# Patient Record
Sex: MALE | Race: BLACK OR AFRICAN AMERICAN | NOT HISPANIC OR LATINO | Employment: FULL TIME | URBAN - METROPOLITAN AREA
[De-identification: names, ages, dates, MRNs, and addresses within clinical notes are randomized per-mention and may not be internally consistent; named-entity substitution may affect disease eponyms.]

---

## 2023-03-02 PROBLEM — S30.1XXA ABDOMINAL WALL HEMATOMA: Status: ACTIVE | Noted: 2023-03-02

## 2023-03-02 PROBLEM — I73.9 PAD (PERIPHERAL ARTERY DISEASE) (CMS-HCC): Status: ACTIVE | Noted: 2023-03-02

## 2023-03-02 PROBLEM — E55.9 VITAMIN D DEFICIENCY: Status: ACTIVE | Noted: 2023-03-02

## 2023-03-02 PROBLEM — N52.9 ERECTILE DYSFUNCTION: Status: ACTIVE | Noted: 2023-03-02

## 2023-03-02 PROBLEM — T80.211S: Status: ACTIVE | Noted: 2023-03-02

## 2023-03-02 PROBLEM — N28.9 KIDNEY LESION, NATIVE, LEFT: Status: ACTIVE | Noted: 2023-03-02

## 2023-03-02 PROBLEM — D64.9 ANEMIA: Status: ACTIVE | Noted: 2023-03-02

## 2023-03-02 PROBLEM — E11.9 DIABETES MELLITUS (MULTI): Status: ACTIVE | Noted: 2023-03-02

## 2023-03-02 PROBLEM — R63.0 POOR APPETITE: Status: ACTIVE | Noted: 2023-03-02

## 2023-03-02 PROBLEM — E11.649 DIABETIC HYPOGLYCEMIA (MULTI): Status: ACTIVE | Noted: 2023-03-02

## 2023-03-02 PROBLEM — M79.605 PAIN OF LEFT LOWER EXTREMITY: Status: ACTIVE | Noted: 2023-03-02

## 2023-03-02 PROBLEM — R78.81 COAG NEGATIVE STAPHYLOCOCCUS BACTEREMIA: Status: ACTIVE | Noted: 2023-03-02

## 2023-03-02 PROBLEM — K63.8219 SMALL INTESTINAL BACTERIAL OVERGROWTH: Status: ACTIVE | Noted: 2023-03-02

## 2023-03-02 PROBLEM — J30.2 SEASONAL ALLERGIES: Status: ACTIVE | Noted: 2023-03-02

## 2023-03-02 PROBLEM — L02.419 ABSCESS, AXILLA: Status: ACTIVE | Noted: 2023-03-02

## 2023-03-02 PROBLEM — B19.20 HEPATITIS C VIRUS: Status: ACTIVE | Noted: 2023-03-02

## 2023-03-02 PROBLEM — I50.32 CHRONIC DIASTOLIC HEART FAILURE (MULTI): Status: ACTIVE | Noted: 2023-03-02

## 2023-03-02 PROBLEM — H61.20 CERUMEN IMPACTION: Status: ACTIVE | Noted: 2023-03-02

## 2023-03-02 PROBLEM — R50.9 FEVER WITH CHILLS: Status: ACTIVE | Noted: 2023-03-02

## 2023-03-02 PROBLEM — A04.72 CLOSTRIDIUM DIFFICILE DIARRHEA: Status: ACTIVE | Noted: 2023-03-02

## 2023-03-02 PROBLEM — K85.10 GALLSTONE PANCREATITIS (HHS-HCC): Status: ACTIVE | Noted: 2023-03-02

## 2023-03-02 PROBLEM — S62.609A FINGER FRACTURE: Status: ACTIVE | Noted: 2023-03-02

## 2023-03-02 PROBLEM — I49.1 SUPRAVENTRICULAR PREMATURE BEATS: Status: ACTIVE | Noted: 2023-03-02

## 2023-03-02 PROBLEM — H10.13 ALLERGIC CONJUNCTIVITIS OF BOTH EYES: Status: ACTIVE | Noted: 2023-03-02

## 2023-03-02 PROBLEM — I10 HYPERTENSION: Status: ACTIVE | Noted: 2023-03-02

## 2023-03-02 PROBLEM — H02.834 DERMATOCHALASIS OF LEFT UPPER EYELID: Status: ACTIVE | Noted: 2023-03-02

## 2023-03-02 PROBLEM — N18.9 CHRONIC RENAL INSUFFICIENCY: Status: ACTIVE | Noted: 2023-03-02

## 2023-03-02 PROBLEM — T85.698A MECHANICAL COMPLICATION DUE TO TISSUE GRAFT: Status: ACTIVE | Noted: 2023-03-02

## 2023-03-02 PROBLEM — I82.409 DEEP VEIN THROMBOSIS (DVT) OF LOWER EXTREMITY (MULTI): Status: ACTIVE | Noted: 2023-03-02

## 2023-03-02 PROBLEM — M17.12 ARTHRITIS OF KNEE, LEFT: Status: ACTIVE | Noted: 2023-03-02

## 2023-03-02 PROBLEM — N17.9 AKI (ACUTE KIDNEY INJURY) (CMS-HCC): Status: ACTIVE | Noted: 2023-03-02

## 2023-03-02 PROBLEM — R93.89 ABNORMAL CT OF THE CHEST: Status: ACTIVE | Noted: 2023-03-02

## 2023-03-02 PROBLEM — E61.1 LOW IRON: Status: ACTIVE | Noted: 2023-03-02

## 2023-03-02 PROBLEM — F41.8 DEPRESSION WITH ANXIETY: Status: ACTIVE | Noted: 2023-03-02

## 2023-03-02 PROBLEM — K85.90 PANCREATITIS (HHS-HCC): Status: ACTIVE | Noted: 2023-03-02

## 2023-03-02 PROBLEM — I50.32: Status: ACTIVE | Noted: 2023-03-02

## 2023-03-02 PROBLEM — T82.9XXA COMPLICATION OF VASCULAR ACCESS FOR DIALYSIS: Status: ACTIVE | Noted: 2023-03-02

## 2023-03-02 PROBLEM — B95.7 COAG NEGATIVE STAPHYLOCOCCUS BACTEREMIA: Status: ACTIVE | Noted: 2023-03-02

## 2023-03-02 PROBLEM — D84.9 IMMUNOSUPPRESSION (MULTI): Status: ACTIVE | Noted: 2023-03-02

## 2023-03-02 PROBLEM — R06.09 DYSPNEA ON EXERTION: Status: ACTIVE | Noted: 2023-03-02

## 2023-03-02 PROBLEM — Z94.0 KIDNEY REPLACED BY TRANSPLANT (HHS-HCC): Status: ACTIVE | Noted: 2023-03-02

## 2023-03-02 PROBLEM — E53.8 VITAMIN B12 DEFICIENCY: Status: ACTIVE | Noted: 2023-03-02

## 2023-03-02 PROBLEM — R10.32 LEFT LOWER QUADRANT PAIN: Status: ACTIVE | Noted: 2023-03-02

## 2023-03-02 PROBLEM — R19.7 DIARRHEA: Status: ACTIVE | Noted: 2023-03-02

## 2023-03-02 PROBLEM — R10.9 CHRONIC ABDOMINAL PAIN: Status: ACTIVE | Noted: 2023-03-02

## 2023-03-02 PROBLEM — H04.123 DRY EYES, BILATERAL: Status: ACTIVE | Noted: 2023-03-02

## 2023-03-02 PROBLEM — N20.0 RIGHT KIDNEY STONE: Status: ACTIVE | Noted: 2023-03-02

## 2023-03-02 PROBLEM — K21.9 GERD (GASTROESOPHAGEAL REFLUX DISEASE): Status: ACTIVE | Noted: 2023-03-02

## 2023-03-02 PROBLEM — R04.0 EPISTAXIS: Status: ACTIVE | Noted: 2023-03-02

## 2023-03-02 PROBLEM — M51.36 LUMBAR DEGENERATIVE DISC DISEASE: Status: ACTIVE | Noted: 2023-03-02

## 2023-03-02 PROBLEM — L02.01 FACIAL ABSCESS: Status: ACTIVE | Noted: 2023-03-02

## 2023-03-02 PROBLEM — M16.12 ARTHRITIS OF LEFT HIP: Status: ACTIVE | Noted: 2023-03-02

## 2023-03-02 PROBLEM — I50.9 CHF (CONGESTIVE HEART FAILURE) (MULTI): Status: ACTIVE | Noted: 2023-03-02

## 2023-03-02 PROBLEM — H52.10 MYOPIA: Status: ACTIVE | Noted: 2023-03-02

## 2023-03-02 PROBLEM — Z94.9 TRANSPLANT: Status: ACTIVE | Noted: 2023-03-02

## 2023-03-02 PROBLEM — E78.5 DYSLIPIDEMIA: Status: ACTIVE | Noted: 2023-03-02

## 2023-03-02 PROBLEM — L30.9 DERMATITIS: Status: ACTIVE | Noted: 2023-03-02

## 2023-03-02 PROBLEM — R09.02 HYPOXIA: Status: ACTIVE | Noted: 2023-03-02

## 2023-03-02 PROBLEM — R53.83 FATIGUE: Status: ACTIVE | Noted: 2023-03-02

## 2023-03-02 PROBLEM — G47.33 OBSTRUCTIVE SLEEP APNEA: Status: ACTIVE | Noted: 2023-03-02

## 2023-03-02 PROBLEM — E78.5 HYPERLIPIDEMIA: Status: ACTIVE | Noted: 2023-03-02

## 2023-03-02 PROBLEM — I48.0 PAROXYSMAL ATRIAL FIBRILLATION (MULTI): Status: ACTIVE | Noted: 2023-03-02

## 2023-03-02 PROBLEM — H25.813 COMBINED FORM OF AGE-RELATED CATARACT, BOTH EYES: Status: ACTIVE | Noted: 2023-03-02

## 2023-03-02 PROBLEM — H02.403 ACQUIRED INVOLUTIONAL PTOSIS OF BOTH EYELIDS: Status: ACTIVE | Noted: 2023-03-02

## 2023-03-02 PROBLEM — N18.6 ESRD (END STAGE RENAL DISEASE) (MULTI): Status: ACTIVE | Noted: 2023-03-02

## 2023-03-02 PROBLEM — R31.9 HEMATURIA: Status: ACTIVE | Noted: 2023-03-02

## 2023-03-02 PROBLEM — R39.9 SYMPTOMS INVOLVING URINARY SYSTEM: Status: ACTIVE | Noted: 2023-03-02

## 2023-03-02 PROBLEM — E66.01 MORBID OBESITY (MULTI): Status: ACTIVE | Noted: 2023-03-02

## 2023-03-02 PROBLEM — R30.0 DYSURIA: Status: ACTIVE | Noted: 2023-03-02

## 2023-03-02 PROBLEM — H52.209 ASTIGMATISM: Status: ACTIVE | Noted: 2023-03-02

## 2023-03-02 PROBLEM — M10.9 GOUT: Status: ACTIVE | Noted: 2023-03-02

## 2023-03-02 PROBLEM — H02.831 DERMATOCHALASIS OF RIGHT UPPER EYELID: Status: ACTIVE | Noted: 2023-03-02

## 2023-03-02 PROBLEM — R05.9 COUGH: Status: ACTIVE | Noted: 2023-03-02

## 2023-03-02 PROBLEM — R60.9 EDEMA: Status: ACTIVE | Noted: 2023-03-02

## 2023-03-02 PROBLEM — R52 PAIN: Status: ACTIVE | Noted: 2023-03-02

## 2023-03-02 PROBLEM — R07.9 CHEST PAIN: Status: ACTIVE | Noted: 2023-03-02

## 2023-03-02 PROBLEM — K80.20 CHOLELITHIASIS: Status: ACTIVE | Noted: 2023-03-02

## 2023-03-02 PROBLEM — H57.813 BROW PTOSIS, BILATERAL: Status: ACTIVE | Noted: 2023-03-02

## 2023-03-02 PROBLEM — I48.91 A-FIB (MULTI): Status: ACTIVE | Noted: 2023-03-02

## 2023-03-02 PROBLEM — G89.29 CHRONIC ABDOMINAL PAIN: Status: ACTIVE | Noted: 2023-03-02

## 2023-03-02 PROBLEM — K57.32 DIVERTICULITIS OF COLON: Status: ACTIVE | Noted: 2023-03-02

## 2023-03-02 PROBLEM — E86.0 DEHYDRATION: Status: ACTIVE | Noted: 2023-03-02

## 2023-03-02 PROBLEM — J18.9 PNEUMONIA: Status: ACTIVE | Noted: 2023-03-02

## 2023-03-02 PROBLEM — R06.02 SHORTNESS OF BREATH: Status: ACTIVE | Noted: 2023-03-02

## 2023-03-02 PROBLEM — J45.909 REACTIVE AIRWAY DISEASE (HHS-HCC): Status: ACTIVE | Noted: 2023-03-02

## 2023-03-02 PROBLEM — G44.209 HEADACHE, TENSION-TYPE: Status: ACTIVE | Noted: 2023-03-02

## 2023-03-02 PROBLEM — M54.16 LEFT LUMBAR RADICULOPATHY: Status: ACTIVE | Noted: 2023-03-02

## 2023-03-02 PROBLEM — R60.0 LEG EDEMA, LEFT: Status: ACTIVE | Noted: 2023-03-02

## 2023-03-02 LAB
ABO GROUP (TYPE) IN BLOOD: NORMAL
ALANINE AMINOTRANSFERASE (SGPT) (U/L) IN SER/PLAS: 10 U/L (ref 10–52)
ALBUMIN (G/DL) IN SER/PLAS: 4.2 G/DL (ref 3.4–5)
ALKALINE PHOSPHATASE (U/L) IN SER/PLAS: 295 U/L (ref 33–120)
APPEARANCE, URINE: NORMAL
ASCORBIC ACID: NORMAL MG/DL
ASPARTATE AMINOTRANSFERASE (SGOT) (U/L) IN SER/PLAS: 21 U/L (ref 9–39)
BILIRUBIN DIRECT (MG/DL) IN SER/PLAS: 0.6 MG/DL (ref 0–0.3)
BILIRUBIN TOTAL (MG/DL) IN SER/PLAS: 1.5 MG/DL (ref 0–1.2)
BILIRUBIN, URINE: NORMAL
BLOOD, URINE: NORMAL
C PEPTIDE (NG/ML) IN SER/PLAS: 2.2 NG/ML (ref 0.7–3.9)
COLOR, URINE: NORMAL
CREATININE (MG/DL) IN SER/PLAS: 5.92 MG/DL (ref 0.5–1.3)
CYTOMEGALOVIRUS IGG ANTIBODY: REACTIVE
ESTIMATED AVERAGE GLUCOSE FOR HBA1C: 108 MG/DL
FLOW AUTOCROSSMATCH: NORMAL
GFR MALE: 10 ML/MIN/1.73M2
GLUCOSE, URINE: NORMAL
HEMOGLOBIN A1C/HEMOGLOBIN TOTAL IN BLOOD: 5.4 %
HEPATITIS B VIRUS CORE AB (PRESENCE) IN SER/PLAS BY IMM: NONREACTIVE
HEPATITIS B VIRUS SURFACE AB (MIU/ML) IN SERUM: 33.1 MIU/ML
HEPATITIS B VIRUS SURFACE AG PRESENCE IN SERUM: NONREACTIVE
HEPATITIS C VIRUS AB PRESENCE IN SERUM: REACTIVE
HIV 1/ 2 AG/AB SCREEN: NONREACTIVE
HLA CLASS I ANTIBODY SCREEN, FLOW CYTOMETRY: NORMAL
HLA CLASS II ANTIBODY SCREEN, FLOW CYTOMETRY: NORMAL
KETONES, URINE: NORMAL
LEUKOCYTE ESTERASE, URINE: NORMAL
NITRITE, URINE: NORMAL
PH, URINE: NORMAL
PHOSPHATE (MG/DL) IN SER/PLAS: 3.8 MG/DL (ref 2.5–4.9)
PROSTATE SPECIFIC AG (NG/ML) IN SER/PLAS: 0.24 NG/ML (ref 0–4)
PROTEIN TOTAL: 7.1 G/DL (ref 6.4–8.2)
PROTEIN, URINE: NORMAL
RH FACTOR: NORMAL
SPECIFIC GRAVITY, URINE: NORMAL
SYPHILIS TOTAL AB: NONREACTIVE
T-SPOT. TB INTERPRETATION: NORMAL
UREA NITROGEN (MG/DL) IN SER/PLAS: 19 MG/DL (ref 6–23)
UROBILINOGEN, URINE: NORMAL
VARICELLA ZOSTER IGG: POSITIVE

## 2023-03-02 RX ORDER — EPINASTINE HYDROCHLORIDE 0.5 MG/ML
SOLUTION/ DROPS OPHTHALMIC
COMMUNITY
Start: 2021-06-09

## 2023-03-02 RX ORDER — ERGOCALCIFEROL 1.25 MG/1
CAPSULE ORAL
COMMUNITY
Start: 2020-09-03

## 2023-03-02 RX ORDER — CHOLESTYRAMINE 4 G/9G
POWDER, FOR SUSPENSION ORAL
Status: ON HOLD | COMMUNITY
Start: 2022-06-21 | End: 2023-10-10 | Stop reason: WASHOUT

## 2023-03-02 RX ORDER — ALLOPURINOL 300 MG/1
TABLET ORAL
COMMUNITY
End: 2023-06-28 | Stop reason: SINTOL

## 2023-03-02 RX ORDER — BLOOD-GLUCOSE METER
KIT MISCELLANEOUS
COMMUNITY
Start: 2018-11-27 | End: 2023-07-20 | Stop reason: ALTCHOICE

## 2023-03-02 RX ORDER — SEVELAMER CARBONATE 800 MG/1
TABLET, FILM COATED ORAL
COMMUNITY
Start: 2021-07-23 | End: 2023-10-06 | Stop reason: ALTCHOICE

## 2023-03-02 RX ORDER — GABAPENTIN 300 MG/1
300 CAPSULE ORAL NIGHTLY
COMMUNITY
Start: 2021-07-06 | End: 2023-06-27 | Stop reason: SDUPTHER

## 2023-03-02 RX ORDER — VIT B COMP NO.3/FOLIC/C/BIOTIN 1 MG-60 MG
1 TABLET ORAL DAILY
COMMUNITY
Start: 2021-08-09 | End: 2023-10-06 | Stop reason: SDUPTHER

## 2023-03-02 RX ORDER — OMEPRAZOLE 40 MG/1
1 CAPSULE, DELAYED RELEASE ORAL 2 TIMES DAILY
COMMUNITY
Start: 2012-12-30 | End: 2023-10-06 | Stop reason: ALTCHOICE

## 2023-03-02 RX ORDER — DULOXETIN HYDROCHLORIDE 30 MG/1
30 CAPSULE, DELAYED RELEASE ORAL DAILY
COMMUNITY
End: 2023-06-27 | Stop reason: SDUPTHER

## 2023-03-02 RX ORDER — INSULIN ASPART 100 [IU]/ML
INJECTION, SOLUTION INTRAVENOUS; SUBCUTANEOUS
COMMUNITY
Start: 2019-02-27 | End: 2023-07-20 | Stop reason: ALTCHOICE

## 2023-03-02 RX ORDER — SILDENAFIL 100 MG/1
TABLET, FILM COATED ORAL
Status: ON HOLD | COMMUNITY
Start: 2018-05-11 | End: 2023-10-10 | Stop reason: WASHOUT

## 2023-03-02 RX ORDER — TORSEMIDE 20 MG/1
TABLET ORAL
COMMUNITY
Start: 2017-10-03 | End: 2023-05-10 | Stop reason: ALTCHOICE

## 2023-03-02 RX ORDER — METOPROLOL TARTRATE 50 MG/1
1 TABLET ORAL EVERY 12 HOURS
COMMUNITY
Start: 2013-01-14 | End: 2023-10-06 | Stop reason: SDUPTHER

## 2023-03-02 RX ORDER — MULTIVITAMIN
1 TABLET ORAL DAILY
COMMUNITY

## 2023-03-02 RX ORDER — AMLODIPINE BESYLATE 10 MG/1
1 TABLET ORAL DAILY
COMMUNITY
Start: 2018-01-29 | End: 2023-05-10 | Stop reason: SDUPTHER

## 2023-03-02 RX ORDER — ASPIRIN 81 MG/1
1 TABLET ORAL DAILY
COMMUNITY
Start: 2022-01-06 | End: 2023-10-06 | Stop reason: SDUPTHER

## 2023-03-02 RX ORDER — BLOOD SUGAR DIAGNOSTIC
STRIP MISCELLANEOUS
COMMUNITY
Start: 2018-02-26 | End: 2023-07-20 | Stop reason: ALTCHOICE

## 2023-03-02 RX ORDER — PREDNISONE 5 MG/1
5 TABLET ORAL DAILY
COMMUNITY
Start: 2013-07-18 | End: 2023-07-20 | Stop reason: ALTCHOICE

## 2023-03-03 LAB
EBV INTERPRETATION: ABNORMAL
EPSTEIN-BARR VCA IGG: POSITIVE
EPSTEIN-BARR VCA IGM: NEGATIVE
EPSTEIN-BARR VIRUS EARLY ANTIGEN ANTIBODY, IGG: POSITIVE
EPSTIEN-BARR NUCLEAR ANTIGEN AB: POSITIVE
HCV PCR QUANT: NOT DETECTED IU/ML
HCV RNA, PCR LOG: NORMAL LOG10 IU/ML

## 2023-03-04 LAB
NIL(NEG) CONTROL SPOT COUNT: NORMAL
PANEL A SPOT COUNT: 0
PANEL B SPOT COUNT: 0
POS CONTROL SPOT COUNT: NORMAL
T-SPOT. TB INTERPRETATION: NEGATIVE

## 2023-03-06 LAB
AMPHETAMINE SCREEN BLOOD: NEGATIVE NG/ML
BARBITURATE SCREEN BLOOD: NEGATIVE NG/ML
BENZODIAZEPINES SCREEN BLOOD: NEGATIVE NG/ML
BUPRENORPHINE SCREEN BLOOD: NEGATIVE NG/ML
CANNABINOIDS SCREEN BLOOD: NEGATIVE NG/ML
COCAINE SCREEN BLOOD: NEGATIVE NG/ML
DRUG SCREEN COMMENT BLOOD: NORMAL
METHADONE SCREEN BLOOD: NEGATIVE NG/ML
METHAMPHETAMINE, BLOOD, SCREEN: NEGATIVE NG/ML
OPIATE SCREEN BLOOD: NEGATIVE NG/ML
OXYCODONE SCREEN BLOOD: NEGATIVE NG/ML
PHENCYCLIDINE SCREEN BLOOD: NEGATIVE NG/ML

## 2023-03-07 LAB
COTININE BLOOD QUANTITATIVE: <5 NG/ML
NICOTINE BLOOD QUANTITATIVE: <5 NG/ML

## 2023-03-15 ENCOUNTER — OFFICE VISIT (OUTPATIENT)
Dept: PRIMARY CARE | Facility: CLINIC | Age: 58
End: 2023-03-15
Payer: MEDICARE

## 2023-03-15 VITALS
HEART RATE: 87 BPM | SYSTOLIC BLOOD PRESSURE: 144 MMHG | BODY MASS INDEX: 33.19 KG/M2 | DIASTOLIC BLOOD PRESSURE: 78 MMHG | RESPIRATION RATE: 20 BRPM | WEIGHT: 238 LBS

## 2023-03-15 DIAGNOSIS — N18.6 ESRD (END STAGE RENAL DISEASE) (MULTI): ICD-10-CM

## 2023-03-15 DIAGNOSIS — E66.09 CLASS 1 OBESITY DUE TO EXCESS CALORIES WITH SERIOUS COMORBIDITY AND BODY MASS INDEX (BMI) OF 33.0 TO 33.9 IN ADULT: ICD-10-CM

## 2023-03-15 DIAGNOSIS — F33.41 RECURRENT MAJOR DEPRESSIVE DISORDER, IN PARTIAL REMISSION (CMS-HCC): ICD-10-CM

## 2023-03-15 DIAGNOSIS — I73.9 PAD (PERIPHERAL ARTERY DISEASE) (CMS-HCC): ICD-10-CM

## 2023-03-15 DIAGNOSIS — R60.1 ANASARCA: ICD-10-CM

## 2023-03-15 DIAGNOSIS — I50.9 CHRONIC CONGESTIVE HEART FAILURE, UNSPECIFIED HEART FAILURE TYPE (MULTI): Primary | ICD-10-CM

## 2023-03-15 DIAGNOSIS — I48.0 PAROXYSMAL ATRIAL FIBRILLATION (MULTI): ICD-10-CM

## 2023-03-15 DIAGNOSIS — Z94.0 KIDNEY REPLACED BY TRANSPLANT (HHS-HCC): ICD-10-CM

## 2023-03-15 PROCEDURE — 3008F BODY MASS INDEX DOCD: CPT | Performed by: INTERNAL MEDICINE

## 2023-03-15 PROCEDURE — 99214 OFFICE O/P EST MOD 30 MIN: CPT | Performed by: INTERNAL MEDICINE

## 2023-03-15 PROCEDURE — 3078F DIAST BP <80 MM HG: CPT | Performed by: INTERNAL MEDICINE

## 2023-03-15 PROCEDURE — 3066F NEPHROPATHY DOC TX: CPT | Performed by: INTERNAL MEDICINE

## 2023-03-15 PROCEDURE — 3077F SYST BP >= 140 MM HG: CPT | Performed by: INTERNAL MEDICINE

## 2023-03-15 PROCEDURE — 3044F HG A1C LEVEL LT 7.0%: CPT | Performed by: INTERNAL MEDICINE

## 2023-03-15 ASSESSMENT — ENCOUNTER SYMPTOMS
RESPIRATORY NEGATIVE: 1
FATIGUE: 1
NEUROLOGICAL NEGATIVE: 1
ARTHRALGIAS: 1
CARDIOVASCULAR NEGATIVE: 1
BACK PAIN: 1
ABDOMINAL PAIN: 1
DYSPHORIC MOOD: 1
EYES NEGATIVE: 1

## 2023-03-15 NOTE — PROGRESS NOTES
Subjective   Patient ID: Nghia Hall is a 57 y.o. male who presents for Discuss Disability .    HPI     Patient with a ESRD on hemodialysis CHF diabetes A-fib PAD depression obesity status post renal transplant presents for evaluation for disability.  Patient goes to hemodialysis 3 days a week misses quite a bit of time from work due to dialysis plus recovered from dialysis including fatigue and just general malaise along with recovery time.  Patient has been under his dry weight 110 kg last hemodialysis down to 106 kg.  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  Does have some passive fluid of the right breast tissue with some discomfort in that area comes and goes in terms of soreness.  Symptoms conditions moderate in severity stable controlled past 3 months.    Review of Systems   Constitutional:  Positive for fatigue.   HENT: Negative.     Eyes: Negative.    Respiratory: Negative.     Cardiovascular: Negative.    Gastrointestinal:  Positive for abdominal pain.   Genitourinary: Negative.         Stable esrd   Musculoskeletal:  Positive for arthralgias and back pain.   Skin: Negative.    Neurological: Negative.    Psychiatric/Behavioral:  Positive for dysphoric mood.        Objective   /78 (Patient Position: Sitting)   Pulse 87   Resp 20   Wt 108 kg (238 lb)   BMI 33.19 kg/m²     Physical Exam  Constitutional:       Appearance: Normal appearance. He is obese.   HENT:      Head: Normocephalic.      Nose: Nose normal.   Pulmonary:      Effort: Pulmonary effort is normal.   Abdominal:      Palpations: Abdomen is soft.   Musculoskeletal:         General: Normal range of motion.      Cervical back: Normal range of motion.      Right lower leg: No edema.      Left lower  leg: No edema.   Skin:     General: Skin is warm and dry.      Comments: Anasarca with fluid passive congestion right breast which is soft and NT   Neurological:      General: No focal deficit present.      Mental Status: He is alert and oriented to person, place, and time. Mental status is at baseline.   Psychiatric:         Behavior: Behavior normal.         Thought Content: Thought content normal.         Judgment: Judgment normal.      Comments: Flat affect         Assessment/Plan   Diagnoses and all orders for this visit:  Chronic congestive heart failure, unspecified heart failure type (CMS/[facility])  ESRD (end stage renal disease) (CMS/[facility])  Paroxysmal atrial fibrillation (CMS/[facility])  PAD (peripheral artery disease) (CMS/[facility])  Kidney replaced by transplant  Anasarca  Recurrent major depressive disorder, in partial remission (CMS/[facility])  Class 1 obesity due to excess calories with serious comorbidity and body mass index (BMI) of 33.0 to 33.9 in adult       ESRD on hemodialysis CHF diabetes A-fib obesity PAD depression status post renal transplant patient continues to decline with multiple comorbidities malaise and fatigue body pains from anasarca as fluid on board and his hemodialysis continue to eat away at him unfortunately.  I would certainly support permanent disability which we discussed.  I would look into Social Security for permanent disability along with his long-term care policy through his job.  I will forward records but would fully support permanent disability given how sick notices been particular with all the hospitalizations for all of his medical problems as we well know.  Continue hemodialysis care per nephrology.  Volume status stable.  We will help him in any way we can.    This note dictated with Dragon software, not proofread for errors or punctuation.

## 2023-03-16 ENCOUNTER — TELEPHONE (OUTPATIENT)
Dept: PRIMARY CARE | Facility: CLINIC | Age: 58
End: 2023-03-16
Payer: COMMERCIAL

## 2023-03-16 NOTE — TELEPHONE ENCOUNTER
Patient just saw you yesterday. He went to dialysis today and feels like his legs  (where the grafs are) give out each time he gets up from the table. States it's the second time it has happened.     He wants to know who should he see or what to do. [phone]    Please send to Ritika so she can call him on Friday-Thank you!

## 2023-03-17 DIAGNOSIS — M16.10 ARTHRITIS, HIP: ICD-10-CM

## 2023-03-17 DIAGNOSIS — M17.10 ARTHRITIS OF KNEE: Primary | ICD-10-CM

## 2023-03-27 NOTE — TELEPHONE ENCOUNTER
Pt called in and stated that he has been having stomach issues off and on for past couple months. Pt states he has tightness and feels dehydrated cant stop using bathroom. Pt needs advice he also states that he had this same problem in the past and he is on dialysis so he cannot drink a lot of water.      # [phone]

## 2023-04-05 DIAGNOSIS — R19.8 ABDOMINAL TIGHTNESS: Primary | ICD-10-CM

## 2023-04-06 ENCOUNTER — DOCUMENTATION (OUTPATIENT)
Dept: PRIMARY CARE | Facility: CLINIC | Age: 58
End: 2023-04-06

## 2023-04-06 ENCOUNTER — OFFICE VISIT (OUTPATIENT)
Dept: PRIMARY CARE | Facility: CLINIC | Age: 58
End: 2023-04-06
Payer: MEDICARE

## 2023-04-06 ENCOUNTER — LAB (OUTPATIENT)
Dept: LAB | Facility: LAB | Age: 58
End: 2023-04-06
Payer: MEDICARE

## 2023-04-06 VITALS
BODY MASS INDEX: 32.48 KG/M2 | HEART RATE: 72 BPM | RESPIRATION RATE: 20 BRPM | HEIGHT: 71 IN | SYSTOLIC BLOOD PRESSURE: 117 MMHG | WEIGHT: 232 LBS | DIASTOLIC BLOOD PRESSURE: 64 MMHG

## 2023-04-06 DIAGNOSIS — R94.6 ABNORMAL RESULTS OF THYROID FUNCTION STUDIES: ICD-10-CM

## 2023-04-06 DIAGNOSIS — E11.22 TYPE 2 DIABETES MELLITUS WITH CHRONIC KIDNEY DISEASE ON CHRONIC DIALYSIS, WITH LONG-TERM CURRENT USE OF INSULIN (MULTI): ICD-10-CM

## 2023-04-06 DIAGNOSIS — Z99.2 DEPENDENCE ON RENAL DIALYSIS (CMS-HCC): ICD-10-CM

## 2023-04-06 DIAGNOSIS — D47.Z1 POST-TRANSPLANT LYMPHOPROLIFERATIVE DISORDER (PTLD) (CODE): ICD-10-CM

## 2023-04-06 DIAGNOSIS — E66.01 MORBID (SEVERE) OBESITY DUE TO EXCESS CALORIES (MULTI): ICD-10-CM

## 2023-04-06 DIAGNOSIS — G89.29 CHRONIC ABDOMINAL PAIN: Primary | ICD-10-CM

## 2023-04-06 DIAGNOSIS — R19.7 DIARRHEA, UNSPECIFIED TYPE: ICD-10-CM

## 2023-04-06 DIAGNOSIS — N18.6 TYPE 2 DIABETES MELLITUS WITH CHRONIC KIDNEY DISEASE ON CHRONIC DIALYSIS, WITH LONG-TERM CURRENT USE OF INSULIN (MULTI): ICD-10-CM

## 2023-04-06 DIAGNOSIS — K63.8219 SMALL INTESTINAL BACTERIAL OVERGROWTH: ICD-10-CM

## 2023-04-06 DIAGNOSIS — R10.9 CHRONIC ABDOMINAL PAIN: Primary | ICD-10-CM

## 2023-04-06 DIAGNOSIS — D84.9 IMMUNODEFICIENCY, UNSPECIFIED (MULTI): ICD-10-CM

## 2023-04-06 DIAGNOSIS — R10.9 CHRONIC ABDOMINAL PAIN: ICD-10-CM

## 2023-04-06 DIAGNOSIS — I48.0 PAROXYSMAL ATRIAL FIBRILLATION (MULTI): ICD-10-CM

## 2023-04-06 DIAGNOSIS — N18.6 ESRD (END STAGE RENAL DISEASE) (MULTI): ICD-10-CM

## 2023-04-06 DIAGNOSIS — Z79.4 TYPE 2 DIABETES MELLITUS WITH CHRONIC KIDNEY DISEASE ON CHRONIC DIALYSIS, WITH LONG-TERM CURRENT USE OF INSULIN (MULTI): ICD-10-CM

## 2023-04-06 DIAGNOSIS — G89.29 CHRONIC ABDOMINAL PAIN: ICD-10-CM

## 2023-04-06 DIAGNOSIS — Z99.2 TYPE 2 DIABETES MELLITUS WITH CHRONIC KIDNEY DISEASE ON CHRONIC DIALYSIS, WITH LONG-TERM CURRENT USE OF INSULIN (MULTI): ICD-10-CM

## 2023-04-06 DIAGNOSIS — N25.81 SECONDARY HYPERPARATHYROIDISM OF RENAL ORIGIN (MULTI): ICD-10-CM

## 2023-04-06 LAB
ALANINE AMINOTRANSFERASE (SGPT) (U/L) IN SER/PLAS: 6 U/L (ref 10–52)
ALBUMIN (G/DL) IN SER/PLAS: 3.7 G/DL (ref 3.4–5)
ALKALINE PHOSPHATASE (U/L) IN SER/PLAS: 236 U/L (ref 33–120)
ANION GAP IN SER/PLAS: 17 MMOL/L (ref 10–20)
ASPARTATE AMINOTRANSFERASE (SGOT) (U/L) IN SER/PLAS: 20 U/L (ref 9–39)
BASOPHILS (10*3/UL) IN BLOOD BY AUTOMATED COUNT: 0.08 X10E9/L (ref 0–0.1)
BASOPHILS/100 LEUKOCYTES IN BLOOD BY AUTOMATED COUNT: 2.8 % (ref 0–2)
BILIRUBIN TOTAL (MG/DL) IN SER/PLAS: 2 MG/DL (ref 0–1.2)
CALCIUM (MG/DL) IN SER/PLAS: 8.7 MG/DL (ref 8.6–10.3)
CARBON DIOXIDE, TOTAL (MMOL/L) IN SER/PLAS: 27 MMOL/L (ref 21–32)
CHLORIDE (MMOL/L) IN SER/PLAS: 95 MMOL/L (ref 98–107)
CREATININE (MG/DL) IN SER/PLAS: 5.6 MG/DL (ref 0.5–1.3)
EOSINOPHILS (10*3/UL) IN BLOOD BY AUTOMATED COUNT: 0.45 X10E9/L (ref 0–0.7)
EOSINOPHILS/100 LEUKOCYTES IN BLOOD BY AUTOMATED COUNT: 15.7 % (ref 0–6)
ERYTHROCYTE DISTRIBUTION WIDTH (RATIO) BY AUTOMATED COUNT: 17.9 % (ref 11.5–14.5)
ERYTHROCYTE MEAN CORPUSCULAR HEMOGLOBIN CONCENTRATION (G/DL) BY AUTOMATED: 30.6 G/DL (ref 32–36)
ERYTHROCYTE MEAN CORPUSCULAR VOLUME (FL) BY AUTOMATED COUNT: 95 FL (ref 80–100)
ERYTHROCYTES (10*6/UL) IN BLOOD BY AUTOMATED COUNT: 3.27 X10E12/L (ref 4.5–5.9)
GFR MALE: 11 ML/MIN/1.73M2
GLUCOSE (MG/DL) IN SER/PLAS: 67 MG/DL (ref 74–99)
HEMATOCRIT (%) IN BLOOD BY AUTOMATED COUNT: 31 % (ref 41–52)
HEMOGLOBIN (G/DL) IN BLOOD: 9.5 G/DL (ref 13.5–17.5)
IMMATURE GRANULOCYTES/100 LEUKOCYTES IN BLOOD BY AUTOMATED COUNT: 0 % (ref 0–0.9)
LEUKOCYTES (10*3/UL) IN BLOOD BY AUTOMATED COUNT: 2.9 X10E9/L (ref 4.4–11.3)
LYMPHOCYTES (10*3/UL) IN BLOOD BY AUTOMATED COUNT: 0.98 X10E9/L (ref 1.2–4.8)
LYMPHOCYTES/100 LEUKOCYTES IN BLOOD BY AUTOMATED COUNT: 34.1 % (ref 13–44)
MONOCYTES (10*3/UL) IN BLOOD BY AUTOMATED COUNT: 0.43 X10E9/L (ref 0.1–1)
MONOCYTES/100 LEUKOCYTES IN BLOOD BY AUTOMATED COUNT: 15 % (ref 2–10)
NEUTROPHILS (10*3/UL) IN BLOOD BY AUTOMATED COUNT: 0.93 X10E9/L (ref 1.2–7.7)
NEUTROPHILS/100 LEUKOCYTES IN BLOOD BY AUTOMATED COUNT: 32.4 % (ref 40–80)
PLATELETS (10*3/UL) IN BLOOD AUTOMATED COUNT: 138 X10E9/L (ref 150–450)
POTASSIUM (MMOL/L) IN SER/PLAS: 4.7 MMOL/L (ref 3.5–5.3)
PROTEIN TOTAL: 6.6 G/DL (ref 6.4–8.2)
SODIUM (MMOL/L) IN SER/PLAS: 134 MMOL/L (ref 136–145)
THYROTROPIN (MIU/L) IN SER/PLAS BY DETECTION LIMIT <= 0.05 MIU/L: 3.68 MIU/L (ref 0.44–3.98)
UREA NITROGEN (MG/DL) IN SER/PLAS: 11 MG/DL (ref 6–23)

## 2023-04-06 PROCEDURE — 36415 COLL VENOUS BLD VENIPUNCTURE: CPT

## 2023-04-06 PROCEDURE — 84443 ASSAY THYROID STIM HORMONE: CPT

## 2023-04-06 PROCEDURE — 3008F BODY MASS INDEX DOCD: CPT | Performed by: INTERNAL MEDICINE

## 2023-04-06 PROCEDURE — 3044F HG A1C LEVEL LT 7.0%: CPT | Performed by: INTERNAL MEDICINE

## 2023-04-06 PROCEDURE — 3078F DIAST BP <80 MM HG: CPT | Performed by: INTERNAL MEDICINE

## 2023-04-06 PROCEDURE — 3074F SYST BP LT 130 MM HG: CPT | Performed by: INTERNAL MEDICINE

## 2023-04-06 PROCEDURE — 99215 OFFICE O/P EST HI 40 MIN: CPT | Performed by: INTERNAL MEDICINE

## 2023-04-06 PROCEDURE — 3066F NEPHROPATHY DOC TX: CPT | Performed by: INTERNAL MEDICINE

## 2023-04-06 PROCEDURE — 85025 COMPLETE CBC W/AUTO DIFF WBC: CPT

## 2023-04-06 PROCEDURE — 80053 COMPREHEN METABOLIC PANEL: CPT

## 2023-04-06 RX ORDER — DIPHENOXYLATE HYDROCHLORIDE AND ATROPINE SULFATE 2.5; .025 MG/1; MG/1
1 TABLET ORAL 4 TIMES DAILY PRN
Qty: 40 TABLET | Refills: 1 | Status: SHIPPED | OUTPATIENT
Start: 2023-04-06 | End: 2023-04-26

## 2023-04-06 ASSESSMENT — ENCOUNTER SYMPTOMS
RESPIRATORY NEGATIVE: 1
NEUROLOGICAL NEGATIVE: 1
ARTHRALGIAS: 1
CARDIOVASCULAR NEGATIVE: 1
BACK PAIN: 1
FATIGUE: 1
EYES NEGATIVE: 1
DYSPHORIC MOOD: 1
ABDOMINAL PAIN: 1

## 2023-04-06 NOTE — PROGRESS NOTES
Subjective   Patient ID: Nghia Hall is a 58 y.o. male who presents for Letter for School/Work.    HPI     Review of Systems    Objective   There were no vitals taken for this visit.    Physical Exam    Assessment/Plan

## 2023-04-06 NOTE — PROGRESS NOTES
"Subjective   Patient ID: Nghia Hall is a 58 y.o. male who presents for Follow-up.    HPI     Patient with a ESRD on hemodialysis CHF diabetes A-fib PAD depression obesity status post renal transplant presents for evaluation for disability.  Patient goes to hemodialysis 3 days a week.  Patient's been having ongoing diarrhea with abdominal cramps for the better part of a month whenever he eats he has a watery bowel movement afterwards occasional formed slight nausea but no emesis tired during the day hard time sleeping at night.  Feels cold left side of his abdomen is sore from anasarca particularly lies on the left side not much of an appetite.  Reviewed his extensive work-up with CT and pelvis and PET scanning plus lymph node biopsy that fortunately did not disclose malignancy.  Abdominal lymph node presence may be consistent with low-grade lymphoproliferative disorder so he is followed with hematology at this point in time.  He had colonoscopy done that was negative last year.  Has tried Imodium which has not helped.  Generally just does not feel well.  Symptoms conditions moderate in severity stable controlled past 1 month.    Review of Systems   Constitutional:  Positive for fatigue.   HENT: Negative.     Eyes: Negative.    Respiratory: Negative.     Cardiovascular: Negative.    Gastrointestinal:  Positive for abdominal pain.   Genitourinary: Negative.         Stable esrd   Musculoskeletal:  Positive for arthralgias and back pain.   Skin: Negative.    Neurological: Negative.    Psychiatric/Behavioral:  Positive for dysphoric mood.        Objective   /64 (Patient Position: Sitting)   Pulse 72   Resp 20   Ht 1.803 m (5' 11\")   Wt 105 kg (232 lb)   BMI 32.36 kg/m²     Physical Exam  Constitutional:       Appearance: Normal appearance. He is obese.   HENT:      Head: Normocephalic.      Nose: Nose normal.   Pulmonary:      Effort: Pulmonary effort is normal.   Abdominal:      Palpations: Abdomen is " soft.   Musculoskeletal:         General: Normal range of motion.      Cervical back: Normal range of motion.      Right lower leg: No edema.      Left lower leg: No edema.   Skin:     General: Skin is warm and dry.      Comments: Anasarca with fluid passive congestion right breast which is soft and NT   Neurological:      General: No focal deficit present.      Mental Status: He is alert and oriented to person, place, and time. Mental status is at baseline.   Psychiatric:         Behavior: Behavior normal.         Thought Content: Thought content normal.         Judgment: Judgment normal.      Comments: Flat affect         Assessment/Plan   Diagnoses and all orders for this visit:  Chronic abdominal pain  -     CBC and Auto Differential; Future  -     Comprehensive Metabolic Panel; Future  -     TSH with reflex to Free T4 if abnormal; Future  -     CT abdomen pelvis wo IV contrast; Future  Diarrhea, unspecified type  -     Pancreatic Elastase, Fecal; Future  -     Stool Pathogen Panel, PCR; Future  -     C. difficile, PCR; Future  -     Referral to Gastroenterology; Future  -     diphenoxylate-atropine (Lomotil) 2.5-0.025 mg tablet; Take 1 tablet by mouth 4 times a day as needed for diarrhea for up to 20 days.  Small intestinal bacterial overgrowth  -     Breath Hydrogen Test-Dextrose (Urea); Future  Abnormal results of thyroid function studies  -     TSH with reflex to Free T4 if abnormal; Future  ESRD (end stage renal disease) (CMS/[facility])  Dependence on renal dialysis (CMS/[facility])  Secondary hyperparathyroidism of renal origin (CMS/[facility])  Post-transplant lymphoproliferative disorder (PTLD) (CODE) (CMS/[facility])  Immunodeficiency, unspecified (CMS/[facility])  Type 2 diabetes mellitus with chronic kidney disease on chronic dialysis, with long-term current use of insulin (CMS/[facility])  Morbid (severe) obesity due to excess calories (CMS/[facility])  Paroxysmal atrial fibrillation (CMS/[facility])       ESRD on hemodialysis CHF diabetes A-fib obesity  PAD depression status post renal transplant presents with ongoing diarrhea and some abdominal cramping.  He has lymphadenopathy in the abdomen with negative biopsy and negative PET scanning potential low-grade lymphoproliferative disorder but no evidence of malignancy and he will continue follow-up care with hematology.  Colonoscopy from last year was negative.  We will run his laboratories and will order C. difficile plus stool pathogen PCR.  This will be to rule out infectious process.  We should rule out pancreatic insufficiency with pancreatic elastase.  We will rule out SIBO with hydrogen breath testing.  We will try him on Lomotil for the diarrhea.  And will also refer him to GI for further investigation.  We will see what the work-up shows.  He will continue dialysis care as per nephrology for his ESRD.    This note dictated with Dragon software, not proofread for errors or punctuation.

## 2023-04-08 LAB
CAMPYLOBACTER GP: NOT DETECTED
NOROVIRUS GI/GII: NOT DETECTED
ROTAVIRUS A: NOT DETECTED
SALMONELLA SP.: NOT DETECTED
SHIGA TOXIN 1: NOT DETECTED
SHIGA TOXIN 2: NOT DETECTED
SHIGELLA SP.: NOT DETECTED
VIBRIO GRP.: NOT DETECTED
YERSINIA ENTEROCOLITICA: NOT DETECTED

## 2023-04-09 LAB — C. DIFFICILE TOXIN, PCR: NOT DETECTED

## 2023-04-14 LAB — PANCREATIC ELASTASE, FECAL: 229 UG/G

## 2023-04-24 ENCOUNTER — PATIENT OUTREACH (OUTPATIENT)
Dept: PRIMARY CARE | Facility: CLINIC | Age: 58
End: 2023-04-24
Payer: COMMERCIAL

## 2023-04-24 NOTE — PROGRESS NOTES
[place]  [facility] [place] Diagnosis:  Acute pancreatitis, covid symptoms   Admission Date:   4/12/23   Discharge Date:   4/22/23     PCP Appointment Date: task to office due to MD emergency   Specialist Appointment Date:     Hospital Encounter and Summary: Linked   See discharge assessment below for further details   No new scripts given at discharge    Engagement  Call Start Time: 1200 (4/24/2023 12:13 PM)    Medications  Medications reviewed with patient/caregiver?: No (no new meds given at discharge) (4/24/2023 12:13 PM)  Is the patient having any side effects they believe may be caused by any medication additions or changes?: No (4/24/2023 12:13 PM)  Does the patient have all medications ordered at discharge?: Not applicable (4/24/2023 12:13 PM)  Care Management Interventions: No intervention needed (4/24/2023 12:13 PM)  Prescription Comments: all meds remained the same (4/24/2023 12:13 PM)  Is the patient taking all medications as directed (includes completed medication regime)?: Yes (4/24/2023 12:13 PM)  Medication Comments: all meds remained the same (4/24/2023 12:13 PM)    Appointments  Does the patient have a primary care provider?: Yes (4/24/2023 12:13 PM)  Care Management Interventions: Educated patient on importance of making appointment (4/24/2023 12:13 PM)  Has the patient kept scheduled appointments due by today?: No (4/24/2023 12:13 PM)  What is preventing the patient from keeping their appointments?: Doesn't understand importance (4/24/2023 12:13 PM)  Care Management Interventions: Educated on importance of keeping appointment (4/24/2023 12:13 PM)    Self Management  What is the home health agency?: pt denies neeed (4/24/2023 12:13 PM)  Has home health visited the patient within 72 hours of discharge?: Not applicable (4/24/2023 12:13 PM)    Patient Teaching  Does the patient have access to their discharge instructions?: Yes (4/24/2023 12:13 PM)  Care Management Interventions: Reviewed  instructions with patient (4/24/2023 12:13 PM)  What is the patient's perception of their health status since discharge?: Improving (4/24/2023 12:13 PM)  Is the patient/caregiver able to teach back the hierarchy of who to call/visit for symptoms/problems? PCP, Specialist, Home Health nurse, Urgent Care, ED, 911: Yes (4/24/2023 12:13 PM)    Wrap Up  Wrap Up Additional Comments: pt reports doing well at home. denies need for hhc. has transporation to HD. pt called office for pcp appt but was told md out for emergency today so this  cm will send task to have offcie schedule follow up. (4/24/2023 12:13 PM)  Call End Time: 1216 (4/24/2023 12:13 PM)

## 2023-05-03 ENCOUNTER — APPOINTMENT (OUTPATIENT)
Dept: PRIMARY CARE | Facility: CLINIC | Age: 58
End: 2023-05-03
Payer: COMMERCIAL

## 2023-05-08 ENCOUNTER — PATIENT OUTREACH (OUTPATIENT)
Dept: PRIMARY CARE | Facility: CLINIC | Age: 58
End: 2023-05-08
Payer: COMMERCIAL

## 2023-05-08 RX ORDER — SENNOSIDES 8.6 MG/1
TABLET ORAL
COMMUNITY
Start: 2023-05-07 | End: 2023-05-11

## 2023-05-08 RX ORDER — DOCUSATE SODIUM 100 MG/1
CAPSULE, LIQUID FILLED ORAL 2 TIMES DAILY
COMMUNITY
Start: 2023-05-07 | End: 2023-05-11

## 2023-05-08 RX ORDER — METHOCARBAMOL 500 MG/1
TABLET, FILM COATED ORAL 4 TIMES DAILY
COMMUNITY
Start: 2023-05-07 | End: 2023-06-27 | Stop reason: ALTCHOICE

## 2023-05-08 RX ORDER — TRAMADOL HYDROCHLORIDE 50 MG/1
TABLET ORAL EVERY 6 HOURS
COMMUNITY
Start: 2023-05-07 | End: 2023-05-11

## 2023-05-08 RX ORDER — LIDOCAINE 560 MG/1
PATCH PERCUTANEOUS; TOPICAL; TRANSDERMAL EVERY 24 HOURS
COMMUNITY
Start: 2023-05-07 | End: 2023-05-09

## 2023-05-08 NOTE — PROGRESS NOTES
Dis[place]  [facility] [place]  Discharge Diagnosis:   FALL   Admission Date:  5/2/23   Discharge Date:   5/7/23     PCP Appointment Date:  5/10/23   Specialist Appointment Date:   Hospital Encounter and Summary: Linked   SCRIPTS GIVEN AT discharge  COLACE, LIDOCAINE, METHOCARBAMOL, SENNA, TRAMADOL  2 ATTEMPTS TO REACH PT AND ASSESS NEEDS

## 2023-05-09 ENCOUNTER — PATIENT OUTREACH (OUTPATIENT)
Dept: PRIMARY CARE | Facility: CLINIC | Age: 58
End: 2023-05-09
Payer: COMMERCIAL

## 2023-05-10 ENCOUNTER — PATIENT OUTREACH (OUTPATIENT)
Dept: PRIMARY CARE | Facility: CLINIC | Age: 58
End: 2023-05-10

## 2023-05-10 ENCOUNTER — OFFICE VISIT (OUTPATIENT)
Dept: PRIMARY CARE | Facility: CLINIC | Age: 58
End: 2023-05-10
Payer: MEDICARE

## 2023-05-10 VITALS
OXYGEN SATURATION: 98 % | HEIGHT: 71 IN | DIASTOLIC BLOOD PRESSURE: 70 MMHG | WEIGHT: 220 LBS | HEART RATE: 58 BPM | SYSTOLIC BLOOD PRESSURE: 110 MMHG | RESPIRATION RATE: 17 BRPM | BODY MASS INDEX: 30.8 KG/M2

## 2023-05-10 DIAGNOSIS — N18.6 ESRD (END STAGE RENAL DISEASE) (MULTI): ICD-10-CM

## 2023-05-10 DIAGNOSIS — E66.01 MORBID OBESITY (MULTI): ICD-10-CM

## 2023-05-10 DIAGNOSIS — I50.32 CHRONIC DIASTOLIC HEART FAILURE (MULTI): ICD-10-CM

## 2023-05-10 DIAGNOSIS — R29.6 RECURRENT FALLS: Primary | ICD-10-CM

## 2023-05-10 DIAGNOSIS — I10 PRIMARY HYPERTENSION: ICD-10-CM

## 2023-05-10 DIAGNOSIS — I48.0 PAROXYSMAL ATRIAL FIBRILLATION (MULTI): ICD-10-CM

## 2023-05-10 DIAGNOSIS — D84.9 IMMUNOSUPPRESSION (MULTI): ICD-10-CM

## 2023-05-10 DIAGNOSIS — I82.409 DEEP VEIN THROMBOSIS (DVT) OF LOWER EXTREMITY, UNSPECIFIED CHRONICITY, UNSPECIFIED LATERALITY, UNSPECIFIED VEIN (MULTI): ICD-10-CM

## 2023-05-10 PROCEDURE — 3044F HG A1C LEVEL LT 7.0%: CPT | Performed by: STUDENT IN AN ORGANIZED HEALTH CARE EDUCATION/TRAINING PROGRAM

## 2023-05-10 PROCEDURE — 1036F TOBACCO NON-USER: CPT | Performed by: STUDENT IN AN ORGANIZED HEALTH CARE EDUCATION/TRAINING PROGRAM

## 2023-05-10 PROCEDURE — 3078F DIAST BP <80 MM HG: CPT | Performed by: STUDENT IN AN ORGANIZED HEALTH CARE EDUCATION/TRAINING PROGRAM

## 2023-05-10 PROCEDURE — 99205 OFFICE O/P NEW HI 60 MIN: CPT | Performed by: STUDENT IN AN ORGANIZED HEALTH CARE EDUCATION/TRAINING PROGRAM

## 2023-05-10 PROCEDURE — 3008F BODY MASS INDEX DOCD: CPT | Performed by: STUDENT IN AN ORGANIZED HEALTH CARE EDUCATION/TRAINING PROGRAM

## 2023-05-10 PROCEDURE — 3074F SYST BP LT 130 MM HG: CPT | Performed by: STUDENT IN AN ORGANIZED HEALTH CARE EDUCATION/TRAINING PROGRAM

## 2023-05-10 PROCEDURE — 3066F NEPHROPATHY DOC TX: CPT | Performed by: STUDENT IN AN ORGANIZED HEALTH CARE EDUCATION/TRAINING PROGRAM

## 2023-05-10 RX ORDER — AMLODIPINE BESYLATE 10 MG/1
10 TABLET ORAL DAILY
Qty: 90 TABLET | Refills: 1 | Status: SHIPPED | OUTPATIENT
Start: 2023-05-10 | End: 2023-05-25 | Stop reason: SDUPTHER

## 2023-05-10 NOTE — PROGRESS NOTES
Nghia Hall is a 58 y.o. male [place] [facility] [place]/10[place]23 for routine care, as well as for management of the following chronic medical conditions: ESRD s/p DDKT (2006, subsequent graft failure 07/2021, on HD MWF at [facility] through LT groin AV graft on [address]; on Prednisone), pAfib (on Apixaban), HFpEF, HCV (s/p treatment), gout, lymphadenopathy (following with oncology, inconclusive biopsy in 01/2023). Patient presents today for hospital follow up after back-to-back hospitalization from 5/2-5/7 and 5/7-5/9 after all out of bed and fall down stairs, respectively. Patient found to have L rib fxs, R pleural effusion, hyponatremia, anemia, MATTHIAS on CKD, abdominopelvic ascites, and mesenteric edema on imaging/labs. He is also transferring care from prior primary care provider, Dr. Hansen, whom he has not seen since December 2008.     ACUTE CONCERNS:   #Fall, Trauma  - Pain control for rib fractures with tramadol, tylenol, robaxin, and lidocaine patch  - Aggressive pulmonary hygiene and IS for pleural effusion  - Staple removal   [  ] engaged Cpari Gutierrez, care navigator, for support services  [  ] Medical Leave Paperwork completed   [  ] Trauma clinic follow up as needed     CHRONIC MEDICAL CONDITIONS:   #ESRD s/p DDKT (2006, subsequent graft failure 07/2021, on HD MWF at [facility] through LT groin AV graft on [address]; on Prednisone), pAfib (on Apixaban)   - follows with Dr. Mccormick   - anuric at this time   - HD MWF as above   - hypoNa at time of admission, improved, defer management to nephrology   - Amlodipine, B-Blocker for BP control  - Prednisone as lone immunosuppressive agent at this time     #HFpEF, Stage C, Associated cpcPH and RV Dysfunction  #Atrial Fibrillation   #LAZARO  #HTN  - follows with cardiology, Dr. Delcid  - Recent RHC with pHTN (mPAP 37) and CVP 18 (CO/CI 8.1/3.6, 7/3.1)  - Echo with evidence of RV dysfunction  - B-blocker, Amlodipine as above   [  ] plan  was for repeat echo in 6 weeks to assess his PAP non-invasively and his RV size/function; has not been done  [  ] has upcoming visit pending   [  ] Amlodipine refill today through [facility] for delivery     #HCV (s/p treatment)  #Gout  #DVT  - prior, on DOAC as above   [  ] refill today     #Lymphadenopathy (following with oncology, inconclusive biopsy in 01/2023): concern for reactive nature vs. PTLD (post-transplant lymphoproliferative disorder)   - Recent concern for lymphoproliferative disorder with FNA completed on 1/11  - Flow Cytometry resulted with no clonal B cell or abnormal T cell population  - Left Iliac lymph node pathology showed reactive hyperplasia and polyclonal plasmacytosis; no evidence of a lymphoproliferative disorder  - following with Dr. 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    Renal transplant rejection    Kidney transplant status 12/08/2022    Kidney replaced by transplant    Personal history of immunosuppression therapy 07/04/2021    H/O immunosuppressive therapy    Personal history of other diseases of the nervous system and sense organs     History of cataract    Personal history of other diseases of urinary system 11/02/2021    Personal history of renal failure    Personal history of other endocrine, nutritional and metabolic disease 07/22/2013    History of hyperlipidemia    Type 2 diabetes mellitus without complications (CMS/[facility]) 12/08/2022    Diabetes mellitus    Urinary tract infection, site not specified 05/02/2018    Acute UTI     Subspecialty Medical Care: Nephrology (Transplant), Oncology, Cardiology (HF), Vascular Surgery      Past Surgical History:   Past Surgical History:   Procedure Laterality Date    CT AORTA AND BILATERAL ILIOFEMORAL RUNOFF ANGIOGRAM W AND/OR WO IV CONTRAST  11/7/2021    CT AORTA AND BILATERAL ILIOFEMORAL RUNOFF ANGIOGRAM W AND/OR WO IV CONTRAST 11/7/2021 [facility] CLINICAL LEGACY    CT AORTA AND BILATERAL ILIOFEMORAL RUNOFF ANGIOGRAM W AND/OR WO IV CONTRAST  7/6/2022    CT AORTA AND BILATERAL ILIOFEMORAL RUNOFF ANGIOGRAM W AND/OR WO IV CONTRAST 7/6/2022 [facility] EMERGENCY LEGACY    CT AORTA AND BILATERAL ILIOFEMORAL RUNOFF ANGIOGRAM W AND/OR WO IV CONTRAST  8/17/2022    CT AORTA AND BILATERAL ILIOFEMORAL RUNOFF ANGIOGRAM W AND/OR WO IV CONTRAST 8/17/2022 [facility] EMERGENCY LEGACY    CT AORTA AND BILATERAL ILIOFEMORAL RUNOFF ANGIOGRAM W AND/OR WO IV CONTRAST  9/30/2022    CT AORTA AND BILATERAL ILIOFEMORAL RUNOFF ANGIOGRAM W AND/OR WO IV CONTRAST 9/30/2022 [facility] ANCILLARY LEGACY    CT AORTA AND BILATERAL ILIOFEMORAL RUNOFF ANGIOGRAM W AND/OR WO IV CONTRAST  12/29/2022    CT AORTA AND BILATERAL ILIOFEMORAL RUNOFF ANGIOGRAM W AND/OR WO IV CONTRAST 12/29/2022 DOCTOR OFFICE LEGACY    IR VENOGRAM DIALYSIS  8/30/2022    IR VENOGRAM DIALYSIS 8/30/2022 [facility] CLINICAL LEGACY    MANDIBLE SURGERY  08/25/2015    Jaw Surgery    MR HEAD ANGIO WO IV CONTRAST  1/30/2014    MR HEAD ANGIO WO IV CONTRAST 1/30/2014 [facility] ANCILLARY LEGACY    MR NECK ANGIO WO IV CONTRAST  1/30/2014    MR NECK ANGIO WO IV CONTRAST 1/30/2014 [facility]CILL[facility]GAC[facility]    OTHER SURGICAL HISTORY  11/03/2021    Arteriovenous fistula creation procedure    OTHER SURGICAL HISTORY  11/02/2021    Renal Transplant    OTHER SURGICAL HISTORY  11/03/2021    Venous Thrombectomy    OTHER SURGICAL HISTORY  06/19/2014    Hand Dislocation Interphalangeal, Open Treatment    US GUIDED BIOPSY LYMPH NODE SUPERFICIAL  1/11/2023    US GUIDED BIOPSY LYMPH NODE SUPERFICIAL 1/11/2023 DOCTOR OFFICE LEGACY     Medications:  Current Outpatient Medications:     allopurinol (Zyloprim) 300 mg tablet, Take 1  tablet twice weekly, Disp: , Rfl:     amLODIPine (Norvasc) 10 mg tablet, Take 1 tablet (10 mg) by mouth once daily., Disp: 90 tablet, Rfl: 1    apixaban (Eliquis) 2.5 mg tablet, Take 1 tablet (2.5 mg) by mouth 2 times a day., Disp: 180 tablet, Rfl: 1    aspirin 81 mg EC tablet, Take 1 tablet (81 mg) by mouth once daily., Disp: , Rfl:     B complex-vitamin C-folic acid (Siri-Salo Rx) 1- mg-mg-mcg tablet, Take 1 tablet by mouth once daily., Disp: , Rfl:     blood sugar diagnostic (Accu-Chek Patricia Plus test strp) strip, TEST 4 TIMES DAILY., Disp: , Rfl:     cholestyramine (Questran) 4 gram packet, MIX THE CONTENTS OF 1 POWDER PACKET WITH 2-6 OZ OF NONCARBONATED BEVERAGE AND SWALLOW ONCE DAILY., Disp: , Rfl:     docusate sodium (Colace) 100 mg capsule, Take by mouth twice a day., Disp: , Rfl:     DULoxetine (Cymbalta) 30 mg DR capsule, Take 1 capsule (30 mg) by mouth once daily. Do not crush or chew., Disp: , Rfl:     epinastine (Elestat) 0.05 % ophthalmic solution, 1 drop in both eyes 2 times a day as needed for itching/allergies, Disp: , Rfl:     ergocalciferol (Vitamin D-2) 1.25 MG (83190 UT) capsule, Take 1 capsule every month, Disp: , Rfl:     FreeStyle Lite Strips strip, TEST TWICE DAILY., Disp: , Rfl:     gabapentin (Neurontin) 300 mg capsule, Take 1 capsule (300 mg) by mouth once daily at bedtime., Disp: , Rfl:     insulin aspart (NovoLOG) 100 unit/mL (3 mL) pen, use sliding scale tid ac as directed - inject 1u fbs 151-200, 2u fbs 201-250, 3u fbs 251-300, 5u fbs 301-350, 7u fbs >350, Disp: , Rfl:     methocarbamol (Robaxin) 500 mg tablet, Take by mouth 4 times a day., Disp: , Rfl:     metoprolol tartrate (Lopressor) 50 mg tablet, Take 1 tablet (50 mg) by mouth in the morning and 1 tablet (50 mg) in the evening., Disp: , Rfl:     multivitamin tablet, Take 1 tablet by mouth once daily., Disp: , Rfl:     omeprazole (PriLOSEC) 40 mg DR capsule, Take 1 capsule (40 mg) by mouth in the morning and 1 capsule (40  "mg) before bedtime., Disp: , Rfl:     predniSONE (Deltasone) 5 mg tablet, Take 2 tablets (10 mg) by mouth once daily., Disp: , Rfl:     sennosides (Senokot) 8.6 mg tablet, Take by mouth., Disp: , Rfl:     sevelamer carbonate (Renvela) 800 mg tablet, TAKE 2 TABLET After meals, Disp: , Rfl:     sildenafil (Viagra) 100 mg tablet, TAKE 1 TABLET DAILY 1 HOUR BEFORE NEEDED, Disp: , Rfl:     traMADol (Ultram) 50 mg tablet, Take by mouth every 6 hours., Disp: , Rfl:   Pharmacy: [facility] ([place])    Allergies:   Allergies   Allergen Reactions    Ibuprofen Anaphylaxis    Oxycodone Itching    Vancomycin Itching, Unknown and Rash     face red and hot     Immunizations: discuss at CPE   Family History:   Family History   Problem Relation Name Age of Onset    Kidney failure Other Sibling      Social History:   Home/Living Situation/Falls/Safety Assessment: lives alone, high falls risk  Education/Employment/Work/Vocational: works for [employer] in [employer]  Activities:   Drug Use:   Diet: ESRD on HD  Depression/Anxiety:   Sexuality/Contraception/Menstrual History:   Sleep:     Patient Information:  Health Insurance:   Transportation:   Healthcare POA/Guardian:   Contact Information:   Other:     Visit Vitals  /70 (BP Location: Left arm, Patient Position: Sitting)   Pulse 58   Resp 17   Ht 1.803 m (5' 11\")   Wt 99.8 kg (220 lb)   SpO2 98%   BMI 30.68 kg/m²   Smoking Status Former   BSA 2.24 m²      PHYSICAL EXAM:   General: obese AA male, NAD   HEENT: staples on occiput of head x4, successful removal with no complication, no other lesions noted, large neck circumference   CV: RRR  PULM: distant breath sounds, non-labored respirations at rest, PO2 98% on RA; pain with deep inspiration given recent rib fractures    ABD: soft, obese, NT, ND  : no suprapubic tenderness; L groin AVG  SKIN: no rashes noted   NEURO: A&Ox4, symmetric facies, difficulty with ambulation   PSYCH: pleasant mood, appropriate affect     Assessment/Plan "    Nghia Hall is a 58 y.o. male seen in Clinic at [facility] [place] by Dr. Luis Petersen on 05/10/23 for routine care, as well as for management of the following chronic medical conditions: ESRD s/p DDKT (2006, subsequent graft failure 07/2021, on HD MWF at [facility] through LT groin AV graft on [address]; on Prednisone), pAfib (on Apixaban), HFpEF, HCV (s/p treatment), gout, lymphadenopathy (following with oncology, inconclusive biopsy in 01/2023). Patient presents today for hospital follow up after back-to-back hospitalization from 5/2-5/7 and 5/7-5/9 after all out of bed and fall down stairs, respectively. Patient found to have L rib fxs, R pleural effusion, hyponatremia, anemia, MATTHIAS on CKD, abdominopelvic ascites, and mesenteric edema on imaging/labs. He is also transferring care from prior primary care provider, Dr. Hansen, whom he has not seen since December 2008.     ACUTE CONCERNS:   #Fall, Trauma  - Pain control for rib fractures with tramadol, tylenol, robaxin, and lidocaine patch  - Aggressive pulmonary hygiene and IS for pleural effusion  - Staple removal   [  ] engaged Capri Gutierrez, care navigator, for support services  [  ] Medical Leave Paperwork completed   [  ] Trauma clinic follow up as needed     CHRONIC MEDICAL CONDITIONS:   #ESRD s/p DDKT (2006, subsequent graft failure 07/2021, on HD MWF at [facility] through LT groin AV graft on [address]; on Prednisone), pAfib (on Apixaban)   - follows with Dr. Mccormick   - anuric at this time   - HD MWF as above   - hypoNa at time of admission, improved, defer management to nephrology   - Amlodipine, B-Blocker for BP control  - Prednisone as lone immunosuppressive agent at this time     #HFpEF, Stage C, Associated cpcPH and RV Dysfunction  #Atrial Fibrillation   #LAZARO  #HTN  - follows with cardiology, Dr. eDlcid  - Recent RHC with pHTN (mPAP 37) and CVP 18 (CO/CI 8.1/3.6, 7/3.1)  - Echo with evidence of RV dysfunction  - B-blocker, Amlodipine as above   [  ]  plan was for repeat echo in 6 weeks to assess his PAP non-invasively and his RV size/function; has not been done  [  ] has upcoming visit pending   [  ] Amlodipine refill today through [facility] for delivery     #HCV (s/p treatment)  #Gout  #DVT  - prior, on DOAC as above   [  ] refill today     #Lymphadenopathy (following with oncology, inconclusive biopsy in 01/2023): concern for reactive nature vs. PTLD (post-transplant lymphoproliferative disorder)   - Recent concern for lymphoproliferative disorder with FNA completed on 1/11  - Flow Cytometry resulted with no clonal B cell or abnormal T cell population  - Left Iliac lymph node pathology showed reactive hyperplasia and polyclonal plasmacytosis; no evidence of a lymphoproliferative disorder  - following with Dr. Castellon, missed most recent visit due to hospitalization  - EBV PCR negative  - Tacro was changed to low dose prednisone only since 11/2022   - plan to conservatively follow LN's for now per last note     #Health Maintenance    Cancer Screening  - Colorectal Cancer Screening: discuss at CPE   - Lung Cancer Screening:   - Prostate Cancer Screening: discuss at CPE    Laboratory Screening  - Lipid Screen: discuss at CPE   - A1C, glucose screen: on HD  - STI, HIV, Hep B screen: prior negative testing   - Hep C screen: s/p treatment     Imaging Screening  - AAA screening:    Immunizations: discuss at CPE   - Influenza: annually   - COVID: vaccinated  - Tdap: unknown   - Prevnar, Pneumovax: discuss at CPE   - Shingrix: discuss at CPE     Other Screening  - Health Literacy Assessment: poor  - Depression screen:   - Home safety/partner violence screen: lives alone, high falls risk   - Hearing/Vision screens:   - Alcohol/tobacco/drug use screen:   - Healthcare POA/Advanced Directives:     Referrals: Care Navigator, Staple Removal, follow up with nephrology, cardiology, and oncology (work to coordinate), connect with  [facility] (through [facility])  Return to  clinic in 2 weeks for close follow-up, sooner if acute issues arise.     Patient Discussion:    Please call back the office with any questions at [phone]. In the case of an emergency, please call 911 or go to the nearest Emergency Department.      Luis Petersen MD  Internal Medicine-Pediatrics  [facility] [place], [place] [address] [place] [address], [address]  P: [phone], F: [phone]

## 2023-05-11 ENCOUNTER — TELEPHONE (OUTPATIENT)
Dept: PRIMARY CARE | Facility: CLINIC | Age: 58
End: 2023-05-11
Payer: COMMERCIAL

## 2023-05-24 ENCOUNTER — APPOINTMENT (OUTPATIENT)
Dept: PRIMARY CARE | Facility: CLINIC | Age: 58
End: 2023-05-24
Payer: MEDICARE

## 2023-05-25 ENCOUNTER — DOCUMENTATION (OUTPATIENT)
Dept: CARE COORDINATION | Facility: CLINIC | Age: 58
End: 2023-05-25

## 2023-05-25 ENCOUNTER — OFFICE VISIT (OUTPATIENT)
Dept: PRIMARY CARE | Facility: CLINIC | Age: 58
End: 2023-05-25
Payer: MEDICARE

## 2023-05-25 VITALS
OXYGEN SATURATION: 96 % | RESPIRATION RATE: 18 BRPM | BODY MASS INDEX: 30.8 KG/M2 | HEART RATE: 75 BPM | SYSTOLIC BLOOD PRESSURE: 142 MMHG | HEIGHT: 71 IN | WEIGHT: 220 LBS | DIASTOLIC BLOOD PRESSURE: 97 MMHG

## 2023-05-25 DIAGNOSIS — I50.32 CHRONIC DIASTOLIC HEART FAILURE (MULTI): ICD-10-CM

## 2023-05-25 DIAGNOSIS — I10 PRIMARY HYPERTENSION: ICD-10-CM

## 2023-05-25 DIAGNOSIS — N18.6 ESRD (END STAGE RENAL DISEASE) (MULTI): ICD-10-CM

## 2023-05-25 DIAGNOSIS — Z59.41 FOOD INSECURITY: ICD-10-CM

## 2023-05-25 DIAGNOSIS — S22.49XS CLOSED FRACTURE OF MULTIPLE RIBS, UNSPECIFIED LATERALITY, SEQUELA: Primary | ICD-10-CM

## 2023-05-25 PROCEDURE — 3008F BODY MASS INDEX DOCD: CPT | Performed by: STUDENT IN AN ORGANIZED HEALTH CARE EDUCATION/TRAINING PROGRAM

## 2023-05-25 PROCEDURE — 3080F DIAST BP >= 90 MM HG: CPT | Performed by: STUDENT IN AN ORGANIZED HEALTH CARE EDUCATION/TRAINING PROGRAM

## 2023-05-25 PROCEDURE — 99215 OFFICE O/P EST HI 40 MIN: CPT | Performed by: STUDENT IN AN ORGANIZED HEALTH CARE EDUCATION/TRAINING PROGRAM

## 2023-05-25 PROCEDURE — 1036F TOBACCO NON-USER: CPT | Performed by: STUDENT IN AN ORGANIZED HEALTH CARE EDUCATION/TRAINING PROGRAM

## 2023-05-25 PROCEDURE — 3044F HG A1C LEVEL LT 7.0%: CPT | Performed by: STUDENT IN AN ORGANIZED HEALTH CARE EDUCATION/TRAINING PROGRAM

## 2023-05-25 PROCEDURE — 3066F NEPHROPATHY DOC TX: CPT | Performed by: STUDENT IN AN ORGANIZED HEALTH CARE EDUCATION/TRAINING PROGRAM

## 2023-05-25 PROCEDURE — 3077F SYST BP >= 140 MM HG: CPT | Performed by: STUDENT IN AN ORGANIZED HEALTH CARE EDUCATION/TRAINING PROGRAM

## 2023-05-25 RX ORDER — AMLODIPINE BESYLATE 10 MG/1
10 TABLET ORAL DAILY
Qty: 90 TABLET | Refills: 1 | Status: SHIPPED | OUTPATIENT
Start: 2023-05-25 | End: 2023-10-03 | Stop reason: ALTCHOICE

## 2023-05-25 RX ORDER — AMLODIPINE BESYLATE 10 MG/1
10 TABLET ORAL DAILY
Qty: 90 TABLET | Refills: 1 | Status: SHIPPED | OUTPATIENT
Start: 2023-05-25 | End: 2023-05-25 | Stop reason: SDUPTHER

## 2023-05-25 SDOH — ECONOMIC STABILITY - FOOD INSECURITY: FOOD INSECURITY: Z59.41

## 2023-05-25 NOTE — PROGRESS NOTES
Nghia Hall is a 58 y.o. male [place] [facility] [place] Luis Petersen on 05/25/23 for routine care, as well as for management of the following chronic medical conditions: ESRD s/p DDKT (2006, subsequent graft failure 07/2021, on HD MWF at [facility] through LT groin AV graft on [address]; on Prednisone), pAfib (on Apixaban), HFpEF, HCV (s/p treatment), gout, lymphadenopathy (following with oncology, inconclusive biopsy in 01/2023). Patient presents today for follow up visit. He was last seen approximately 2 weeks ago for hospital follow up after fall with subsequent rib fractures. He continues to have rib pain today. Was scheduled for follow up in trauma clinic but appointment was cancelled. Missed HD yesterday. Unable to get BP medication refilled due to pharmacy not accepting his insurance. He notes today that he has less than $2 available to him from a financial standpoint between now and mid-June, the time of his next paycheck. He has followed up with cardiology as well since last visit, heart failure team, and noted to have persistence of his pulm HTN and biventricular dysfunction, making him a poor transplant candidate. He continues to have innumerous medical and social complications today. Actively work to engage social work, coordinate mail delivery of medications, food for life referral, etc during visit with plans for close follow up. Repeat x-ray given pleural effusion previously seen and to assess rib fractures.     #Fall, Trauma  - Pain control for rib fractures with tramadol, tylenol, robaxin, and lidocaine patch  - Aggressive pulmonary hygiene and IS for pleural effusion  - Trauma clinic follow up appointment cancelled  [x] engaged Capri Gutierrez, care navigator, for support services  [x] Medical Leave Paperwork completed--patient now back to work  [  ] social work referral     #ESRD s/p DDKT (2006, subsequent graft failure 07/2021, on HD MWF at [facility] through LT groin AV graft on [address]  [address]; on Prednisone), pAfib (on Apixaban)   - follows with Dr. Mccormick   - anuric at this time   - HD MWF as above   - hypoNa at time of admission, improved, defer management to nephrology   - Amlodipine, B-Blocker for BP control  - Prednisone as lone immunosuppressive agent at this time   [X] appointment with transplant nephrology coordinated (scheduled for July)  - DID miss dialysis yesterday   [  ] refill Amlodipine--unable to be filled by [facility] due to insurance issues based on phone call with pharmacist today during visit, sent to [facility] for delivery after discussion with social work team     #HFpEF, Stage C, Associated cpcPH and RV Dysfunction  #Atrial Fibrillation   #LAZARO  #HTN  - follows with cardiology, Dr. Delcid  - Recent RHC with pHTN (mPAP 37) and CVP 18 (CO/CI 8.1/3.6, 7/3.1)  - Echo with evidence of RV dysfunction  - B-blocker, Amlodipine as above   - visit with Farhana earlier this month   - plan was for repeat echo in 6 weeks to assess his PAP non-invasively and his RV size/function: completed 5/11/23, LVEF 45-50%, abnormal LV diastolic filling, no LVH, moderately enlarged RV, low normal RV systolic function, LA moderately dilated, RA moderately dilated, moderate TR, moderately elevated PAP, global hypokinesis of LV   - per May 2023 note:  \"At the current time he would be high risk from a cardiac standpoint given his biventricular dysfunction and persistent pHTN. I discussed that we can reassess in 1 year with a repeat echo to see if chronic volume unloading has an impact on his PA pressures.\"    #HCV (s/p treatment)  #Gout  #DVT  - prior, on DOAC as above     #Lymphadenopathy (following with oncology, inconclusive biopsy in 01/2023): concern for reactive nature vs. PTLD (post-transplant lymphoproliferative disorder)   - Recent concern for lymphoproliferative disorder with FNA completed on 1/11  - Flow Cytometry resulted with no clonal B cell or abnormal T cell " population  - Left Iliac lymph node pathology showed reactive hyperplasia and polyclonal plasmacytosis; no evidence of a lymphoproliferative disorder  - following with Dr. 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    Renal transplant rejection    Kidney transplant status 12/08/2022    Kidney replaced by transplant    Personal history of immunosuppression therapy 07/04/2021    H/O immunosuppressive therapy    Personal history of other diseases of the nervous system and sense organs     History of cataract    Personal history of other diseases of urinary system 11/02/2021    Personal history of renal failure    Personal history of other endocrine, nutritional and metabolic disease 07/22/2013    History of hyperlipidemia    Type 2 diabetes mellitus without complications (CMS/[facility]) 12/08/2022    Diabetes mellitus    Urinary tract infection, site not specified 05/02/2018    Acute UTI     Subspecialty Medical Care: Nephrology (Transplant), Oncology, Cardiology (HF), Vascular Surgery     Past Surgical History:   Past Surgical History:   Procedure Laterality Date    CT AORTA AND BILATERAL ILIOFEMORAL RUNOFF ANGIOGRAM W AND/OR WO IV CONTRAST  11/7/2021    CT AORTA AND BILATERAL ILIOFEMORAL RUNOFF ANGIOGRAM W AND/OR WO IV CONTRAST 11/7/2021 [facility] CLINICAL LEGACY    CT AORTA AND BILATERAL ILIOFEMORAL RUNOFF ANGIOGRAM W AND/OR WO IV CONTRAST  7/6/2022    CT AORTA AND BILATERAL ILIOFEMORAL RUNOFF ANGIOGRAM W AND/OR WO IV CONTRAST 7/6/2022 [facility] EMERGENCY LEGACY    CT AORTA AND BILATERAL ILIOFEMORAL RUNOFF ANGIOGRAM W AND/OR WO IV CONTRAST  8/17/2022    CT AORTA AND BILATERAL  ILIOFEMORAL RUNOFF ANGIOGRAM W AND/OR WO IV CONTRAST 8/17/2022 [facility] EMERGENCY LEGACY    CT AORTA AND BILATERAL ILIOFEMORAL RUNOFF ANGIOGRAM W AND/OR WO IV CONTRAST  9/30/2022    CT AORTA AND BILATERAL ILIOFEMORAL RUNOFF ANGIOGRAM W AND/OR WO IV CONTRAST 9/30/2022 [facility] ANCILLARY LEGACY    CT AORTA AND BILATERAL ILIOFEMORAL RUNOFF ANGIOGRAM W AND/OR WO IV CONTRAST  12/29/2022    CT AORTA AND BILATERAL ILIOFEMORAL RUNOFF ANGIOGRAM W AND/OR WO IV CONTRAST 12/29/2022 DOCTOR OFFICE LEGACY    IR VENOGRAM DIALYSIS  8/30/2022    IR VENOGRAM DIALYSIS 8/30/2022 [facility] CLINICAL LEGACY    MANDIBLE SURGERY  08/25/2015    Jaw Surgery    MR HEAD ANGIO WO IV CONTRAST  1/30/2014    MR HEAD ANGIO WO IV CONTRAST 1/30/2014 [facility] ANCILLARY LEGACY    MR NECK ANGIO WO IV CONTRAST  1/30/2014    MR NECK ANGIO WO IV CONTRAST 1/30/2014 [facility] ANCILLARY LEGACY    OTHER SURGICAL HISTORY  11/03/2021    Arteriovenous fistula creation procedure    OTHER SURGICAL HISTORY  11/02/2021    Renal Transplant    OTHER SURGICAL HISTORY  11/03/2021    Venous Thrombectomy    OTHER SURGICAL HISTORY  06/19/2014    Hand Dislocation Interphalangeal, Open Treatment    US GUIDED BIOPSY LYMPH NODE SUPERFICIAL  1/11/2023    US GUIDED BIOPSY LYMPH NODE SUPERFICIAL 1/11/2023 DOCTOR OFFICE LEGACY     Medications:  Current Outpatient Medications:     allopurinol (Zyloprim) 300 mg tablet, Take 1 tablet twice weekly, Disp: , Rfl:     amLODIPine (Norvasc) 10 mg tablet, Take 1 tablet (10 mg) by mouth once daily., Disp: 90 tablet, Rfl: 1    apixaban (Eliquis) 2.5 mg tablet, Take 1 tablet (2.5 mg) by mouth 2 times a day., Disp: 180 tablet, Rfl: 1    aspirin 81 mg EC tablet, Take 1 tablet (81 mg) by mouth once daily., Disp: , Rfl:     B complex-vitamin C-folic acid (Siri-Salo Rx) 1- mg-mg-mcg tablet, Take 1 tablet by mouth once daily., Disp: , Rfl:     blood sugar diagnostic (Accu-Chek Patricia Plus test strp) strip, TEST 4 TIMES DAILY., Disp: , Rfl:     cholestyramine (Questran) 4  gram packet, MIX THE CONTENTS OF 1 POWDER PACKET WITH 2-6 OZ OF NONCARBONATED BEVERAGE AND SWALLOW ONCE DAILY., Disp: , Rfl:     DULoxetine (Cymbalta) 30 mg DR capsule, Take 1 capsule (30 mg) by mouth once daily. Do not crush or chew., Disp: , Rfl:     epinastine (Elestat) 0.05 % ophthalmic solution, 1 drop in both eyes 2 times a day as needed for itching/allergies, Disp: , Rfl:     ergocalciferol (Vitamin D-2) 1.25 MG (63830 UT) capsule, Take 1 capsule every month, Disp: , Rfl:     FreeStyle Lite Strips strip, TEST TWICE DAILY., Disp: , Rfl:     gabapentin (Neurontin) 300 mg capsule, Take 1 capsule (300 mg) by mouth once daily at bedtime., Disp: , Rfl:     insulin aspart (NovoLOG) 100 unit/mL (3 mL) pen, use sliding scale tid ac as directed - inject 1u fbs 151-200, 2u fbs 201-250, 3u fbs 251-300, 5u fbs 301-350, 7u fbs >350, Disp: , Rfl:     methocarbamol (Robaxin) 500 mg tablet, Take by mouth 4 times a day., Disp: , Rfl:     metoprolol tartrate (Lopressor) 50 mg tablet, Take 1 tablet (50 mg) by mouth in the morning and 1 tablet (50 mg) in the evening., Disp: , Rfl:     multivitamin tablet, Take 1 tablet by mouth once daily., Disp: , Rfl:     omeprazole (PriLOSEC) 40 mg DR capsule, Take 1 capsule (40 mg) by mouth in the morning and 1 capsule (40 mg) before bedtime., Disp: , Rfl:     predniSONE (Deltasone) 5 mg tablet, Take 2 tablets (10 mg) by mouth once daily., Disp: , Rfl:     sevelamer carbonate (Renvela) 800 mg tablet, TAKE 2 TABLET After meals, Disp: , Rfl:     sildenafil (Viagra) 100 mg tablet, TAKE 1 TABLET DAILY 1 HOUR BEFORE NEEDED, Disp: , Rfl:   Pharmacy: [facility] ([place])    Allergies:   Allergies   Allergen Reactions    Ibuprofen Anaphylaxis    Oxycodone Itching    Vancomycin Itching, Unknown and Rash     face red and hot     Immunizations: discuss at CPE   Family History:   Family History   Problem Relation Name Age of Onset    Kidney failure Other Sibling      Social History:   Home/Living  "Situation/Falls/Safety Assessment: lives alone, high falls risk  Education/Employment/Work/Vocational: works for [employer] in [employer]  Activities:   Drug Use:   Diet: ESRD on HD, +food insecurity   Depression/Anxiety: very frustrated over health and barriers to his healthcare   Sexuality/Contraception/Menstrual History:   Sleep:     Patient Information:  Health Insurance: has insurance   Transportation: public transportation   Healthcare POA/Guardian:   Contact Information:     Visit Vitals  BP (!) 142/97 (BP Location: Left arm, Patient Position: Sitting)   Pulse 75   Resp 18   Ht 1.803 m (5' 11\")   Wt 99.8 kg (220 lb)   SpO2 96%   BMI 30.68 kg/m²   Smoking Status Former   BSA 2.24 m²      PHYSICAL EXAM:   General: obese AA male, NAD   HEENT: staples on occiput of head x4, successful removal with no complication, no other lesions noted, large neck circumference   CV: RRR  PULM: distant breath sounds, non-labored respirations at rest, PO2 96% on RA; pain with deep inspiration given recent rib fractures    ABD: soft, obese, NT, ND  : no suprapubic tenderness; L groin AVG  SKIN: no rashes noted   NEURO: A&Ox4, symmetric facies, difficulty with ambulation   PSYCH: pleasant mood, appropriate affect     Assessment/Plan    Nghia Hall is a 58 y.o. male seen in Clinic at [facility] by Dr. Luis Petersen on 05/25/23 for routine care, as well as for management of the following chronic medical conditions: ESRD s/p DDKT (2006, subsequent graft failure 07/2021, on HD MWF at [facility] through LT groin AV graft on [address]; on Prednisone), pAfib (on Apixaban), HFpEF, HCV (s/p treatment), gout, lymphadenopathy (following with oncology, inconclusive biopsy in 01/2023). Patient presents today for follow up visit. He was last seen approximately 2 weeks ago for hospital follow up after fall with subsequent rib fractures. He continues to have rib pain today. Was scheduled for follow up in trauma clinic but appointment was cancelled. " Missed HD yesterday. Unable to get BP medication refilled due to pharmacy not accepting his insurance. He notes today that he has less than $2 available to him from a financial standpoint between now and mid-June, the time of his next paycheck. He has followed up with cardiology as well since last visit, heart failure team, and noted to have persistence of his pulm HTN and biventricular dysfunction, making him a poor transplant candidate. He continues to have innumerous medical and social complications today. Actively work to engage social work, coordinate mail delivery of medications, food for life referral, etc during visit with plans for close follow up. Repeat x-ray given pleural effusion previously seen and to assess rib fractures.     #Fall, Trauma  - Pain control for rib fractures with tramadol, tylenol, robaxin, and lidocaine patch  - Aggressive pulmonary hygiene and IS for pleural effusion  - Trauma clinic follow up appointment cancelled  [x] engaged Capri Gutierrez, care navigator, for support services  [x] Medical Leave Paperwork completed--patient now back to work  [  ] social work referral     #ESRD s/p DDKT (2006, subsequent graft failure 07/2021, on HD MWF at [facility] through LT groin AV graft on [address]; on Prednisone), pAfib (on Apixaban)   - follows with Dr. Mccormick   - anuric at this time   - HD MWF as above   - hypoNa at time of admission, improved, defer management to nephrology   - Amlodipine, B-Blocker for BP control  - Prednisone as lone immunosuppressive agent at this time   [X] appointment with transplant nephrology coordinated (scheduled for July)  - DID miss dialysis yesterday   [  ] refill Amlodipine--unable to be filled by [facility] due to insurance issues based on phone call with pharmacist today during visit, sent to [facility] for delivery after discussion with social work team     #HFpEF, Stage C, Associated cpcPH and RV Dysfunction  #Atrial Fibrillation  "  #LAZARO  #HTN  - follows with cardiology, Dr. Delcid  - Recent RHC with pHTN (mPAP 37) and CVP 18 (CO/CI 8.1/3.6, 7/3.1)  - Echo with evidence of RV dysfunction  - B-blocker, Amlodipine as above   - visit with Farhana earlier this month   - plan was for repeat echo in 6 weeks to assess his PAP non-invasively and his RV size/function: completed 5/11/23, LVEF 45-50%, abnormal LV diastolic filling, no LVH, moderately enlarged RV, low normal RV systolic function, LA moderately dilated, RA moderately dilated, moderate TR, moderately elevated PAP, global hypokinesis of LV   - per May 2023 note:  \"At the current time he would be high risk from a cardiac standpoint given his biventricular dysfunction and persistent pHTN. I discussed that we can reassess in 1 year with a repeat echo to see if chronic volume unloading has an impact on his PA pressures.\"    #HCV (s/p treatment)  #Gout  #DVT  - prior, on DOAC as above     #Lymphadenopathy (following with oncology, inconclusive biopsy in 01/2023): concern for reactive nature vs. PTLD (post-transplant lymphoproliferative disorder)   - Recent concern for lymphoproliferative disorder with FNA completed on 1/11  - Flow Cytometry resulted with no clonal B cell or abnormal T cell population  - Left Iliac lymph node pathology showed reactive hyperplasia and polyclonal plasmacytosis; no evidence of a lymphoproliferative disorder  - following with Dr. Castellon, missed most recent visit due to hospitalization  - EBV PCR negative  - Tacro was changed to low dose prednisone only since 11/2022   - plan to conservatively follow LN's for now per last note     #Health Maintenance    Cancer Screening  - Colorectal Cancer Screening: discuss at CPE   - Lung Cancer Screening:   - Prostate Cancer Screening: discuss at CPE    Laboratory Screening  - Lipid Screen: discuss at CPE   - A1C, glucose screen: on HD  - STI, HIV, Hep B screen: prior negative testing   - Hep C screen: s/p treatment "     Imaging Screening  - AAA screening:    Immunizations: discuss at CPE   - Influenza: annually   - COVID: vaccinated  - Tdap: unknown   - Prevnar, Pneumovax: discuss at CPE   - Shingrix: discuss at CPE     Other Screening  - Health Literacy Assessment: poor  - Depression screen:   - Home safety/partner violence screen: lives alone, high falls risk   - Hearing/Vision screens:   - Alcohol/tobacco/drug use screen:   - Healthcare POA/Advanced Directives:     Referrals: Social Work, Food for Life, [facility] (for medication delivery), Nephrology follow up (coordinated in July), CXR    Return to clinic in 2-4 weeks for close follow-up, sooner if acute issues arise.     Patient Discussion:    Please call back the office with any questions at [phone]. In the case of an emergency, please call [phone] or go to the nearest Emergency Department.      Luis Petersen MD  Internal Medicine-Pediatrics  [facility] [address] [place] [address], [address]  P: [phone], F: [phone]

## 2023-05-25 NOTE — PROGRESS NOTES
Outpatient Evaluation on 2023      Patient: Nghia Hall                     : 1965  [id number]        Luis Petersen MD   [phone]  [email or web address]        Patient ID: Nghia Hall  is a 58 y.o.    male      Reason for Note:    Dr. Petersen and Bailey Gee asked me to look into this.  The pt's insurance denied [facility] for prescription.  I put in new pharmacy as [facility]once [facility]/  in his Epic chart because they deliver meds next day to pts. He needs them delivered.  I called the pharmacy and they do deliver to that address. I sent an urgent note to Dr. Petersen and Bailey so an e-script can be sent there.  I don't know if his insurance will accept his Medicare/[place] insurance, but the pharmacy said they take just about every insurance.  Will f-up to see if med script went through and will be delivered.

## 2023-05-31 ENCOUNTER — PATIENT OUTREACH (OUTPATIENT)
Dept: PRIMARY CARE | Facility: CLINIC | Age: 58
End: 2023-05-31
Payer: COMMERCIAL

## 2023-05-31 ENCOUNTER — DOCUMENTATION (OUTPATIENT)
Dept: CARE COORDINATION | Facility: CLINIC | Age: 58
End: 2023-05-31
Payer: COMMERCIAL

## 2023-05-31 NOTE — PROGRESS NOTES
Nghia ANDERSON Hall   1965   [id number]   [phone]   Luis Petersen MD    I called [facility] and spoke with Keiry Vargas- this morning to confirm that referral was submitted and approved for [facility] and that Yulisa Pérez [phone] [email or web address] is over his case.  Keiry did confirm that Sukhdev did reach out to pt but he was unable to talk and will call back. I have called Yulisa left a voice message and emailed her this morning in regards to the status of pt. Also I called and left a message with pt to check the status. Hopefully, I should be receiving a call from both soon. I will provide an update once I have spoken to Yulisa Pérez and pt. Thank you.

## 2023-05-31 NOTE — PROGRESS NOTES
TRANSFER OF NOTE TO CORRECT PT: PRABHJOT HALL  [id number]    RE: Transitional Care Mgmt  Received: Today  WILLARD Lane MA  Thank you for getting back to me,. I called yo uthis am and forgot to mention this is being documented in the wrong chart, pt linked here is not the correct pt.          Previous Messages       ----- Message -----  From: Sera Hall MA  Sent: 5/31/2023   9:48 AM EDT  To: Lisa Yan RN  Subject: RE: Transitional Care Mgmt                      Update from co-worker in [address] Capri Gutierrez this morning:    Capri submitted a referral on 5/10  to [facility] for assistance. She did follow up with pt and pt mentioned that he couldn't take the call because he was at an appt at the time. However, She called [facility] and spoke with Keiry Vargas- this morning to confirm that referral was submitted and approved for [facility] and that Yulisa Pérez is over his case.  Keiry did confirm that Sukhdev did reach out to pt but was unable to talk and will call back. She called Yulisa and emailed her this morning in regards to the status of pt. Also she called and left a message with pt to check the status. Hopefully, she should be receiving a call from both soon. I'm sure you will be reading updates in this pt chart. Jaquelin Duncan will be able to help with transport, Rx, and all needs at this time.    Just wanted to let you know.  Thank you. For reaching out.  Sera Hall  ----- Message -----  From: Lisa Yan RN  Sent: 5/31/2023   7:51 AM EDT  To: Sera Hall MA  Subject: RE: Transitional Care Mgmt                      Does this pt have a PCP?    ----- Message -----  From: Sera Hall MA  Sent: 5/30/2023   4:27 PM EDT  To: Bailey Gee; Capri Gutierrez MA; *  Subject: Transitional Care Mgmt                          Jacky Gonzalez: Would love some insight to this pt.  We recently had to help find a pharmacy  to accept pt insurance and be able to deliver Rx. to his home. Not sure if he is able to afford them.  There seems there is a lot going on with this pt, including transportation needs and utilizing all his Medicare benefits to help pay for Rx. With ESRD and dialysis he is quite compromised.  Could you reach out and review pt's recent Rx request. Not sure if he can afford them. Does he have Passport services?  We would be happy to help out as well, not sure where you step in to assist or how far we should take this since in Transitional Care as well.  We appreciate any guidance.  Thank you.  Sera Hall  Pt Navigator  [address].             `      RE: Transitional Care Mgmt  Received: Today  WILLARD Lane MA; Bailey Gee; Capri Gutierrez MA  Good morning Sera   I call pts when they are discharged from the hospital to ensure they have meds and follow up appts.  I think you are looking for a  for this pt. [facility] is currently hiring a nurse to cover this office but I am thinking we may be able to look for a nurse to cover this pt until the new person is in place. Tory Badillo is at one of the [facility] offices.  I can ask Lynne who would be able to help with this pt and get back to you .          Previous Messages       ----- Message -----  From: Sera Hall MA  Sent: 5/30/2023   4:27 PM EDT  To: Bailey Gutierrez MA; *  Subject: Transitional Care Mgmt                          Jacky Gonzalez: Would love some insight to this pt.  We recently had to help find a pharmacy to accept pt insurance and be able to deliver Rx. to his home. Not sure if he is able to afford them.  There seems there is a lot going on with this pt, including transportation needs and utilizing all his Medicare benefits to help pay for Rx. With ESRD and dialysis he is quite compromised.  Could you reach out and review pt's recent Rx request. Not sure if he can afford them. Does he have  Passport services?  We would be happy to help out as well, not sure where you step in to assist or how far we should take this since in Transitional Care as well.  We appreciate any guidance.  Thank you.  Sera Hall  Pt Navigator  [address].

## 2023-06-02 ENCOUNTER — PATIENT OUTREACH (OUTPATIENT)
Dept: PRIMARY CARE | Facility: CLINIC | Age: 58
End: 2023-06-02
Payer: COMMERCIAL

## 2023-06-02 NOTE — PROGRESS NOTES
Nghia Hall   1965   [id number]   [phone]   Luis Petersen MD    Called pt in regards to if he spoken with his Case Manger-Yulisa Pérez about assistance and services needed and he said no. Pt also mentioned that he is free to talk today and anytime from 11am-12pm when he is on lunch break.   I called and left another voice message with -Yulisa Pérez [phone] to follow up with pt and the prefer contact times to reach him. I will follow up with pt next with for an update. Also I will send a staff message to provider with and update. Thank you.

## 2023-06-08 ENCOUNTER — OFFICE VISIT (OUTPATIENT)
Dept: PRIMARY CARE | Facility: CLINIC | Age: 58
End: 2023-06-08
Payer: COMMERCIAL

## 2023-06-08 VITALS
OXYGEN SATURATION: 96 % | RESPIRATION RATE: 16 BRPM | SYSTOLIC BLOOD PRESSURE: 133 MMHG | WEIGHT: 220 LBS | HEART RATE: 97 BPM | DIASTOLIC BLOOD PRESSURE: 86 MMHG | BODY MASS INDEX: 30.8 KG/M2 | HEIGHT: 71 IN

## 2023-06-08 DIAGNOSIS — I82.90 VTE (VENOUS THROMBOEMBOLISM): Primary | ICD-10-CM

## 2023-06-08 DIAGNOSIS — R43.0 LOSS OF SMELL: ICD-10-CM

## 2023-06-08 PROCEDURE — 3075F SYST BP GE 130 - 139MM HG: CPT | Performed by: STUDENT IN AN ORGANIZED HEALTH CARE EDUCATION/TRAINING PROGRAM

## 2023-06-08 PROCEDURE — 3044F HG A1C LEVEL LT 7.0%: CPT | Performed by: STUDENT IN AN ORGANIZED HEALTH CARE EDUCATION/TRAINING PROGRAM

## 2023-06-08 PROCEDURE — 3066F NEPHROPATHY DOC TX: CPT | Performed by: STUDENT IN AN ORGANIZED HEALTH CARE EDUCATION/TRAINING PROGRAM

## 2023-06-08 PROCEDURE — 1036F TOBACCO NON-USER: CPT | Performed by: STUDENT IN AN ORGANIZED HEALTH CARE EDUCATION/TRAINING PROGRAM

## 2023-06-08 PROCEDURE — 87635 SARS-COV-2 COVID-19 AMP PRB: CPT

## 2023-06-08 PROCEDURE — 3079F DIAST BP 80-89 MM HG: CPT | Performed by: STUDENT IN AN ORGANIZED HEALTH CARE EDUCATION/TRAINING PROGRAM

## 2023-06-08 PROCEDURE — 3008F BODY MASS INDEX DOCD: CPT | Performed by: STUDENT IN AN ORGANIZED HEALTH CARE EDUCATION/TRAINING PROGRAM

## 2023-06-08 PROCEDURE — 99214 OFFICE O/P EST MOD 30 MIN: CPT | Performed by: STUDENT IN AN ORGANIZED HEALTH CARE EDUCATION/TRAINING PROGRAM

## 2023-06-08 RX ORDER — OXYCODONE HYDROCHLORIDE 10 MG/1
TABLET ORAL
COMMUNITY
Start: 2023-06-02 | End: 2023-06-08 | Stop reason: ALTCHOICE

## 2023-06-08 RX ORDER — ASCORBIC ACID, THIAMINE, RIBOFLAVIN, NIACINAMIDE, PYRIDOXINE, FOLIC ACID, COBALAMIN, BIOTIN, PANTOTHENIC ACID 100; 1.5; 1.7; 20; 10; 1; 6; 300; 1 MG/1; MG/1; MG/1; MG/1; MG/1; MG/1; UG/1; UG/1; MG/1
TABLET, COATED ORAL
COMMUNITY
Start: 2023-01-20

## 2023-06-08 RX ORDER — L. ACIDOPHILUS/LACTOBAC SPOR 35MM-25MM
2 TABLET ORAL DAILY
Status: ON HOLD | COMMUNITY
Start: 2023-06-02 | End: 2023-10-10 | Stop reason: WASHOUT

## 2023-06-08 NOTE — PROGRESS NOTES
Nghia Hall is a 58 y.o. male seen in Clinic at [facility] by Dr. Luis Petersen on 06/08/23 for routine care, as well as for management of the following chronic medical conditions: ESRD s/p DDKT (2006, subsequent graft failure 07/2021, on HD MWF at [facility] through LT groin AV graft on [address]; on Prednisone), pAfib (on Apixaban), HFpEF, HCV (s/p treatment), gout, lymphadenopathy (following with oncology, inconclusive biopsy in 01/2023). Patient presents today for follow up visit for recent finding of partially occlusive LLE DVT. Patient on Apixaban for history of Afib, had been on 2.5mg BID dosing. Was started on 10mg BID in hospital, completed 7x days of this, can now transition to 5mg BID for maintenance dosing. Plan on repeat duplex scan in 1 month with follow up afterwards.     #Partially Occlusive LLE DVT  - prior 2.5mg BID Apixaban dosing  - s/p at least 7 days of 10 BID dosing  - transition to 5mg BID dosing for maintenance going forward  - repeat duplex in 4-6 week, ordered today   [  ] follow up in clinic after that time     #ESRD s/p DDKT (2006, subsequent graft failure 07/2021, on HD MWF at [facility] through LT groin AV graft on [address]; on Prednisone), pAfib (on Apixaban)   - follows with Dr. Mccormick   - anuric at this time   - HD MWF as above   - Amlodipine, B-Blocker for BP control  - Prednisone as lone immunosuppressive agent at this time   [X] appointment with transplant nephrology coordinated (scheduled for July)    #HFpEF, Stage C, Associated cpcPH and RV Dysfunction  #Atrial Fibrillation   #LAZARO  #HTN  - follows with cardiology, Dr. Delcid  - Recent RHC with pHTN (mPAP 37) and CVP 18 (CO/CI 8.1/3.6, 7/3.1)  - Echo with evidence of RV dysfunction  - B-blocker, Amlodipine as above   - DOAC for AC   - visit with Farhana earlier this month   - plan was for repeat echo in 6 weeks to assess his PAP non-invasively and his RV size/function: completed 5/11/23, LVEF 45-50%, abnormal LV diastolic  "filling, no LVH, moderately enlarged RV, low normal RV systolic function, LA moderately dilated, RA moderately dilated, moderate TR, moderately elevated PAP, global hypokinesis of LV   - per May 2023 note:  \"At the current time he would be high risk from a cardiac standpoint given his biventricular dysfunction and persistent pHTN. I discussed that we can reassess in 1 year with a repeat echo to see if chronic volume unloading has an impact on his PA pressures.\"    #HCV (s/p treatment)  #Gout  #DVT  - prior, on DOAC as above; was on only 2.5mg BID dosing, however      #Lymphadenopathy (following with oncology, inconclusive biopsy in 01/2023): concern for reactive nature vs. PTLD (post-transplant lymphoproliferative disorder)   - Recent concern for lymphoproliferative disorder with FNA completed on 1/11  - Flow Cytometry resulted with no clonal B cell or abnormal T cell population  - Left Iliac lymph node pathology showed reactive hyperplasia and polyclonal plasmacytosis; no evidence of a lymphoproliferative disorder  - following with Dr. 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    Renal transplant rejection    Kidney transplant status 12/08/2022    Kidney replaced by transplant    Personal history of immunosuppression therapy 07/04/2021    H/O immunosuppressive therapy    Personal history of other diseases of the nervous system and sense organs     History of cataract    Personal history of other diseases of urinary system 11/02/2021    Personal history of renal failure    Personal history of other endocrine, nutritional and metabolic disease 07/22/2013    History " of hyperlipidemia    Type 2 diabetes mellitus without complications (CMS/[facility]) 12/08/2022    Diabetes mellitus    Urinary tract infection, site not specified 05/02/2018    Acute UTI     Subspecialty Medical Care: Nephrology (Transplant), Oncology, Cardiology (HF), Vascular Surgery     Past Surgical History:   Past Surgical History:   Procedure Laterality Date    CT AORTA AND BILATERAL ILIOFEMORAL RUNOFF ANGIOGRAM W AND/OR WO IV CONTRAST  11/7/2021    CT AORTA AND BILATERAL ILIOFEMORAL RUNOFF ANGIOGRAM W AND/OR WO IV CONTRAST 11/7/2021 [facility] CLINICAL LEGACY    CT AORTA AND BILATERAL ILIOFEMORAL RUNOFF ANGIOGRAM W AND/OR WO IV CONTRAST  7/6/2022    CT AORTA AND BILATERAL ILIOFEMORAL RUNOFF ANGIOGRAM W AND/OR WO IV CONTRAST 7/6/2022 [facility] EMERGENCY LEGACY    CT AORTA AND BILATERAL ILIOFEMORAL RUNOFF ANGIOGRAM W AND/OR WO IV CONTRAST  8/17/2022    CT AORTA AND BILATERAL ILIOFEMORAL RUNOFF ANGIOGRAM W AND/OR WO IV CONTRAST 8/17/2022 [facility] EMERGENCY LEGACY    CT AORTA AND BILATERAL ILIOFEMORAL RUNOFF ANGIOGRAM W AND/OR WO IV CONTRAST  9/30/2022    CT AORTA AND BILATERAL ILIOFEMORAL RUNOFF ANGIOGRAM W AND/OR WO IV CONTRAST 9/30/2022 [facility] ANCILLARY LEGACY    CT AORTA AND BILATERAL ILIOFEMORAL RUNOFF ANGIOGRAM W AND/OR WO IV CONTRAST  12/29/2022    CT AORTA AND BILATERAL ILIOFEMORAL RUNOFF ANGIOGRAM W AND/OR WO IV CONTRAST 12/29/2022 DO[facility] OFFICE LEGACY    IR VENOGRAM DIALYSIS  8/30/2022    IR VENOGRAM DIALYSIS 8/30/2022 [facility] CLINICAL LEGACY    MANDIBLE SURGERY  08/25/2015    Jaw Surgery    MR HEAD ANGIO WO IV CONTRAST  1/30/2014    MR HEAD ANGIO WO IV CONTRAST 1/30/2014 [facility] ANCILLARY LEGACY    MR NECK ANGIO WO IV CONTRAST  1/30/2014    MR NECK ANGIO WO IV CONTRAST 1/30/2014 [facility] ANCILLARY LEGAC[facility]    OTHER SURGICAL HISTORY  11/03/2021    Arteriovenous fistula creation procedure    OTHER SURGICAL HISTORY  11/02/2021    Renal Transplant    OTHER SURGICAL HISTORY  11/03/2021    Venous Thrombectomy    OTHER SURGICAL HISTORY  06/19/2014     Hand Dislocation Interphalangeal, Open Treatment    US GUIDED BIOPSY LYMPH NODE SUPERFICIAL  1/11/2023    US GUIDED BIOPSY LYMPH NODE SUPERFICIAL 1/11/2023 [facility]CTOR OFFICE LEGACY     Medications:  Current Outpatient Medications:     Acidophilus Ex Str, L. sporog, 35 million- 25 million cell tablet, , Disp: , Rfl:     Dialyvite 100-1 mg tablet, , Disp: , Rfl:     EPOETIN KUMAR INJ, 7500 unit(s)   - to IntraVenous Push (Every 1  week at ,16:00 on Friday, Monday, Wednesday), Disp: , Rfl:     allopurinol (Zyloprim) 300 mg tablet, Take 1 tablet twice weekly, Disp: , Rfl:     amLODIPine (Norvasc) 10 mg tablet, Take 1 tablet (10 mg) by mouth once daily., Disp: 90 tablet, Rfl: 1    apixaban (Eliquis) 5 mg tablet, Take 1 tablet (5 mg) by mouth 2 times a day., Disp: 180 tablet, Rfl: 3    aspirin 81 mg EC tablet, Take 1 tablet (81 mg) by mouth once daily., Disp: , Rfl:     B complex-vitamin C-folic acid (Siri-Salo Rx) 1- mg-mg-mcg tablet, Take 1 tablet by mouth once daily., Disp: , Rfl:     blood sugar diagnostic (Accu-Chek Patricia Plus test strp) strip, TEST 4 TIMES DAILY., Disp: , Rfl:     cholestyramine (Questran) 4 gram packet, MIX THE CONTENTS OF 1 POWDER PACKET WITH 2-6 OZ OF NONCARBONATED BEVERAGE AND SWALLOW ONCE DAILY., Disp: , Rfl:     DULoxetine (Cymbalta) 30 mg DR capsule, Take 1 capsule (30 mg) by mouth once daily. Do not crush or chew., Disp: , Rfl:     epinastine (Elestat) 0.05 % ophthalmic solution, 1 drop in both eyes 2 times a day as needed for itching/allergies, Disp: , Rfl:     ergocalciferol (Vitamin D-2) 1.25 MG (47177 UT) capsule, Take 1 capsule every month, Disp: , Rfl:     FreeStyle Lite Strips strip, TEST TWICE DAILY., Disp: , Rfl:     gabapentin (Neurontin) 300 mg capsule, Take 1 capsule (300 mg) by mouth once daily at bedtime., Disp: , Rfl:     insulin aspart (NovoLOG) 100 unit/mL (3 mL) pen, use sliding scale tid ac as directed - inject 1u fbs 151-200, 2u fbs 201-250, 3u fbs 251-300, 5u fbs  "301-350, 7u fbs >350, Disp: , Rfl:     methocarbamol (Robaxin) 500 mg tablet, Take by mouth 4 times a day., Disp: , Rfl:     metoprolol tartrate (Lopressor) 50 mg tablet, Take 1 tablet (50 mg) by mouth in the morning and 1 tablet (50 mg) in the evening., Disp: , Rfl:     multivitamin tablet, Take 1 tablet by mouth once daily., Disp: , Rfl:     omeprazole (PriLOSEC) 40 mg DR capsule, Take 1 capsule (40 mg) by mouth in the morning and 1 capsule (40 mg) before bedtime., Disp: , Rfl:     predniSONE (Deltasone) 5 mg tablet, Take 2 tablets (10 mg) by mouth once daily., Disp: , Rfl:     sevelamer carbonate (Renvela) 800 mg tablet, TAKE 2 TABLET After meals, Disp: , Rfl:     sildenafil (Viagra) 100 mg tablet, TAKE 1 TABLET DAILY 1 HOUR BEFORE NEEDED, Disp: , Rfl:   Ph[facility]    Allergies:   Allergies   Allergen Reactions    Ibuprofen Anaphylaxis    Oxycodone Itching    Vancomycin Itching, Unknown and Rash     face red and hot     Immunizations: discuss at CPE   Family History:   Family History   Problem Relation Name Age of Onset    Kidney failure Other Sibling      Social History:   Home/Living Situation/Falls/Safety Assessment: lives alone, high falls risk  Education/Employment/Work/Vocational: works for [employer] in [employer]  Activities:   Drug Use:   Diet: ESRD on HD, +food insecurity   Depression/Anxiety: very frustrated over health and barriers to his healthcare   Sexuality/Contraception/Menstrual History:   Sleep:     Patient Information:  Health Insurance: has insurance   Transportation: public transportation   Healthcare POA/Guardian:   Contact Information:     Visit Vitals  /86 (BP Location: Right arm, Patient Position: Sitting)   Pulse 97   Resp 16   Ht 1.803 m (5' 11\")   Wt 99.8 kg (220 lb)   SpO2 96%   BMI 30.68 kg/m²   Smoking Status Former   BSA 2.24 m²      PHYSICAL EXAM:   General: obese AA male, NAD   HEENT: NCAT  CV: RRR  PULM: distant breath sounds, non-labored respirations at " rest, PO2 96% on RA  ABD: soft, obese, NT, ND  : no suprapubic tenderness; L groin AVG  SKIN: no rashes noted   EXT: LLE edema > right   NEURO: A&Ox4, symmetric facies, difficulty with ambulation   PSYCH: pleasant mood, appropriate affect     Assessment/Plan    Nghia Hall is a 58 y.o. male seen in Clinic at [facility] by Dr. Luis Petersen on 06/08/23 for routine care, as well as for management of the following chronic medical conditions: ESRD s/p DDKT (2006, subsequent graft failure 07/2021, on HD MWF at [facility] through LT groin AV graft on [address]; on Prednisone), pAfib (on Apixaban), HFpEF, HCV (s/p treatment), gout, lymphadenopathy (following with oncology, inconclusive biopsy in 01/2023). Patient presents today for follow up visit for recent finding of partially occlusive LLE DVT. Patient on Apixaban for history of Afib, had been on 2.5mg BID dosing. Was started on 10mg BID in hospital, completed 7x days of this, can now transition to 5mg BID for maintenance dosing. Plan on repeat duplex scan in 1 month with follow up afterwards.     #Partially Occlusive LLE DVT  - prior 2.5mg BID Apixaban dosing  - s/p at least 7 days of 10 BID dosing  - transition to 5mg BID dosing for maintenance going forward  - repeat duplex in 4-6 week, ordered today   [  ] follow up in clinic after that time     #ESRD s/p DDKT (2006, subsequent graft failure 07/2021, on HD MWF at [facility] through LT groin AV graft on [address]; on Prednisone), pAfib (on Apixaban)   - follows with Dr. Mccormick   - anuric at this time   - HD MWF as above   - Amlodipine, B-Blocker for BP control  - Prednisone as lone immunosuppressive agent at this time   [X] appointment with transplant nephrology coordinated (scheduled for July)    #HFpEF, Stage C, Associated cpcPH and RV Dysfunction  #Atrial Fibrillation   #LAZARO  #HTN  - follows with cardiology, Dr. Delcid  - Recent RHC with pHTN (mPAP 37) and CVP 18 (CO/CI 8.1/3.6, 7/3.1)  - Echo with evidence of  "RV dysfunction  - B-blocker, Amlodipine as above   - DOAC for AC   - visit with Farhana earlier this month   - plan was for repeat echo in 6 weeks to assess his PAP non-invasively and his RV size/function: completed 5/11/23, LVEF 45-50%, abnormal LV diastolic filling, no LVH, moderately enlarged RV, low normal RV systolic function, LA moderately dilated, RA moderately dilated, moderate TR, moderately elevated PAP, global hypokinesis of LV   - per May 2023 note:  \"At the current time he would be high risk from a cardiac standpoint given his biventricular dysfunction and persistent pHTN. I discussed that we can reassess in 1 year with a repeat echo to see if chronic volume unloading has an impact on his PA pressures.\"    #HCV (s/p treatment)  #Gout  #DVT  - prior, on DOAC as above; was on only 2.5mg BID dosing, however      #Lymphadenopathy (following with oncology, inconclusive biopsy in 01/2023): concern for reactive nature vs. PTLD (post-transplant lymphoproliferative disorder)   - Recent concern for lymphoproliferative disorder with FNA completed on 1/11  - Flow Cytometry resulted with no clonal B cell or abnormal T cell population  - Left Iliac lymph node pathology showed reactive hyperplasia and polyclonal plasmacytosis; no evidence of a lymphoproliferative disorder  - following with Dr. Castellon, missed most recent visit due to hospitalization  - EBV PCR negative  - Tacro was changed to low dose prednisone only since 11/2022   - plan to conservatively follow LN's for now per last note     #Health Maintenance    Cancer Screening  - Colorectal Cancer Screening: discuss at CPE   - Lung Cancer Screening:   - Prostate Cancer Screening: discuss at CPE    Laboratory Screening  - Lipid Screen: discuss at CPE   - A1C, glucose screen: on HD  - STI, HIV, Hep B screen: prior negative testing   - Hep C screen: s/p treatment     Imaging Screening  - AAA screening:    Immunizations: discuss at CPE   - Influenza: annually   - " COVID: vaccinated  - Tdap: unknown   - Prevnar, Pneumovax: discuss at CPE   - Shingrix: discuss at CPE     Other Screening  - Health Literacy Assessment: poor  - Depression screen:   - Home safety/partner violence screen: lives alone, high falls risk   - Hearing/Vision screens:   - Alcohol/tobacco/drug use screen:   - Healthcare POA/Advanced Directives:     Referrals: medication refill, repeat duplex scan in 4-6 weeks, [facility] (for medication delivery), Nephrology follow up (coordinated in July),     Return to clinic in 4-6 weeks for close follow-up, sooner if acute issues arise.     Patient Discussion:    Please call back the office with any questions at [phone]. In the case of an emergency, please call 911 or go to the nearest Emergency Department.      Luis Petersen MD  Internal Medicine-Pediatrics  [facility] [address], [address]  P: [phone], F: [phone]

## 2023-06-08 NOTE — PATIENT INSTRUCTIONS
You can transition to the 5mg twice daily dose of the Apixaban.     I have placed a new script but you can do two tablets of the 2.5mg dosing in the morning and evening to use up the supply you already have.     We have tested you for COVID today given your mild sore throat and loss of taste and smell. We will be in touch with results.     Please get your updated ultrasound of the Left leg done in early July. I would like to see you back in clinic after that for follow up, sooner if any other issues arise.     Best,  Dr. Petersen

## 2023-06-09 LAB — SARS-COV-2 RESULT: NOT DETECTED

## 2023-06-27 ENCOUNTER — OFFICE VISIT (OUTPATIENT)
Dept: PRIMARY CARE | Facility: CLINIC | Age: 58
End: 2023-06-27
Payer: COMMERCIAL

## 2023-06-27 VITALS
RESPIRATION RATE: 17 BRPM | DIASTOLIC BLOOD PRESSURE: 72 MMHG | HEIGHT: 71 IN | BODY MASS INDEX: 31.08 KG/M2 | WEIGHT: 222 LBS | HEART RATE: 70 BPM | SYSTOLIC BLOOD PRESSURE: 134 MMHG

## 2023-06-27 DIAGNOSIS — L98.9 SKIN LESION OF BACK: ICD-10-CM

## 2023-06-27 DIAGNOSIS — N18.6 ESRD (END STAGE RENAL DISEASE) (MULTI): Primary | ICD-10-CM

## 2023-06-27 DIAGNOSIS — M54.16 LUMBAR RADICULOPATHY: ICD-10-CM

## 2023-06-27 DIAGNOSIS — F33.41 RECURRENT MAJOR DEPRESSIVE DISORDER, IN PARTIAL REMISSION (CMS-HCC): ICD-10-CM

## 2023-06-27 PROCEDURE — 99214 OFFICE O/P EST MOD 30 MIN: CPT | Performed by: STUDENT IN AN ORGANIZED HEALTH CARE EDUCATION/TRAINING PROGRAM

## 2023-06-27 PROCEDURE — 3078F DIAST BP <80 MM HG: CPT | Performed by: STUDENT IN AN ORGANIZED HEALTH CARE EDUCATION/TRAINING PROGRAM

## 2023-06-27 PROCEDURE — 3044F HG A1C LEVEL LT 7.0%: CPT | Performed by: STUDENT IN AN ORGANIZED HEALTH CARE EDUCATION/TRAINING PROGRAM

## 2023-06-27 PROCEDURE — 3075F SYST BP GE 130 - 139MM HG: CPT | Performed by: STUDENT IN AN ORGANIZED HEALTH CARE EDUCATION/TRAINING PROGRAM

## 2023-06-27 PROCEDURE — 1036F TOBACCO NON-USER: CPT | Performed by: STUDENT IN AN ORGANIZED HEALTH CARE EDUCATION/TRAINING PROGRAM

## 2023-06-27 PROCEDURE — 3008F BODY MASS INDEX DOCD: CPT | Performed by: STUDENT IN AN ORGANIZED HEALTH CARE EDUCATION/TRAINING PROGRAM

## 2023-06-27 PROCEDURE — 3066F NEPHROPATHY DOC TX: CPT | Performed by: STUDENT IN AN ORGANIZED HEALTH CARE EDUCATION/TRAINING PROGRAM

## 2023-06-27 RX ORDER — DULOXETIN HYDROCHLORIDE 30 MG/1
30 CAPSULE, DELAYED RELEASE ORAL DAILY
Qty: 90 CAPSULE | Refills: 1 | Status: SHIPPED | OUTPATIENT
Start: 2023-06-27 | End: 2023-10-06 | Stop reason: SDUPTHER

## 2023-06-27 RX ORDER — GABAPENTIN 300 MG/1
300 CAPSULE ORAL NIGHTLY
Qty: 90 CAPSULE | Refills: 1 | Status: SHIPPED | OUTPATIENT
Start: 2023-06-27 | End: 2023-06-28 | Stop reason: SDUPTHER

## 2023-06-27 NOTE — PROGRESS NOTES
"Nghia Hall is a 58 y.o. male seen in Clinic at [facility] [place]. Luis Petersen on 06/27/23 for routine care, as well as for management of the following chronic medical conditions: ESRD s/p DDKT (2006, subsequent graft failure 07/2021, on HD MWF at [facility] through LT groin AV graft on [address]; on Prednisone), pAfib (on Apixaban), HFpEF, HCV (s/p treatment), gout, lymphadenopathy (following with oncology, inconclusive biopsy in 01/2023). Patient presents today for follow up visit after recent hospitalization at [facility] for missed HD x2 with subsequent hyperkalemia. Initially medically treated and admitted from 6/19-6/22 for recurrent inpatient HD sessions with improvement of electrolytes, deemed suitable for discharge. States he has been compliant with outpatient HD since then, las session yesterday. Continues on DOAC for recent partially occlusive LLE DVT. Planning for repeat ultrasound in early July, order re-printed for patient today. Has upcoming nephrology visit in early July as well. Also complains of \"feeling my skin crawl\" and some irritability, which upon further investigation is likely in setting of Duloxetine being suddenly stopped and not resumed after hospital stay. He has previously tolerated 30mg daily dosing despite his ESRD on HD dependence. Would like to resume, refill sent today. Continue to monitor closely. Also complains of cystic structure present in lower cervical/upper thoracic segment of back, would like to be assessed for removal, surgery referral placed.      ACUTE CONCERNS:   HyperK in setting of missed HD x2   Michelle   [facility]   Sunday, June 18--Thursday, June 22nd   RTC tomorrow   Cyst on neck   Surgery referral today per patient request  Last HD session yesterday, has been tolerating full 4 hour sessions  Duloxetine 30mg daily; ran out around 1 week   [  ] refill today for Duloxetine--delivery pharmacy  [  ] refill Gabapentin     PRIOR/CHRONIC CONCERNS:   #Partially " "Occlusive LLE DVT  - prior 2.5mg BID Apixaban dosing  - s/p at least 7 days of 10 BID dosing  - transition to 5mg BID dosing for maintenance going forward  - repeat duplex in 4-6 week, referral re-printed today   [  ] follow up in clinic after that time: mid-July 2023    #ESRD s/p DDKT (2006, subsequent graft failure 07/2021, on HD MWF at [facility] through LT groin AV graft on [address]; on Prednisone), pAfib (on Apixaban)   - follows with Dr. Mccormick   - anuric at this time   - HD MWF as above   - Amlodipine, B-Blocker for BP control  - Prednisone as lone immunosuppressive agent at this time   [X] appointment with transplant nephrology coordinated (scheduled for July)    #HFpEF, Stage C, Associated cpcPH and RV Dysfunction  #Atrial Fibrillation   #LAZARO  #HTN  - follows with cardiology, Dr. Delcid  - Recent RHC with pHTN (mPAP 37) and CVP 18 (CO/CI 8.1/3.6, 7/3.1)  - Echo with evidence of RV dysfunction  - B-blocker, Amlodipine as above   - DOAC for AC   - visit with Farhana earlier this month   - plan was for repeat echo in 6 weeks to assess his PAP non-invasively and his RV size/function: completed 5/11/23, LVEF 45-50%, abnormal LV diastolic filling, no LVH, moderately enlarged RV, low normal RV systolic function, LA moderately dilated, RA moderately dilated, moderate TR, moderately elevated PAP, global hypokinesis of LV   - per May 2023 note:  \"At the current time he would be high risk from a cardiac standpoint given his biventricular dysfunction and persistent pHTN. I discussed that we can reassess in 1 year with a repeat echo to see if chronic volume unloading has an impact on his PA pressures.\"    #HCV (s/p treatment)    #Gout  - Allopurinol 300 previously by prior PCP  - Adjusted to 100mg M/W/F post-dialysis for ESRD dosing     #DVT  - prior, on DOAC as above; was on only 2.5mg BID dosing, however    - now on 5mg BID dosing with plans for repeat duplex in early/mid-July     #Lymphadenopathy (following " with oncology, inconclusive biopsy in 01/2023): concern for reactive nature vs. PTLD (post-transplant lymphoproliferative disorder)   - Recent concern for lymphoproliferative disorder with FNA completed on 1/11  - Flow Cytometry resulted with no clonal B cell or abnormal T cell population  - Left Iliac lymph node pathology showed reactive hyperplasia and polyclonal plasmacytosis; no evidence of a lymphoproliferative disorder  - following with Dr. Castellon, last visit early June 2023; next visit September 2023 for close follow up/monitoring of progression   - EBV PCR negative  - Tacro was changed to low dose prednisone only since 11/2022   - plan to conservatively follow LN's for now per last note     #Depression   - Duloxetine 30mg daily   - patient has been on long term, previously tolerated  - discussed risk/benefit given ESRD on HD; would like to continue     #Lumbar Radiculopathy   - Gabapentin 300mg M/W/F after HD (adjusted from prior daily dosing, given ESRD on HD)     Past Medical History: as above   Past Medical History:   Diagnosis Date    End stage renal disease (CMS/[facility]) 12/16/2022    ESRD (end stage renal disease)    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    Renal transplant rejection    Kidney transplant status 12/08/2022    Kidney replaced by transplant    Personal history of immunosuppression therapy 07/04/2021    H/O immunosuppressive therapy    Personal history of other diseases of the nervous system and sense organs     History of cataract    Personal history of other diseases of urinary system 11/02/2021    Personal history of renal failure    Personal history of other endocrine, nutritional and metabolic disease 07/22/2013    History of hyperlipidemia    Type 2 diabetes mellitus without complications (CMS/[facility]) 12/08/2022    Diabetes mellitus    Urinary tract infection, site not specified 05/02/2018    Acute UTI     Subspecialty Medical Care: Nephrology (Transplant), Oncology, Cardiology (HF),  Vascular Surgery     Past Surgical History:   Past Surgical History:   Procedure Laterality Date    CT AORTA AND BILATERAL ILIOFEMORAL RUNOFF ANGIOGRAM W AND/OR WO IV CONTRAST  11/7/2021    CT AORTA AND BILATERAL ILIOFEMORAL RUNOFF ANGIOGRAM W AND/OR WO IV CONTRAST 11/7/2021 [facility] CLINICAL LEGACY    CT AORTA AND BILATERAL ILIOFEMORAL RUNOFF ANGIOGRAM W AND/OR WO IV CONTRAST  7/6/2022    CT AORTA AND BILATERAL ILIOFEMORAL RUNOFF ANGIOGRAM W AND/OR WO IV CONTRAST 7/6/2022 [facility] EMERGENCY LEGACY    CT AORTA AND BILATERAL ILIOFEMORAL RUNOFF ANGIOGRAM W AND/OR WO IV CONTRAST  8/17/2022    CT AORTA AND BILATERAL ILIOFEMORAL RUNOFF ANGIOGRAM W AND/OR WO IV CONTRAST 8/17/2022 [facility] EMERGENCY LEGACY    CT AORTA AND BILATERAL ILIOFEMORAL RUNOFF ANGIOGRAM W AND/OR WO IV CONTRAST  9/30/2022    CT AORTA AND BILATERAL ILIOFEMORAL RUNOFF ANGIOGRAM W AND/OR WO IV CONTRAST 9/30/2022 [facility] ANCILLARY LEGACY    CT AORTA AND BILATERAL ILIOFEMORAL RUNOFF ANGIOGRAM W AND/OR WO IV CONTRAST  12/29/2022    CT AORTA AND BILATERAL ILIOFEMORAL RUNOFF ANGIOGRAM W AND/OR WO IV CONTRAST 12/29/2022 DOCTOR OFFICE LEGACY    IR VENOGRAM DIALYSIS  8/30/2022    IR VENOGRAM DIALYSIS 8/30/2022 [facility] CLINICAL LEGACY    MANDIBLE SURGERY  08/25/2015    Jaw Surgery    MR HEAD ANGIO WO IV CONTRAST  1/30/2014    MR HEAD ANGIO WO IV CONTRAST 1/30/2014 [facility] ANCILLARY LEGACY    MR NECK ANGIO WO IV CONTRAST  1/30/2014    MR NECK ANGIO WO IV CONTRAST 1/30/2014 [facility] ANCILLARY LEGAC[facility]    OTHER SURGICAL HISTORY  11/03/2021    Arteriovenous fistula creation procedure    OTHER SURGICAL HISTORY  11/02/2021    Renal Transplant    OTHER SURGICAL HISTORY  11/03/2021    Venous Thrombectomy    OTHER SURGICAL HISTORY  06/19/2014    Hand Dislocation Interphalangeal, Open Treatment    US GUIDED BIOPSY LYMPH NODE SUPERFICIAL  1/11/2023    US GUIDED BIOPSY LYMPH NODE SUPERFICIAL 1/11/2023 DOCTOR OFFICE LEGACY     Medications:  Current Outpatient Medications:     Acidophilus Ex Str, L. sporog,  35 million- 25 million cell tablet, , Disp: , Rfl:     allopurinol (Zyloprim) 100 mg tablet, Take 1 tablet (100 mg) by mouth once a day on Monday, Wednesday, and Friday. AFTER DIALYSIS., Disp: , Rfl:     amLODIPine (Norvasc) 10 mg tablet, Take 1 tablet (10 mg) by mouth once daily., Disp: 90 tablet, Rfl: 1    apixaban (Eliquis) 5 mg tablet, Take 1 tablet (5 mg) by mouth 2 times a day., Disp: 180 tablet, Rfl: 3    aspirin 81 mg EC tablet, Take 1 tablet (81 mg) by mouth once daily., Disp: , Rfl:     B complex-vitamin C-folic acid (Siri-Salo Rx) 1- mg-mg-mcg tablet, Take 1 tablet by mouth once daily., Disp: , Rfl:     blood sugar diagnostic (Accu-Chek Patricia Plus test strp) strip, TEST 4 TIMES DAILY., Disp: , Rfl:     cholestyramine (Questran) 4 gram packet, MIX THE CONTENTS OF 1 POWDER PACKET WITH 2-6 OZ OF NONCARBONATED BEVERAGE AND SWALLOW ONCE DAILY., Disp: , Rfl:     Dialyvite 100-1 mg tablet, , Disp: , Rfl:     DULoxetine (Cymbalta) 30 mg DR capsule, Take 1 capsule (30 mg) by mouth once daily. Do not crush or chew., Disp: 90 capsule, Rfl: 1    epinastine (Elestat) 0.05 % ophthalmic solution, 1 drop in both eyes 2 times a day as needed for itching/allergies, Disp: , Rfl:     EPOETIN KUMAR INJ, 7500 unit(s)   - to IntraVenous Push (Every 1  week at ,16:00 on Friday, Monday, Wednesday), Disp: , Rfl:     ergocalciferol (Vitamin D-2) 1.25 MG (86176 UT) capsule, Take 1 capsule every month, Disp: , Rfl:     FreeStyle Lite Strips strip, TEST TWICE DAILY., Disp: , Rfl:     gabapentin (Neurontin) 300 mg capsule, Take 1 capsule (300 mg) by mouth once a day on Monday, Wednesday, and Friday. AFTER DIALYSIS., Disp: 39 capsule, Rfl: 1    insulin aspart (NovoLOG) 100 unit/mL (3 mL) pen, use sliding scale tid ac as directed - inject 1u fbs 151-200, 2u fbs 201-250, 3u fbs 251-300, 5u fbs 301-350, 7u fbs >350, Disp: , Rfl:     metoprolol tartrate (Lopressor) 50 mg tablet, Take 1 tablet (50 mg) by mouth in the morning and 1  "tablet (50 mg) in the evening., Disp: , Rfl:     multivitamin tablet, Take 1 tablet by mouth once daily., Disp: , Rfl:     omeprazole (PriLOSEC) 40 mg DR capsule, Take 1 capsule (40 mg) by mouth in the morning and 1 capsule (40 mg) before bedtime., Disp: , Rfl:     predniSONE (Deltasone) 5 mg tablet, Take 2 tablets (10 mg) by mouth once daily., Disp: , Rfl:     sevelamer carbonate (Renvela) 800 mg tablet, TAKE 2 TABLET After meals, Disp: , Rfl:     sildenafil (Viagra) 100 mg tablet, TAKE 1 TABLET DAILY 1 HOUR BEFORE NEEDED, Disp: , Rfl:   Pharmacy: [facility] ([place])    Allergies:   Allergies   Allergen Reactions    Ibuprofen Anaphylaxis    Oxycodone Itching    Vancomycin Itching, Unknown and Rash     face red and hot     Immunizations: discuss at CPE   Family History:   Family History   Problem Relation Name Age of Onset    Kidney failure Other Sibling      Social History:   Home/Living Situation/Falls/Safety Assessment: lives alone, high falls risk  Education/Employment/Work/Vocational: works for city [employer] in [employer]  Activities:   Drug Use:   Diet: ESRD on HD, +food insecurity   Depression/Anxiety: very frustrated over health and barriers to his healthcare   Sexuality/Contraception/Menstrual History:   Sleep:     Patient Information:  Health Insurance: has insurance   Transportation: public transportation   Healthcare POA/Guardian:   Contact Information:     Visit Vitals  /72 (BP Location: Right arm, Patient Position: Sitting)   Pulse 70   Resp 17   Ht 1.803 m (5' 11\")   Wt 101 kg (222 lb)   BMI 30.96 kg/m²   Smoking Status Former   BSA 2.25 m²      PHYSICAL EXAM:   General: obese AA male, NAD   HEENT: NCAT  CV: RRR  PULM: distant breath sounds, non-labored respirations at rest  ABD: soft, obese, NT, ND  : no suprapubic tenderness; L groin AVG  SKIN: no rashes noted; lesion on upper back, possible lipoma   EXT: LLE edema > right   NEURO: A&Ox4, symmetric facies, difficulty with ambulation   PSYCH: " "pleasant mood, appropriate affect     Assessment/Plan    Nghia Hall is a 58 y.o. male seen in Clinic at [facility] [place] by Dr. Luis Petersen on 06/27/23 for routine care, as well as for management of the following chronic medical conditions:  ESRD s/p DDKT (2006, subsequent graft failure 07/2021, on HD MWF at [facility] through LT groin AV graft on [address]; on Prednisone), pAfib (on Apixaban), HFpEF, HCV (s/p treatment), gout, lymphadenopathy (following with oncology, inconclusive biopsy in 01/2023). Patient presents today for follow up visit after recent hospitalization at [facility] for missed HD x2 with subsequent hyperkalemia. Initially medically treated and admitted from 6/19-6/22 for recurrent inpatient HD sessions with improvement of electrolytes, deemed suitable for discharge. States he has been compliant with outpatient HD since then, las session yesterday. Continues on DOAC for recent partially occlusive LLE DVT. Planning for repeat ultrasound in early July, order re-printed for patient today. Has upcoming nephrology visit in early July as well. Also complains of \"feeling my skin crawl\" and some irritability, which upon further investigation is likely in setting of Duloxetine being suddenly stopped and not resumed after hospital stay. He has previously tolerated 30mg daily dosing despite his ESRD on HD dependence. Would like to resume, refill sent today. Continue to monitor closely. Also complains of cystic structure present in lower cervical/upper thoracic segment of back, would like to be assessed for removal, surgery referral placed.      ACUTE CONCERNS:   HyperK in setting of missed HD x2   Michelle   [facility]   Sunday, June 18--Thursday, June 22nd   RTC tomorrow   Cyst on neck   Surgery referral today per patient request  Last HD session yesterday, has been tolerating full 4 hour sessions  Duloxetine 30mg daily; ran out around 1 week   [  ] refill today for Duloxetine--delivery pharmacy  [  ] " "refill Gabapentin     PRIOR/CHRONIC CONCERNS:   #Partially Occlusive LLE DVT  - prior 2.5mg BID Apixaban dosing  - s/p at least 7 days of 10 BID dosing  - transition to 5mg BID dosing for maintenance going forward  - repeat duplex in 4-6 week, referral re-printed today   [  ] follow up in clinic after that time: mid-July 2023    #ESRD s/p DDKT (2006, subsequent graft failure 07/2021, on HD MWF at [facility] through LT groin AV graft on [address]; on Prednisone), pAfib (on Apixaban)   - follows with Dr. Mccormick   - anuric at this time   - HD MWF as above   - Amlodipine, B-Blocker for BP control  - Prednisone as lone immunosuppressive agent at this time   [X] appointment with transplant nephrology coordinated (scheduled for July)    #HFpEF, Stage C, Associated cpcPH and RV Dysfunction  #Atrial Fibrillation   #LAZARO  #HTN  - follows with cardiology, Dr. Delcid  - Recent RHC with pHTN (mPAP 37) and CVP 18 (CO/CI 8.1/3.6, 7/3.1)  - Echo with evidence of RV dysfunction  - B-blocker, Amlodipine as above   - DOAC for AC   - visit with Farhana earlier this month   - plan was for repeat echo in 6 weeks to assess his PAP non-invasively and his RV size/function: completed 5/11/23, LVEF 45-50%, abnormal LV diastolic filling, no LVH, moderately enlarged RV, low normal RV systolic function, LA moderately dilated, RA moderately dilated, moderate TR, moderately elevated PAP, global hypokinesis of LV   - per May 2023 note:  \"At the current time he would be high risk from a cardiac standpoint given his biventricular dysfunction and persistent pHTN. I discussed that we can reassess in 1 year with a repeat echo to see if chronic volume unloading has an impact on his PA pressures.\"    #HCV (s/p treatment)    #Gout  - Allopurinol 300 previously by prior PCP  - Adjusted to 100mg M/W/F post-dialysis for ESRD dosing     #DVT  - prior, on DOAC as above; was on only 2.5mg BID dosing, however    - now on 5mg BID dosing with plans for repeat " duplex in early/mid-July     #Lymphadenopathy (following with oncology, inconclusive biopsy in 01/2023): concern for reactive nature vs. PTLD (post-transplant lymphoproliferative disorder)   - Recent concern for lymphoproliferative disorder with FNA completed on 1/11  - Flow Cytometry resulted with no clonal B cell or abnormal T cell population  - Left Iliac lymph node pathology showed reactive hyperplasia and polyclonal plasmacytosis; no evidence of a lymphoproliferative disorder  - following with Dr. Castellon, last visit early June 2023; next visit September 2023 for close follow up/monitoring of progression   - EBV PCR negative  - Tacro was changed to low dose prednisone only since 11/2022   - plan to conservatively follow LN's for now per last note     #Depression   - Duloxetine 30mg daily   - patient has been on long term, previously tolerated  - discussed risk/benefit given ESRD on HD; would like to continue     #Lumbar Radiculopathy   - Gabapentin 300mg M/W/F after HD (adjusted from prior daily dosing, given ESRD on HD)     #Health Maintenance    Cancer Screening  - Colorectal Cancer Screening: discuss at CPE   - Lung Cancer Screening:   - Prostate Cancer Screening: discuss at CPE    Laboratory Screening  - Lipid Screen: discuss at CPE   - A1C, glucose screen: on HD  - STI, HIV, Hep B screen: prior negative testing   - Hep C screen: s/p treatment     Imaging Screening  - AAA screening:    Immunizations: discuss at CPE   - Influenza: annually   - COVID: vaccinated  - Tdap: unknown   - Prevnar, Pneumovax: discuss at CPE   - Shingrix: discuss at CPE     Other Screening  - Health Literacy Assessment: poor  - Depression screen:   - Home safety/partner violence screen: lives alone, high falls risk   - Hearing/Vision screens:   - Alcohol/tobacco/drug use screen:   - Healthcare POA/Advanced Directives:     Referrals: medication refill, repeat duplex scan in 4-6 weeks (due early/mid July), [facility]  (for medication delivery), Nephrology follow up (coordinated in July), Hematology/Oncology follow up (September)     Return to clinic in 1-2 months for close follow-up, sooner if acute issues arise.     Patient Discussion:    Please call back the office with any questions at [phone]. In the case of an emergency, please call 911 or go to the nearest Emergency Department.      Luis Petersen MD  Internal Medicine-Pediatrics  [facility] [place] [address], [address]  P: [phone], F: [phone]

## 2023-06-28 RX ORDER — ALLOPURINOL 100 MG/1
100 TABLET ORAL
COMMUNITY
End: 2023-08-04 | Stop reason: ALTCHOICE

## 2023-06-28 RX ORDER — GABAPENTIN 300 MG/1
300 CAPSULE ORAL
Qty: 39 CAPSULE | Refills: 1 | Status: SHIPPED | OUTPATIENT
Start: 2023-06-28 | End: 2023-10-06 | Stop reason: SDUPTHER

## 2023-06-30 ENCOUNTER — TELEPHONE (OUTPATIENT)
Dept: PRIMARY CARE | Facility: CLINIC | Age: 58
End: 2023-06-30
Payer: COMMERCIAL

## 2023-06-30 NOTE — TELEPHONE ENCOUNTER
He will be dropping off FMLA papers on Monday.    On page 2 you have to check box where it has he can return to work.    He says dates should be form 6 17 23 to 6 26 23.    Once completed can be faxed to:    [phone]

## 2023-07-01 LAB
GRAM STAIN: ABNORMAL
TISSUE/WOUND CULTURE/SMEAR: ABNORMAL

## 2023-07-02 LAB
BLOOD CULTURE: NORMAL
BLOOD CULTURE: NORMAL

## 2023-07-17 ENCOUNTER — DOCUMENTATION (OUTPATIENT)
Dept: PRIMARY CARE | Facility: CLINIC | Age: 58
End: 2023-07-17
Payer: COMMERCIAL

## 2023-07-17 RX ORDER — DOCUSATE SODIUM 100 MG/1
100 CAPSULE, LIQUID FILLED ORAL 2 TIMES DAILY
COMMUNITY
Start: 2023-07-14 | End: 2023-07-20 | Stop reason: ALTCHOICE

## 2023-07-17 NOTE — PROGRESS NOTES
[place]  [facility] [place]charge Diagnosis:   Rib fracture   Admission Date:  7/12/23   Discharge Date:  7/14/23     PCP Appointment Date:  7/20/23   Specialist Appointment Date:   8/31/23    Hospital Encounter and Summary: Linked  See discharge assessment below for further details    Engagement  Call Start Time: 1220 (7/17/2023 12:38 PM)    Medications  Medications reviewed with patient/caregiver?: Yes (7/17/2023 12:38 PM)  Is the patient having any side effects they believe may be caused by any medication additions or changes?: No (7/17/2023 12:38 PM)  Does the patient have all medications ordered at discharge?: Yes (7/17/2023 12:38 PM)  Prescription Comments: 13 scripts given at d/c. all delivered to bedside prior to d/c.- melatonin, metoprolol, mulri vit, omeprazole, oxycodone, prednisone, sevelamer, tylenol, amlodipine, apixaban, asa, colace, duloxetine. (7/17/2023 12:38 PM)  Is the patient taking all medications as directed (includes completed medication regime)?: Yes (7/17/2023 12:38 PM)    Appointments  Does the patient have a primary care provider?: Yes (7/17/2023 12:38 PM)  Care Management Interventions: Verified appointment date/time/provider (7/17/2023 12:38 PM)  Has the patient kept scheduled appointments due by today?: No (7/17/2023 12:38 PM)  Care Management Interventions: Advised patient to keep appointment (7/17/2023 12:38 PM)    Self Management  What is the home health agency?: denies need (7/17/2023 12:38 PM)  Has home health visited the patient within 72 hours of discharge?: Not applicable (7/17/2023 12:38 PM)    Patient Teaching  Does the patient have access to their discharge instructions?: Yes (7/17/2023 12:38 PM)  Care Management Interventions: Reviewed instructions with patient (7/17/2023 12:38 PM)  What is the patient's perception of their health status since discharge?: Improving (7/17/2023 12:38 PM)  Is the patient/caregiver able to teach back the hierarchy of who to call/visit for  symptoms/problems? PCP, Specialist, Home Health nurse, Urgent Care, ED, 911: Yes (7/17/2023 12:38 PM)  Patient/Caregiver Education Comments: cm spoke with pt who states he is doing well at home, pt goes to HD 3 x a week, pt aware of appt 7/20/23 with pcp. pt states most meds given at d/ were his home meds. pt aware to keep pcp appt. pt denies nned to [facility]. contact info provided for any additional concerns. (7/17/2023 12:38 PM)

## 2023-07-18 DIAGNOSIS — E11.9 TYPE 2 DIABETES MELLITUS WITHOUT COMPLICATION, UNSPECIFIED WHETHER LONG TERM INSULIN USE (MULTI): ICD-10-CM

## 2023-07-18 DIAGNOSIS — I50.32 CHRONIC DIASTOLIC HEART FAILURE (MULTI): Primary | ICD-10-CM

## 2023-07-20 ENCOUNTER — OFFICE VISIT (OUTPATIENT)
Dept: PRIMARY CARE | Facility: CLINIC | Age: 58
End: 2023-07-20
Payer: COMMERCIAL

## 2023-07-20 VITALS
HEART RATE: 64 BPM | BODY MASS INDEX: 33.04 KG/M2 | WEIGHT: 236 LBS | OXYGEN SATURATION: 99 % | SYSTOLIC BLOOD PRESSURE: 117 MMHG | DIASTOLIC BLOOD PRESSURE: 73 MMHG | HEIGHT: 71 IN

## 2023-07-20 DIAGNOSIS — Z23 IMMUNIZATION DUE: ICD-10-CM

## 2023-07-20 DIAGNOSIS — Z48.02 VISIT FOR SUTURE REMOVAL: Primary | ICD-10-CM

## 2023-07-20 DIAGNOSIS — I82.409 DEEP VEIN THROMBOSIS (DVT) OF LOWER EXTREMITY, UNSPECIFIED CHRONICITY, UNSPECIFIED LATERALITY, UNSPECIFIED VEIN (MULTI): ICD-10-CM

## 2023-07-20 DIAGNOSIS — M10.9 GOUT, UNSPECIFIED CAUSE, UNSPECIFIED CHRONICITY, UNSPECIFIED SITE: ICD-10-CM

## 2023-07-20 DIAGNOSIS — Z94.0 KIDNEY REPLACED BY TRANSPLANT (HHS-HCC): ICD-10-CM

## 2023-07-20 DIAGNOSIS — N18.6 ESRD (END STAGE RENAL DISEASE) (MULTI): ICD-10-CM

## 2023-07-20 PROCEDURE — 3078F DIAST BP <80 MM HG: CPT | Performed by: STUDENT IN AN ORGANIZED HEALTH CARE EDUCATION/TRAINING PROGRAM

## 2023-07-20 PROCEDURE — 3074F SYST BP LT 130 MM HG: CPT | Performed by: STUDENT IN AN ORGANIZED HEALTH CARE EDUCATION/TRAINING PROGRAM

## 2023-07-20 PROCEDURE — 3008F BODY MASS INDEX DOCD: CPT | Performed by: STUDENT IN AN ORGANIZED HEALTH CARE EDUCATION/TRAINING PROGRAM

## 2023-07-20 PROCEDURE — 90471 IMMUNIZATION ADMIN: CPT | Performed by: STUDENT IN AN ORGANIZED HEALTH CARE EDUCATION/TRAINING PROGRAM

## 2023-07-20 PROCEDURE — 90715 TDAP VACCINE 7 YRS/> IM: CPT | Performed by: STUDENT IN AN ORGANIZED HEALTH CARE EDUCATION/TRAINING PROGRAM

## 2023-07-20 PROCEDURE — 3066F NEPHROPATHY DOC TX: CPT | Performed by: STUDENT IN AN ORGANIZED HEALTH CARE EDUCATION/TRAINING PROGRAM

## 2023-07-20 PROCEDURE — 3044F HG A1C LEVEL LT 7.0%: CPT | Performed by: STUDENT IN AN ORGANIZED HEALTH CARE EDUCATION/TRAINING PROGRAM

## 2023-07-20 PROCEDURE — 99214 OFFICE O/P EST MOD 30 MIN: CPT | Performed by: STUDENT IN AN ORGANIZED HEALTH CARE EDUCATION/TRAINING PROGRAM

## 2023-07-20 PROCEDURE — 1036F TOBACCO NON-USER: CPT | Performed by: STUDENT IN AN ORGANIZED HEALTH CARE EDUCATION/TRAINING PROGRAM

## 2023-07-20 RX ORDER — TALC
POWDER (GRAM) TOPICAL
COMMUNITY
Start: 2023-07-14 | End: 2023-10-06 | Stop reason: SDUPTHER

## 2023-07-20 RX ORDER — LANOLIN ALCOHOL/MO/W.PET/CERES
1 CREAM (GRAM) TOPICAL DAILY
COMMUNITY
Start: 2023-06-01 | End: 2023-10-06 | Stop reason: SDUPTHER

## 2023-07-20 RX ORDER — TORSEMIDE 20 MG/1
TABLET ORAL
COMMUNITY
Start: 2017-10-03 | End: 2023-08-04 | Stop reason: ALTCHOICE

## 2023-07-20 RX ORDER — ALLOPURINOL 300 MG/1
TABLET ORAL
COMMUNITY
End: 2023-08-04 | Stop reason: ALTCHOICE

## 2023-07-20 RX ORDER — MULTIVITAMIN WITH FOLIC ACID 400 MCG
TABLET ORAL
COMMUNITY
Start: 2023-07-14

## 2023-07-20 NOTE — PROGRESS NOTES
[facility]     Advanced Practice Exam & Daily Communication Note    Patient Active Problem List   Diagnosis     Extreme Prematurity - 22 weeks completed     Maternal obesity, antepartum     Maternal GBS Positive Status      ELBW (extremely low birth weight) infant     Respiratory failure of      Respiratory distress syndrome in      Hypotension, unspecified hypotension type     Hypoglycemia     Feeding problem of      Need for observation and evaluation of  for sepsis     Intestinal perforation in      Vital Signs:  Temp:  [97.5  F (36.4  C)-99  F (37.2  C)] 98.1  F (36.7  C)  Pulse:  [135-152] 143  Resp:  [50] 50  BP: (63-76)/(25-36) 76/31  Cuff Mean (mmHg):  [39-49] 43  FiO2 (%):  [35 %-65 %] 37 %  SpO2:  [89 %-96 %] 93 %    Weight:  Wt Readings from Last 1 Encounters:   21 0.8 kg (1 lb 12.2 oz) (<1 %, Z= -10.56)*     * Growth percentiles are based on WHO (Boys, 0-2 years) data.       Physical Exam:  General: Resting comfortably, desaturates with exam.   HEENT: Normocephalic. Head and neck with mild-moderate edema. Scalp intact. Anterior fontanel soft. Sutures approximated.   Cardiovascular: RRR, Gr II/III systolic murmur. Capillary refill <3 seconds peripherally and centrally.    Respiratory: Breath sounds clear with good aeration bilaterally on conventional ventilator. Audible air leak. No retractions noted.  Gastrointestinal: Abdomen full/soft, continues to have intermittent dusky undertones. Faint bowel sounds auscultated. Ostomy and mucous fistula pink, with granulation tissue covering stomas. Small amount of blood on gauze covering.  : Deferred.  Skin: Warm, pink, bronze undertones. Multiple skin tears/injury that are healing.  Neurologic: Spontaneous movement. Tone AGA and symmetric    Parent Communication:  Mother and father updated at bedside. Consented to hepatitis B to be given today.     Jeri Cherry PA-C  Nghia Hall is a 58 y.o. male seen in Clinic at [facility] [place] [facility] by Dr. Luis Petersen on 07/20/23 for routine care, as well as for management of the following chronic medical conditions: ESRD s/p DDKT (2006, subsequent graft failure 07/2021, on HD MWF at [facility] through LT groin AV graft on [address]; not currently on immunosuppression), pAfib (on Apixaban), HFpEF, HCV (s/p treatment), gout, lymphadenopathy (following with oncology, inconclusive biopsy in 01/2023). Presents today for follow-up.     He presented to the ED on 7/5/23 for mechanical fall after tripping on concrete. Fell forward, hit head, and L knee. No LOC. All imaging negative for acute traumatic injuries at that time. Sutures placed for temporal laceration. Removed today.     Saw his nephrologist 7/10/23. Prednisone was discontinued. He missed HD yesterday because he was running late from an eye appointment. Denies chest pain, palpitations. BP at goal.    He was admitted between 7/12-7/14 for chest pain. Noted to have initial troponin leak that resolved. Cardiac work-up unrevealing. Pain thought to be 2/2 rib fractures sustained in May after a fall. Discharged on tylenol and oxycodone. Still endorsing some residual L sided flank pain, somewhat manageable with ibuprofen.     CHRONIC CONCERNS:   #Partially Occlusive LLE DVT  - prior 2.5mg BID Apixaban dosing  - s/p at least 7 days of 10 BID dosing  - transition to 5mg BID dosing for maintenance going forward  - concern today that pt is not taking Eliquis as prescribed, mentioned that he still takes 10 g BID; reinforced that prescription is for 5 mg BID, voiced understanding  - re-printed referral for duplex US LLE, will complete today: NEGATIVE--NO evidence of persistent clot     #ESRD s/p DDKT (2006, subsequent graft failure 07/2021, on HD MWF at [facility] through LT groin AV graft on [address]; not on immunosuppression)  - follows with Dr. Mccormick   - jessieuric at this time   - HD MWF as above   -  "Amlodipine, B-Blocker for BP control  - No immunosuppression at this time, prednisone discontinued 7/10/23   - missed HD 7/19, will receive tomorrow    #HFpEF, Stage C, Associated cpcPH and RV Dysfunction  #Atrial Fibrillation (on Eliquis 5 mg BID)  #LAZARO  #HTN  - follows with cardiology, Dr. Delcid  - Recent RHC with pHTN (mPAP 37) and CVP 18 (CO/CI 8.1/3.6, 7/3.1)  - Echo with evidence of RV dysfunction  - B-blocker, Amlodipine as above   - DOAC for AC   - plan was for repeat echo in 6 weeks to assess his PAP non-invasively and his RV size/function: completed 5/11/23, LVEF 45-50%, abnormal LV diastolic filling, no LVH, moderately enlarged RV, low normal RV systolic function, LA moderately dilated, RA moderately dilated, moderate TR, moderately elevated PAP, global hypokinesis of LV   - per May 2023 note:  \"At the current time he would be high risk from a cardiac standpoint given his biventricular dysfunction and persistent pHTN. I discussed that we can reassess in 1 year with a repeat echo to see if chronic volume unloading has an impact on his PA pressures.\"  - Scheduled to see Dr. Delcid later this fall     #Chronic diarrhea  #Recurrent Cdiff infection  - received SBE 9/9/2022 for fecal microbiota transplant  - last colonoscopy 5/17/22, mentioned that date of repeat C-scope depends on pathology results  - has GI follow-up in early September with Dr. Maria, will follow-up on suggested repeat colonoscopy date    #Lymphadenopathy (following with oncology, inconclusive biopsy in 01/2023): concern for reactive nature vs. PTLD (post-transplant lymphoproliferative disorder)   - Recent concern for lymphoproliferative disorder with FNA completed on 1/11  - Flow Cytometry resulted with no clonal B cell or abnormal T cell population  - Left Iliac lymph node pathology showed reactive hyperplasia and polyclonal plasmacytosis; no evidence of a lymphoproliferative disorder  - following with Dr. Castellon, last visit early " 2021 9:23 AM   [facility]                                 June 2023; next visit September 2023 for close follow up/monitoring of progression   - EBV PCR negative  - plan to conservatively follow LN's for now per last note     #Depression   - Duloxetine 30mg daily   - patient has been on long term, previously tolerated  - discussed risk/benefit given ESRD on HD; would like to continue     #Lumbar Radiculopathy   - Gabapentin 300mg M/W/F after HD (adjusted from prior daily dosing, given ESRD on HD)     #HCV (s/p treatment)    #Cystic structure in lower cervical/upper thoracic segment of back  - likely lipoma  - general surgery referral placed    #Gout  - Allopurinol 300 previously by prior PCP  - Adjusted to 100mg M/W/F post-dialysis for ESRD dosing     Past Medical History: as above   Past Medical History:   Diagnosis Date    End stage renal disease (CMS/[facility]) 12/16/2022    ESRD (end stage renal disease)    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    Renal transplant rejection    Kidney transplant status 12/08/2022    Kidney replaced by transplant    Personal history of immunosuppression therapy 07/04/2021    H/O immunosuppressive therapy    Personal history of other diseases of the nervous system and sense organs     History of cataract    Personal history of other diseases of urinary system 11/02/2021    Personal history of renal failure    Personal history of other endocrine, nutritional and metabolic disease 07/22/2013    History of hyperlipidemia    Type 2 diabetes mellitus without complications (CMS/[facility]) 12/08/2022    Diabetes mellitus    Urinary tract infection, site not specified 05/02/2018    Acute UTI     Subspecialty Medical Care: Nephrology (Transplant), Oncology, Cardiology (HF), Vascular Surgery     Past Surgical History:   Past Surgical History:   Procedure Laterality Date    CT AORTA AND BILATERAL ILIOFEMORAL RUNOFF ANGIOGRAM W AND/OR WO IV CONTRAST  11/7/2021    CT AORTA AND BILATERAL ILIOFEMORAL RUNOFF ANGIOGRAM W AND/OR WO IV CONTRAST 11/7/2021 [facility] CL[facility]ICAL  [facility]    CT AORTA AND BILATERAL ILIOFEMORAL RUNOFF ANGIOGRAM W AND/OR WO IV CONTRAST  7/6/2022    CT AORTA AND BILATERAL ILIOFEMORAL RUNOFF ANGIOGRAM W AND/OR WO IV CONTRAST 7/6/2022 [facility]    CT AORTA AND BILATERAL ILIOFEMORAL RUNOFF ANGIOGRAM W AND/OR WO IV CONTRAST  8/17/2022    CT AORTA AND BILATERAL ILIOFEMORAL RUNOFF ANGIOGRAM W AND/OR WO IV CONTRAST 8/17/2022 [facility]    CT AORTA AND BILATERAL ILIOFEMORAL RUNOFF ANGIOGRAM W AND/OR WO IV CONTRAST  9/30/2022    CT AORTA AND BILATERAL ILIOFEMORAL RUNOFF ANGIOGRAM W AND/OR WO IV CONTRAST 9/30/2022 [facility]    CT AORTA AND BILATERAL ILIOFEMORAL RUNOFF ANGIOGRAM W AND/OR WO IV CONTRAST  12/29/2022    CT AORTA AND BILATERAL ILIOFEMORAL RUNOFF ANGIOGRAM W AND/OR WO IV CONTRAST 12/29/2022 [facility]    IR VENOGRAM DIALYSIS  8/30/2022    IR VENOGRAM DIALYSIS 8/30/2022 [facility] CLINICAL LEGACY    MANDIBLE SURGERY  08/25/2015    Jaw Surgery    MR HEAD ANGIO WO IV CONTRAST  1/30/2014    MR HEAD ANGIO WO IV CONTRAST 1/30/2014 [facility]    MR NECK ANGIO WO IV CONTRAST  1/30/2014    MR NECK ANGIO WO IV CONTRAST 1/30/2014 [facility]    OTHER SURGICAL HISTORY  11/03/2021    Arteriovenous fistula creation procedure    OTHER SURGICAL HISTORY  11/02/2021    Renal Transplant    OTHER SURGICAL HISTORY  11/03/2021    Venous Thrombectomy    OTHER SURGICAL HISTORY  06/19/2014    Hand Dislocation Interphalangeal, Open Treatment    US GUIDED BIOPSY LYMPH NODE SUPERFICIAL  1/11/2023    US GUIDED BIOPSY LYMPH NODE SUPERFICIAL 1/11/2023 [facility]     Medications:  Current Outpatient Medications:     Acidophilus Ex Str, L. sporog, 35 million- 25 million cell tablet, , Disp: , Rfl:     amLODIPine (Norvasc) 10 mg tablet, Take 1 tablet (10 mg) by mouth once daily., Disp: 90 tablet, Rfl: 1    apixaban (Eliquis) 5 mg tablet, Take 1 tablet (5 mg) by mouth 2 times a day., Disp: 180 tablet, Rfl: 3    aspirin 81 mg EC  tablet, Take 1 tablet (81 mg) by mouth once daily., Disp: , Rfl:     B complex-vitamin C-folic acid (Siri-Salo Rx) 1- mg-mg-mcg tablet, Take 1 tablet by mouth once daily., Disp: , Rfl:     cholestyramine (Questran) 4 gram packet, MIX THE CONTENTS OF 1 POWDER PACKET WITH 2-6 OZ OF NONCARBONATED BEVERAGE AND SWALLOW ONCE DAILY., Disp: , Rfl:     cyanocobalamin (Vitamin B-12) 1,000 mcg tablet, Take 1 tablet (1,000 mcg) by mouth once daily., Disp: , Rfl:     Dialyvite 100-1 mg tablet, , Disp: , Rfl:     DULoxetine (Cymbalta) 30 mg DR capsule, Take 1 capsule (30 mg) by mouth once daily. Do not crush or chew., Disp: 90 capsule, Rfl: 1    epinastine (Elestat) 0.05 % ophthalmic solution, 1 drop in both eyes 2 times a day as needed for itching/allergies, Disp: , Rfl:     ergocalciferol (Vitamin D-2) 1.25 MG (75481 UT) capsule, Take 1 capsule every month, Disp: , Rfl:     gabapentin (Neurontin) 300 mg capsule, Take 1 capsule (300 mg) by mouth once a day on Monday, Wednesday, and Friday. AFTER DIALYSIS., Disp: 39 capsule, Rfl: 1    melatonin 3 mg tablet, , Disp: , Rfl:     metoprolol tartrate (Lopressor) 50 mg tablet, Take 1 tablet (50 mg) by mouth every 12 hours., Disp: , Rfl:     multivitamin tablet, Take 1 tablet by mouth once daily., Disp: , Rfl:     omeprazole (PriLOSEC) 40 mg DR capsule, Take 1 capsule (40 mg) by mouth 2 times a day., Disp: , Rfl:     predniSONE (Deltasone) 5 mg tablet, Take by mouth., Disp: , Rfl:     sevelamer carbonate (Renvela) 800 mg tablet, TAKE 2 TABLET After meals, Disp: , Rfl:     sildenafil (Viagra) 100 mg tablet, TAKE 1 TABLET DAILY 1 HOUR BEFORE NEEDED, Disp: , Rfl:     Tab-A-Salo 400 mcg tablet, , Disp: , Rfl:     allopurinol (Zyloprim) 100 mg tablet, Take 1 tablet (100 mg) by mouth once a day on Monday, Wednesday, and Friday. AFTER DIALYSIS., Disp: 36 tablet, Rfl: 3  [facility]: [facility] ([place])    Allergies:   Allergies   Allergen Reactions    Ibuprofen Anaphylaxis    Oxycodone  "Itching    Vancomycin Itching, Unknown and Rash     face red and hot     Immunizations: discuss at CPE, received Tdap 7/20/23    Family History:   Family History   Problem Relation Name Age of Onset    Kidney failure Other Sibling      Social History:   Home/Living Situation/Falls/Safety Assessment: lives alone, high falls risk  Education/Employment/Work/Vocational: works for [employer] in [employer]  Activities:   Drug Use:   Diet: ESRD on HD, +food insecurity   Depression/Anxiety: very frustrated over health and barriers to his healthcare   Sexuality/Contraception/Menstrual History:   Sleep:     Patient Information:  Health Insurance: has insurance   Transportation: public transportation   Healthcare POA/Guardian:   Contact Information:     Visit Vitals  /73 (BP Location: Left arm, Patient Position: Sitting)   Pulse 64   Ht 1.803 m (5' 11\")   Wt 107 kg (236 lb)   SpO2 99%   BMI 32.92 kg/m²   Smoking Status Former   BSA 2.31 m²      PHYSICAL EXAM:   General: obese AA male, NAD   HEENT: NCAT  CV: RRR  PULM: distant breath sounds, bibasilar crackles, non-labored respirations at rest  ABD: soft, obese, NT, ND  : no suprapubic tenderness; L groin AVG  SKIN: no rashes noted; lesion on upper back, possible lipoma   EXT: LLE 2+ pitting edema > right   NEURO: A&Ox4, symmetric facies, difficulty with ambulation   PSYCH: pleasant mood, appropriate affect     Assessment/Plan    Jupiterdionte Hall is a 58 y.o. male seen in Clinic at [facility] by Dr. Luis Petersen on 07/20/23 for routine care, as well as for management of the following chronic medical conditions:  ESRD s/p DDKT (2006, subsequent graft failure 07/2021, on HD MWF at [facility] through LT groin AV graft on [address]; not currently on immunosuppression), pAfib (on Apixaban), HFpEF, HCV (s/p treatment), gout, lymphadenopathy (following with oncology, inconclusive biopsy in 01/2023). Presents today for follow-up.     He presented to the ED on 7/5/23 for mechanical " fall after tripping on concrete. Fell forward, hit head, and L knee. No LOC. All imaging negative for acute traumatic injuries at that time. Sutures placed for temporal laceration. Removed today.     Saw his nephrologist 7/10/23. Prednisone was discontinued. He missed HD yesterday because he was running late from an eye appointment. Denies chest pain, palpitations. BP at goal.    He was admitted between 7/12-7/14 for chest pain. Noted to have initial troponin leak that resolved. Cardiac work-up unrevealing. Pain thought to be 2/2 rib fractures sustained in May after a fall. Discharged on tylenol and oxycodone. Still endorsing some residual L sided flank pain, somewhat manageable with ibuprofen.     CHRONIC CONCERNS:   #Partially Occlusive LLE DVT  - prior 2.5mg BID Apixaban dosing  - s/p at least 7 days of 10 BID dosing  - transition to 5mg BID dosing for maintenance going forward  - concern today that pt is not taking Eliquis as prescribed, mentioned that he still takes 10 g BID; reinforced that prescription is for 5 mg BID, voiced understanding  - re-printed referral for duplex US LLE, will complete today: NEGATIVE--NO evidence of persistent clot     #ESRD s/p DDKT (2006, subsequent graft failure 07/2021, on HD MWF at [facility] through LT groin AV graft on [address]; not on immunosuppression)  - follows with Dr. Mccormick   - anuric at this time   - HD MWF as above   - Amlodipine, B-Blocker for BP control  - No immunosuppression at this time, prednisone discontinued 7/10/23   - missed HD 7/19, will receive tomorrow    #HFpEF, Stage C, Associated cpcPH and RV Dysfunction  #Atrial Fibrillation (on Eliquis 5 mg BID)  #LAZARO  #HTN  - follows with cardiology, Dr. Delcid  - Recent RHC with pHTN (mPAP 37) and CVP 18 (CO/CI 8.1/3.6, 7/3.1)  - Echo with evidence of RV dysfunction  - B-blocker, Amlodipine as above   - DOAC for AC   - plan was for repeat echo in 6 weeks to assess his PAP non-invasively and his RV  "size/function: completed 5/11/23, LVEF 45-50%, abnormal LV diastolic filling, no LVH, moderately enlarged RV, low normal RV systolic function, LA moderately dilated, RA moderately dilated, moderate TR, moderately elevated PAP, global hypokinesis of LV   - per May 2023 note:  \"At the current time he would be high risk from a cardiac standpoint given his biventricular dysfunction and persistent pHTN. I discussed that we can reassess in 1 year with a repeat echo to see if chronic volume unloading has an impact on his PA pressures.\"  - Scheduled to see Dr. Delcid later this fall     #Chronic diarrhea  #Recurrent Cdiff infection  - received SBE 9/9/2022 for fecal microbiota transplant  - last colonoscopy 5/17/22, mentioned that date of repeat C-scope depends on pathology results  - has GI follow-up in early September with Dr. Maria, will follow-up on suggested repeat colonoscopy date    #Lymphadenopathy (following with oncology, inconclusive biopsy in 01/2023): concern for reactive nature vs. PTLD (post-transplant lymphoproliferative disorder)   - Recent concern for lymphoproliferative disorder with FNA completed on 1/11  - Flow Cytometry resulted with no clonal B cell or abnormal T cell population  - Left Iliac lymph node pathology showed reactive hyperplasia and polyclonal plasmacytosis; no evidence of a lymphoproliferative disorder  - following with Dr. Castellon, last visit early June 2023; next visit September 2023 for close follow up/monitoring of progression   - EBV PCR negative  - plan to conservatively follow LN's for now per last note     #Depression   - Duloxetine 30mg daily   - patient has been on long term, previously tolerated  - discussed risk/benefit given ESRD on HD; would like to continue     #Lumbar Radiculopathy   - Gabapentin 300mg M/W/F after HD (adjusted from prior daily dosing, given ESRD on HD)     #HCV (s/p treatment)    #Cystic structure in lower cervical/upper thoracic segment of back  - " likely lipoma  - general surgery referral placed    #Gout  - Allopurinol 300 previously by prior PCP  - Adjusted to 100mg M/W/F post-dialysis for ESRD dosing    #Health Maintenance    Cancer Screening  - Colorectal Cancer Screening: repeat date pending GI follow-up September 2023  - Lung Cancer Screening:   - Prostate Cancer Screening: WNL 03/2023    Laboratory Screening  - Lipid Screen: profound DLD per last lipids in 2020; repeat with next labs, ideally fasting   - A1C, glucose screen: on HD, recent hypoglycemia   - STI, HIV, Hep B screen: prior negative testing   - Hep C screen: s/p treatment     Imaging Screening  - AAA screening: negative per CT scan C/A/P in 05/2023    Immunizations: discuss at CPE   - Influenza: annually   - COVID: vaccinated  - Tdap: received 7/20/23  - Prevnar, Pneumovax: discuss at CPE   - Shingrix: discuss at CPE     Other Screening  - Health Literacy Assessment: poor  - Depression screen: positive   - Home safety/partner violence screen: lives alone, high falls risk   - Hearing/Vision screens:   - Alcohol/tobacco/drug use screen:   - Healthcare POA/Advanced Directives:     Referrals: repeat duplex scan today, GI follow up (coordinated in Sept), Hematology/Oncology follow up (September), suture removal     Return to clinic in 6 weeks for close follow-up, sooner if acute issues arise.     Patient Discussion:    Please call back the office with any questions at [phone]. In the case of an emergency, please call [phone] or go to the nearest Emergency Department.      Luis Martinez MD  Internal Medicine, PGY-2    Pt discussed, seen, and examined with Dr. Petersen.

## 2023-07-20 NOTE — PATIENT INSTRUCTIONS
Suture removal today     Please clarify you are taking 5mg twice per day of the Apixaban and not 10mg.

## 2023-08-01 ENCOUNTER — APPOINTMENT (OUTPATIENT)
Dept: PHARMACY | Facility: HOSPITAL | Age: 58
End: 2023-08-01
Payer: COMMERCIAL

## 2023-08-04 PROBLEM — I48.91 ATRIAL FIBRILLATION (MULTI): Status: ACTIVE | Noted: 2022-06-17

## 2023-08-04 PROBLEM — N18.9 ANEMIA IN CHRONIC KIDNEY DISEASE: Status: ACTIVE | Noted: 2022-07-27

## 2023-08-04 PROBLEM — E16.2 HYPOGLYCEMIA: Status: ACTIVE | Noted: 2023-03-02

## 2023-08-04 PROBLEM — U07.1 DISEASE DUE TO SEVERE ACUTE RESPIRATORY SYNDROME CORONAVIRUS 2 (SARS-COV-2): Status: ACTIVE | Noted: 2023-08-04

## 2023-08-04 PROBLEM — K52.9 CHRONIC DIARRHEA: Status: ACTIVE | Noted: 2023-06-19

## 2023-08-04 PROBLEM — I89.0 LYMPHEDEMA, LIMB: Status: ACTIVE | Noted: 2023-08-04

## 2023-08-04 PROBLEM — R59.0 ABDOMINAL LYMPHADENOPATHY: Status: ACTIVE | Noted: 2023-08-04

## 2023-08-04 PROBLEM — I82.90 VTE (VENOUS THROMBOEMBOLISM): Status: ACTIVE | Noted: 2023-06-19

## 2023-08-04 PROBLEM — Z99.2 ESRD ON DIALYSIS (MULTI): Status: ACTIVE | Noted: 2023-03-02

## 2023-08-04 PROBLEM — N26.1 ATROPHY OF KIDNEY (TERMINAL): Status: ACTIVE | Noted: 2022-08-01

## 2023-08-04 PROBLEM — M94.0 COSTOCHONDRITIS: Status: ACTIVE | Noted: 2023-03-02

## 2023-08-04 PROBLEM — S01.91XA LACERATION OF HEAD: Status: ACTIVE | Noted: 2023-08-04

## 2023-08-04 PROBLEM — R53.1 WEAKNESS: Status: ACTIVE | Noted: 2023-03-02

## 2023-08-04 PROBLEM — C07: Status: ACTIVE | Noted: 2023-08-04

## 2023-08-04 PROBLEM — E66.9 OBESITY, CLASS I, BMI 30-34.9: Status: ACTIVE | Noted: 2023-06-20

## 2023-08-04 PROBLEM — H02.401 PTOSIS OF RIGHT EYELID: Status: ACTIVE | Noted: 2023-03-02
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
## 2023-08-04 PROBLEM — I27.20 PULMONARY HYPERTENSION (MULTI): Status: ACTIVE | Noted: 2023-08-04

## 2023-08-04 PROBLEM — S22.39XA RIB FRACTURE: Status: ACTIVE | Noted: 2023-08-04

## 2023-08-04 PROBLEM — E27.40 ADRENAL INSUFFICIENCY (MULTI): Status: ACTIVE | Noted: 2023-08-04

## 2023-08-04 PROBLEM — D63.1 ANEMIA IN CHRONIC KIDNEY DISEASE: Status: ACTIVE | Noted: 2022-07-27

## 2023-08-04 PROBLEM — S22.42XA MULTIPLE FRACTURES OF RIBS, LEFT SIDE, INITIAL ENCOUNTER FOR CLOSED FRACTURE: Status: ACTIVE | Noted: 2023-08-04

## 2023-08-04 PROBLEM — Z86.79 STATUS POST ABLATION OF ATRIAL FIBRILLATION: Status: ACTIVE | Noted: 2018-10-01

## 2023-08-04 PROBLEM — Z98.890 STATUS POST ABLATION OF ATRIAL FIBRILLATION: Status: ACTIVE | Noted: 2018-10-01

## 2023-08-04 PROBLEM — R91.1 LUNG NODULE: Status: ACTIVE | Noted: 2023-08-04

## 2023-08-04 PROBLEM — F33.41 RECURRENT MAJOR DEPRESSIVE DISORDER, IN PARTIAL REMISSION (CMS-HCC): Status: ACTIVE | Noted: 2023-08-04

## 2023-08-04 PROBLEM — G62.9 NEUROPATHY: Status: ACTIVE | Noted: 2023-08-04

## 2023-08-04 PROBLEM — R10.11 ABDOMINAL PAIN, ACUTE, RIGHT UPPER QUADRANT: Status: ACTIVE | Noted: 2023-03-02

## 2023-08-04 PROBLEM — K57.92 ACUTE DIVERTICULITIS: Status: ACTIVE | Noted: 2023-08-04

## 2023-08-04 PROBLEM — K58.9 IRRITABLE BOWEL SYNDROME (IBS): Status: ACTIVE | Noted: 2023-08-04

## 2023-08-04 PROBLEM — E88.09 HYPOALBUMINEMIA: Status: ACTIVE | Noted: 2023-08-04

## 2023-08-04 PROBLEM — I50.33 ACUTE ON CHRONIC DIASTOLIC ACC/AHA STAGE C CONGESTIVE HEART FAILURE (MULTI): Status: ACTIVE | Noted: 2023-03-02

## 2023-08-04 RX ORDER — PREDNISONE 5 MG/1
TABLET ORAL
COMMUNITY
Start: 2015-08-29 | End: 2023-09-01 | Stop reason: ALTCHOICE

## 2023-08-04 RX ORDER — ALLOPURINOL 100 MG/1
100 TABLET ORAL
Qty: 36 TABLET | Refills: 3 | Status: SHIPPED | OUTPATIENT
Start: 2023-08-04 | End: 2023-11-02 | Stop reason: CLARIF

## 2023-08-10 ENCOUNTER — APPOINTMENT (OUTPATIENT)
Dept: LAB | Facility: LAB | Age: 58
End: 2023-08-10
Payer: COMMERCIAL

## 2023-08-10 LAB — CORTISOL (UG/DL) IN SERUM - AM: 4.3 UG/DL (ref 5–20)

## 2023-09-01 ENCOUNTER — OFFICE VISIT (OUTPATIENT)
Dept: PRIMARY CARE | Facility: CLINIC | Age: 58
End: 2023-09-01
Payer: COMMERCIAL

## 2023-09-01 VITALS
DIASTOLIC BLOOD PRESSURE: 80 MMHG | BODY MASS INDEX: 31.5 KG/M2 | SYSTOLIC BLOOD PRESSURE: 143 MMHG | WEIGHT: 225 LBS | OXYGEN SATURATION: 99 % | HEIGHT: 71 IN | HEART RATE: 74 BPM

## 2023-09-01 DIAGNOSIS — R19.7 DIARRHEA, UNSPECIFIED TYPE: ICD-10-CM

## 2023-09-01 DIAGNOSIS — E27.40 ADRENAL INSUFFICIENCY (MULTI): Primary | ICD-10-CM

## 2023-09-01 LAB
ALBUMIN (G/DL) IN SER/PLAS: 3.3 G/DL (ref 3.4–5)
ANION GAP IN SER/PLAS: 18 MMOL/L (ref 10–20)
BASOPHILS (10*3/UL) IN BLOOD BY AUTOMATED COUNT: 0.08 X10E9/L (ref 0–0.1)
BASOPHILS/100 LEUKOCYTES IN BLOOD BY AUTOMATED COUNT: 1.7 % (ref 0–2)
BURR CELLS PRESENCE IN BLOOD BY LIGHT MICROSCOPY: NORMAL
CALCIUM (MG/DL) IN SER/PLAS: 8.8 MG/DL (ref 8.6–10.6)
CARBON DIOXIDE, TOTAL (MMOL/L) IN SER/PLAS: 25 MMOL/L (ref 21–32)
CHLORIDE (MMOL/L) IN SER/PLAS: 101 MMOL/L (ref 98–107)
CREATININE (MG/DL) IN SER/PLAS: 5.26 MG/DL (ref 0.5–1.3)
EOSINOPHILS (10*3/UL) IN BLOOD BY AUTOMATED COUNT: 0.2 X10E9/L (ref 0–0.7)
EOSINOPHILS/100 LEUKOCYTES IN BLOOD BY AUTOMATED COUNT: 4.4 % (ref 0–6)
ERYTHROCYTE DISTRIBUTION WIDTH (RATIO) BY AUTOMATED COUNT: 17.8 % (ref 11.5–14.5)
ERYTHROCYTE MEAN CORPUSCULAR HEMOGLOBIN CONCENTRATION (G/DL) BY AUTOMATED: 30.4 G/DL (ref 32–36)
ERYTHROCYTE MEAN CORPUSCULAR VOLUME (FL) BY AUTOMATED COUNT: 97 FL (ref 80–100)
ERYTHROCYTES (10*6/UL) IN BLOOD BY AUTOMATED COUNT: 3.67 X10E12/L (ref 4.5–5.9)
GFR MALE: 12 ML/MIN/1.73M2
GLUCOSE (MG/DL) IN SER/PLAS: 47 MG/DL (ref 74–99)
HEMATOCRIT (%) IN BLOOD BY AUTOMATED COUNT: 35.5 % (ref 41–52)
HEMOGLOBIN (G/DL) IN BLOOD: 10.8 G/DL (ref 13.5–17.5)
IMMATURE GRANULOCYTES/100 LEUKOCYTES IN BLOOD BY AUTOMATED COUNT: 0.4 % (ref 0–0.9)
LEUKOCYTES (10*3/UL) IN BLOOD BY AUTOMATED COUNT: 4.6 X10E9/L (ref 4.4–11.3)
LYMPHOCYTES (10*3/UL) IN BLOOD BY AUTOMATED COUNT: 1.16 X10E9/L (ref 1.2–4.8)
LYMPHOCYTES/100 LEUKOCYTES IN BLOOD BY AUTOMATED COUNT: 25.3 % (ref 13–44)
MAGNESIUM (MG/DL) IN SER/PLAS: 2.13 MG/DL (ref 1.6–2.4)
MONOCYTES (10*3/UL) IN BLOOD BY AUTOMATED COUNT: 0.52 X10E9/L (ref 0.1–1)
MONOCYTES/100 LEUKOCYTES IN BLOOD BY AUTOMATED COUNT: 11.4 % (ref 2–10)
NEUTROPHILS (10*3/UL) IN BLOOD BY AUTOMATED COUNT: 2.6 X10E9/L (ref 1.2–7.7)
NEUTROPHILS/100 LEUKOCYTES IN BLOOD BY AUTOMATED COUNT: 56.8 % (ref 40–80)
NRBC (PER 100 WBCS) BY AUTOMATED COUNT: 0 /100 WBC (ref 0–0)
PHOSPHATE (MG/DL) IN SER/PLAS: 3.3 MG/DL (ref 2.5–4.9)
PLATELETS (10*3/UL) IN BLOOD AUTOMATED COUNT: 209 X10E9/L (ref 150–450)
POLYCHROMASIA IN BLOOD BY LIGHT MICROSCOPY: NORMAL
POTASSIUM (MMOL/L) IN SER/PLAS: 4.1 MMOL/L (ref 3.5–5.3)
RBC MORPHOLOGY IN BLOOD: NORMAL
SODIUM (MMOL/L) IN SER/PLAS: 140 MMOL/L (ref 136–145)
UREA NITROGEN (MG/DL) IN SER/PLAS: 26 MG/DL (ref 6–23)

## 2023-09-01 PROCEDURE — 3079F DIAST BP 80-89 MM HG: CPT | Performed by: STUDENT IN AN ORGANIZED HEALTH CARE EDUCATION/TRAINING PROGRAM

## 2023-09-01 PROCEDURE — 83735 ASSAY OF MAGNESIUM: CPT

## 2023-09-01 PROCEDURE — 3077F SYST BP >= 140 MM HG: CPT | Performed by: STUDENT IN AN ORGANIZED HEALTH CARE EDUCATION/TRAINING PROGRAM

## 2023-09-01 PROCEDURE — 99213 OFFICE O/P EST LOW 20 MIN: CPT | Performed by: STUDENT IN AN ORGANIZED HEALTH CARE EDUCATION/TRAINING PROGRAM

## 2023-09-01 PROCEDURE — 1036F TOBACCO NON-USER: CPT | Performed by: STUDENT IN AN ORGANIZED HEALTH CARE EDUCATION/TRAINING PROGRAM

## 2023-09-01 PROCEDURE — 3066F NEPHROPATHY DOC TX: CPT | Performed by: STUDENT IN AN ORGANIZED HEALTH CARE EDUCATION/TRAINING PROGRAM

## 2023-09-01 PROCEDURE — 85025 COMPLETE CBC W/AUTO DIFF WBC: CPT

## 2023-09-01 PROCEDURE — 3044F HG A1C LEVEL LT 7.0%: CPT | Performed by: STUDENT IN AN ORGANIZED HEALTH CARE EDUCATION/TRAINING PROGRAM

## 2023-09-01 PROCEDURE — 3008F BODY MASS INDEX DOCD: CPT | Performed by: STUDENT IN AN ORGANIZED HEALTH CARE EDUCATION/TRAINING PROGRAM

## 2023-09-01 PROCEDURE — 80069 RENAL FUNCTION PANEL: CPT

## 2023-09-01 RX ORDER — FLUTICASONE PROPIONATE 50 MCG
1 SPRAY, SUSPENSION (ML) NASAL DAILY PRN
COMMUNITY

## 2023-09-01 RX ORDER — CLINDAMYCIN PHOSPHATE 10 UG/ML
LOTION TOPICAL
Status: ON HOLD | COMMUNITY
Start: 2021-02-05 | End: 2023-10-10 | Stop reason: WASHOUT

## 2023-09-01 RX ORDER — HYDROCORTISONE 20 MG/1
20 TABLET ORAL DAILY
Qty: 30 TABLET | Refills: 1 | Status: SHIPPED | OUTPATIENT
Start: 2023-09-01 | End: 2023-10-03 | Stop reason: DRUGHIGH

## 2023-09-01 RX ORDER — HYDROCORTISONE 5 MG/1
TABLET ORAL
COMMUNITY
Start: 2023-08-24 | End: 2023-09-01 | Stop reason: SDUPTHER

## 2023-09-01 RX ORDER — HYDROCORTISONE 20 MG/1
TABLET ORAL
COMMUNITY
Start: 2023-08-24 | End: 2023-09-01 | Stop reason: SDUPTHER

## 2023-09-01 RX ORDER — HYDROCORTISONE 5 MG/1
5 TABLET ORAL DAILY
Qty: 30 TABLET | Refills: 1 | Status: SHIPPED | OUTPATIENT
Start: 2023-09-01 | End: 2023-10-03 | Stop reason: DRUGHIGH

## 2023-09-01 RX ORDER — SELENIUM 50 MCG
TABLET ORAL
Status: ON HOLD | COMMUNITY
End: 2023-10-10 | Stop reason: WASHOUT

## 2023-09-01 NOTE — PROGRESS NOTES
Nghia Hall is a 58 y.o. male seen in Clinic at [facility] [place] [facility] by Dr. Luis Petersen on 09/01/23 for routine care, as well as for management of the following chronic medical conditions: ESRD s/p DDKT (2006, subsequent graft failure 07/2021, on HD MWF at [facility] through LT groin AV graft on [address]; not currently on immunosuppression), pAfib (on Apixaban), HFpEF, HCV (s/p treatment), gout, lymphadenopathy (following with oncology, inconclusive biopsy in 01/2023). Presents today for follow-up after recent hospitalization for hypoglycemia in setting of adrenal insufficiency after discontinuation of chronic prednisone by transplant nephrology after failed transplant.     UPDATES:   - seen by cardiology, Dr. Delcid, no changes to regimen   - hypoglycemia in setting of adrenal insufficiency after steroid removal from transplant regimen; started on Hydrocortisone (has not started taking yet due to financial constraints)  Final discharge recommendation from endocrinology was 20 mg at   8 AM the morning and 5 mg at 2 PM in the afternoon.    GI follow up 9/5 for gastric emptying [facility]  Endocrinology follow up 10/3  Persistent diarrhea; has taken anti-diarrheals in the past without improvement   [  ] labs today, concern for hypoglycemia  BG 65 on check; sugar beverage given in office, alert and tolerating PO   [  ] hydrocort script again written, instructed to  TODAY and start     CHRONIC CONCERNS:   #Partially Occlusive LLE DVT (s/p resolution on repeat duplex)   - prior 2.5mg BID Apixaban dosing; now on 5mg BID dosing for maintenance going forward    #ESRD s/p DDKT (2006, subsequent graft failure 07/2021, on HD MWF at [facility] through LT groin AV graft on [address]; not on immunosuppression)  - follows with Dr. Mccormick   - jessieuric at this time   - HD MWF as above   - Amlodipine, B-Blocker for BP control  - No immunosuppression at this time, prednisone discontinued 7/10/23     #HFpEF, Stage C,  "Associated cpcPH and RV Dysfunction  #Atrial Fibrillation (on Eliquis 5 mg BID)  #LAZARO  #HTN  - follows with cardiology, Dr. Delcid  - Recent RHC with pHTN (mPAP 37) and CVP 18 (CO/CI 8.1/3.6, 7/3.1)  - Echo with evidence of RV dysfunction  - B-blocker, Amlodipine as above   - DOAC for AC   - Echo 5/11/23, LVEF 45-50%, abnormal LV diastolic filling, no LVH, moderately enlarged RV, low normal RV systolic function, LA moderately dilated, RA moderately dilated, moderate TR, moderately elevated PAP, global hypokinesis of LV   - per May 2023 note:  \"At the current time he would be high risk from a cardiac standpoint given his biventricular dysfunction and persistent pHTN. 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 Maria, will follow-up on suggested repeat colonoscopy date    #Lymphadenopathy (following with oncology, inconclusive biopsy in 01/2023): concern for reactive nature vs. PTLD (post-transplant lymphoproliferative disorder)   - Recent concern for lymphoproliferative disorder with FNA completed on 1/11  - Flow Cytometry resulted with no clonal B cell or abnormal T cell population  - Left Iliac lymph node pathology showed reactive hyperplasia and polyclonal plasmacytosis; no evidence of a lymphoproliferative disorder  - following with Dr. Castellon, last visit early June 2023; next visit September 2023 for close follow up/monitoring of progression   - EBV PCR negative  - plan to conservatively follow LN's for now per last note     #Depression   - Duloxetine 30mg daily   - patient has been on long term, previously tolerated  - discussed risk/benefit given ESRD on HD; would like to " continue     #Lumbar Radiculopathy   - Gabapentin 300mg M/W/F after HD (adjusted from prior daily dosing, given ESRD on HD)     #HCV (s/p treatment)    #Cystic structure in lower cervical/upper thoracic segment of back  - likely lipoma  - general surgery referral placed    #Gout  - Allopurinol 300 previously by prior PCP  - Adjusted to 100mg M/W/F post-dialysis for ESRD dosing     Past Medical History: as above   Past Medical History:   Diagnosis Date    End stage renal disease (CMS/[facility]) 12/16/2022    ESRD (end stage renal disease)    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    Renal transplant rejection    Kidney transplant status 12/08/2022    Kidney replaced by transplant    Personal history of immunosuppression therapy 07/04/2021    H/O immunosuppressive therapy    Personal history of other diseases of the nervous system and sense organs     History of cataract    Personal history of other diseases of urinary system 11/02/2021    Personal history of renal failure    Personal history of other endocrine, nutritional and metabolic disease 07/22/2013    History of hyperlipidemia    Type 2 diabetes mellitus without complications (CMS/[facility]) 12/08/2022    Diabetes mellitus    Urinary tract infection, site not specified 05/02/2018    Acute UTI     Subspecialty Medical Care: Nephrology (Transplant), Oncology, Cardiology (HF), Vascular Surgery     Past Surgical History:   Past Surgical History:   Procedure Laterality Date    CT AORTA AND BILATERAL ILIOFEMORAL RUNOFF ANGIOGRAM W AND/OR WO IV CONTRAST  11/7/2021    CT AORTA AND BILATERAL ILIOFEMORAL RUNOFF ANGIOGRAM W AND/OR WO IV CONTRAST 11/7/2021 [facility] CLINICAL LEGACY    CT AORTA AND BILATERAL ILIOFEMORAL RUNOFF ANGIOGRAM W AND/OR WO IV CONTRAST  7/6/2022    CT AORTA AND BILATERAL ILIOFEMORAL RUNOFF ANGIOGRAM W AND/OR WO IV CONTRAST 7/6/2022 [facility] EMERGENCY LEGACY    CT AORTA AND BILATERAL ILIOFEMORAL RUNOFF ANGIOGRAM W AND/OR WO IV CONTRAST  8/17/2022    CT AORTA AND  BILATERAL ILIOFEMORAL RUNOFF ANGIOGRAM W AND/OR WO IV CONTRAST 8/17/2022 [facility] EMERGENCY LEGACY    CT AORTA AND BILATERAL ILIOFEMORAL RUNOFF ANGIOGRAM W AND/OR WO IV CONTRAST  9/30/2022    CT AORTA AND BILATERAL ILIOFEMORAL RUNOFF ANGIOGRAM W AND/OR WO IV CONTRAST 9/30/2022 [facility] ANCILLARY LEGACY    CT AORTA AND BILATERAL ILIOFEMORAL RUNOFF ANGIOGRAM W AND/OR WO IV CONTRAST  12/29/2022    CT AORTA AND BILATERAL ILIOFEMORAL RUNOFF ANGIOGRAM W AND/OR WO IV CONTRAST 12/29/2022 DOCTOR OFFICE LEGACY    IR VENOGRAM DIALYSIS  8/30/2022    IR VENOGRAM DIALYSIS 8/30/2022 [facility] CLINICAL LEGACY    MANDIBLE SURGERY  08/25/2015    Jaw Surgery    MR HEAD ANGIO WO IV CONTRAST  1/30/2014    MR HEAD ANGIO WO IV CONTRAST 1/30/2014 [facility] ANCILLARY LEGACY    MR NECK ANGIO WO IV CONTRAST  1/30/2014    MR NECK ANGIO WO IV CONTRAST 1/30/2014 [facility] ANCILLARY LEGACY    OTHER SURGICAL HISTORY  11/03/2021    Arteriovenous fistula creation procedure    OTHER SURGICAL HISTORY  11/02/2021    Renal Transplant    OTHER SURGICAL HISTORY  11/03/2021    Venous Thrombectomy    OTHER SURGICAL HISTORY  06/19/2014    Hand Dislocation Interphalangeal, Open Treatment    US GUIDED BIOPSY LYMPH NODE SUPERFICIAL  1/11/2023    US GUIDED BIOPSY LYMPH NODE SUPERFICIAL 1/11/2023 DOCTOR OFFICE LEGACY     Medications:  Current Outpatient Medications:     benzoyl peroxide 5 % lotion, 1 Application, Disp: , Rfl:     clindamycin (Cleocin T) 1 % lotion, 1 Application, Disp: , Rfl:     hydrocortisone (Cortef) 20 mg tablet, Take by mouth., Disp: , Rfl:     hydrocortisone (Cortef) 5 mg tablet, Take by mouth., Disp: , Rfl:     Acidophilus Ex Str, L. sporog, 35 million- 25 million cell tablet, , Disp: , Rfl:     allopurinol (Zyloprim) 100 mg tablet, Take 1 tablet (100 mg) by mouth once a day on Monday, Wednesday, and Friday. AFTER DIALYSIS., Disp: 36 tablet, Rfl: 3    amLODIPine (Norvasc) 10 mg tablet, Take 1 tablet (10 mg) by mouth once daily., Disp: 90 tablet, Rfl: 1    apixaban  (Eliquis) 5 mg tablet, Take 1 tablet (5 mg) by mouth 2 times a day., Disp: 180 tablet, Rfl: 3    aspirin 81 mg EC tablet, Take 1 tablet (81 mg) by mouth once daily., Disp: , Rfl:     B complex-vitamin C-folic acid (Siri-Salo Rx) 1- mg-mg-mcg tablet, Take 1 tablet by mouth once daily., Disp: , Rfl:     cholestyramine (Questran) 4 gram packet, MIX THE CONTENTS OF 1 POWDER PACKET WITH 2-6 OZ OF NONCARBONATED BEVERAGE AND SWALLOW ONCE DAILY., Disp: , Rfl:     cyanocobalamin (Vitamin B-12) 1,000 mcg tablet, Take 1 tablet (1,000 mcg) by mouth once daily., Disp: , Rfl:     Dialyvite 100-1 mg tablet, , Disp: , Rfl:     DULoxetine (Cymbalta) 30 mg DR capsule, Take 1 capsule (30 mg) by mouth once daily. Do not crush or chew., Disp: 90 capsule, Rfl: 1    epinastine (Elestat) 0.05 % ophthalmic solution, 1 drop in both eyes 2 times a day as needed for itching/allergies, Disp: , Rfl:     ergocalciferol (Vitamin D-2) 1.25 MG (05332 UT) capsule, Take 1 capsule every month, Disp: , Rfl:     fluticasone (Flonase) 50 mcg/actuation nasal spray, Administer into affected nostril(s)., Disp: , Rfl:     gabapentin (Neurontin) 300 mg capsule, Take 1 capsule (300 mg) by mouth once a day on Monday, Wednesday, and Friday. AFTER DIALYSIS., Disp: 39 capsule, Rfl: 1    lactobacillus acidophilus capsule, Take by mouth., Disp: , Rfl:     melatonin 3 mg tablet, , Disp: , Rfl:     metoprolol tartrate (Lopressor) 50 mg tablet, Take 1 tablet (50 mg) by mouth every 12 hours., Disp: , Rfl:     multivitamin tablet, Take 1 tablet by mouth once daily., Disp: , Rfl:     omeprazole (PriLOSEC) 40 mg DR capsule, Take 1 capsule (40 mg) by mouth 2 times a day., Disp: , Rfl:     sevelamer carbonate (Renvela) 800 mg tablet, TAKE 2 TABLET After meals, Disp: , Rfl:     sildenafil (Viagra) 100 mg tablet, TAKE 1 TABLET DAILY 1 HOUR BEFORE NEEDED, Disp: , Rfl:     Tab-A-Salo 400 mcg tablet, , Disp: , Rfl:   Ph[facility]acy: [facility] ([place]    Allergies:   Allergies  "  Allergen Reactions    Ibuprofen Anaphylaxis    Oxycodone Itching    Vancomycin Itching, Unknown and Rash     face red and hot     Immunizations: discuss at [facility], received Tdap 7/20/23    Family History:   Family History   Problem Relation Name Age of Onset    Kidney failure Other Sibling      Social History:   Home/Living Situation/Falls/Safety Assessment: lives alone, high falls risk  Education/Employment/Work/Vocational: works for [employer] in [employer]  Activities:   Drug Use:   Diet: ESRD on HD, +food insecurity   Depression/Anxiety: very frustrated over health and barriers to his healthcare   Sexuality/Contraception/Menstrual History:   Sleep:     Patient Information:  Health Insurance: has insurance   Transportation: public transportation   Healthcare POA/Guardian:   Contact Information:     Visit Vitals  /80   Pulse 74   Ht 1.803 m (5' 11\")   Wt 102 kg (225 lb)   SpO2 99%   BMI 31.38 kg/m²   Smoking Status Former   BSA 2.26 m²      PHYSICAL EXAM:   General: obese AA male, NAD   HEENT: NCAT  CV: RRR  PULM: distant breath sounds, non-labored respirations on RA   ABD: soft, obese, NT, ND  : no suprapubic tenderness; L groin AVG  SKIN: no rashes noted; lesion on upper back, possible lipoma   EXT: 1+ pitting edema bilaterally    NEURO: A&Ox4, symmetric facies, difficulty with ambulation   PSYCH: pleasant mood, appropriate affect     Assessment/Plan    Nghia Hall is a 58 y.o. male seen in Clinic at [facility] [place] [facility] by Dr. Luis Petersen on 07/20/23 for routine care, as well as for management of the following chronic medical conditions: ESRD s/p DDKT (2006, subsequent graft failure 07/2021, on HD MWF at [facility] through LT groin AV graft on [address]; not currently on immunosuppression), pAfib (on Apixaban), HFpEF, HCV (s/p treatment), gout, lymphadenopathy (following with oncology, inconclusive biopsy in 01/2023). Presents today for follow-up after recent hospitalization for hypoglycemia in setting of adrenal " "insufficiency after discontinuation of chronic prednisone by transplant nephrology after failed transplant.     UPDATES:   - seen by cardiology, Dr. Delcid, no changes to regimen   - hypoglycemia in setting of adrenal insufficiency after steroid removal from transplant regimen; started on Hydrocortisone (has not started taking yet due to financial constraints)  Final discharge recommendation from endocrinology was 20 mg at   8 AM the morning and 5 mg at 2 PM in the afternoon.    GI follow up 9/5 for [facility] emptying [facility]  Endocrinology follow up 10/3  Persistent diarrhea; has taken anti-diarrheals in the past without improvement   [  ] labs today, concern for hypoglycemia  BG 65 on check; sugar beverage given in office, alert and tolerating PO   [  ] hydrocort script again written, instructed to  TODAY and start     CHRONIC CONCERNS:   #Partially Occlusive LLE DVT (s/p resolution on repeat duplex)   - prior 2.5mg BID Apixaban dosing; now on 5mg BID dosing for maintenance going forward    #ESRD s/p DDKT (2006, subsequent graft failure 07/2021, on HD MWF at [facility] through LT groin AV graft on [address]; not on immunosuppression)  - follows with Dr. Mccormick   - anuric at this time   - HD MWF as above   - Amlodipine, B-Blocker for BP control  - No immunosuppression at this time, prednisone discontinued 7/10/23     #HFpEF, Stage C, Associated cpcPH and RV Dysfunction  #Atrial Fibrillation (on Eliquis 5 mg BID)  #LAZARO  #HTN  - follows with cardiology, Dr. Delcid  - Recent RHC with pHTN (mPAP 37) and CVP 18 (CO/CI 8.1/3.6, 7/3.1)  - Echo with evidence of RV dysfunction  - B-blocker, Amlodipine as above   - DOAC for AC   - Echo 5/11/23, LVEF 45-50%, abnormal LV diastolic filling, no LVH, moderately enlarged RV, low normal RV systolic function, LA moderately dilated, RA moderately dilated, moderate TR, moderately elevated PAP, global hypokinesis of LV   - per May 2023 note:  \"At the " "current time he would be high risk from a cardiac standpoint given his biventricular dysfunction and persistent pHTN. 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 Maria, will follow-up on suggested repeat colonoscopy date    #Lymphadenopathy (following with oncology, inconclusive biopsy in 01/2023): concern for reactive nature vs. PTLD (post-transplant lymphoproliferative disorder)   - Recent concern for lymphoproliferative disorder with FNA completed on 1/11  - Flow Cytometry resulted with no clonal B cell or abnormal T cell population  - Left Iliac lymph node pathology showed reactive hyperplasia and polyclonal plasmacytosis; no evidence of a lymphoproliferative disorder  - following with Dr. 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  - general surgery referral placed    #Gout  - Allopurinol 300 previously by prior PCP  - Adjusted to 100mg M/W/F post-dialysis for ESRD dosing     #Health Maintenance    Cancer Screening  - Colorectal Cancer Screening: repeat date pending GI follow-up September 2023  - Lung Cancer Screening:   - Prostate Cancer " Screening: WNL 03/2023    Laboratory Screening  - Lipid Screen: profound DLD per last lipids in 2020; repeat with next labs, ideally fasting   - A1C, glucose screen: on HD, recent hypoglycemia   - STI, HIV, Hep B screen: prior negative testing   - Hep C screen: s/p treatment     Imaging Screening  - AAA screening: negative per CT scan C/A/P in 05/2023    Immunizations: discuss at CPE   - Influenza: annually   - COVID: vaccinated  - RSV: eligible   - Tdap: received 7/20/23  - Prevnar, Pneumovax: discuss at CPE   - Shingrix: discuss at CPE     Other Screening  - Health Literacy Assessment: poor  - Depression screen: positive   - Home safety/partner violence screen: lives alone, high falls risk   - Hearing/Vision screens:   - Alcohol/tobacco/drug use screen:   - Healthcare POA/Advanced Directives:     Referrals: labs, pending follow-up with GI, oncology, endocrinology; hydrocortisone scripts sent     Return to clinic in 2 weeks for close follow-up, sooner if acute issues arise.     Patient Discussion:    Please call back the office with any questions at [phone]. In the case of an emergency, please call [phone] or go to the nearest Emergency Department.      Luis Petersen MD  Internal Medicine-Pediatrics

## 2023-09-01 NOTE — PATIENT INSTRUCTIONS
Labs today in [address] or [address].    GI follow up with Dr. Martinez on 9/5/23 as scheduled.     Follow up with oncology as planned next week as well.     Endocrinology visit on 10/3/23.     Prescriptions for hydrocortisone provided today since you have not yet picked them up. Please start taking as prescribed.     Follow up in 2 weeks, sooner if acute issues come up!    Best,  Dr. Petersen

## 2023-09-07 LAB — GAMMA GLUTAMYL TRANSFERASE (U/L) IN SER/PLAS: 268 U/L (ref 5–64)

## 2023-09-15 ENCOUNTER — OFFICE VISIT (OUTPATIENT)
Dept: PRIMARY CARE | Facility: CLINIC | Age: 58
End: 2023-09-15
Payer: MEDICARE

## 2023-09-15 VITALS
BODY MASS INDEX: 31.22 KG/M2 | WEIGHT: 223 LBS | SYSTOLIC BLOOD PRESSURE: 146 MMHG | DIASTOLIC BLOOD PRESSURE: 83 MMHG | OXYGEN SATURATION: 96 % | HEART RATE: 58 BPM | HEIGHT: 71 IN

## 2023-09-15 DIAGNOSIS — I60.9 SUBARACHNOID HEMORRHAGE (MULTI): Primary | ICD-10-CM

## 2023-09-15 DIAGNOSIS — E27.40 ADRENAL INSUFFICIENCY (MULTI): ICD-10-CM

## 2023-09-15 DIAGNOSIS — N18.6 ESRD ON DIALYSIS (MULTI): ICD-10-CM

## 2023-09-15 DIAGNOSIS — E16.2 HYPOGLYCEMIA: ICD-10-CM

## 2023-09-15 DIAGNOSIS — Z99.2 ESRD ON DIALYSIS (MULTI): ICD-10-CM

## 2023-09-15 DIAGNOSIS — Z23 IMMUNIZATION DUE: ICD-10-CM

## 2023-09-15 PROCEDURE — 90471 IMMUNIZATION ADMIN: CPT | Performed by: STUDENT IN AN ORGANIZED HEALTH CARE EDUCATION/TRAINING PROGRAM

## 2023-09-15 PROCEDURE — 90686 IIV4 VACC NO PRSV 0.5 ML IM: CPT | Performed by: STUDENT IN AN ORGANIZED HEALTH CARE EDUCATION/TRAINING PROGRAM

## 2023-09-15 PROCEDURE — 3044F HG A1C LEVEL LT 7.0%: CPT | Performed by: STUDENT IN AN ORGANIZED HEALTH CARE EDUCATION/TRAINING PROGRAM

## 2023-09-15 PROCEDURE — 3066F NEPHROPATHY DOC TX: CPT | Performed by: STUDENT IN AN ORGANIZED HEALTH CARE EDUCATION/TRAINING PROGRAM

## 2023-09-15 PROCEDURE — 3008F BODY MASS INDEX DOCD: CPT | Performed by: STUDENT IN AN ORGANIZED HEALTH CARE EDUCATION/TRAINING PROGRAM

## 2023-09-15 PROCEDURE — 99214 OFFICE O/P EST MOD 30 MIN: CPT | Performed by: STUDENT IN AN ORGANIZED HEALTH CARE EDUCATION/TRAINING PROGRAM

## 2023-09-15 PROCEDURE — 3079F DIAST BP 80-89 MM HG: CPT | Performed by: STUDENT IN AN ORGANIZED HEALTH CARE EDUCATION/TRAINING PROGRAM

## 2023-09-15 PROCEDURE — 1036F TOBACCO NON-USER: CPT | Performed by: STUDENT IN AN ORGANIZED HEALTH CARE EDUCATION/TRAINING PROGRAM

## 2023-09-15 PROCEDURE — 3077F SYST BP >= 140 MM HG: CPT | Performed by: STUDENT IN AN ORGANIZED HEALTH CARE EDUCATION/TRAINING PROGRAM

## 2023-09-15 RX ORDER — FLASH GLUCOSE SENSOR
KIT MISCELLANEOUS
Qty: 1 EACH | Refills: 0 | Status: SHIPPED | OUTPATIENT
Start: 2023-09-15 | End: 2023-09-28

## 2023-09-15 RX ORDER — OXYCODONE HYDROCHLORIDE 10 MG/1
TABLET ORAL
COMMUNITY
Start: 2023-06-02 | End: 2023-10-06 | Stop reason: ALTCHOICE

## 2023-09-15 RX ORDER — LOPERAMIDE HYDROCHLORIDE 2 MG/1
2 CAPSULE ORAL 2 TIMES DAILY PRN
COMMUNITY
Start: 2023-09-05

## 2023-09-15 RX ORDER — DOCUSATE SODIUM 100 MG/1
CAPSULE, LIQUID FILLED ORAL
Status: ON HOLD | COMMUNITY
Start: 2023-07-14 | End: 2023-10-10 | Stop reason: WASHOUT

## 2023-09-15 NOTE — PATIENT INSTRUCTIONS
CT Head today    Tried to send new sensor for blood sugar to [facility].     Pharmacy referral for additional support--they should reach out to you.     Follow up with me after your endocrine visit in around 1 month for our next visit.     Dr. Petersen

## 2023-09-15 NOTE — PROGRESS NOTES
Nghia Hall is a 58 y.o. male seen in Clinic at [facility] by Dr. Luis Petersen on 09/15/23 for routine care, as well as for management of the following chronic medical conditions: ESRD s/p DDKT (2006, subsequent graft failure 07/2021, on HD MWF at [facility] through LT groin AV graft on [address]; not currently on immunosuppression), pAfib (on Apixaban), HFpEF, HCV (s/p treatment), gout, lymphadenopathy (following with oncology, inconclusive biopsy in 01/2023), DVT, Adrenal Insufficiency (on hydrocortisone). Presents today for follow-up visit.     UPDATES:   Since last visit, ED visit x2  - Fall down 5 stairs, seen on 9/2 with CT head showing acute subarachnoid hemorrhage, stable on repeat imaging, not intervened upon, no AC reversal pursued   - Edema on 9/5 with patent L common femoral artery-common femoral vein AV graft; negative for DVT; likely in setting of steroids     Hypoglycemia at last visit in setting of discontinuation of prednisone and resultant adrenal insufficiency--hydrocortisone prescriptions provided, has been compliant, has endocrine visit 10/3/23     GI follow up with Dr. Martinez on 9/5/23 as scheduled:   - increased imodium 2mg two tablets in morning and two in afternoon as needed for >3 loose BMs a day  - daily psyllium  - repeat colonoscopy for surveillance in 2025  - US liver and GGT for chronically elevated persistent ALP  - fecal calprotectin ordered   - follows Q6 months     Oncology follow up:   - pending repeat CT/PET     CHRONIC MEDICAL CONDITIONS:   #Partially Occlusive LLE DVT, recent SAH  - prior 2.5mg BID Apixaban dosing; currently on 5mg BID dosing going forward   - recent fall with SAH  [  ] repeat CT scan today  [  ] will need to continue to closely evaluate clot vs. Bleed risk in this very chronically ill patient   - repeat Duplex US LLE without persistence of clot     #ESRD s/p DDKT (2006, subsequent graft failure 07/2021, on HD MWF at [facility] through LT groin AV graft on [address]; not on immunosuppression)  - follows with Dr. Mccormick   - anuric at this time   - HD MWF as above   - Amlodipine, B-Blocker for BP control  - No immunosuppression at this time, prednisone discontinued 7/10/23     #Adrenal Insufficiency   - apparent after Prednisone discontinued from prior post-transplant regimen   - started on Hydrocortisone, delayed in starting due to not picking up medication   - Currently complaint: regimen is 20 mg at 8 AM the morning and 5 mg at 2 PM in the afternoon  [  ] pending Endocrinology follow up 10/3  [  ] pharmacy referral for support with hypoglycemia and home glucose management/care   [  ] CGM prescription     #HFpEF, Stage C, Associated cpcPH and RV Dysfunction  #Atrial Fibrillation (on Eliquis 5 mg BID)  #LAZARO  #HTN  - follows with cardiology, Dr. Delcid  - Recent RHC with pHTN (mPAP 37) and CVP 18 (CO/CI 8.1/3.6, 7/3.1)  - Echo with evidence of RV dysfunction  - B-blocker, Amlodipine as above   - DOAC for AC   - Echo 5/11/23, LVEF 45-50%, abnormal LV diastolic filling, no LVH, moderately enlarged RV, low normal RV systolic function, LA moderately dilated, RA moderately dilated, moderate TR, moderately elevated PAP, global hypokinesis of LV   - per May 2023 note:  \"At the current time he would be high risk from a cardiac standpoint given his biventricular dysfunction and persistent pHTN. I discussed that we can reassess in 1 year with a repeat echo to see if chronic volume unloading has an impact on his PA pressures.\"  - Most recent visit in 08/2023 with no changes to plan of care     #Chronic diarrhea  #Recurrent Cdiff infection  - received SBE 9/9/2022 for fecal microbiota transplant  - last colonoscopy 5/17/22, mentioned that date of repeat C-scope depends on pathology results  - follows with Dr. Maria:  Increased Imodium 2mg two tablets in morning and two in afternoon as needed for >3 loose BMs a day  Daily psyllium  Repeat colonoscopy for surveillance in 2025  US " liver and GGT for chronically elevated persistent ALP  Fecal calprotectin ordered   Follows Q6 months     #Lymphadenopathy (following with oncology, inconclusive biopsy in 01/2023): concern for reactive nature vs. PTLD (post-transplant lymphoproliferative disorder)   - Recent concern for lymphoproliferative disorder with FNA completed on 1/11  - Flow Cytometry resulted with no clonal B cell or abnormal T cell population  - Left Iliac lymph node pathology showed reactive hyperplasia and polyclonal plasmacytosis; no evidence of a lymphoproliferative disorder  - following with Dr. Castellon  - EBV PCR negative  - plan to conservatively follow LN's for now per last note   [  ] pending repeat PET/CT this fall     #Depression   - Duloxetine 30mg daily   - patient has been on long term, previously tolerated  - discussed risk/benefit given ESRD on HD; would like to continue     #Lumbar Radiculopathy   - Gabapentin 300mg M/W/F after HD (adjusted from prior daily dosing, given ESRD on HD)     #HCV (s/p treatment)    #Cystic structure in lower cervical/upper thoracic segment of back  - likely lipoma  - general surgery referral placed    #Gout  - Allopurinol 300 previously by prior PCP  - Adjusted to 100mg M/W/F post-dialysis for ESRD dosing     Past Medical History: as above   Past Medical History:   Diagnosis Date    End stage renal disease (CMS/HCC) 12/16/2022    ESRD (end stage renal disease)    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    Renal transplant rejection    Kidney transplant status 12/08/2022    Kidney replaced by transplant    Personal history of immunosuppression therapy 07/04/2021    H/O immunosuppressive therapy    Personal history of other diseases of the nervous system and sense organs     History of cataract    Personal history of other diseases of urinary system 11/02/2021    Personal history of renal failure    Personal history of other endocrine, nutritional and metabolic disease 07/22/2013    History of  hyperlipidemia    Type 2 diabetes mellitus without complications (CMS/HCC) 12/08/2022    Diabetes mellitus    Urinary tract infection, site not specified 05/02/2018    Acute UTI     Subspecialty Medical Care: Nephrology (Transplant), Oncology, Cardiology (HF), Vascular Surgery     Past Surgical History:   Past Surgical History:   Procedure Laterality Date    CT AORTA AND BILATERAL ILIOFEMORAL RUNOFF ANGIOGRAM W AND/OR WO IV CONTRAST  11/7/2021    CT AORTA AND BILATERAL ILIOFEMORAL RUNOFF ANGIOGRAM W AND/OR WO IV CONTRAST 11/7/2021 [facility] CLINICAL LEGACY    CT AORTA AND BILATERAL ILIOFEMORAL RUNOFF ANGIOGRAM W AND/OR WO IV CONTRAST  7/6/2022    CT AORTA AND BILATERAL ILIOFEMORAL RUNOFF ANGIOGRAM W AND/OR WO IV CONTRAST 7/6/2022 [facility] EMERGENCY LEGACY    CT AORTA AND BILATERAL ILIOFEMORAL RUNOFF ANGIOGRAM W AND/OR WO IV CONTRAST  8/17/2022    CT AORTA AND BILATERAL ILIOFEMORAL RUNOFF ANGIOGRAM W AND/OR WO IV CONTRAST 8/17/2022 [facility] EMERGENCY LEGACY    CT AORTA AND BILATERAL ILIOFEMORAL RUNOFF ANGIOGRAM W AND/OR WO IV CONTRAST  9/30/2022    CT AORTA AND BILATERAL ILIOFEMORAL RUNOFF ANGIOGRAM W AND/OR WO IV CONTRAST 9/30/2022 [facility] LEGAC[facility]    CT AORTA AND BILATERAL ILIOFEMORAL RUNOFF ANGIOGRAM W AND/OR WO IV CONTRAST  12/29/2022    CT AORTA AND BILATERAL ILIOFEMORAL RUNOFF ANGIOGRAM W AND/OR WO IV CONTRAST 12/29/2022 [facility]GAC[facility]    IR VENOGRAM DIALYSIS  8/30/2022    IR VENOGRAM DIALYSIS 8/30/2022 [facility] CLINICAL LEGACY    MANDIBLE SURGERY  08/25/2015    Jaw Surgery    MR HEAD ANGIO WO IV CONTRAST  1/30/2014    MR HEAD ANGIO WO IV CONTRAST 1/30/2014 [facility]    MR NECK ANGIO WO IV CONTRAST  1/30/2014    MR NECK ANGIO WO IV CONTRAST 1/30/2014 [facility]    OTHER SURGICAL HISTORY  11/03/2021    Arteriovenous fistula creation procedure    OTHER SURGICAL HISTORY  11/02/2021    Renal Transplant    OTHER SURGICAL HISTORY  11/03/2021    Venous Thrombectomy    OTHER SURGICAL HISTORY  06/19/2014     Hand Dislocation Interphalangeal, Open Treatment    US GUIDED BIOPSY LYMPH NODE SUPERFICIAL  1/11/2023    US GUIDED BIOPSY LYMPH NODE SUPERFICIAL 1/11/2023 [facility]     Medications:  Current Outpatient Medications:     docusate sodium (Colace) 100 mg capsule, Take by mouth., Disp: , Rfl:     loperamide (Imodium) 2 mg capsule, , Disp: , Rfl:     oxyCODONE (Roxicodone) 10 mg immediate release tablet, Take by mouth., Disp: , Rfl:     Acidophilus Ex Str, L. sporog, 35 million- 25 million cell tablet, , Disp: , Rfl:     allopurinol (Zyloprim) 100 mg tablet, Take 1 tablet (100 mg) by mouth once a day on Monday, Wednesday, and Friday. AFTER DIALYSIS., Disp: 36 tablet, Rfl: 3    amLODIPine (Norvasc) 10 mg tablet, Take 1 tablet (10 mg) by mouth once daily., Disp: 90 tablet, Rfl: 1    apixaban (Eliquis) 5 mg tablet, Take 1 tablet (5 mg) by mouth 2 times a day., Disp: 180 tablet, Rfl: 3    aspirin 81 mg EC tablet, Take 1 tablet (81 mg) by mouth once daily., Disp: , Rfl:     B complex-vitamin C-folic acid (Siri-Salo Rx) 1- mg-mg-mcg tablet, Take 1 tablet by mouth once daily., Disp: , Rfl:     benzoyl peroxide 5 % lotion, 1 Application, Disp: , Rfl:     cholestyramine (Questran) 4 gram packet, MIX THE CONTENTS OF 1 POWDER PACKET WITH 2-6 OZ OF NONCARBONATED BEVERAGE AND SWALLOW ONCE DAILY., Disp: , Rfl:     clindamycin (Cleocin T) 1 % lotion, 1 Application, Disp: , Rfl:     cyanocobalamin (Vitamin B-12) 1,000 mcg tablet, Take 1 tablet (1,000 mcg) by mouth once daily., Disp: , Rfl:     Dialyvite 100-1 mg tablet, , Disp: , Rfl:     DULoxetine (Cymbalta) 30 mg DR capsule, Take 1 capsule (30 mg) by mouth once daily. Do not crush or chew., Disp: 90 capsule, Rfl: 1    epinastine (Elestat) 0.05 % ophthalmic solution, 1 drop in both eyes 2 times a day as needed for itching/allergies, Disp: , Rfl:     ergocalciferol (Vitamin D-2) 1.25 MG (94148 UT) capsule, Take 1 capsule every month, Disp: , Rfl:     fluticasone  (Flonase) 50 mcg/actuation nasal spray, Administer into affected nostril(s)., Disp: , Rfl:     FreeStyle Evrin sensor system (FreeStyle Ervin 2 Sensor) kit, Use as instructed, Disp: 1 each, Rfl: 0    gabapentin (Neurontin) 300 mg capsule, Take 1 capsule (300 mg) by mouth once a day on Monday, Wednesday, and Friday. AFTER DIALYSIS., Disp: 39 capsule, Rfl: 1    hydrocortisone (Cortef) 20 mg tablet, Take 1 tablet (20 mg) by mouth once daily. Take at 8AM in the morning., Disp: 30 tablet, Rfl: 1    hydrocortisone (Cortef) 5 mg tablet, Take 1 tablet (5 mg) by mouth once daily. Take at 2PM in the afternoon., Disp: 30 tablet, Rfl: 1    lactobacillus acidophilus capsule, Take by mouth., Disp: , Rfl:     melatonin 3 mg tablet, , Disp: , Rfl:     metoprolol tartrate (Lopressor) 50 mg tablet, Take 1 tablet (50 mg) by mouth every 12 hours., Disp: , Rfl:     multivitamin tablet, Take 1 tablet by mouth once daily., Disp: , Rfl:     omeprazole (PriLOSEC) 40 mg DR capsule, Take 1 capsule (40 mg) by mouth 2 times a day., Disp: , Rfl:     sevelamer carbonate (Renvela) 800 mg tablet, TAKE 2 TABLET After meals, Disp: , Rfl:     sildenafil (Viagra) 100 mg tablet, TAKE 1 TABLET DAILY 1 HOUR BEFORE NEEDED, Disp: , Rfl:     Tab-A-Salo 400 mcg tablet, , Disp: , Rfl:   Ph[facility]acy: [facility] ([facility])    Allergies:   Allergies   Allergen Reactions    Ibuprofen Anaphylaxis    Oxycodone Itching    Vancomycin Itching, Unknown and Rash     face red and hot     Immunizations: discuss at CPE, received Tdap 7/20/23, Flu shot today     Family History:   Family History   Problem Relation Name Age of Onset    Kidney failure Other Sibling      Social History:   Home/Living Situation/Falls/Safety Assessment: lives alone, high falls risk  Education/Employment/Work/Vocational: works for [employer] in [employer]  Activities:   Drug Use:   Diet: ESRD on HD, +food insecurity   Depression/Anxiety: very frustrated over health and barriers to his healthcare  "  Sexuality/Contraception/Menstrual History:   Sleep:     Patient Information:  Health Insurance: has insurance   Transportation: public transportation   Healthcare POA/Guardian:   Contact Information:     Visit Vitals  /83   Pulse 58   Ht 1.803 m (5' 11\")   Wt 101 kg (223 lb)   SpO2 96%   BMI 31.10 kg/m²   Smoking Status Former   BSA 2.25 m²      PHYSICAL EXAM:   General: obese AA male, NAD   HEENT: NCAT, poor dentition   CV: RRR  PULM: distant breath sounds, bibasilar crackles, non-labored respirations at rest  ABD: soft, obese, NT, ND  : no suprapubic tenderness; L groin AVG  SKIN: no rashes noted; lesion on upper back, possible lipoma   EXT: LLE 2+ pitting edema > right   NEURO: A&Ox4, symmetric facies, no gross motor or sensory deficits, difficulty with ambulation   PSYCH: pleasant mood, appropriate affect     Assessment/Plan    Nghia Hall is a 58 y.o. male seen in Clinic at [facility] by Dr. Luis Petersen on 07/20/23 for routine care, as well as for management of the following chronic medical conditions: ESRD s/p DDKT (2006, subsequent graft failure 07/2021, on HD MWF at [facility] through LT groin AV graft on [address]; not currently on immunosuppression), pAfib (on Apixaban), HFpEF, HCV (s/p treatment), gout, lymphadenopathy (following with oncology, inconclusive biopsy in 01/2023), DVT, Adrenal Insufficiency (on hydrocortisone). Presents today for follow-up visit.     UPDATES:   Since last visit, ED visit x2  - Fall down 5 stairs, seen on 9/2 with CT head showing acute subarachnoid hemorrhage, stable on repeat imaging, not intervened upon, no AC reversal pursued   - Edema on 9/5 with patent L common femoral artery-common femoral vein AV graft; negative for DVT; likely in setting of steroids     Hypoglycemia at last visit in setting of discontinuation of prednisone and resultant adrenal insufficiency--hydrocortisone prescriptions provided, has been compliant, has endocrine visit 10/3/23     GI follow " up with Dr. Martinez on 9/5/23 as scheduled:   - increased imodium 2mg two tablets in morning and two in afternoon as needed for >3 loose BMs a day  - daily psyllium  - repeat colonoscopy for surveillance in 2025  - US liver and GGT for chronically elevated persistent ALP  - fecal calprotectin ordered   - follows Q6 months     Oncology follow up:   - pending repeat CT/PET     CHRONIC MEDICAL CONDITIONS:   #Partially Occlusive LLE DVT, recent SAH  - prior 2.5mg BID Apixaban dosing; currently on 5mg BID dosing going forward   - recent fall with SAH  [  ] repeat CT scan today  [  ] will need to continue to closely evaluate clot vs. Bleed risk in this very chronically ill patient   - repeat Duplex US LLE without persistence of clot     #ESRD s/p DDKT (2006, subsequent graft failure 07/2021, on HD MWF at [facility] through LT groin AV graft on [address]; not on immunosuppression)  - follows with Dr. Mccormick   - anuric at this time   - HD MWF as above   - Amlodipine, B-Blocker for BP control  - No immunosuppression at this time, prednisone discontinued 7/10/23     #Adrenal Insufficiency   - apparent after Prednisone discontinued from prior post-transplant regimen   - started on Hydrocortisone, delayed in starting due to not picking up medication   - Currently complaint: regimen is 20 mg at 8 AM the morning and 5 mg at 2 PM in the afternoon  [  ] pending Endocrinology follow up 10/3  [  ] pharmacy referral for support with hypoglycemia and home glucose management/care   [  ] CGM prescription     #HFpEF, Stage C, Associated cpcPH and RV Dysfunction  #Atrial Fibrillation (on Eliquis 5 mg BID)  #LAZARO  #HTN  - follows with cardiology, Dr. Delcid  - Recent RHC with pHTN (mPAP 37) and CVP 18 (CO/CI 8.1/3.6, 7/3.1)  - Echo with evidence of RV dysfunction  - B-blocker, Amlodipine as above   - DOAC for AC   - Echo 5/11/23, LVEF 45-50%, abnormal LV diastolic filling, no LVH, moderately enlarged RV, low normal RV systolic function,  "LA moderately dilated, RA moderately dilated, moderate TR, moderately elevated PAP, global hypokinesis of LV   - per May 2023 note:  \"At the current time he would be high risk from a cardiac standpoint given his biventricular dysfunction and persistent pHTN. I discussed that we can reassess in 1 year with a repeat echo to see if chronic volume unloading has an impact on his PA pressures.\"  - Most recent visit in 08/2023 with no changes to plan of care     #Chronic diarrhea  #Recurrent Cdiff infection  - received SBE 9/9/2022 for fecal microbiota transplant  - last colonoscopy 5/17/22, mentioned that date of repeat C-scope depends on pathology results  - follows with Dr. Maria:  Increased Imodium 2mg two tablets in morning and two in afternoon as needed for >3 loose BMs a day  Daily psyllium  Repeat colonoscopy for surveillance in 2025  US liver and GGT for chronically elevated persistent ALP  Fecal calprotectin ordered   Follows Q6 months     #Lymphadenopathy (following with oncology, inconclusive biopsy in 01/2023): concern for reactive nature vs. PTLD (post-transplant lymphoproliferative disorder)   - Recent concern for lymphoproliferative disorder with FNA completed on 1/11  - Flow Cytometry resulted with no clonal B cell or abnormal T cell population  - Left Iliac lymph node pathology showed reactive hyperplasia and polyclonal plasmacytosis; no evidence of a lymphoproliferative disorder  - following with Dr. Castellon  - EBV PCR negative  - plan to conservatively follow LN's for now per last note   [  ] pending repeat PET/CT this fall     #Depression   - Duloxetine 30mg daily   - patient has been on long term, previously tolerated  - discussed risk/benefit given ESRD on HD; would like to continue     #Lumbar Radiculopathy   - Gabapentin 300mg M/W/F after HD (adjusted from prior daily dosing, given ESRD on HD)     #HCV (s/p treatment)    #Cystic structure in lower cervical/upper thoracic segment of back  - likely " lipoma  - general surgery referral placed    #Gout  - Allopurinol 300 previously by prior PCP  - Adjusted to 100mg M/W/F post-dialysis for ESRD dosing     #Health Maintenance    Cancer Screening  - Colorectal Cancer Screening: repeat 2025 per GI   - Lung Cancer Screening:   - Prostate Cancer Screening: WNL 03/2023    Laboratory Screening  - Lipid Screen: profound DLD per last lipids in 2020; repeat with next labs, ideally fasting   - A1C, glucose screen: on HD, recent hypoglycemia   - STI, HIV, Hep B screen: prior negative testing   - Hep C screen: s/p treatment     Imaging Screening  - AAA screening: negative per CT scan C/A/P in 05/2023    Immunizations: discuss at CPE   - Influenza: annually; given today   - COVID: will recommend staying UTD with booster  - RSV: candidate   - Tdap: received 7/20/23  - Prevnar, Pneumovax: discuss at CPE   - Shingrix: discuss at CPE     Other Screening  - Health Literacy Assessment: poor  - Depression screen: positive   - Home safety/partner violence screen: lives alone, high falls risk   - Hearing/Vision screens:   - Alcohol/tobacco/drug use screen:   - Healthcare POA/Advanced Directives:     Referrals: CT head, CGM supplies, Pharmacy referral, Flu shot     Patient Discussion:    Please call back the office with any questions at [phone]. In the case of an emergency, please call [phone] or go to the nearest Emergency Department.      Luis Petersen MD  Internal Medicine-Pediatrics

## 2023-09-28 ENCOUNTER — TELEMEDICINE (OUTPATIENT)
Dept: PHARMACY | Facility: HOSPITAL | Age: 58
End: 2023-09-28
Payer: COMMERCIAL

## 2023-09-28 DIAGNOSIS — E16.2 HYPOGLYCEMIA: ICD-10-CM

## 2023-09-28 RX ORDER — BLOOD-GLUCOSE SENSOR
EACH MISCELLANEOUS
Qty: 2 EACH | Refills: 11 | Status: SHIPPED | OUTPATIENT
Start: 2023-09-28 | End: 2023-09-28

## 2023-09-28 NOTE — PROGRESS NOTES
"Subjective   Patient ID: Nghia Hall is a 58 y.o. male who presents for Med Management.    Referring Provider: Luis Petersen MD     Referred for hypoglycemia in the setting of adrenal insufficiency per last PCP note:     \"Hypoglycemia at last visit in setting of discontinuation of prednisone and resultant adrenal insufficiency--hydrocortisone prescriptions provided, has been compliant, has endocrine visit 10/3/23 \" - Remains compliant to hydrocortisone regimen, no symptoms of hypoglycemia.     Had a Freestyle Ervin 3 sensor and felt it was very beneficial, sensors are ~$75 per month through insurance, unfortunately per patient cannot afford at this time.     Objective     There were no vitals taken for this visit.     LAB  Lab Results   Component Value Date    BILITOT 1.7 (H) 09/22/2023    CALCIUM 8.7 09/22/2023    CO2 27 09/22/2023     09/22/2023    CREATININE 6.98 (H) 09/22/2023    GLUCOSE 88 09/22/2023    ALKPHOS 202 (H) 09/22/2023    K 4.1 09/22/2023    PROT 6.2 (L) 09/22/2023     09/22/2023    AST 28 09/22/2023    ALT 14 09/22/2023    BUN 28 (H) 09/22/2023    ANIONGAP 16 09/22/2023    MG 2.13 09/01/2023    PHOS 3.3 09/01/2023     (H) 09/07/2023     (H) 09/07/2023    ALBUMIN 3.4 09/22/2023    AMYLASE 67 11/02/2021    LIPASE 27 09/22/2023    GFRF CANCELED 09/05/2023    GFRMALE 8 (A) 09/22/2023     Lab Results   Component Value Date    TRIG 480 (H) 11/05/2020    CHOL 210 (H) 11/05/2020    HDL 39.0 (A) 11/05/2020     Lab Results   Component Value Date    HGBA1C 4.4 08/15/2023     The 10-year ASCVD risk score (Gabi JARRETT, et al., 2019) is: 31%    Values used to calculate the score:      Age: 58 years      Sex: Male      Is Non- : Yes      Diabetic: Yes      Tobacco smoker: No      Systolic Blood Pressure: 146 mmHg      Is BP treated: Yes      HDL Cholesterol: 39 mg/dL      Total Cholesterol: 210 mg/dL    Assessment/Plan   Problem List Items Addressed This " Visit       Hypoglycemia     No recent hypoglycemia symptoms. Follow-up scheduled with Endocrinology and PCP next week          Relevant Medications    blood-glucose sensor (FreeStyle Ervin 3 Sensor) device     Patient assistance programs limited for Freestyle Ervin 3,  PAP does not cover,  assistance may provide a free trial but assistance thereafter brings cost to $75 a month which is the same as the patient's co-pay.     Follow-up: as needed following Endo/PCP visits, patient provided clinical pharmacy number to call as needed.     Kati TubbsD [facility]  Clinical Pharmacist     Continue all meds under the continuation of care with the referring provider and clinical pharmacy team

## 2023-10-03 ENCOUNTER — OFFICE VISIT (OUTPATIENT)
Dept: ENDOCRINOLOGY | Facility: CLINIC | Age: 58
End: 2023-10-03
Payer: COMMERCIAL

## 2023-10-03 VITALS
HEART RATE: 67 BPM | DIASTOLIC BLOOD PRESSURE: 76 MMHG | BODY MASS INDEX: 31.5 KG/M2 | SYSTOLIC BLOOD PRESSURE: 123 MMHG | HEIGHT: 71 IN | WEIGHT: 225 LBS

## 2023-10-03 DIAGNOSIS — E27.40 ADRENAL INSUFFICIENCY (MULTI): Primary | ICD-10-CM

## 2023-10-03 DIAGNOSIS — I48.0 PAROXYSMAL ATRIAL FIBRILLATION (MULTI): Primary | ICD-10-CM

## 2023-10-03 DIAGNOSIS — F41.8 DEPRESSION WITH ANXIETY: ICD-10-CM

## 2023-10-03 DIAGNOSIS — M54.16 LUMBAR RADICULOPATHY: ICD-10-CM

## 2023-10-03 PROCEDURE — 3044F HG A1C LEVEL LT 7.0%: CPT | Performed by: INTERNAL MEDICINE

## 2023-10-03 PROCEDURE — 99215 OFFICE O/P EST HI 40 MIN: CPT | Performed by: INTERNAL MEDICINE

## 2023-10-03 PROCEDURE — 3078F DIAST BP <80 MM HG: CPT | Performed by: INTERNAL MEDICINE

## 2023-10-03 PROCEDURE — 1036F TOBACCO NON-USER: CPT | Performed by: INTERNAL MEDICINE

## 2023-10-03 PROCEDURE — 3066F NEPHROPATHY DOC TX: CPT | Performed by: INTERNAL MEDICINE

## 2023-10-03 PROCEDURE — 99417 PROLNG OP E/M EACH 15 MIN: CPT | Performed by: INTERNAL MEDICINE

## 2023-10-03 PROCEDURE — 3008F BODY MASS INDEX DOCD: CPT | Performed by: INTERNAL MEDICINE

## 2023-10-03 PROCEDURE — 3074F SYST BP LT 130 MM HG: CPT | Performed by: INTERNAL MEDICINE

## 2023-10-03 RX ORDER — HYDROCORTISONE 5 MG/1
5 TABLET ORAL 2 TIMES DAILY
Qty: 180 TABLET | Refills: 3 | Status: SHIPPED
Start: 2023-10-03 | End: 2023-11-02 | Stop reason: CLARIF

## 2023-10-03 NOTE — PROGRESS NOTES
1st  visit    Reason for consult: Adrenal insuffiencey (post hospital discharge follow up)    HPI:  PRABHJOT CHAVES is a 58 year old male , with PMH of ESRD secondary to hypertensive nephrosclerosis , previous DDKT in 2006 that graft failed in 2021 and he went back on MWF dialysis , chronic C. diff s/p  fecal transplant,  HTN, A.fib, Pulmonary HTN with RHF , left femoral AVG with multiple revisions.    Patient is being seen in [facility] endocrine clinic as a case of Adrenal insuffiency who was admitted as in patient in August 23 twice with hypotension, hypoglycemia and falls.     Background History :  He was on Prednisone  in view of H/O kidney transplant since 2006.  Prednisone was suggested to be discontinued by nephrology service in his appointment with them in July 23.  (but as per pt. he was already off of medicine for few months, without any concerning symptoms). He was admitted as in patient in  July 12th- 14 th for recurrent fall and rib fracture . He was then readmitted July 24- Aug 2nd  for hypoglycemia , bradycardia, hypotension, and dizziness. He was started on hydrocortisone 15mg Q12H on 27th July ,  then 15 mg once on 28th July followed by Prednisone 10 mg daily from 29 th July 23 to 31st July. Aug 1 st  7.5 mg Prednisone daily   .Endo was consulted on 8/2/23 regarding steroids management , we suggested to taper the dose to 2.5 mg daily, asking him to repeat labs 2 days later holding the dose the day prior . His AM cortisol level was 4.3 , we suggested to resume Prednisone 2.5 mg daily.  He was readmitted with symptoms of nausea, vomiting, fatigue on 8/12/23. He was found to be hypoglycemic as well (POC FS - 35).   We suggested to discharge him with PO HC 20 mg AM and 5 PM in afternoon.    Interval history:  Today, came to establish care with us in [facility]ocrine clinic.   Denies any active complaints except stomach pain (has recent admission , was found to have diverticulosis )    Energy level:  good  Appetite: good  Sleep: has multiple awakenings , usually able to sleep again, says there is no particular reason for frequent awakenings (has LAZARO , uses CPAP at home)  Endorsing medicine compliance.    Family history:  Not significant.     ROS:  10 points ROS done , negative except as above.     Physical examination:  Pulse: 67 / mint  BP: 123/76 mmHg  Weight: 102 kg  BMI: 31.39    General: Patient is alert / oriented *3, in no acute distress.   HENT: Head normocephalic, atraumatic  Neck: supple, ROM normal  Eyes: EOMI intact  Respiratory: RR normal   CVS: HR normal  Musculoskeletal: normal gait, mild peripheral edema.   CNS: Cranial nerves grossly intact.   Psych: appropriate mood                 Assessment and plan:     # Adrenal insuffiencey (secondary to chronic glucocorticoid use)   H/O prednisone intake in view of kidney transplant in 2008 , currently back on HD - 3 sessions /week due to transplant rejection in 2021.     - Episodes of  hypoglycemia , hypotension , falls while off prednisone for some time.  - Currently , on PO HC 20 mg AM and 5 mg in afternoon.  - As per him, energy is good.  - Was discharged from hospital with [employer] CGM , But not using anymore due to co-payment issues. Denies any symptoms suggestive of hypoglycemic episodes , appetite is good.    Recommendations:  - will suggest to  take PO Hydrocortisone 15 mg in AM (7-8 AM) and 5 mg in afternoon (2-3 PM)  - Suggested to take HC with some food   - RTC in 6 months.     Patient seen / examined and discussed with attending - Dr. Ortiz

## 2023-10-04 RX ORDER — METOPROLOL TARTRATE 50 MG/1
50 TABLET ORAL 2 TIMES DAILY
Qty: 180 TABLET | Refills: 1 | Status: SHIPPED | OUTPATIENT
Start: 2023-10-04 | End: 2023-10-06 | Stop reason: SDUPTHER

## 2023-10-04 RX ORDER — DULOXETIN HYDROCHLORIDE 30 MG/1
30 CAPSULE, DELAYED RELEASE ORAL DAILY
Qty: 90 CAPSULE | Refills: 1 | Status: SHIPPED
Start: 2023-10-04 | End: 2023-11-02 | Stop reason: CLARIF

## 2023-10-05 ENCOUNTER — APPOINTMENT (OUTPATIENT)
Dept: RADIOLOGY | Facility: HOSPITAL | Age: 58
End: 2023-10-05
Payer: COMMERCIAL

## 2023-10-05 ENCOUNTER — HOSPITAL ENCOUNTER (OUTPATIENT)
Dept: RADIOLOGY | Facility: HOSPITAL | Age: 58
Discharge: HOME | End: 2023-10-05
Payer: COMMERCIAL

## 2023-10-05 DIAGNOSIS — R59.1 GENERALIZED ENLARGED LYMPH NODES: ICD-10-CM

## 2023-10-05 LAB — GLUCOSE BLD MANUAL STRIP-MCNC: 76 MG/DL (ref 74–99)

## 2023-10-05 PROCEDURE — 82947 ASSAY GLUCOSE BLOOD QUANT: CPT

## 2023-10-05 PROCEDURE — 78815 PET IMAGE W/CT SKULL-THIGH: CPT | Mod: PET TUMOR SUBSQ TX STRATEGY | Performed by: RADIOLOGY

## 2023-10-06 ENCOUNTER — HOSPITAL ENCOUNTER (OUTPATIENT)
Dept: RADIOLOGY | Facility: HOSPITAL | Age: 58
Discharge: HOME | End: 2023-10-06
Payer: COMMERCIAL

## 2023-10-06 ENCOUNTER — OFFICE VISIT (OUTPATIENT)
Dept: PRIMARY CARE | Facility: CLINIC | Age: 58
End: 2023-10-06
Payer: COMMERCIAL

## 2023-10-06 VITALS
HEART RATE: 79 BPM | BODY MASS INDEX: 31.92 KG/M2 | OXYGEN SATURATION: 97 % | SYSTOLIC BLOOD PRESSURE: 133 MMHG | HEIGHT: 71 IN | DIASTOLIC BLOOD PRESSURE: 73 MMHG | WEIGHT: 228 LBS

## 2023-10-06 DIAGNOSIS — Z23 IMMUNIZATION DUE: ICD-10-CM

## 2023-10-06 DIAGNOSIS — I48.0 PAROXYSMAL ATRIAL FIBRILLATION (MULTI): ICD-10-CM

## 2023-10-06 DIAGNOSIS — N18.6 ESRD ON DIALYSIS (MULTI): ICD-10-CM

## 2023-10-06 DIAGNOSIS — K76.89 LIVER DYSFUNCTION: Primary | ICD-10-CM

## 2023-10-06 DIAGNOSIS — M54.16 LUMBAR RADICULOPATHY: ICD-10-CM

## 2023-10-06 DIAGNOSIS — Z99.2 ESRD ON DIALYSIS (MULTI): ICD-10-CM

## 2023-10-06 LAB — GLUCOSE BLD MANUAL STRIP-MCNC: 60 MG/DL (ref 74–99)

## 2023-10-06 PROCEDURE — 82947 ASSAY GLUCOSE BLOOD QUANT: CPT

## 2023-10-06 PROCEDURE — 3066F NEPHROPATHY DOC TX: CPT | Performed by: STUDENT IN AN ORGANIZED HEALTH CARE EDUCATION/TRAINING PROGRAM

## 2023-10-06 PROCEDURE — 3075F SYST BP GE 130 - 139MM HG: CPT | Performed by: STUDENT IN AN ORGANIZED HEALTH CARE EDUCATION/TRAINING PROGRAM

## 2023-10-06 PROCEDURE — 99214 OFFICE O/P EST MOD 30 MIN: CPT | Performed by: STUDENT IN AN ORGANIZED HEALTH CARE EDUCATION/TRAINING PROGRAM

## 2023-10-06 PROCEDURE — 3008F BODY MASS INDEX DOCD: CPT | Performed by: STUDENT IN AN ORGANIZED HEALTH CARE EDUCATION/TRAINING PROGRAM

## 2023-10-06 PROCEDURE — 90471 IMMUNIZATION ADMIN: CPT | Performed by: STUDENT IN AN ORGANIZED HEALTH CARE EDUCATION/TRAINING PROGRAM

## 2023-10-06 PROCEDURE — 1036F TOBACCO NON-USER: CPT | Performed by: STUDENT IN AN ORGANIZED HEALTH CARE EDUCATION/TRAINING PROGRAM

## 2023-10-06 PROCEDURE — 3078F DIAST BP <80 MM HG: CPT | Performed by: STUDENT IN AN ORGANIZED HEALTH CARE EDUCATION/TRAINING PROGRAM

## 2023-10-06 PROCEDURE — 90677 PCV20 VACCINE IM: CPT | Performed by: STUDENT IN AN ORGANIZED HEALTH CARE EDUCATION/TRAINING PROGRAM

## 2023-10-06 PROCEDURE — 78815 PET IMAGE W/CT SKULL-THIGH: CPT | Mod: PS

## 2023-10-06 PROCEDURE — A9552 F18 FDG: HCPCS | Performed by: INTERNAL MEDICINE

## 2023-10-06 PROCEDURE — 3430000001 HC RX 343 DIAGNOSTIC RADIOPHARMACEUTICALS: Performed by: INTERNAL MEDICINE

## 2023-10-06 PROCEDURE — 3044F HG A1C LEVEL LT 7.0%: CPT | Performed by: STUDENT IN AN ORGANIZED HEALTH CARE EDUCATION/TRAINING PROGRAM

## 2023-10-06 RX ORDER — HYDROCORTISONE 10 MG/1
TABLET ORAL
Status: ON HOLD | COMMUNITY
Start: 2023-09-06 | End: 2023-10-10 | Stop reason: WASHOUT

## 2023-10-06 RX ORDER — METOPROLOL TARTRATE 50 MG/1
50 TABLET ORAL 2 TIMES DAILY
Qty: 180 TABLET | Refills: 1 | Status: SHIPPED
Start: 2023-10-06 | End: 2023-11-02 | Stop reason: CLARIF

## 2023-10-06 RX ORDER — GABAPENTIN 300 MG/1
300 CAPSULE ORAL
Qty: 39 CAPSULE | Refills: 1 | Status: SHIPPED
Start: 2023-10-06 | End: 2023-11-02 | Stop reason: CLARIF

## 2023-10-06 RX ORDER — FLUDEOXYGLUCOSE F 18 200 MCI/ML
11.8 INJECTION, SOLUTION INTRAVENOUS
Status: COMPLETED | OUTPATIENT
Start: 2023-10-06 | End: 2023-10-06

## 2023-10-06 RX ORDER — VIT B COMP NO.3/FOLIC/C/BIOTIN 1 MG-60 MG
1 TABLET ORAL DAILY
Qty: 90 TABLET | Refills: 3 | Status: SHIPPED
Start: 2023-10-06 | End: 2023-11-02 | Stop reason: CLARIF

## 2023-10-06 RX ADMIN — FLUDEOXYGLUCOSE F 18 11.8 MILLICURIE: 200 INJECTION, SOLUTION INTRAVENOUS at 07:40

## 2023-10-06 NOTE — PROGRESS NOTES
Nghia Hall is a 58 y.o. male seen in Clinic at [facility] by Dr. Luis Petersen on 10/06/23 for routine care, as well as for management of the following chronic medical conditions: ESRD s/p DDKT (2006, subsequent graft failure 07/2021, on HD MWF at [facility] through LT groin AV graft on [address]; not currently on immunosuppression), pAfib (on Apixaban), HFpEF, HCV (s/p treatment), gout, lymphadenopathy (following with oncology, inconclusive biopsy in 01/2023), DVT, Adrenal Insufficiency (on hydrocortisone). Presents today for follow-up visit after recent ED visit in late September. At that time, he was seen for LLQ abdominal pain for which workup was performed, including CT A/P, notable for diverticulosis with possible mild acute diverticulitis vs. Chronic diverticulitis (note of malignancy not being entirely excluded). A non-emergent scope was recommended. His last endoscopic evaluation was in 05/2022 with GI provider, Dr. Maria. Additionally, mention of cirrhotic morphology of the liver was noted on this scan. Per my review of recent other imaging, no similar comment is made. Patient also with note of moderate abdominopelvic ascites. His most recent INR is 1.2, similar to prior, Plt count is 197. He does have elevated Alk Phos to low 200s and recently fluctuating bili with subtle elevation per labs. Plan on follow up discussion with GI and transplant nephrology for additional input at this time.     OTHER UPDATES:   - Repeat PET scan earlier today, pending result     CHRONIC MEDICAL CONDITIONS:   #Partially Occlusive LLE DVT, recent SAH  - prior 2.5mg BID Apixaban dosing; currently on 5mg BID dosing going forward   - recent fall with SAH; never completed repeat CT head as recommended   [  ] will need to continue to closely evaluate clot vs. Bleed risk in this very chronically ill patient   - repeat Duplex US LLE without persistence of clot     #ESRD s/p DDKT (2006, subsequent graft failure 07/2021, on HD MWF at  "[facility] [place] through [place] groin AV graft on [address]; not on immunosuppression)  - follows with Dr. Mccormick   - anuric at this time   - HD MWF as above   - Amlodipine, B-Blocker for BP control  - No immunosuppression at this time, prednisone discontinued 7/10/23     #Adrenal Insufficiency   - apparent after Prednisone discontinued from prior post-transplant regimen   - started on Hydrocortisone, delayed in starting due to not picking up medication   - Currently complaint: regimen is 15 mg at 8 AM the morning and 5 mg at 2 PM in the afternoon (no recurrent hypoglycemia since last visit)  - following with Endocrinology, last visit 10/3    #HFpEF, Stage C, Associated cpcPH and RV Dysfunction  #Atrial Fibrillation (on Eliquis 5 mg BID)  #LAZARO  #HTN  - follows with cardiology, Dr. Delcid  - last [facility] with pHTN (mPAP 37) and CVP 18 (CO/CI 8.1/3.6, 7/3.1)  - Echo with evidence of RV dysfunction  - B-blocker, Amlodipine as above   - DOAC for AC   - Echo 5/11/23, LVEF 45-50%, abnormal LV diastolic filling, no LVH, moderately enlarged RV, low normal RV systolic function, LA moderately dilated, RA moderately dilated, moderate TR, moderately elevated PAP, global hypokinesis of LV   - per May 2023 note:  \"At the current time he would be high risk from a cardiac standpoint given his biventricular dysfunction and persistent pHTN. I discussed that we can reassess in 1 year with a repeat echo to see if chronic volume unloading has an impact on his PA pressures.\"  - Most recent visit in 08/2023 with no changes to plan of care     #Chronic diarrhea  #Recurrent Cdiff infection  - received SBE 9/9/2022 for fecal microbiota transplant  - last colonoscopy 5/17/22, mentioned that date of repeat C-scope depends on pathology results  - follows with Dr. Maria:  Increased Imodium 2mg two tablets in morning and two in afternoon as needed for >3 loose BMs a day  Daily psyllium  Repeat colonoscopy for surveillance in 2025  US liver and GGT for " chronically elevated persistent ALP  Fecal calprotectin ordered   Follows Q6 months     #Lymphadenopathy (following with oncology, inconclusive biopsy in 01/2023): concern for reactive nature vs. PTLD (post-transplant lymphoproliferative disorder)   - Recent concern for lymphoproliferative disorder with FNA completed on 1/11  - Flow Cytometry resulted with no clonal B cell or abnormal T cell population  - Left Iliac lymph node pathology showed reactive hyperplasia and polyclonal plasmacytosis; no evidence of a lymphoproliferative disorder  - following with Dr. Castellon  - EBV PCR negative  - plan to conservatively follow LN's for now per last note   [  ] pending repeat PET/CT results from earlier today      #Depression   - Duloxetine 30mg daily   - patient has been on long term, previously tolerated  - discussed risk/benefit given ESRD on HD; would like to continue     #Lumbar Radiculopathy   - Gabapentin 300mg M/W/F after HD (adjusted from prior daily dosing, given ESRD on HD)     #HCV (s/p treatment)    #Cystic structure in lower cervical/upper thoracic segment of back  - likely lipoma  - general surgery referral placed    #Gout  - Allopurinol 300 previously by prior PCP  - Adjusted to 100mg M/W/F post-dialysis for ESRD dosing     Past Medical History: as above   Past Medical History:   Diagnosis Date    End stage renal disease (CMS/HCC) 12/16/2022    ESRD (end stage renal disease)    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    Renal transplant rejection    Kidney transplant status 12/08/2022    Kidney replaced by transplant    Personal history of immunosuppression therapy 07/04/2021    H/O immunosuppressive therapy    Personal history of other diseases of the nervous system and sense organs     History of cataract    Personal history of other diseases of urinary system 11/02/2021    Personal history of renal failure    Personal history of other endocrine, nutritional and metabolic disease 07/22/2013    History of  hyperlipidemia    Type 2 diabetes mellitus without complications (CMS/[facility]) 12/08/2022    Diabetes mellitus    Urinary tract infection, site not specified 05/02/2018    Acute UTI     Subspecialty Medical Care: Nephrology (Transplant), Oncology, Cardiology (HF), Vascular Surgery, Gastroenterology     Past Surgical History:   Past Surgical History:   Procedure Laterality Date    CT AORTA AND BILATERAL ILIOFEMORAL RUNOFF ANGIOGRAM W AND/OR WO IV CONTRAST  11/7/2021    CT AORTA AND BILATERAL ILIOFEMORAL RUNOFF ANGIOGRAM W AND/OR WO IV CONTRAST 11/7/2021 [facility] CLINICAL LEGACY    CT AORTA AND BILATERAL ILIOFEMORAL RUNOFF ANGIOGRAM W AND/OR WO IV CONTRAST  7/6/2022    CT AORTA AND BILATERAL ILIOFEMORAL RUNOFF ANGIOGRAM W AND/OR WO IV CONTRAST 7/6/2022 [facility] EMERGENCY LEGACY    CT AORTA AND BILATERAL ILIOFEMORAL RUNOFF ANGIOGRAM W AND/OR WO IV CONTRAST  8/17/2022    CT AORTA AND BILATERAL ILIOFEMORAL RUNOFF ANGIOGRAM W AND/OR WO IV CONTRAST 8/17/2022 [facility] EMERGENCY LEGACY    CT AORTA AND BILATERAL ILIOFEMORAL RUNOFF ANGIOGRAM W AND/OR WO IV CONTRAST  9/30/2022    CT AORTA AND BILATERAL ILIOFEMORAL RUNOFF ANGIOGRAM W AND/OR WO IV CONTRAST 9/30/2022 [facility] ANCILLARY LEGACY    CT AORTA AND BILATERAL ILIOFEMORAL RUNOFF ANGIOGRAM W AND/OR WO IV CONTRAST  12/29/2022    CT AORTA AND BILATERAL ILIOFEMORAL RUNOFF ANGIOGRAM W AND/OR WO IV CONTRAST 12/29/2022 DOCTOR OFFICE LEGACY    IR VENOGRAM DIALYSIS  8/30/2022    IR VENOGRAM DIALYSIS 8/30/2022 [facility] CLINICAL LEGACY    MANDIBLE SURGERY  08/25/2015    Jaw Surgery    MR HEAD ANGIO WO IV CONTRAST  1/30/2014    MR HEAD ANGIO WO IV CONTRAST 1/30/2014 [facility]CILL[facility] LEGAC[facility]    MR NECK ANGIO WO IV CONTRAST  1/30/2014    MR NECK ANGIO WO IV CONTRAST 1/30/2014 [facility]GAC[facility]    OTHER SURGICAL HISTORY  11/03/2021    Arteriovenous fistula creation procedure    OTHER SURGICAL HISTORY  11/02/2021    Renal Transplant    OTHER SURGICAL HISTORY  11/03/2021    Venous Thrombectomy    OTHER SURGICAL  HISTORY  06/19/2014    Hand Dislocation Interphalangeal, Open Treatment    US GUIDED BIOPSY LYMPH NODE SUPERFICIAL  1/11/2023    US GUIDED BIOPSY LYMPH NODE SUPERFICIAL 1/11/2023 DOCTOR OFFICE LEGACY     Medications:No current facility-administered medications for this visit.    Current Outpatient Medications:     hydrocortisone (Cortef) 10 mg tablet, TAKE 2 TABLETS BY MOUTH ONCE DAILY TAKE AT 8AM IN THE MORNING, Disp: , Rfl:     acetaminophen (Tylenol) 325 mg tablet, TAKE 2 TABLETS BY MOUTH EVERY 4 HOURS AS NEEDED FOR PAIN (MILD 1-3), Disp: 120 tablet, Rfl: 0    Acidophilus Ex Str, L. sporog, 35 million- 25 million cell tablet, , Disp: , Rfl:     acidophilus-sporogenes 35 million- 25 million cell tablet, TAKE 2 TABLETS BY MOUTH ONCE DAILY, Disp: 60 tablet, Rfl: 2    allopurinol (Zyloprim) 100 mg tablet, Take 1 tablet (100 mg) by mouth once a day on Monday, Wednesday, and Friday. AFTER DIALYSIS., Disp: 36 tablet, Rfl: 3    amLODIPine (Norvasc) 10 mg tablet, TAKE 1 TABLET BY MOUTH ONCE DAILY, Disp: 30 tablet, Rfl: 0    apixaban (Eliquis) 5 mg tablet, Take 1 tablet (5 mg) by mouth 2 times a day. (Patient not taking: Reported on 10/3/2023), Disp: 180 tablet, Rfl: 3    aspirin 81 mg EC tablet, TAKE 1 TABLET BY MOUTH ONCE DAILY, Disp: 30 tablet, Rfl: 0    B complex-vitamin C-folic acid (Siri-Salo Rx) 1- mg-mg-mcg tablet, Take 1 tablet by mouth once daily., Disp: 90 tablet, Rfl: 3    benzoyl peroxide 5 % lotion, 1 Application, Disp: , Rfl:     blood-glucose sensor device, 1 SENSOR EVERY 14 DAYS TO TEST BLOOD SUGAR, Disp: 2 each, Rfl: 11    cholestyramine (Questran) 4 gram packet, MIX THE CONTENTS OF 1 POWDER PACKET WITH 2-6 OZ OF NONCARBONATED BEVERAGE AND SWALLOW ONCE DAILY., Disp: , Rfl:     clindamycin (Cleocin T) 1 % lotion, 1 Application, Disp: , Rfl:     cyanocobalamin (Vitamin B-12) 1,000 mcg tablet, TAKE 1 TABLET BY MOUTH ONCE DAILY, Disp: 90 tablet, Rfl: 2    Dialyvite 100-1 mg tablet, , Disp: , Rfl:      docusate sodium (Colace) 100 mg capsule, Take by mouth., Disp: , Rfl:     DULoxetine (Cymbalta) 30 mg DR capsule, TAKE 1 CAPSULE BY MOUTH ONCE DAILY, Disp: 90 capsule, Rfl: 1    epinastine (Elestat) 0.05 % ophthalmic solution, 1 drop in both eyes 2 times a day as needed for itching/allergies, Disp: , Rfl:     ergocalciferol (Vitamin D-2) 1.25 MG (25724 UT) capsule, Take 1 capsule every month, Disp: , Rfl:     fluticasone (Flonase) 50 mcg/actuation nasal spray, Administer into affected nostril(s)., Disp: , Rfl:     gabapentin (Neurontin) 300 mg capsule, Take 1 capsule (300 mg) by mouth once a day on Monday, Wednesday, and Friday. AFTER DIALYSIS., Disp: 39 capsule, Rfl: 1    hydrocortisone (Cortef) 5 mg tablet, Take 1 tablet (5 mg) by mouth 2 times a day. Take 15 mg in AM , 5 mg in afternoon., Disp: 180 tablet, Rfl: 3    lactobacillus acidophilus capsule, Take by mouth., Disp: , Rfl:     loperamide (Imodium) 2 mg capsule, , Disp: , Rfl:     melatonin 3 mg tablet, TAKE 1 TABLET BY MOUTH AT BEDTIME AS NEEDED FOR INSOMNIA, Disp: 30 tablet, Rfl: 0    metoprolol tartrate (Lopressor) 50 mg tablet, TAKE 1 TABLET BY MOUTH TWO TIMES A DAY, Disp: 180 tablet, Rfl: 1    multivitamin tablet, Take 1 tablet by mouth once daily., Disp: , Rfl:     omeprazole (PriLOSEC) 40 mg DR capsule, TAKE 1 CAPSULE BY MOUTH ONCE A DAY, Disp: 30 capsule, Rfl: 0    oxyCODONE-acetaminophen (Percocet) 5-325 mg tablet, Take 1 tablet by mouth every 6 hours if needed for severe pain (7 - 10) for up to 3 days., Disp: 5 tablet, Rfl: 0    sevelamer carbonate (Renvela) 800 mg tablet, TAKE 2 TABLETS BY MOUTH THREE TIMES A DAY WITH MEALS, Disp: 180 tablet, Rfl: 0    sildenafil (Viagra) 100 mg tablet, TAKE 1 TABLET DAILY 1 HOUR BEFORE NEEDED, Disp: , Rfl:     Tab-A-Salo 400 mcg tablet, , Disp: , Rfl:     Facility-Administered Medications Ordered in Other Visits:     acetaminophen (Tylenol) tablet 650 mg, 650 mg, oral, q4h PRN **OR** acetaminophen (Tylenol) oral  "liquid 650 mg, 650 mg, oral, q4h PRN **OR** acetaminophen (Tylenol) suppository 650 mg, 650 mg, rectal, q4h PRN, CHRISTI Atkinson    melatonin tablet 10 mg, 10 mg, oral, Daily, CHRISTI Atkinson    oxyCODONE (Roxicodone) immediate release tablet 10 mg, 10 mg, oral, q6h PRN, Ann Andrew DO, 10 mg at 10/09/23 1520    [START ON 10/10/2023] psyllium (Metamucil) 3.4 gram packet 1 packet, 1 packet, oral, Daily, CHRISTI Atkinson  [facility]    Allergies:   Allergies   Allergen Reactions    Ibuprofen Anaphylaxis    Oxycodone Itching    Vancomycin Itching, Unknown and Rash     face red and hot     Immunizations: received Tdap 7/20/23, Flu shot 2023 utd, Prevnar 20 today     Family History:   Family History   Problem Relation Name Age of Onset    Kidney failure Other Sibling      Social History:   Home/Living Situation/Falls/Safety Assessment: lives alone, high falls risk  Education/Employment/Work/Vocational: works for [employer] in [employer]  Activities:   Drug Use:   Diet: ESRD on HD, +food insecurity   Depression/Anxiety: very frustrated over health and barriers to his healthcare   Sexuality/Contraception/Menstrual History:   Sleep:     Patient Information:  Health Insurance: has insurance   Transportation: public transportation   Healthcare POA/Guardian:   Contact Information:     Visit Vitals  /73   Pulse 79   Ht 1.803 m (5' 11\")   Wt 103 kg (228 lb)   SpO2 97%   BMI 31.80 kg/m²   Smoking Status Former   BSA 2.27 m²      PHYSICAL EXAM:   General: obese AA male, NAD   HEENT: NCAT, poor dentition   CV: RRR  PULM: distant breath sounds, bibasilar crackles, non-labored respirations at rest  ABD: soft, obese, NT, ND  : no suprapubic tenderness; L groin AVG  SKIN: no rashes noted; lesion on upper back, possible lipoma   EXT: LLE 2+ pitting edema > right   NEURO: A&Ox4, symmetric facies, no gross motor or sensory deficits, difficulty with ambulation   PSYCH: " pleasant mood, appropriate affect     Assessment/Plan    Nghia Hall is a 58 y.o. male seen in Clinic at [facility] by Dr. Luis Petersen on 07/20/23 for routine care, as well as for management of the following chronic medical conditions: ESRD s/p DDKT (2006, subsequent graft failure 07/2021, on HD MWF at [facility] through LT groin AV graft on [address]; not currently on immunosuppression), pAfib (on Apixaban), HFpEF, HCV (s/p treatment), gout, lymphadenopathy (following with oncology, inconclusive biopsy in 01/2023), DVT, Adrenal Insufficiency (on hydrocortisone). Presents today for follow-up visit after recent ED visit in late September. At that time, he was seen for LLQ abdominal pain for which workup was performed, including CT A/P, notable for diverticulosis with possible mild acute diverticulitis vs. Chronic diverticulitis (note of malignancy not being entirely excluded). A non-emergent scope was recommended. His last endoscopic evaluation was in 05/2022 with GI provider, Dr. Maria. Additionally, mention of cirrhotic morphology of the liver was noted on this scan. Per my review of recent other imaging, no similar comment is made. Patient also with note of moderate abdominopelvic ascites. His most recent INR is 1.2, similar to prior, Plt count is 197. He does have elevated Alk Phos to low 200s and recently fluctuating bili with subtle elevation per labs. Plan on follow up discussion with GI and transplant nephrology for additional input at this time.     OTHER UPDATES:   - Repeat PET scan earlier today, pending result     CHRONIC MEDICAL CONDITIONS:   #Partially Occlusive LLE DVT, recent SAH  - prior 2.5mg BID Apixaban dosing; currently on 5mg BID dosing going forward   - recent fall with SAH; never completed repeat CT head as recommended   [  ] will need to continue to closely evaluate clot vs. Bleed risk in this very chronically ill patient   - repeat Duplex US LLE without persistence of clot     #ESRD s/p  "DDKT (2006, subsequent graft failure 07/2021, on HD MWF at [facility] through LT groin AV graft on [address]; not on immunosuppression)  - follows with Dr. Mccormick   - anuric at this time   - HD MWF as above   - Amlodipine, B-Blocker for BP control  - No immunosuppression at this time, prednisone discontinued 7/10/23     #Adrenal Insufficiency   - apparent after Prednisone discontinued from prior post-transplant regimen   - started on Hydrocortisone, delayed in starting due to not picking up medication   - Currently complaint: regimen is 15 mg at 8 AM the morning and 5 mg at 2 PM in the afternoon (no recurrent hypoglycemia since last visit)  - following with Endocrinology, last visit 10/3    #HFpEF, Stage C, Associated cpcPH and RV Dysfunction  #Atrial Fibrillation (on Eliquis 5 mg BID)  #LAZARO  #HTN  - follows with cardiology, Dr. Delcid  - last [facility] with pHTN (mPAP 37) and CVP 18 (CO/CI 8.1/3.6, 7/3.1)  - Echo with evidence of RV dysfunction  - B-blocker, Amlodipine as above   - DOAC for AC   - Echo 5/11/23, LVEF 45-50%, abnormal LV diastolic filling, no LVH, moderately enlarged RV, low normal RV systolic function, LA moderately dilated, RA moderately dilated, moderate TR, moderately elevated PAP, global hypokinesis of LV   - per May 2023 note:  \"At the current time he would be high risk from a cardiac standpoint given his biventricular dysfunction and persistent pHTN. I discussed that we can reassess in 1 year with a repeat echo to see if chronic volume unloading has an impact on his PA pressures.\"  - Most recent visit in 08/2023 with no changes to plan of care     #Chronic diarrhea  #Recurrent Cdiff infection  - received SBE 9/9/2022 for fecal microbiota transplant  - last colonoscopy 5/17/22, mentioned that date of repeat C-scope depends on pathology results  - follows with Dr. Maria:  Increased Imodium 2mg two tablets in morning and two in afternoon as needed for >3 loose BMs a day  Daily psyllium  Repeat " colonoscopy for surveillance in 2025  US liver and GGT for chronically elevated persistent ALP  Fecal calprotectin ordered   Follows Q6 months     #Lymphadenopathy (following with oncology, inconclusive biopsy in 01/2023): concern for reactive nature vs. PTLD (post-transplant lymphoproliferative disorder)   - Recent concern for lymphoproliferative disorder with FNA completed on 1/11  - Flow Cytometry resulted with no clonal B cell or abnormal T cell population  - Left Iliac lymph node pathology showed reactive hyperplasia and polyclonal plasmacytosis; no evidence of a lymphoproliferative disorder  - following with Dr. Castellon  - EBV PCR negative  - plan to conservatively follow LN's for now per last note   [  ] pending repeat PET/CT results from earlier today      #Depression   - Duloxetine 30mg daily   - patient has been on long term, previously tolerated  - discussed risk/benefit given ESRD on HD; would like to continue     #Lumbar Radiculopathy   - Gabapentin 300mg M/W/F after HD (adjusted from prior daily dosing, given ESRD on HD)     #HCV (s/p treatment)    #Cystic structure in lower cervical/upper thoracic segment of back  - likely lipoma  - general surgery referral placed    #Gout  - Allopurinol 300 previously by prior PCP  - Adjusted to 100mg M/W/F post-dialysis for ESRD dosing     #Health Maintenance    Cancer Screening  - Colorectal Cancer Screening: repeat 2025 per GI, may consider sooner as above   - Lung Cancer Screening: ongoing evaluation with oncology for adenopathy as above   - Prostate Cancer Screening: WNL 03/2023    Laboratory Screening  - Lipid Screen: profound DLD per last lipids in 2020; repeat with next labs, ideally fasting   - A1C, glucose screen: on HD, recent hypoglycemia   - STI, HIV, Hep B screen: prior negative testing   - Hep C screen: s/p treatment     Imaging Screening  - AAA screening: negative per CT scan C/A/P in 05/2023    Immunizations: discuss at CPE   - Influenza: UTD 2023    - COVID: will recommend staying UTD with booster  - RSV: candidate, recommend   - Tdap: received 7/20/23  - Prevnar, Pneumovax: PPSV23 from 2018; PCV-20 today   - Shingrix: recommend     Other Screening  - Health Literacy Assessment: poor  - Depression screen: positive   - Home safety/partner violence screen: lives alone, high falls risk   - Hearing/Vision screens: corrective lenses  - Alcohol/tobacco/drug use screen:   - Healthcare POA/Advanced Directives: mother     Referrals: follow up with GI, pending PET/CT results, Prevnar 20 today, medication refills     Patient Discussion:    Please call back the office with any questions at [phone]. In the case of an emergency, please call [phone] or go to the nearest Emergency Department.      RTC in 2 months for follow up visit, sooner if acute issues arise.     Luis Petersen MD  Internal Medicine-Pediatrics

## 2023-10-09 ENCOUNTER — HOSPITAL ENCOUNTER (INPATIENT)
Facility: HOSPITAL | Age: 58
LOS: 23 days | DRG: 252 | End: 2023-11-01
Attending: STUDENT IN AN ORGANIZED HEALTH CARE EDUCATION/TRAINING PROGRAM | Admitting: NURSE PRACTITIONER
Payer: COMMERCIAL

## 2023-10-09 ENCOUNTER — APPOINTMENT (OUTPATIENT)
Dept: VASCULAR MEDICINE | Facility: HOSPITAL | Age: 58
DRG: 252 | End: 2023-10-09
Payer: COMMERCIAL

## 2023-10-09 DIAGNOSIS — K74.60 HEPATIC CIRRHOSIS, UNSPECIFIED HEPATIC CIRRHOSIS TYPE, UNSPECIFIED WHETHER ASCITES PRESENT (MULTI): ICD-10-CM

## 2023-10-09 DIAGNOSIS — R60.0 LOCALIZED EDEMA: ICD-10-CM

## 2023-10-09 DIAGNOSIS — T82.848A PAIN DUE TO VASCULAR PROSTHETIC DEVICES, IMPLANTS AND GRAFTS, INITIAL ENCOUNTER (CMS-HCC): ICD-10-CM

## 2023-10-09 DIAGNOSIS — I50.9 CHRONIC CONGESTIVE HEART FAILURE, UNSPECIFIED HEART FAILURE TYPE (MULTI): ICD-10-CM

## 2023-10-09 DIAGNOSIS — Z99.2 ESRD ON DIALYSIS (MULTI): ICD-10-CM

## 2023-10-09 DIAGNOSIS — R26.2 INABILITY TO WALK: Primary | ICD-10-CM

## 2023-10-09 DIAGNOSIS — I27.20 PULMONARY HYPERTENSION (MULTI): ICD-10-CM

## 2023-10-09 DIAGNOSIS — M79.605 PAIN OF LEFT LOWER EXTREMITY: ICD-10-CM

## 2023-10-09 DIAGNOSIS — N18.6 ESRD ON DIALYSIS (MULTI): ICD-10-CM

## 2023-10-09 DIAGNOSIS — Z79.01 ANTICOAGULANT LONG-TERM USE: ICD-10-CM

## 2023-10-09 DIAGNOSIS — R60.0 LEG EDEMA, LEFT: ICD-10-CM

## 2023-10-09 LAB
ALBUMIN SERPL BCP-MCNC: 3.3 G/DL (ref 3.4–5)
ALP SERPL-CCNC: 257 U/L (ref 33–120)
ALT SERPL W P-5'-P-CCNC: 12 U/L (ref 10–52)
ANION GAP SERPL CALC-SCNC: 21 MMOL/L (ref 10–20)
AST SERPL W P-5'-P-CCNC: 23 U/L (ref 9–39)
BILIRUB SERPL-MCNC: 1.5 MG/DL (ref 0–1.2)
BUN SERPL-MCNC: 43 MG/DL (ref 6–23)
CALCIUM SERPL-MCNC: 8.8 MG/DL (ref 8.6–10.6)
CHLORIDE SERPL-SCNC: 99 MMOL/L (ref 98–107)
CO2 SERPL-SCNC: 23 MMOL/L (ref 21–32)
CREAT SERPL-MCNC: 8.23 MG/DL (ref 0.5–1.3)
ERYTHROCYTE [DISTWIDTH] IN BLOOD BY AUTOMATED COUNT: 16 % (ref 11.5–14.5)
GFR SERPL CREATININE-BSD FRML MDRD: 7 ML/MIN/1.73M*2
GLUCOSE SERPL-MCNC: 63 MG/DL (ref 74–99)
HCT VFR BLD AUTO: 36.7 % (ref 41–52)
HGB BLD-MCNC: 12.3 G/DL (ref 13.5–17.5)
MCH RBC QN AUTO: 29.2 PG (ref 26–34)
MCHC RBC AUTO-ENTMCNC: 33.5 G/DL (ref 32–36)
MCV RBC AUTO: 87 FL (ref 80–100)
NRBC BLD-RTO: 0 /100 WBCS (ref 0–0)
PLATELET # BLD AUTO: 262 X10*3/UL (ref 150–450)
PMV BLD AUTO: 9.1 FL (ref 7.5–11.5)
POTASSIUM SERPL-SCNC: 4.4 MMOL/L (ref 3.5–5.3)
PROT SERPL-MCNC: 6.9 G/DL (ref 6.4–8.2)
RBC # BLD AUTO: 4.21 X10*6/UL (ref 4.5–5.9)
SODIUM SERPL-SCNC: 139 MMOL/L (ref 136–145)
WBC # BLD AUTO: 5.7 X10*3/UL (ref 4.4–11.3)

## 2023-10-09 PROCEDURE — 99223 1ST HOSP IP/OBS HIGH 75: CPT | Performed by: NURSE PRACTITIONER

## 2023-10-09 PROCEDURE — 80053 COMPREHEN METABOLIC PANEL: CPT

## 2023-10-09 PROCEDURE — 36415 COLL VENOUS BLD VENIPUNCTURE: CPT

## 2023-10-09 PROCEDURE — 2500000001 HC RX 250 WO HCPCS SELF ADMINISTERED DRUGS (ALT 637 FOR MEDICARE OP): Performed by: NURSE PRACTITIONER

## 2023-10-09 PROCEDURE — 1210000001 HC SEMI-PRIVATE ROOM DAILY

## 2023-10-09 PROCEDURE — 99285 EMERGENCY DEPT VISIT HI MDM: CPT | Performed by: STUDENT IN AN ORGANIZED HEALTH CARE EDUCATION/TRAINING PROGRAM

## 2023-10-09 PROCEDURE — 93971 EXTREMITY STUDY: CPT

## 2023-10-09 PROCEDURE — 99284 EMERGENCY DEPT VISIT MOD MDM: CPT | Performed by: STUDENT IN AN ORGANIZED HEALTH CARE EDUCATION/TRAINING PROGRAM

## 2023-10-09 PROCEDURE — 2500000001 HC RX 250 WO HCPCS SELF ADMINISTERED DRUGS (ALT 637 FOR MEDICARE OP)

## 2023-10-09 PROCEDURE — 85027 COMPLETE CBC AUTOMATED: CPT

## 2023-10-09 PROCEDURE — 93971 EXTREMITY STUDY: CPT | Performed by: SURGERY

## 2023-10-09 PROCEDURE — 99285 EMERGENCY DEPT VISIT HI MDM: CPT | Mod: 25

## 2023-10-09 RX ORDER — METOPROLOL TARTRATE 50 MG/1
50 TABLET ORAL 2 TIMES DAILY
Status: DISCONTINUED | OUTPATIENT
Start: 2023-10-09 | End: 2023-10-30

## 2023-10-09 RX ORDER — ACETAMINOPHEN 650 MG/1
650 SUPPOSITORY RECTAL EVERY 4 HOURS PRN
Status: DISCONTINUED | OUTPATIENT
Start: 2023-10-09 | End: 2023-11-01

## 2023-10-09 RX ORDER — ACETAMINOPHEN 160 MG/5ML
650 SOLUTION ORAL EVERY 4 HOURS PRN
Status: DISCONTINUED | OUTPATIENT
Start: 2023-10-09 | End: 2023-11-01

## 2023-10-09 RX ORDER — OXYCODONE AND ACETAMINOPHEN 5; 325 MG/1; MG/1
1 TABLET ORAL EVERY 6 HOURS PRN
Qty: 5 TABLET | Refills: 0 | Status: SHIPPED | OUTPATIENT
Start: 2023-10-09 | End: 2023-10-12

## 2023-10-09 RX ORDER — HYDROCORTISONE 5 MG/1
5 TABLET ORAL EVERY EVENING
Status: DISCONTINUED | OUTPATIENT
Start: 2023-10-09 | End: 2023-10-30

## 2023-10-09 RX ORDER — ACETAMINOPHEN 325 MG/1
650 TABLET ORAL EVERY 4 HOURS PRN
Status: DISCONTINUED | OUTPATIENT
Start: 2023-10-09 | End: 2023-11-01

## 2023-10-09 RX ORDER — ASPIRIN 81 MG/1
81 TABLET ORAL DAILY
Status: DISCONTINUED | OUTPATIENT
Start: 2023-10-10 | End: 2023-10-30

## 2023-10-09 RX ORDER — TALC
3 POWDER (GRAM) TOPICAL NIGHTLY PRN
Status: DISCONTINUED | OUTPATIENT
Start: 2023-10-09 | End: 2023-11-01

## 2023-10-09 RX ORDER — AMLODIPINE BESYLATE 10 MG/1
10 TABLET ORAL DAILY
Status: DISCONTINUED | OUTPATIENT
Start: 2023-10-10 | End: 2023-10-30

## 2023-10-09 RX ORDER — B-COMPLEX WITH VITAMIN C
1 TABLET ORAL DAILY
Status: DISCONTINUED | OUTPATIENT
Start: 2023-10-10 | End: 2023-10-10

## 2023-10-09 RX ORDER — BISMUTH SUBSALICYLATE 262 MG
1 TABLET,CHEWABLE ORAL DAILY
Status: DISCONTINUED | OUTPATIENT
Start: 2023-10-10 | End: 2023-10-10

## 2023-10-09 RX ORDER — OXYCODONE HYDROCHLORIDE 5 MG/1
10 TABLET ORAL EVERY 6 HOURS PRN
Status: DISCONTINUED | OUTPATIENT
Start: 2023-10-09 | End: 2023-10-14

## 2023-10-09 RX ORDER — LANOLIN ALCOHOL/MO/W.PET/CERES
1000 CREAM (GRAM) TOPICAL DAILY
Status: DISCONTINUED | OUTPATIENT
Start: 2023-10-10 | End: 2023-11-01

## 2023-10-09 RX ORDER — PANTOPRAZOLE SODIUM 40 MG/1
40 TABLET, DELAYED RELEASE ORAL
Status: DISCONTINUED | OUTPATIENT
Start: 2023-10-10 | End: 2023-11-01

## 2023-10-09 RX ORDER — SEVELAMER CARBONATE 800 MG/1
1600 TABLET, FILM COATED ORAL
Status: DISCONTINUED | OUTPATIENT
Start: 2023-10-10 | End: 2023-11-01

## 2023-10-09 RX ORDER — HYDROCORTISONE 10 MG/1
15 TABLET ORAL EVERY MORNING
Status: DISCONTINUED | OUTPATIENT
Start: 2023-10-10 | End: 2023-10-30

## 2023-10-09 RX ORDER — ACETAMINOPHEN 500 MG
10 TABLET ORAL DAILY
Status: DISCONTINUED | OUTPATIENT
Start: 2023-10-09 | End: 2023-11-01

## 2023-10-09 RX ADMIN — OXYCODONE HYDROCHLORIDE 10 MG: 5 TABLET ORAL at 06:53

## 2023-10-09 RX ADMIN — OXYCODONE HYDROCHLORIDE 10 MG: 5 TABLET ORAL at 15:20

## 2023-10-09 RX ADMIN — OXYCODONE HYDROCHLORIDE 10 MG: 5 TABLET ORAL at 21:44

## 2023-10-09 RX ADMIN — MELATONIN 10 MG: 3 TAB ORAL at 21:44

## 2023-10-09 ASSESSMENT — LIFESTYLE VARIABLES
EVER FELT BAD OR GUILTY ABOUT YOUR DRINKING: NO
HAVE YOU EVER FELT YOU SHOULD CUT DOWN ON YOUR DRINKING: NO
HAVE PEOPLE ANNOYED YOU BY CRITICIZING YOUR DRINKING: NO
EVER HAD A DRINK FIRST THING IN THE MORNING TO STEADY YOUR NERVES TO GET RID OF A HANGOVER: NO

## 2023-10-09 ASSESSMENT — PAIN SCALES - GENERAL
PAINLEVEL_OUTOF10: 7
PAINLEVEL_OUTOF10: 8
PAINLEVEL_OUTOF10: 7
PAINLEVEL_OUTOF10: 6

## 2023-10-09 ASSESSMENT — ENCOUNTER SYMPTOMS
FEVER: 0
VOMITING: 0
CHILLS: 0
ARTHRALGIAS: 1
CONSTIPATION: 0
DIARRHEA: 0
DYSURIA: 0
FREQUENCY: 0
FLANK PAIN: 0
NAUSEA: 0
MYALGIAS: 1
HEMATURIA: 0
SHORTNESS OF BREATH: 0
PALPITATIONS: 0
ABDOMINAL PAIN: 0

## 2023-10-09 ASSESSMENT — COLUMBIA-SUICIDE SEVERITY RATING SCALE - C-SSRS
1. IN THE PAST MONTH, HAVE YOU WISHED YOU WERE DEAD OR WISHED YOU COULD GO TO SLEEP AND NOT WAKE UP?: NO
2. HAVE YOU ACTUALLY HAD ANY THOUGHTS OF KILLING YOURSELF?: NO
6. HAVE YOU EVER DONE ANYTHING, STARTED TO DO ANYTHING, OR PREPARED TO DO ANYTHING TO END YOUR LIFE?: NO

## 2023-10-09 ASSESSMENT — PAIN - FUNCTIONAL ASSESSMENT: PAIN_FUNCTIONAL_ASSESSMENT: 0-10

## 2023-10-09 ASSESSMENT — PAIN DESCRIPTION - LOCATION: LOCATION: LEG

## 2023-10-09 ASSESSMENT — PAIN DESCRIPTION - ORIENTATION: ORIENTATION: LEFT

## 2023-10-09 NOTE — ED PROVIDER NOTES
ED care was transitioned to me.  Please see earlier provider note for further details.    Patient was pending ultrasound results.  Patient's care was delayed by long read time.  Eventually had to call radiology department multiple times to determine the issue.  Apparently patient's imaging had not uploaded correctly on there and the radiologist were unaware.  Patient's ultrasound was negative for any acute DVT.  It also shows improvement of his chronic DVT.    With a negative work-up and extensive outpatient work-up completed discussed with patient about being discharged home.  Patient states that he could not ambulate without any assistance.  Patient states the pain is severe and has not been able to control it at home.  Patient agrees to admission for PT OT evaluation and possible placement.       Nory Rasheed DO  Resident  10/12/23 3320

## 2023-10-09 NOTE — ED PROVIDER NOTES
HPI   Chief Complaint   Patient presents with    Leg Swelling       HPI     Patient is a 58-year-old male with extensive past medical history as stated below.  He is presenting with chronic left lower extremity pain and swelling.  He presents today because swelling and pain are worse than usual.  He is due for dialysis today after work.  He states pain is ongoing for over 2 years after he got grafts placed.  He is compliant with his anticoagulation and he is taking Tylenol for pain relief at home.  He is here because he has difficulty walking secondary to pain and swelling of left lower extremity.  He states he has followed up with his primary care physician for this and they state is due to excess fluid however he states he is at his dry weight.  He denies any fevers, chills, shortness of breath, chest pain.  He is ambulatory with a cane at baseline.  He denies any new falls or trauma.    I reviewed outpatient medical records, prior ED visits, PCP visits as appropriate.  Patient was seen on October 6, 2023 by his PCP for routine care and he has a history of ESRD status post DDKT with graft failure on hemodialysis Monday Wednesday Friday through left groin AV graft, not currently on immunosuppression, he has paroxysmal A-fib on Eliquis, HFpEF, HCV status posttreatment, lymphadenopathy and he following with oncology with inconclusive biopsy, history of DVT and adrenal insufficiency on hydrocortisone.  Per PCP note 3 days ago, patient has a known partially occlusive left lower extremity DVT and is currently on 5 mg twice daily dosing for DVT.               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    Renal transplant rejection    Kidney transplant status 12/08/2022    Kidney replaced by transplant    Personal history of immunosuppression therapy  07/04/2021    H/O immunosuppressive therapy    Personal history of other diseases of the nervous system and sense organs     History of cataract    Personal history of other diseases of urinary system 11/02/2021    Personal history of renal failure    Personal history of other endocrine, nutritional and metabolic disease 07/22/2013    History of hyperlipidemia    Type 2 diabetes mellitus without complications (CMS/HCC) 12/08/2022    Diabetes mellitus    Urinary tract infection, site not specified 05/02/2018    Acute UTI     Past Surgical History:   Procedure Laterality Date    CT AORTA AND BILATERAL ILIOFEMORAL RUNOFF ANGIOGRAM W AND/OR WO IV CONTRAST  11/7/2021    CT AORTA AND BILATERAL ILIOFEMORAL RUNOFF ANGIOGRAM W AND/OR WO IV CONTRAST 11/7/2021 [facility] CLINICAL LEGACY    CT AORTA AND BILATERAL ILIOFEMORAL RUNOFF ANGIOGRAM W AND/OR WO IV CONTRAST  7/6/2022    CT AORTA AND BILATERAL ILIOFEMORAL RUNOFF ANGIOGRAM W AND/OR WO IV CONTRAST 7/6/2022 [facility] EMERGENCY LEGACY    CT AORTA AND BILATERAL ILIOFEMORAL RUNOFF ANGIOGRAM W AND/OR WO IV CONTRAST  8/17/2022    CT AORTA AND BILATERAL ILIOFEMORAL RUNOFF ANGIOGRAM W AND/OR WO IV CONTRAST 8/17/2022 [facility] EMERGENCY LEGACY    CT AORTA AND BILATERAL ILIOFEMORAL RUNOFF ANGIOGRAM W AND/OR WO IV CONTRAST  9/30/2022    CT AORTA AND BILATERAL ILIOFEMORAL RUNOFF ANGIOGRAM W AND/OR WO IV CONTRAST 9/30/2022 [facility]CILL[facility] LEGAC[facility]    CT AORTA AND BILATERAL ILIOFEMORAL RUNOFF ANGIOGRAM W AND/OR WO IV CONTRAST  12/29/2022    CT AORTA AND BILATERAL ILIOFEMORAL RUNOFF ANGIOGRAM W AND/OR WO IV CONTRAST 12/29/2022 [facility] OFF[facility] LEGACY    IR VENOGRAM DIALYSIS  8/30/2022    IR VENOGRAM DIALYSIS 8/30/2022 [facility] CLINICAL LEGACY    MANDIBLE SURGERY  08/25/2015    Jaw Surgery    MR HEAD ANGIO WO IV CONTRAST  1/30/2014    MR HEAD ANGIO WO IV CONTRAST 1/30/2014 [facility]CILL[facility] LEGAC[facility]    MR NECK ANGIO WO IV CONTRAST  1/30/2014    MR NECK ANGIO WO IV CONTRAST 1/30/2014 [facility]    OTHER SURGICAL  HISTORY  11/03/2021    Arteriovenous fistula creation procedure    OTHER SURGICAL HISTORY  11/02/2021    Renal Transplant    OTHER SURGICAL HISTORY  11/03/2021    Venous Thrombectomy    OTHER SURGICAL HISTORY  06/19/2014    Hand Dislocation Interphalangeal, Open Treatment    US GUIDED BIOPSY LYMPH NODE SUPERFICIAL  1/11/2023    US GUIDED BIOPSY LYMPH NODE SUPERFICIAL 1/11/2023 [facility]GAC[facility]     Family History   Problem Relation Name Age of Onset    Kidney failure Other Sibling      Social History     Tobacco Use    Smoking status: Former     Types: Cigarettes    Smokeless tobacco: Never   Substance Use Topics    Alcohol use: Yes     Alcohol/week: 1.0 standard drink of alcohol     Types: 1 Shots of liquor per week    Drug use: Never       Physical Exam   ED Triage Vitals [10/09/23 0508]   Temp Heart Rate Resp BP   36.5 °C (97.7 °F) 65 16 147/83      SpO2 Temp Source Heart Rate Source Patient Position   98 % Temporal -- --      BP Location FiO2 (%)     Right arm --       Physical Exam  Vitals and nursing note reviewed.   Constitutional:       General: He is not in acute distress.     Appearance: Normal appearance.   HENT:      Head: Normocephalic and atraumatic.   Eyes:      Conjunctiva/sclera: Conjunctivae normal.   Cardiovascular:      Rate and Rhythm: Normal rate and regular rhythm.   Pulmonary:      Effort: Pulmonary effort is normal. No respiratory distress.   Abdominal:      General: Abdomen is flat.      Palpations: Abdomen is soft.   Musculoskeletal:         General: Normal range of motion.      Cervical back: Normal range of motion and neck supple.   Skin:     General: Skin is warm and dry.      Comments: 3+ pitting edema of left lower extremity with induration, edema up to above knee   Neurological:      General: No focal deficit present.      Mental Status: He is alert and oriented to person, place, and time. Mental status is at baseline.   Psychiatric:         Mood and Affect: Mood normal.          Behavior: Behavior normal.         ED Course & MDM   Diagnoses as of 10/09/23 2251   Pain of left lower extremity   Inability to walk       Medical Decision Making  Patient is a 58-year-old male with extensive medical history who presents with left lower extremity swelling and pain.  Vital signs are stable.  Physical exam is notable for asymmetric swelling of left lower extremity, 3+ pitting edema and induration.  There are no open wounds, purulent discharge, bleeding.  Differential include venous or lymphatic insufficiency, DVT, thrombophlebitis, compartment syndrome, muscle injury, Cellulitis, baker's cyst, may-thurner syndrome, trauma, lympedema.  Given worsening of pain and swelling, left lower extremity DVT ultrasound ordered and patient given oxycodone for pain control.  Patient signed out in stable condition pending ultrasound results.  Patient will require reevaluation for pain management, final disposition pending imaging.    Procedure  Procedures     Ann Andrew DO  Resident  10/09/23 0607       Ann Andrew DO  Resident  10/09/23 3116

## 2023-10-09 NOTE — Clinical Note
R. Fem tunneled HD cath insertion complete.  Pt received versed 1mg and fentanyl 50 mcg IVP, sedation time 30 minutes

## 2023-10-09 NOTE — Clinical Note
Angioplasty of L LEG fistula complete. 50 mcg of fentanyl given and 4,000 units of Heparin given for coagulation prophylaxis. Dressing C/D/I.

## 2023-10-09 NOTE — PROGRESS NOTES
10/09/23 1707   Discharge Planning   Living Arrangements Alone   Support Systems None   Type of Residence Private residence   Do you have animals or pets at home? No   Who is requesting discharge planning? Provider   Home or Post Acute Services None   Patient expects to be discharged to: home with outpatient PT.   Does the patient need discharge transport arranged? No   RoundTrip coordination needed? No   Has discharge transport been arranged? No     I spoke to patient at chairside per provider request for discharge planning. Patient lives at home alone. Patient uses a cane to assist with ambulation. Patient takes to bus to appointments and dialysis. Patient goes to dialysis Monday, Wednesday, Friday. Patient would like to do outpatient PT at discharge. Outpatient PT list given. MD updated.       Jeet Lopez RN

## 2023-10-10 PROCEDURE — 2500000001 HC RX 250 WO HCPCS SELF ADMINISTERED DRUGS (ALT 637 FOR MEDICARE OP): Performed by: NURSE PRACTITIONER

## 2023-10-10 PROCEDURE — 2500000004 HC RX 250 GENERAL PHARMACY W/ HCPCS (ALT 636 FOR OP/ED): Performed by: NURSE PRACTITIONER

## 2023-10-10 PROCEDURE — 5A1D70Z PERFORMANCE OF URINARY FILTRATION, INTERMITTENT, LESS THAN 6 HOURS PER DAY: ICD-10-PCS | Performed by: STUDENT IN AN ORGANIZED HEALTH CARE EDUCATION/TRAINING PROGRAM

## 2023-10-10 PROCEDURE — 74174 CTA ABD&PLVS W/CONTRAST: CPT | Performed by: RADIOLOGY

## 2023-10-10 PROCEDURE — 1100000001 HC PRIVATE ROOM DAILY

## 2023-10-10 PROCEDURE — 99232 SBSQ HOSP IP/OBS MODERATE 35: CPT | Performed by: STUDENT IN AN ORGANIZED HEALTH CARE EDUCATION/TRAINING PROGRAM

## 2023-10-10 PROCEDURE — 2500000002 HC RX 250 W HCPCS SELF ADMINISTERED DRUGS (ALT 637 FOR MEDICARE OP, ALT 636 FOR OP/ED): Performed by: NURSE PRACTITIONER

## 2023-10-10 PROCEDURE — 2500000001 HC RX 250 WO HCPCS SELF ADMINISTERED DRUGS (ALT 637 FOR MEDICARE OP)

## 2023-10-10 PROCEDURE — 2500000001 HC RX 250 WO HCPCS SELF ADMINISTERED DRUGS (ALT 637 FOR MEDICARE OP): Performed by: FAMILY MEDICINE

## 2023-10-10 PROCEDURE — 99222 1ST HOSP IP/OBS MODERATE 55: CPT | Performed by: INTERNAL MEDICINE

## 2023-10-10 RX ORDER — MULTIVIT-MIN/IRON FUM/FOLIC AC 7.5 MG-4
1 TABLET ORAL DAILY
Status: DISCONTINUED | OUTPATIENT
Start: 2023-10-10 | End: 2023-10-30

## 2023-10-10 RX ORDER — L. ACIDOPHILUS/L.BULGARICUS 1MM CELL
1 TABLET ORAL DAILY
Status: DISCONTINUED | OUTPATIENT
Start: 2023-10-10 | End: 2023-11-01

## 2023-10-10 RX ORDER — CYCLOBENZAPRINE HCL 5 MG
5 TABLET ORAL 3 TIMES DAILY PRN
Status: DISCONTINUED | OUTPATIENT
Start: 2023-10-10 | End: 2023-11-01

## 2023-10-10 RX ORDER — DIPHENHYDRAMINE HCL 25 MG
25 CAPSULE ORAL EVERY 6 HOURS PRN
Status: DISCONTINUED | OUTPATIENT
Start: 2023-10-10 | End: 2023-10-31

## 2023-10-10 RX ORDER — PARICALCITOL 2 UG/ML
8 INJECTION, SOLUTION INTRAVENOUS 3 TIMES WEEKLY
Status: DISCONTINUED | OUTPATIENT
Start: 2023-10-11 | End: 2023-10-13

## 2023-10-10 RX ADMIN — Medication 1 TABLET: at 08:43

## 2023-10-10 RX ADMIN — HYDROCORTISONE 5 MG: 5 TABLET ORAL at 20:41

## 2023-10-10 RX ADMIN — CYANOCOBALAMIN TAB 1000 MCG 1000 MCG: 1000 TAB at 08:43

## 2023-10-10 RX ADMIN — METOPROLOL TARTRATE 50 MG: 50 TABLET, FILM COATED ORAL at 20:39

## 2023-10-10 RX ADMIN — OXYCODONE HYDROCHLORIDE 10 MG: 5 TABLET ORAL at 14:56

## 2023-10-10 RX ADMIN — CYCLOBENZAPRINE 5 MG: 10 TABLET, FILM COATED ORAL at 20:38

## 2023-10-10 RX ADMIN — PANTOPRAZOLE SODIUM 40 MG: 40 TABLET, DELAYED RELEASE ORAL at 06:16

## 2023-10-10 RX ADMIN — APIXABAN 5 MG: 5 TABLET, FILM COATED ORAL at 20:38

## 2023-10-10 RX ADMIN — METOPROLOL TARTRATE 50 MG: 50 TABLET, FILM COATED ORAL at 01:23

## 2023-10-10 RX ADMIN — APIXABAN 5 MG: 5 TABLET, FILM COATED ORAL at 01:23

## 2023-10-10 RX ADMIN — AMLODIPINE BESYLATE 10 MG: 10 TABLET ORAL at 08:43

## 2023-10-10 RX ADMIN — SEVELAMER CARBONATE 1600 MG: 800 TABLET, FILM COATED ORAL at 08:43

## 2023-10-10 RX ADMIN — SEVELAMER CARBONATE 1600 MG: 800 TABLET, FILM COATED ORAL at 12:28

## 2023-10-10 RX ADMIN — MELATONIN 3 MG: 3 TAB ORAL at 20:40

## 2023-10-10 RX ADMIN — ASPIRIN 81 MG: 81 TABLET, COATED ORAL at 08:43

## 2023-10-10 RX ADMIN — DIPHENHYDRAMINE HYDROCHLORIDE 25 MG: 25 CAPSULE ORAL at 20:39

## 2023-10-10 RX ADMIN — METOPROLOL TARTRATE 50 MG: 50 TABLET, FILM COATED ORAL at 08:43

## 2023-10-10 RX ADMIN — OXYCODONE HYDROCHLORIDE 10 MG: 5 TABLET ORAL at 02:57

## 2023-10-10 RX ADMIN — APIXABAN 5 MG: 5 TABLET, FILM COATED ORAL at 08:43

## 2023-10-10 RX ADMIN — OXYCODONE HYDROCHLORIDE 10 MG: 5 TABLET ORAL at 20:38

## 2023-10-10 RX ADMIN — HYDROCORTISONE 15 MG: 5 TABLET ORAL at 08:43

## 2023-10-10 RX ADMIN — ACETAMINOPHEN 650 MG: 325 TABLET ORAL at 02:57

## 2023-10-10 RX ADMIN — OXYCODONE HYDROCHLORIDE 10 MG: 5 TABLET ORAL at 08:43

## 2023-10-10 SDOH — SOCIAL STABILITY: SOCIAL INSECURITY: HAVE YOU HAD THOUGHTS OF HARMING ANYONE ELSE?: NO

## 2023-10-10 SDOH — SOCIAL STABILITY: SOCIAL INSECURITY: DO YOU FEEL UNSAFE GOING BACK TO THE PLACE WHERE YOU ARE LIVING?: NO

## 2023-10-10 SDOH — SOCIAL STABILITY: SOCIAL INSECURITY: ARE YOU OR HAVE YOU BEEN THREATENED OR ABUSED PHYSICALLY, EMOTIONALLY, OR SEXUALLY BY ANYONE?: NO

## 2023-10-10 SDOH — SOCIAL STABILITY: SOCIAL INSECURITY: WERE YOU ABLE TO COMPLETE ALL THE BEHAVIORAL HEALTH SCREENINGS?: YES

## 2023-10-10 SDOH — SOCIAL STABILITY: SOCIAL INSECURITY: HAS ANYONE EVER THREATENED TO HURT YOUR FAMILY OR YOUR PETS?: NO

## 2023-10-10 SDOH — SOCIAL STABILITY: SOCIAL INSECURITY: ARE THERE ANY APPARENT SIGNS OF INJURIES/BEHAVIORS THAT COULD BE RELATED TO ABUSE/NEGLECT?: NO

## 2023-10-10 SDOH — SOCIAL STABILITY: SOCIAL INSECURITY: ABUSE: ADULT

## 2023-10-10 SDOH — SOCIAL STABILITY: SOCIAL INSECURITY: DO YOU FEEL ANYONE HAS EXPLOITED OR TAKEN ADVANTAGE OF YOU FINANCIALLY OR OF YOUR PERSONAL PROPERTY?: NO

## 2023-10-10 SDOH — SOCIAL STABILITY: SOCIAL INSECURITY: DOES ANYONE TRY TO KEEP YOU FROM HAVING/CONTACTING OTHER FRIENDS OR DOING THINGS OUTSIDE YOUR HOME?: NO

## 2023-10-10 ASSESSMENT — PATIENT HEALTH QUESTIONNAIRE - PHQ9
1. LITTLE INTEREST OR PLEASURE IN DOING THINGS: NOT AT ALL
SUM OF ALL RESPONSES TO PHQ9 QUESTIONS 1 & 2: 0
2. FEELING DOWN, DEPRESSED OR HOPELESS: NOT AT ALL

## 2023-10-10 ASSESSMENT — LIFESTYLE VARIABLES
HOW OFTEN DO YOU HAVE 6 OR MORE DRINKS ON ONE OCCASION: NEVER
HOW MANY STANDARD DRINKS CONTAINING ALCOHOL DO YOU HAVE ON A TYPICAL DAY: 1 OR 2
SKIP TO QUESTIONS 9-10: 1
AUDIT-C TOTAL SCORE: 1
AUDIT-C TOTAL SCORE: 1
HOW OFTEN DO YOU HAVE A DRINK CONTAINING ALCOHOL: MONTHLY OR LESS

## 2023-10-10 ASSESSMENT — COGNITIVE AND FUNCTIONAL STATUS - GENERAL
MOBILITY SCORE: 24
MOBILITY SCORE: 24
DAILY ACTIVITIY SCORE: 24
DAILY ACTIVITIY SCORE: 24

## 2023-10-10 ASSESSMENT — PAIN - FUNCTIONAL ASSESSMENT
PAIN_FUNCTIONAL_ASSESSMENT: 0-10

## 2023-10-10 ASSESSMENT — ACTIVITIES OF DAILY LIVING (ADL)
GROOMING: INDEPENDENT
PATIENT'S MEMORY ADEQUATE TO SAFELY COMPLETE DAILY ACTIVITIES?: YES
FEEDING YOURSELF: INDEPENDENT
ASSISTIVE_DEVICE: DENTURES LOWER
HEARING - RIGHT EAR: FUNCTIONAL
JUDGMENT_ADEQUATE_SAFELY_COMPLETE_DAILY_ACTIVITIES: YES
HEARING - LEFT EAR: FUNCTIONAL
ADEQUATE_TO_COMPLETE_ADL: YES
TOILETING: INDEPENDENT
BATHING: INDEPENDENT
WALKS IN HOME: INDEPENDENT
DRESSING YOURSELF: INDEPENDENT

## 2023-10-10 ASSESSMENT — PAIN SCALES - GENERAL
PAINLEVEL_OUTOF10: 9
PAINLEVEL_OUTOF10: 8
PAINLEVEL_OUTOF10: 7
PAINLEVEL_OUTOF10: 8

## 2023-10-10 NOTE — H&P (VIEW-ONLY)
Vascular Surgery Consult Note    HPI:  Nghia Hall is 58 y.o. male with history of ESRD s/p DDKT (2006, subsequent graft failure 2021) on HD MWF via L femoral AVG, pAfib (on Eliquis), HFpEF, HCV, gout, lymphadenopthy, adrenal insufficiency (on steroids) who presented to ED with disabling LLE pain and swelling resulting in difficulty with ambulation. Denied any weakness. Intermittent episodes of numbness ongoing for several weeks. Reports swelling present since HD access creation, however, worsening significantly in last two weeks. Reports prior fistulogram at [facility] and interventions for outflow stenosis, most recently earlier this year. No fevers, chills. Of note, patient reports undergoing PET scan recently for liguinal and pelvic lymph nodes s/p biopsy (non-conclusive)     PMH/PSH: ESRD s/p DDKT (2006) c/b graft failure (2021) on HD via L femoral AVG, DM2, HLD, HFpEF, HCV s/p treatment, lymphadenopathy, adrenal insufficiency    Relevant Home Meds: Aspirin and Eliquis    Allergies:   Allergies   Allergen Reactions    Ibuprofen Anaphylaxis    Oxycodone Itching    Vancomycin Itching, Unknown and Rash     face red and hot     SH/FH: Past smoker, occasional EtOH use    Objective:  Vitals:  Vitals:    10/10/23 1434   BP: 110/58   Pulse: 60   Resp: 18   Temp: 36.5 °C (97.7 °F)   SpO2: 95%      Exam:  Constitutional: No acute distress, resting comfortably  Neuro:  AOx3, grossly intact  ENMT: moist mucous membranes  Head/neck: atraumatic  CV: no tachycardia  Pulm: non-labored on room air  GI: soft, non-tender, non-distended  Skin: warm and dry  Musculoskeletal: moving all extremities  Extremities: severe LLE edema, sensory and motor intact, triphasic L DP doppler signal    Labs:  Lab Results   Component Value Date    WBC 5.7 10/09/2023    HGB 12.3 (L) 10/09/2023    HCT 36.7 (L) 10/09/2023    MCV 87 10/09/2023     10/09/2023     Lab Results   Component Value Date    GLUCOSE 63 (L) 10/09/2023    CALCIUM  8.8 10/09/2023     10/09/2023    K 4.4 10/09/2023    CO2 23 10/09/2023    CL 99 10/09/2023    BUN 43 (H) 10/09/2023    CREATININE 8.23 (H) 10/09/2023     Lab Results   Component Value Date    INR 1.2 (H) 09/22/2023    PROTIME 14.0 (H) 09/22/2023     Assessment & Plan:  Nghia Hall is 58 y.o. male with history of ESRD s/p DDKT (2006, subsequent graft failure 2021) on HD MWF via L femoral AVG, pAfib (on Eliquis), HFpEF, HCV, gout, lymphadenopthy, adrenal insufficiency (on steroids) who presented to ED with disabling LLE pain and swelling resulting in difficulty with ambulation.    Vascular Surgery consulted with worsening swelling in presence of L femoral AVG.     Plan:  - obtain CTA A/P with arterial and venous phase (ordered)  - obtain left femoral AVG duplex (ordered)  - obtain an echocardiogram to rule-out high-output HF  - elevate left leg while in bed  - ACE compression to left leg (patient refusing at this time d/t pain)     D/w attending, Dr. Henry Oliver MD  Vascular Surgery PGY-4

## 2023-10-10 NOTE — PROGRESS NOTES
I met with Nghia at the bedside regarding discharge planning and home going needs. Patient lives at home alone and is usually independent with ADL's patient does use a cane in the home. Patient is not medically  cleared for discharge at this time. I will continue to follow with a safe discharge plan.

## 2023-10-10 NOTE — ED NOTES
Pharmacy Medication History Review    Nghia Hall is a 58 y.o. male admitted for Inability to walk. Pharmacy reviewed the patient's [id number] medications and allergies for accuracy.    The list below reflectives the updated PTA list. Please review each medication in order reconciliation for additional clarification and justification.  Medications Prior to Admission   Medication Sig Dispense Refill Last Dose    acetaminophen (Tylenol) 325 mg tablet TAKE 2 TABLETS BY MOUTH EVERY 4 HOURS AS NEEDED FOR PAIN (MILD 1-3) 120 tablet 0 Past Month    acidophilus-sporogenes 35 million- 25 million cell tablet TAKE 2 TABLETS BY MOUTH ONCE DAILY 60 tablet 2 10/9/2023    allopurinol (Zyloprim) 100 mg tablet Take 1 tablet (100 mg) by mouth once a day on Monday, Wednesday, and Friday. AFTER DIALYSIS. 36 tablet 3 Past Week    amLODIPine (Norvasc) 10 mg tablet TAKE 1 TABLET BY MOUTH ONCE DAILY 30 tablet 0 10/9/2023    apixaban (Eliquis) 5 mg tablet Take 1 tablet (5 mg) by mouth 2 times a day. (Patient not taking: Reported on 10/3/2023) 180 tablet 3 10/9/2023    aspirin 81 mg EC tablet TAKE 1 TABLET BY MOUTH ONCE DAILY 30 tablet 0 10/9/2023    B complex-vitamin C-folic acid (Siri-Salo Rx) 1- mg-mg-mcg tablet Take 1 tablet by mouth once daily. 90 tablet 3     blood-glucose sensor device 1 SENSOR EVERY 14 DAYS TO TEST BLOOD SUGAR 2 each 11     cyanocobalamin (Vitamin B-12) 1,000 mcg tablet TAKE 1 TABLET BY MOUTH ONCE DAILY 90 tablet 2 10/9/2023    Dialyvite 100-1 mg tablet        DULoxetine (Cymbalta) 30 mg DR capsule TAKE 1 CAPSULE BY MOUTH ONCE DAILY 90 capsule 1 10/9/2023    epinastine (Elestat) 0.05 % ophthalmic solution 1 drop in both eyes 2 times a day as needed for itching/allergies   Past Month    ergocalciferol (Vitamin D-2) 1.25 MG (89336 UT) capsule Take 1 capsule every month   Past Month    fluticasone (Flonase) 50 mcg/actuation nasal spray Administer 1 spray into each nostril once daily as needed for  allergies or rhinitis.       gabapentin (Neurontin) 300 mg capsule Take 1 capsule (300 mg) by mouth once a day on Monday, Wednesday, and Friday. AFTER DIALYSIS. 39 capsule 1     hydrocortisone (Cortef) 5 mg tablet Take 1 tablet (5 mg) by mouth 2 times a day. Take 15 mg in AM , 5 mg in afternoon. 180 tablet 3 10/9/2023    loperamide (Imodium) 2 mg capsule 2 capsules (4 mg) 2 times a day as needed (> 3 loose bowel movements a day). Morning and Afternoon   More than a month    melatonin 3 mg tablet TAKE 1 TABLET BY MOUTH AT BEDTIME AS NEEDED FOR INSOMNIA 30 tablet 0 Past Week    metoprolol tartrate (Lopressor) 50 mg tablet TAKE 1 TABLET BY MOUTH TWO TIMES A  tablet 1 10/9/2023    multivitamin tablet Take 1 tablet by mouth once daily. Men's 50+ formula   10/9/2023    omeprazole (PriLOSEC) 40 mg DR capsule TAKE 1 CAPSULE BY MOUTH ONCE A DAY 30 capsule 0     sevelamer carbonate (Renvela) 800 mg tablet TAKE 2 TABLETS BY MOUTH THREE TIMES A DAY WITH MEALS 180 tablet 0 Past Week    Tab-A-Salo 400 mcg tablet            The list below reflectives the updated allergy list. Please review each documented allergy for additional clarification and justification.  Allergies  Reviewed by Jay Kingsley RN on 10/10/2023        Severity Reactions Comments    Ibuprofen High Anaphylaxis     Oxycodone Medium Itching     Vancomycin Low Itching, Unknown, Rash face red and hot        Sources: Care Everywhere ([facility]-continuity of care 09/05/2023, Hospital Encounter [facility] 06/18/2023), Medication dispense history, [place] (oxycodone 10 mg-5 day supply on 06/02/2023), and patient interview (good historian)    Below are additional concerns with the patient's PTA list.  -Sevelamer is not always taken (patient skips if eats smaller meal or does NOT eat at all)  -Melatonin is not effective for patient at 3 mg daily PRN. Requested if dose could be increased at discharge  -Patient could not remember exact names of each  supplement/multivitamin they are taking. All vitamins reported on [facility] continuity of care document or Hospital Encounter summary, [facility].  -Some medications are being taken differently by patient: allopurinol 300 m tab. Twice weekly (Tuesday and Thursday), gabapentin 300 m capsule every PM, and omeprazole 40 mg DR: 1 capsule BID.   -Patient reported still taking daily apixiban and low-dose aspirin therapies  -Hydrocortisone dose just changed per patient last week. Now taking, hydrocortisone 5 mg tablet: 3 tablets in the AM and 1 tablet in the afternoon.      China Neal PharmD, [facility]  PGY1 Resident  Medication reconciliation complete  Please reach out via [employer] Secure Chat for questions,   or if no response call "[employer] or [employer].  [facility]

## 2023-10-10 NOTE — H&P
History Of Present Illness  Nghia Hall is a 58 y.o. male with a past medical history of ESRD s/p DDKT (2006, subsequent graft failure 07/2021, on HD MWF at [facility] through LT groin AV graft on [address]; not currently on immunosuppression), pAfib (on Apixaban), HFpEF, HCV (s/p treatment), gout, lymphadenopathy (following with oncology, inconclusive biopsy in 01/2023), DVT, and Adrenal Insufficiency (on hydrocortisone) who presented to the ED with difficulty ambulating due to secondary LLE pain and swelling. His left leg (which is also used for his HD access) is severely edematous and exquisitely tender to palpation. He reports being compliant with his Eliquis. The edema is non dependent. He is not interested in placement at this time due to being employed.      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    Renal transplant rejection    Kidney transplant status 12/08/2022    Kidney replaced by transplant    Personal history of immunosuppression therapy 07/04/2021    H/O immunosuppressive therapy    Personal history of other diseases of the nervous system and sense organs     History of cataract    Personal history of other diseases of urinary system 11/02/2021    Personal history of renal failure    Personal history of other endocrine, nutritional and metabolic disease 07/22/2013    History of hyperlipidemia    Type 2 diabetes mellitus without complications (CMS/[facility]) 12/08/2022    Diabetes mellitus    Urinary tract infection, site not specified 05/02/2018    Acute UTI       Surgical History  Past Surgical History:   Procedure Laterality Date    CT AORTA AND BILATERAL ILIOFEMORAL RUNOFF ANGIOGRAM W AND/OR WO IV CONTRAST  11/7/2021    CT AORTA AND BILATERAL ILIOFEMORAL RUNOFF ANGIOGRAM W AND/OR WO IV CONTRAST 11/7/2021 [facility] CLINICAL LEGACY    CT AORTA AND BILATERAL  ILIOFEMORAL RUNOFF ANGIOGRAM W AND/OR WO IV CONTRAST  7/6/2022    CT AORTA AND BILATERAL ILIOFEMORAL RUNOFF ANGIOGRAM W AND/OR WO IV CONTRAST 7/6/2022 [facility]    CT AORTA AND BILATERAL ILIOFEMORAL RUNOFF ANGIOGRAM W AND/OR WO IV CONTRAST  8/17/2022    CT AORTA AND BILATERAL ILIOFEMORAL RUNOFF ANGIOGRAM W AND/OR WO IV CONTRAST 8/17/2022 [facility]    CT AORTA AND BILATERAL ILIOFEMORAL RUNOFF ANGIOGRAM W AND/OR WO IV CONTRAST  9/30/2022    CT AORTA AND BILATERAL ILIOFEMORAL RUNOFF ANGIOGRAM W AND/OR WO IV CONTRAST 9/30/2022 [facility]    CT AORTA AND BILATERAL ILIOFEMORAL RUNOFF ANGIOGRAM W AND/OR WO IV CONTRAST  12/29/2022    CT AORTA AND BILATERAL ILIOFEMORAL RUNOFF ANGIOGRAM W AND/OR WO IV CONTRAST 12/29/2022 [facility] OFFICE LEGACY    IR VENOGRAM DIALYSIS  8/30/2022    IR VENOGRAM DIALYSIS 8/30/2022 [facility] CLINICAL LEGACY    MANDIBLE SURGERY  08/25/2015    Jaw Surgery    MR HEAD ANGIO WO IV CONTRAST  1/30/2014    MR HEAD ANGIO WO IV CONTRAST 1/30/2014 [facility]    MR NECK ANGIO WO IV CONTRAST  1/30/2014    MR NECK ANGIO WO IV CONTRAST 1/30/2014 [facility]    OTHER SURGICAL HISTORY  11/03/2021    Arteriovenous fistula creation procedure    OTHER SURGICAL HISTORY  11/02/2021    Renal Transplant    OTHER SURGICAL HISTORY  11/03/2021    Venous Thrombectomy    OTHER SURGICAL HISTORY  06/19/2014    Hand Dislocation Interphalangeal, Open Treatment    US GUIDED BIOPSY LYMPH NODE SUPERFICIAL  1/11/2023    US GUIDED BIOPSY LYMPH NODE SUPERFICIAL 1/11/2023 [facility]GAC[facility]        Social History  He reports that he has quit smoking. His smoking use included cigarettes. He has never used smokeless tobacco. He reports current alcohol use of about 1.0 standard drink of alcohol per week. He reports that he does not use drugs.    Family History  Family History   Problem Relation Name Age of Onset    Kidney failure Other Sibling         Allergies  Ibuprofen, Oxycodone, and  "Vancomycin    Review of Systems   Constitutional:  Negative for chills and fever.   Respiratory:  Negative for shortness of breath.    Cardiovascular:  Positive for leg swelling. Negative for chest pain and palpitations.   Gastrointestinal:  Negative for abdominal pain, constipation, diarrhea, nausea and vomiting.   Genitourinary:  Negative for dysuria, flank pain, frequency, hematuria and urgency.   Musculoskeletal:  Positive for arthralgias and myalgias.   All other systems reviewed and are negative.       Physical Exam  Vitals reviewed.   Constitutional:       Appearance: He is obese.      Comments: Older than stated age   HENT:      Head: Normocephalic and atraumatic.   Neck:      Comments: Left sided PIV  Cardiovascular:      Rate and Rhythm: Normal rate and regular rhythm.      Heart sounds: Normal heart sounds.   Pulmonary:      Effort: Pulmonary effort is normal.      Breath sounds: Normal air entry.   Abdominal:      General: Bowel sounds are normal.      Palpations: Abdomen is soft.      Tenderness: There is no abdominal tenderness.   Musculoskeletal:         General: Tenderness present. No deformity.      Left lower leg: Edema present.   Skin:     General: Skin is warm and dry.   Neurological:      General: No focal deficit present.      Mental Status: He is oriented to person, place, and time.   Psychiatric:         Mood and Affect: Mood normal.         Behavior: Behavior normal.          Last Recorded Vitals  Blood pressure (!) 154/98, pulse 86, temperature 36.7 °C (98.1 °F), temperature source Oral, resp. rate 18, height 1.854 m (6' 1\"), weight 143 kg (315 lb), SpO2 99 %.    Relevant Results  Lab Results   Component Value Date    WBC 5.7 10/09/2023    HGB 12.3 (L) 10/09/2023    HCT 36.7 (L) 10/09/2023    MCV 87 10/09/2023     10/09/2023     Lab Results   Component Value Date    GLUCOSE 63 (L) 10/09/2023    CALCIUM 8.8 10/09/2023     10/09/2023    K 4.4 10/09/2023    CO2 23 10/09/2023    CL " 99 10/09/2023    BUN 43 (H) 10/09/2023    CREATININE 8.23 (H) 10/09/2023     Lab Results   Component Value Date    HGBA1C 4.4 08/15/2023            Assessment/Plan   Principal Problem:    Inability to walk      #LLE edema  #Ambulatory dysfunction  -Patient does not want placement  -PT/OT consult  -US -ve for DVT, limited exam  -Tylenol, Oxycodone for pain  -Consider Vascular input, defer to attending    #ESRD on HD  -MWF  -Renal dosing  -Renal diet  -No electrolyte abnormalities  -Consult nephrology for HD management if patient still admitted Wednesday    #HFpEF  -Does not appear fluid overloaded (unilateral edema, no respiratory complaints)  -Daily weights  -Continue home medications    #History of DVT  -US -ve for DVT (limited)  -Continue Eliquis    #Paroxysmal afib  -Continue home medications  -No acute issues    #Adrenal insufficiency  -Continue hydrocortisone    #Lymphadenopathy  -Could be underlying cause of edema  -Patient followed by oncology             Ben Mcbride, APRN-CNP

## 2023-10-10 NOTE — CONSULTS
"NEPHROLOGY NEW CONSULT NOTE   Patient ID: Nghia Hall is a 58 y.o. male.     Evaluation of patient on dialysis at [facility] [place]  [address] [address] on 10/9/2023  Reason for consult: ESRD-HD    Chief Complaint   Patient presents with    Leg Swelling        HPI  Nghia Hall is a 58 y.o. male   - With past medical Hx as below   - Admitted for left LE pain and swelling and difficulty walking  - Nephrology was consulted for ESRD management   HD is on MWF [facility]/Dr Mccormick. Last HD 10/6 with post weight 101.7. . Missed HD yesterday, Left femoral AVG. Labs 10/9 K 4.4 Hgb 12.3 Mircera 150mcg Q 2 weeks last dose given on 9/27.   From H&P:   Nghia Hall is a 58 y.o. male with a past medical history of ESRD s/p DDKT (2006, subsequent graft failure 07/2021, on HD MWF at [facility] through LT groin AV graft on [address]; not currently on immunosuppression), pAfib (on Apixaban), HFpEF, HCV (s/p treatment), gout, lymphadenopathy (following with oncology, inconclusive biopsy in 01/2023), DVT, and Adrenal Insufficiency (on hydrocortisone) who presented to the ED with difficulty ambulating due to secondary LLE pain and swelling. His left leg (which is also used for his HD access) is severely edematous and exquisitely tender to palpation. He reports being compliant with his Eliquis. The edema is non dependent. He is not interested in placement at this time due to being employed.      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    Renal transplant rejection    Kidney transplant status 12/08/2022    Kidney replaced by transplant    Personal history of immunosuppression therapy 07/04/2021    H/O immunosuppressive " therapy    Personal history of other diseases of the nervous system and sense organs     History of cataract    Personal history of other diseases of urinary system 11/02/2021    Personal history of renal failure    Personal history of other endocrine, nutritional and metabolic disease 07/22/2013    History of hyperlipidemia    Type 2 diabetes mellitus without complications (CMS/[facility]) 12/08/2022    Diabetes mellitus    Urinary tract infection, site not specified 05/02/2018    Acute UTI      Past Surgical History:   Procedure Laterality Date    CT AORTA AND BILATERAL ILIOFEMORAL RUNOFF ANGIOGRAM W AND/OR WO IV CONTRAST  11/7/2021    CT AORTA AND BILATERAL ILIOFEMORAL RUNOFF ANGIOGRAM W AND/OR WO IV CONTRAST 11/7/2021 [facility] CLINICAL LEGACY    CT AORTA AND BILATERAL ILIOFEMORAL RUNOFF ANGIOGRAM W AND/OR WO IV CONTRAST  7/6/2022    CT AORTA AND BILATERAL ILIOFEMORAL RUNOFF ANGIOGRAM W AND/OR WO IV CONTRAST 7/6/2022 [facility] EMERGENCY LEGACY    CT AORTA AND BILATERAL ILIOFEMORAL RUNOFF ANGIOGRAM W AND/OR WO IV CONTRAST  8/17/2022    CT AORTA AND BILATERAL ILIOFEMORAL RUNOFF ANGIOGRAM W AND/OR WO IV CONTRAST 8/17/2022 [facility] EMERGENCY LEGACY    CT AORTA AND BILATERAL ILIOFEMORAL RUNOFF ANGIOGRAM W AND/OR WO IV CONTRAST  9/30/2022    CT AORTA AND BILATERAL ILIOFEMORAL RUNOFF ANGIOGRAM W AND/OR WO IV CONTRAST 9/30/2022 [facility] ANCILLARY LEGACY    CT AORTA AND BILATERAL ILIOFEMORAL RUNOFF ANGIOGRAM W AND/OR WO IV CONTRAST  12/29/2022    CT AORTA AND BILATERAL ILIOFEMORAL RUNOFF ANGIOGRAM W AND/OR WO IV CONTRAST 12/29/2022 DOCTOR OFFICE LEGACY    IR VENOGRAM DIALYSIS  8/30/2022    IR VENOGRAM DIALYSIS 8/30/2022 [facility] CLINICAL LEGACY    MANDIBLE SURGERY  08/25/2015    Jaw Surgery    MR HEAD ANGIO WO IV CONTRAST  1/30/2014    MR HEAD ANGIO WO IV CONTRAST 1/30/2014 [facility] ANCILLARY LEGACY    MR NECK ANGIO WO IV CONTRAST  1/30/2014    MR NECK ANGIO WO IV CONTRAST 1/30/2014 [facility] ANCILLARY LEGACY    OTHER SURGICAL HISTORY  11/03/2021    Arteriovenous  fistula creation procedure    OTHER SURGICAL HISTORY  11/02/2021    Renal Transplant    OTHER SURGICAL HISTORY  11/03/2021    Venous Thrombectomy    OTHER SURGICAL HISTORY  06/19/2014    Hand Dislocation Interphalangeal, Open Treatment    US GUIDED BIOPSY LYMPH NODE SUPERFICIAL  1/11/2023    US GUIDED BIOPSY LYMPH NODE SUPERFICIAL 1/11/2023 DOCTOR OFFICE LEGACY      Family History   Problem Relation Name Age of Onset    Kidney failure Other Sibling      Social History     Socioeconomic History    Marital status: Single     Spouse name: Not on file    Number of children: Not on file    Years of education: Not on file    Highest education level: Not on file   Occupational History    Not on file   Tobacco Use    Smoking status: Former     Types: Cigarettes    Smokeless tobacco: Never   Substance and Sexual Activity    Alcohol use: Yes     Alcohol/week: 1.0 standard drink of alcohol     Types: 1 Shots of liquor per week    Drug use: Never    Sexual activity: Not on file   Other Topics Concern    Not on file   Social History Narrative    Not on file     Social Determinants of Health     Financial Resource Strain: Not on file   Food Insecurity: Not on file   Transportation Needs: Not on file   Physical Activity: Not on file   Stress: Not on file   Social Connections: Not on file   Intimate Partner Violence: Not on file   Housing Stability: Not on file     Allergies   Allergen Reactions    Ibuprofen Anaphylaxis    Oxycodone Itching    Vancomycin Itching, Unknown and Rash     face red and hot        Prior to Admission Medications   Prescriptions Last Dose Informant Patient Reported? Taking?   B complex-vitamin C-folic acid (S[facility]-Aslo Rx) 1- mg-mg-mcg tablet  Other No No   Sig: Take 1 tablet by mouth once daily.   DULoxetine (Cymbalta) 30 mg DR capsule 10/9/2023 Self, Other No No   Sig: TAKE 1 CAPSULE BY MOUTH ONCE DAILY   Dialyvite 100-1 mg tablet  Other Yes No   Tab-A-Salo 400 mcg tablet  Other Yes No   acetaminophen  (Tylenol) 325 mg tablet Past Month Self, Other No No   Sig: TAKE 2 TABLETS BY MOUTH EVERY 4 HOURS AS NEEDED FOR PAIN (MILD 1-3)   acidophilus-sporogenes 35 million- 25 million cell tablet 10/9/2023 Self, Other No No   Sig: TAKE 2 TABLETS BY MOUTH ONCE DAILY   allopurinol (Zyloprim) 100 mg tablet Past Week Self, Other No No   Sig: Take 1 tablet (100 mg) by mouth once a day on Monday, Wednesday, and Friday. AFTER DIALYSIS.   amLODIPine (Norvasc) 10 mg tablet 10/9/2023 Self, Other No No   Sig: TAKE 1 TABLET BY MOUTH ONCE DAILY   apixaban (Eliquis) 5 mg tablet  Self, Other No No   Sig: Take 1 tablet (5 mg) by mouth 2 times a day.   aspirin 81 mg EC tablet 10/9/2023 Self, Other No No   Sig: TAKE 1 TABLET BY MOUTH ONCE DAILY   blood-glucose sensor device  Self, Other No No   Si SENSOR EVERY 14 DAYS TO TEST BLOOD SUGAR   cyanocobalamin (Vitamin B-12) 1,000 mcg tablet 10/9/2023 Self, Other No No   Sig: TAKE 1 TABLET BY MOUTH ONCE DAILY   epinastine (Elestat) 0.05 % ophthalmic solution Past Month Self, Other Yes No   Si drop in both eyes 2 times a day as needed for itching/allergies   ergocalciferol (Vitamin D-2) 1.25 MG (07261 UT) capsule Past Month Self, Other Yes No   Sig: Take 1 capsule every month   fluticasone (Flonase) 50 mcg/actuation nasal spray  Other Yes No   Sig: Administer 1 spray into each nostril once daily as needed for allergies or rhinitis.   gabapentin (Neurontin) 300 mg capsule  Self, Other No No   Sig: Take 1 capsule (300 mg) by mouth once a day on Monday, Wednesday, and Friday. AFTER DIALYSIS.   hydrocortisone (Cortef) 5 mg tablet 10/9/2023 Self, Other No No   Sig: Take 1 tablet (5 mg) by mouth 2 times a day. Take 15 mg in AM , 5 mg in afternoon.   loperamide (Imodium) 2 mg capsule More than a month Other Yes No   Si capsules (4 mg) 2 times a day as needed (> 3 loose bowel movements a day). Morning and Afternoon   melatonin 3 mg tablet Past Week Self, Other No No   Sig: TAKE 1 TABLET BY  MOUTH AT BEDTIME AS NEEDED FOR INSOMNIA   metoprolol tartrate (Lopressor) 50 mg tablet 10/9/2023 Self, Other No No   Sig: TAKE 1 TABLET BY MOUTH TWO TIMES A DAY   multivitamin tablet 10/9/2023 Self, Other Yes No   Sig: Take 1 tablet by mouth once daily. Men's 50+ formula   omeprazole (PriLOSEC) 40 mg DR capsule  Self, Other No No   Sig: TAKE 1 CAPSULE BY MOUTH ONCE A DAY   sevelamer carbonate (Renvela) 800 mg tablet Past Week Self, Other No No   Sig: TAKE 2 TABLETS BY MOUTH THREE TIMES A DAY WITH MEALS      Facility-Administered Medications: None          Scheduled medications  amLODIPine, 10 mg, oral, Daily  apixaban, 5 mg, oral, BID  aspirin, 81 mg, oral, Daily  B complex-vitamin C-folic acid, 1 capsule, oral, Daily  cyanocobalamin, 1,000 mcg, oral, Daily  hydrocortisone, 15 mg, oral, q AM  hydrocortisone, 5 mg, oral, q PM  lactobacillus acidophilus, 1 tablet, oral, Daily  melatonin, 10 mg, oral, Daily  metoprolol tartrate, 50 mg, oral, BID  multivitamin with minerals, 1 tablet, oral, Daily  pantoprazole, 40 mg, oral, Daily before breakfast  psyllium, 1 packet, oral, Daily  sevelamer carbonate, 1,600 mg, oral, TID with meals      Continuous medications     PRN medications  PRN medications: acetaminophen **OR** acetaminophen **OR** acetaminophen, melatonin, oxyCODONE   Meds:   amLODIPine, 10 mg, Daily  apixaban, 5 mg, BID  aspirin, 81 mg, Daily  B complex-vitamin C-folic acid, 1 capsule, Daily  cyanocobalamin, 1,000 mcg, Daily  hydrocortisone, 15 mg, q AM  hydrocortisone, 5 mg, q PM  lactobacillus acidophilus, 1 tablet, Daily  melatonin, 10 mg, Daily  metoprolol tartrate, 50 mg, BID  multivitamin with minerals, 1 tablet, Daily  pantoprazole, 40 mg, Daily before breakfast  psyllium, 1 packet, Daily  sevelamer carbonate, 1,600 mg, TID with meals         acetaminophen, 650 mg, q4h PRN   Or  acetaminophen, 650 mg, q4h PRN   Or  acetaminophen, 650 mg, q4h PRN  melatonin, 3 mg, Nightly PRN  oxyCODONE, 10 mg, q6h  PRN        Heart Rate:  [59-86]   Temp:  [36.3 °C (97.3 °F)-36.8 °C (98.2 °F)]   Resp:  [16-18]   BP: (114-155)/(72-98)   SpO2:  [97 %-99 %]    Weight: 143 kg (315 lb)   General appearance: Awake and alert, oriented, . No distress  HEENT: supple, moist oral mucosa, no mouth ulcers  Neck: No JVD  Skin: no apparent rash  Heart: heart sounds 1 & 2 present and normal, no murmurs heard or friction rub  Lungs: Adequate air entry,  no wheezing, bibasilar crackles   Abdomen: soft, non tender, no masses palpated, no flank tenderness  Extremities: ++++ edema on left LE, + edema of rt, no joint swelling,  : deferred  Neuro: No FND, no asterixis   ACCESS: Left fem graft with an area of erythema at proximal end; + thrill and bruit       Results from last 72 hours   Lab Units 10/09/23  1905   SODIUM mmol/L 139   POTASSIUM mmol/L 4.4   CO2 mmol/L 23   BUN mg/dL 43*   CREATININE mg/dL 8.23*   CALCIUM mg/dL 8.8   ALBUMIN g/dL 3.3*   GLUCOSE mg/dL 63*   WBC AUTO x10*3/uL 5.7        A/P  ESRD-HD MWF admitted with LE edema and pain   He has significant volume overload (b/l LE edea left >>> rt, + Jvd and crackles, BP high)  Pt was offered HD today but he refused because it would interfere with his dinner   He also had a pitcher full of ice and chewing on it   Based on these observation he will require sequential dialysis with alternating UF to improve volume status, reduce LE edema and improve function     Plan HD 10/11 per submitted orders, UF 3L as tolerated; if pt in agreement isolated  UF could be offerred 10/12.   Fluid restriction < 1L per day ; low salt diet   MBD - please add Phosphorus to AM labs ; c/w sevelamer  Anemia of CKD: EPO - nephrology will add   Access: may need US Doppler AVG to explore the erythematous and tender area  over AVG r/o abscess  BP: high in the setting of hypervolemia  needs UF as above  Renal Diet   Daily renal MVI         Josette Alexander MD MPH

## 2023-10-10 NOTE — CARE PLAN
Problem: Safety  Goal: I will remain free of falls  Outcome: Progressing     Problem: Daily Care  Goal: Daily care needs are met  Outcome: Progressing     Problem: Psychosocial Needs  Goal: Demonstrates ability to cope with hospitalization/illness  Outcome: Progressing  Goal: Collaborate with me, my family, and caregiver to identify my specific goals  Outcome: Progressing   The patient's goals for the shift include      The clinical goals for the shift include Keep patient safe from injury    Over the shift, the patient did not make progress toward the following goals. Barriers to progression include n. Recommendations to address these barriers include NA.

## 2023-10-10 NOTE — PROGRESS NOTES
INTERNAL MEDICINE PROGRESS NOTE     BRIEF NARRATIVE      Nghia Hall is a 58 y.o. male on day 1 of admission presenting with Inability to walk. Patient has past medical history of ESRD s/p DDKT (2006, subsequent graft failure 07/2021, on HD MWF at [facility] through LT groin AV graft on [address]; not currently on immunosuppression), pAfib (on Apixaban), HFpEF, HCV (s/p treatment), gout, lymphadenopathy (following with oncology, inconclusive biopsy in 01/2023), DVT, and Adrenal Insufficiency (on hydrocortisone) who presented to the ED with difficulty ambulating due to secondary LLE pain and swelling. His left leg (which is also used for his HD access) is severely edematous and exquisitely tender to palpation. He reports being compliant with his Eliquis. The edema is non dependent. He is not interested in placement at this time due to being employed.  Patient last HD was 5 days ago    SUBJECTIVE     Pt complaining of left leg pain and edema today. Denies fever or chill     OBJECTIVE      Visit Vitals  /72   Pulse 59   Temp 36.3 °C (97.3 °F) (Temporal)   Resp 16      No intake or output data in the 24 hours ending 10/10/23 1241    Physical Exam   Vitals reviewed.   Constitutional:       Appearance: He is obese.      Comments: Older than stated age   HENT:      Head: Normocephalic and atraumatic.   Neck:      Comments: Left sided PIV  Cardiovascular:      Rate and Rhythm: Normal rate and regular rhythm.      Heart sounds: Normal heart sounds.   Pulmonary:      Effort: Pulmonary effort is normal.      Breath sounds: Normal air entry.   Abdominal:      General: Bowel sounds are normal.      Palpations: Abdomen is soft.      Tenderness:  There is no abdominal tenderness.   Musculoskeletal:         General: Tenderness present. No deformity.      Left lower leg: Edema present.   Skin:     General: Skin is warm and dry.   Neurological:      General: No focal deficit present.      Mental Status: He is oriented to person, place, and time.   Psychiatric:         Mood and Affect: Mood normal.         Behavior: Behavior normal.     Current Meds   amLODIPine, 10 mg, oral, Daily  apixaban, 5 mg, oral, BID  aspirin, 81 mg, oral, Daily  B complex-vitamin C-folic acid, 1 capsule, oral, Daily  cyanocobalamin, 1,000 mcg, oral, Daily  hydrocortisone, 15 mg, oral, q AM  hydrocortisone, 5 mg, oral, q PM  lactobacillus acidophilus, 1 tablet, oral, Daily  melatonin, 10 mg, oral, Daily  metoprolol tartrate, 50 mg, oral, BID  multivitamin with minerals, 1 tablet, oral, Daily  pantoprazole, 40 mg, oral, Daily before breakfast  psyllium, 1 packet, oral, Daily  sevelamer carbonate, 1,600 mg, oral, TID with meals       PRN medications: acetaminophen **OR** acetaminophen **OR** acetaminophen, melatonin, oxyCODONE     LABS and IMAGING     WBC   Date Value Ref Range Status   10/09/2023 5.7 4.4 - 11.3 x10*3/uL Final     Hemoglobin   Date Value Ref Range Status   10/09/2023 12.3 (L) 13.5 - 17.5 g/dL Final     Hematocrit   Date Value Ref Range Status   10/09/2023 36.7 (L) 41.0 - 52.0 % Final     Bicarbonate   Date Value Ref Range Status   10/09/2023 23 21 - 32 mmol/L Final     Creatinine   Date Value Ref Range Status   10/09/2023 8.23 (H) 0.50 - 1.30 mg/dL Final     Calcium   Date Value Ref Range Status   10/09/2023 8.8 8.6 - 10.6 mg/dL Final       Lower extremity venous duplex left              [place] [address]    Tel [phone] and Fax [phone]       Vascular Lab Report  Lower Venous Duplex Ultrasound       Patient Name:     PRABHJOT CHAVES       Reading           [id number] Shahid Guevara                                         Physician:        MD  Study Date:       10/9/2023           Ordering          [id number] VINAYAK ZAMUDIO                                        Provider:  MRN/PID:          [id number]            Technologist:     Kylah Kim RVT                                                          [facility]  Accession#:       [id number]        Technologist 2:  Date of           1965 / 58      Encounter#:       [id number]  Birth/Age:        years  Gender:           M  Admission Status: Emergency           Location          [facility]                                        Performed:       Diagnosis/ICD: Localized (leg) edema-R60.0  Indication:    Limb edema       CONCLUSIONS:  Left Lower Venous: Negative for acute DVT of the visualized vessels. Possible chronic thrombus in the CFV. Patient cannot tolerate compressions of the mid distal thigh. The vessels appear patent by color flow and Doppler. Calf veins only visuslized in segments and appear patent. There are lymph nodes noted in the left groin. May wish for further evaluation. Also AVG noted and is patent. An old non functioning AVG is also noted.     Imaging & Doppler Findings:     Right Compressible Thrombus        Flow  CFV       Yes        None   Spontaneous/Phasic       Left                  Compress Thrombus        Flow  Distal External Iliac            None  CFV                     Yes      None   Spontaneous/Phasic  PFV                     Yes      None  FV Proximal             Yes      None   Spontaneous/Phasic  FV Mid                           None   Spontaneous/Phasic  FV Distal                        None   Spontaneous/Phasic  Popliteal               Yes      None   Spontaneous/Phasic       [id number] Shahid Guevara MD  Electronically signed by [id number] Shahid Guevara MD on 10/9/2023 at 9:36:08 PM       ** Final **       ASSESSMENT / PLANS      #LLE edema  #Ambulatory dysfunction  Presented with worsening left leg edema and pain affecting patient ambulation.  Patient does not want placement  -PT/OT consult  -US -ve for DVT, limited exam was negative for acute thrombus but showed persistent chronic thrombus in the CFV   -Tylenol, Oxycodone for pain  -Will consult vascular surgery to evaluate fistula      #ESRD on HD  -MWF  -Renal dosing meds   -Renal diet, patient refusing renal diet and would to be placed on regular diet   -Electrolytes wnl   -Nephrology consulted      #HFpEF  -Does not appear fluid overloaded (unilateral edema, no respiratory complaints)  -Daily weights  -Continue home medications     #History of DVT  -US -ve for DVT (limited)  -Continue Eliquis     #Paroxysmal afib  -Continue home medications  -No acute issues     #Adrenal insufficiency  -Continue hydrocortisone     #Lymphadenopathy  -Could be underlying cause of edema  -Patient followed by oncology    Jermaine Garcia MD

## 2023-10-10 NOTE — CONSULTS
Vascular Surgery Consult Note    HPI:  Nghia Hall is 58 y.o. male with history of ESRD s/p DDKT (2006, subsequent graft failure 2021) on HD MWF via L femoral AVG, pAfib (on Eliquis), HFpEF, HCV, gout, lymphadenopthy, adrenal insufficiency (on steroids) who presented to ED with disabling LLE pain and swelling resulting in difficulty with ambulation. Denied any weakness. Intermittent episodes of numbness ongoing for several weeks. Reports swelling present since HD access creation, however, worsening significantly in last two weeks. Reports prior fistulogram at [facility] and interventions for outflow stenosis, most recently earlier this year. No fevers, chills. Of note, patient reports undergoing PET scan recently for liguinal and pelvic lymph nodes s/p biopsy (non-conclusive)     PMH/PSH: ESRD s/p DDKT (2006) c/b graft failure (2021) on HD via L femoral AVG, DM2, HLD, HFpEF, HCV s/p treatment, lymphadenopathy, adrenal insufficiency    Relevant Home Meds: Aspirin and Eliquis    Allergies:   Allergies   Allergen Reactions    Ibuprofen Anaphylaxis    Oxycodone Itching    Vancomycin Itching, Unknown and Rash     face red and hot     SH/FH: Past smoker, occasional EtOH use    Objective:  Vitals:  Vitals:    10/10/23 1434   BP: 110/58   Pulse: 60   Resp: 18   Temp: 36.5 °C (97.7 °F)   SpO2: 95%      Exam:  Constitutional: No acute distress, resting comfortably  Neuro:  AOx3, grossly intact  ENMT: moist mucous membranes  Head/neck: atraumatic  CV: no tachycardia  Pulm: non-labored on room air  GI: soft, non-tender, non-distended  Skin: warm and dry  Musculoskeletal: moving all extremities  Extremities: severe LLE edema, sensory and motor intact, triphasic L DP doppler signal    Labs:  Lab Results   Component Value Date    WBC 5.7 10/09/2023    HGB 12.3 (L) 10/09/2023    HCT 36.7 (L) 10/09/2023    MCV 87 10/09/2023     10/09/2023     Lab Results   Component Value Date    GLUCOSE 63 (L) 10/09/2023    CALCIUM  8.8 10/09/2023     10/09/2023    K 4.4 10/09/2023    CO2 23 10/09/2023    CL 99 10/09/2023    BUN 43 (H) 10/09/2023    CREATININE 8.23 (H) 10/09/2023     Lab Results   Component Value Date    INR 1.2 (H) 09/22/2023    PROTIME 14.0 (H) 09/22/2023     Assessment & Plan:  Nghia Hall is 58 y.o. male with history of ESRD s/p DDKT (2006, subsequent graft failure 2021) on HD MWF via L femoral AVG, pAfib (on Eliquis), HFpEF, HCV, gout, lymphadenopthy, adrenal insufficiency (on steroids) who presented to ED with disabling LLE pain and swelling resulting in difficulty with ambulation.    Vascular Surgery consulted with worsening swelling in presence of L femoral AVG.     Plan:  - obtain CTA A/P with arterial and venous phase (ordered)  - obtain left femoral AVG duplex (ordered)  - obtain an echocardiogram to rule-out high-output HF  - elevate left leg while in bed  - ACE compression to left leg (patient refusing at this time d/t pain)     D/w attending, Dr. Henry Oliver MD  Vascular Surgery PGY-4

## 2023-10-11 ENCOUNTER — APPOINTMENT (OUTPATIENT)
Dept: DIALYSIS | Facility: HOSPITAL | Age: 58
DRG: 252 | End: 2023-10-11
Payer: COMMERCIAL

## 2023-10-11 ENCOUNTER — APPOINTMENT (OUTPATIENT)
Dept: VASCULAR MEDICINE | Facility: HOSPITAL | Age: 58
DRG: 252 | End: 2023-10-11
Payer: COMMERCIAL

## 2023-10-11 ENCOUNTER — APPOINTMENT (OUTPATIENT)
Dept: CARDIOLOGY | Facility: HOSPITAL | Age: 58
DRG: 252 | End: 2023-10-11
Payer: COMMERCIAL

## 2023-10-11 LAB
ALBUMIN SERPL BCP-MCNC: 2.8 G/DL (ref 3.4–5)
ALP SERPL-CCNC: 230 U/L (ref 33–120)
ALT SERPL W P-5'-P-CCNC: 12 U/L (ref 10–52)
ANION GAP SERPL CALC-SCNC: 21 MMOL/L (ref 10–20)
AST SERPL W P-5'-P-CCNC: 19 U/L (ref 9–39)
BILIRUB SERPL-MCNC: 1 MG/DL (ref 0–1.2)
BUN SERPL-MCNC: 57 MG/DL (ref 6–23)
CALCIUM SERPL-MCNC: 7.9 MG/DL (ref 8.6–10.6)
CHLORIDE SERPL-SCNC: 100 MMOL/L (ref 98–107)
CO2 SERPL-SCNC: 22 MMOL/L (ref 21–32)
CREAT SERPL-MCNC: 9.43 MG/DL (ref 0.5–1.3)
ERYTHROCYTE [DISTWIDTH] IN BLOOD BY AUTOMATED COUNT: 15.9 % (ref 11.5–14.5)
GFR SERPL CREATININE-BSD FRML MDRD: 6 ML/MIN/1.73M*2
GLUCOSE SERPL-MCNC: 81 MG/DL (ref 74–99)
HCT VFR BLD AUTO: 31.9 % (ref 41–52)
HGB BLD-MCNC: 10.3 G/DL (ref 13.5–17.5)
MAGNESIUM SERPL-MCNC: 1.84 MG/DL (ref 1.6–2.4)
MCH RBC QN AUTO: 28.9 PG (ref 26–34)
MCHC RBC AUTO-ENTMCNC: 32.3 G/DL (ref 32–36)
MCV RBC AUTO: 90 FL (ref 80–100)
NRBC BLD-RTO: 0 /100 WBCS (ref 0–0)
PHOSPHATE SERPL-MCNC: 6.2 MG/DL (ref 2.5–4.9)
PLATELET # BLD AUTO: 272 X10*3/UL (ref 150–450)
PMV BLD AUTO: 9.9 FL (ref 7.5–11.5)
POTASSIUM SERPL-SCNC: 5 MMOL/L (ref 3.5–5.3)
PROT SERPL-MCNC: 5.4 G/DL (ref 6.4–8.2)
RBC # BLD AUTO: 3.56 X10*6/UL (ref 4.5–5.9)
SODIUM SERPL-SCNC: 138 MMOL/L (ref 136–145)
WBC # BLD AUTO: 5.4 X10*3/UL (ref 4.4–11.3)

## 2023-10-11 PROCEDURE — 2500000001 HC RX 250 WO HCPCS SELF ADMINISTERED DRUGS (ALT 637 FOR MEDICARE OP): Performed by: FAMILY MEDICINE

## 2023-10-11 PROCEDURE — 8010000001 HC DIALYSIS - HEMODIALYSIS PER DAY

## 2023-10-11 PROCEDURE — 83735 ASSAY OF MAGNESIUM: CPT | Performed by: STUDENT IN AN ORGANIZED HEALTH CARE EDUCATION/TRAINING PROGRAM

## 2023-10-11 PROCEDURE — 2500000002 HC RX 250 W HCPCS SELF ADMINISTERED DRUGS (ALT 637 FOR MEDICARE OP, ALT 636 FOR OP/ED): Performed by: NURSE PRACTITIONER

## 2023-10-11 PROCEDURE — 99232 SBSQ HOSP IP/OBS MODERATE 35: CPT | Performed by: STUDENT IN AN ORGANIZED HEALTH CARE EDUCATION/TRAINING PROGRAM

## 2023-10-11 PROCEDURE — 2500000001 HC RX 250 WO HCPCS SELF ADMINISTERED DRUGS (ALT 637 FOR MEDICARE OP)

## 2023-10-11 PROCEDURE — 36415 COLL VENOUS BLD VENIPUNCTURE: CPT | Performed by: STUDENT IN AN ORGANIZED HEALTH CARE EDUCATION/TRAINING PROGRAM

## 2023-10-11 PROCEDURE — 84100 ASSAY OF PHOSPHORUS: CPT | Performed by: STUDENT IN AN ORGANIZED HEALTH CARE EDUCATION/TRAINING PROGRAM

## 2023-10-11 PROCEDURE — 2500000001 HC RX 250 WO HCPCS SELF ADMINISTERED DRUGS (ALT 637 FOR MEDICARE OP): Performed by: STUDENT IN AN ORGANIZED HEALTH CARE EDUCATION/TRAINING PROGRAM

## 2023-10-11 PROCEDURE — 85027 COMPLETE CBC AUTOMATED: CPT | Performed by: STUDENT IN AN ORGANIZED HEALTH CARE EDUCATION/TRAINING PROGRAM

## 2023-10-11 PROCEDURE — 2500000004 HC RX 250 GENERAL PHARMACY W/ HCPCS (ALT 636 FOR OP/ED): Performed by: NURSE PRACTITIONER

## 2023-10-11 PROCEDURE — 90935 HEMODIALYSIS ONE EVALUATION: CPT | Performed by: INTERNAL MEDICINE

## 2023-10-11 PROCEDURE — 2500000004 HC RX 250 GENERAL PHARMACY W/ HCPCS (ALT 636 FOR OP/ED): Performed by: INTERNAL MEDICINE

## 2023-10-11 PROCEDURE — 76937 US GUIDE VASCULAR ACCESS: CPT

## 2023-10-11 PROCEDURE — 2500000004 HC RX 250 GENERAL PHARMACY W/ HCPCS (ALT 636 FOR OP/ED): Performed by: STUDENT IN AN ORGANIZED HEALTH CARE EDUCATION/TRAINING PROGRAM

## 2023-10-11 PROCEDURE — 1100000001 HC PRIVATE ROOM DAILY

## 2023-10-11 PROCEDURE — 2500000001 HC RX 250 WO HCPCS SELF ADMINISTERED DRUGS (ALT 637 FOR MEDICARE OP): Performed by: NURSE PRACTITIONER

## 2023-10-11 PROCEDURE — 80053 COMPREHEN METABOLIC PANEL: CPT | Performed by: STUDENT IN AN ORGANIZED HEALTH CARE EDUCATION/TRAINING PROGRAM

## 2023-10-11 RX ORDER — VANCOMYCIN HYDROCHLORIDE 1 G/200ML
1 INJECTION, SOLUTION INTRAVENOUS
Status: DISCONTINUED | OUTPATIENT
Start: 2023-10-14 | End: 2023-10-12

## 2023-10-11 RX ORDER — DIPHENHYDRAMINE HCL 25 MG
25 CAPSULE ORAL EVERY 6 HOURS PRN
Status: DISCONTINUED | OUTPATIENT
Start: 2023-10-11 | End: 2023-11-01

## 2023-10-11 RX ADMIN — OXYCODONE HYDROCHLORIDE 10 MG: 5 TABLET ORAL at 12:09

## 2023-10-11 RX ADMIN — CYANOCOBALAMIN TAB 1000 MCG 1000 MCG: 1000 TAB at 12:09

## 2023-10-11 RX ADMIN — Medication 1 TABLET: at 12:09

## 2023-10-11 RX ADMIN — PSYLLIUM HUSK 1 PACKET: 3.4 POWDER ORAL at 12:09

## 2023-10-11 RX ADMIN — METOPROLOL TARTRATE 50 MG: 50 TABLET, FILM COATED ORAL at 21:15

## 2023-10-11 RX ADMIN — MELATONIN 10 MG: 3 TAB ORAL at 21:15

## 2023-10-11 RX ADMIN — HYDROCORTISONE 5 MG: 5 TABLET ORAL at 21:15

## 2023-10-11 RX ADMIN — APIXABAN 5 MG: 5 TABLET, FILM COATED ORAL at 12:09

## 2023-10-11 RX ADMIN — PIPERACILLIN SODIUM AND TAZOBACTAM SODIUM 2.25 G: 2; .25 INJECTION, SOLUTION INTRAVENOUS at 23:16

## 2023-10-11 RX ADMIN — PIPERACILLIN SODIUM AND TAZOBACTAM SODIUM 2.25 G: 2; .25 INJECTION, SOLUTION INTRAVENOUS at 14:39

## 2023-10-11 RX ADMIN — DIPHENHYDRAMINE HYDROCHLORIDE 25 MG: 25 CAPSULE ORAL at 19:12

## 2023-10-11 RX ADMIN — ASPIRIN 81 MG: 81 TABLET, COATED ORAL at 12:09

## 2023-10-11 RX ADMIN — SEVELAMER CARBONATE 1600 MG: 800 TABLET, FILM COATED ORAL at 19:07

## 2023-10-11 RX ADMIN — Medication 2 G: at 19:08

## 2023-10-11 RX ADMIN — APIXABAN 5 MG: 5 TABLET, FILM COATED ORAL at 21:15

## 2023-10-11 RX ADMIN — METOPROLOL TARTRATE 50 MG: 50 TABLET, FILM COATED ORAL at 12:09

## 2023-10-11 RX ADMIN — PARICALCITOL 8 MCG: 2 INJECTION INTRAVENOUS at 19:09

## 2023-10-11 RX ADMIN — HYDROCORTISONE 15 MG: 5 TABLET ORAL at 12:09

## 2023-10-11 RX ADMIN — CYCLOBENZAPRINE 5 MG: 10 TABLET, FILM COATED ORAL at 12:09

## 2023-10-11 RX ADMIN — OXYCODONE HYDROCHLORIDE 10 MG: 5 TABLET ORAL at 05:11

## 2023-10-11 RX ADMIN — SEVELAMER CARBONATE 1600 MG: 800 TABLET, FILM COATED ORAL at 12:09

## 2023-10-11 RX ADMIN — AMLODIPINE BESYLATE 10 MG: 10 TABLET ORAL at 12:09

## 2023-10-11 RX ADMIN — OXYCODONE HYDROCHLORIDE 10 MG: 5 TABLET ORAL at 19:12

## 2023-10-11 RX ADMIN — NEPHROCAP 1 CAPSULE: 1 CAP ORAL at 12:09

## 2023-10-11 RX ADMIN — PANTOPRAZOLE SODIUM 40 MG: 40 TABLET, DELAYED RELEASE ORAL at 12:09

## 2023-10-11 ASSESSMENT — COGNITIVE AND FUNCTIONAL STATUS - GENERAL
MOBILITY SCORE: 24
MOBILITY SCORE: 24
DAILY ACTIVITIY SCORE: 24
DAILY ACTIVITIY SCORE: 24

## 2023-10-11 ASSESSMENT — PAIN - FUNCTIONAL ASSESSMENT
PAIN_FUNCTIONAL_ASSESSMENT: NO/DENIES PAIN
PAIN_FUNCTIONAL_ASSESSMENT: 0-10

## 2023-10-11 ASSESSMENT — PAIN SCALES - GENERAL
PAINLEVEL_OUTOF10: 10 - WORST POSSIBLE PAIN
PAINLEVEL_OUTOF10: 7
PAINLEVEL_OUTOF10: 8

## 2023-10-11 NOTE — NURSING NOTE
Report to Receiving RN:    Report From: Report given to:  Time Report Called: 1100 Junior?  Hand-Off Communication: Pt had a lot of pain with cannulation but slept after tx started  Complications During Treatment: No  Ultrafiltration Treatment: Yes, 3L removed  Medications Administered During Dialysis: No  Blood Products Administered During Dialysis: No  Labs Sent During Dialysis: No  Heparin Drip Rate Changes: N/A        Last Updated: 10:56 AM by THEO VIDALES

## 2023-10-11 NOTE — PROGRESS NOTES
Admitted for LLE swelling and pain  Seen on HD. Asleep at time of my visit-L thigh AVG able to be cannulated by master cannulator but was very painful to patient        Vital signs in last 24 hours:  Temp:  [36.1 °C (97 °F)-36.6 °C (97.9 °F)] 36.6 °C (97.9 °F)  Heart Rate:  [57-68] 68  Resp:  [18] 18  BP: (110-126)/(58-70) 126/67       Intake/Output this shift:  I/O this shift:  In: 800 [I.V.:400; Other:400]  Out: 6000 [Other:6000]    Physical Exam  No distress  HEENT:  moist, no pallor  LLE massively swollen, no swelling RLE  Breath sounds brent equal, clear  S1 S2 regular, normal, no rub or murmur  Abdomen soft, non tender  Asleep    Labs:  Results from last 7 days   Lab Units 10/11/23  0725   WBC AUTO x10*3/uL 5.4   RBC AUTO x10*6/uL 3.56*   HEMOGLOBIN g/dL 10.3*   HEMATOCRIT % 31.9*     Results from last 7 days   Lab Units 10/11/23  0725   SODIUM mmol/L 138   POTASSIUM mmol/L 5.0   CHLORIDE mmol/L 100   CO2 mmol/L 22   BUN mg/dL 57*   CREATININE mg/dL 9.43*   CALCIUM mg/dL 7.9*   PHOSPHORUS mg/dL 6.2*   MAGNESIUM mg/dL 1.84   BILIRUBIN TOTAL mg/dL 1.0   ALT U/L 12   AST U/L 19         Current Facility-Administered Medications   Medication Dose Route Frequency Provider Last Rate Last Admin    acetaminophen (Tylenol) tablet 650 mg  650 mg oral q4h PRN CHRISTI Atkinson   650 mg at 10/10/23 0257    Or    acetaminophen (Tylenol) oral liquid 650 mg  650 mg oral q4h PRN CHRISTI Atkinson        Or    acetaminophen (Tylenol) suppository 650 mg  650 mg rectal q4h PRN CHRISTI Atkinosn        amLODIPine (Norvasc) tablet 10 mg  10 mg oral Daily CHRISTI Atkinson   10 mg at 10/11/23 1209    apixaban (Eliquis) tablet 5 mg  5 mg oral BID CHRISTI Atkinson   5 mg at 10/11/23 1209    aspirin EC tablet 81 mg  81 mg oral Daily CHRISTI Atkinson   81 mg at 10/11/23 1209    B complex-vitamin C-folic acid (Nephrocaps) capsule 1 capsule  1 capsule oral Daily Ben BOBO  AMARIS Mcbride-CNP   1 capsule at 10/11/23 1209    cyanocobalamin (Vitamin B-12) tablet 1,000 mcg  1,000 mcg oral Daily AMARIS Atkinson-CNP   1,000 mcg at 10/11/23 1209    cyclobenzaprine (Flexeril) tablet 5 mg  5 mg oral TID PRN Ferny Grady MD   5 mg at 10/11/23 1209    diphenhydrAMINE (BENADryl) capsule 25 mg  25 mg oral q6h PRN Ferny Grady MD   25 mg at 10/10/23 2039    diphenhydrAMINE (BENADryl) capsule 25 mg  25 mg oral q6h PRN Jermaine Garcia MD        hydrocortisone (Cortef) tablet 15 mg  15 mg oral q AM AMARIS Atkinson-CNP   15 mg at 10/11/23 1209    hydrocortisone (Cortef) tablet 5 mg  5 mg oral q PM AMARIS Atkinson-CNP   5 mg at 10/10/23 2041    lactobacillus acidophilus tablet 1 tablet  1 tablet oral Daily AMARIS Atkinson-CNP   1 tablet at 10/11/23 1209    melatonin tablet 10 mg  10 mg oral Daily AMARIS Atkinson-CNP   10 mg at 10/09/23 2144    melatonin tablet 3 mg  3 mg oral Nightly PRN AMARIS Atkinson-CNP   3 mg at 10/10/23 2040    metoprolol tartrate (Lopressor) tablet 50 mg  50 mg oral BID AMARIS Atkinson-CNP   50 mg at 10/11/23 1209    multivitamin with minerals 1 tablet  1 tablet oral Daily AMARIS Atkinson-CNP   1 tablet at 10/11/23 1209    oxyCODONE (Roxicodone) immediate release tablet 10 mg  10 mg oral q6h PRN Ann Andrew DO   10 mg at 10/11/23 1209    pantoprazole (ProtoNix) EC tablet 40 mg  40 mg oral Daily before breakfast AMARIS Atkinson-CNP   40 mg at 10/11/23 1209    paricalcitol (Zemplar) injection 8 mcg  8 mcg intravenous Once per day on Mon Wed Fri Josette Alexander MD MPH        piperacillin-tazobactam-dextrose (Zosyn) IV 2.25 g  2.25 g intravenous q8h Jermaine Garcia MD        psyllium (Metamucil) 3.4 gram packet 1 packet  1 packet oral Daily Ben Mcbride, APRN-CNP   1 packet at 10/11/23 1209    sevelamer carbonate (Renvela) tablet 1,600 mg  1,600 mg oral TID with meals Ben BOBO  AMARIS Mcbride-CNP   1,600 mg at 10/11/23 1209         Assessment/Plan     58 Year old with h/o ESRD on HD admitted with LLE swelling and pain  Nephrology following for ESRD    #ESRD: Tolerating HD per submitted orders, K, Ca, UF goal    # Anemia:   Hemoglobin   Date Value Ref Range Status   10/11/2023 10.3 (L) 13.5 - 17.5 g/dL Final    Start  Epo 10,000U IV QHD    # MBD:      continue Sevelamer carbonate 1600 mg PO TIDWM,  Continue daily renal MVI. continue Zemplar 8 mcg Iv QHD    #Hypertension:  BP control acceptable, continue current anti-hypertensive medications    #Volume status: Clinically euvolemic continue UF as tolerated    # LLE swelling: Per vascular sx, awaiting CT    Will continue to follow         Gale Murray MD  10/11/2023  12:39 PM

## 2023-10-11 NOTE — NURSING NOTE
.Report from Sending RN:    Report From: WILLARD Ashraf  Recent Surgery of Procedure: No  Baseline Level of Consciousness (LOC): A&OX3  Oxygen Use: No  Type: none  Diabetic: No  Last BP Med Given Day of Dialysis: metropolol  Last Pain Med Given: oxycodone  Lab Tests to be Obtained with Dialysis: none  Blood Transfusion to be Given During Dialysis: No  Available IV Access: Yes  Medications to be Administered During Dialysis: No  Continuous IV Infusion Running: No  Restraints on Currently or in the Last 24 Hours: No  Hand-Off Communication: No acute event overnight, vital signs stable. Pt morning meds was not given. Pt has a AVF on the thigh. Not on any precaution and can travel by bed.

## 2023-10-11 NOTE — PROGRESS NOTES
.                                                                                                                                                                                                                                                                                             INTERNAL MEDICINE PROGRESS NOTE     BRIEF NARRATIVE      Nghia Hall is a 58 y.o. male on day 2 of admission presenting with Inability to walk. Patient has past medical history of ESRD s/p DDKT (2006, subsequent graft failure 07/2021, on HD MWF at [facility] through LT groin AV graft on [address]; not currently on immunosuppression), pAfib (on Apixaban), HFpEF, HCV (s/p treatment), gout, lymphadenopathy (following with oncology, inconclusive biopsy in 01/2023), DVT, and Adrenal Insufficiency (on hydrocortisone) who presented to the ED with difficulty ambulating due to secondary LLE pain and swelling. His left leg (which is also used for his HD access) is severely edematous and exquisitely tender to palpation. He reports being compliant with his Eliquis. The edema is non dependent. He is not interested in placement at this time due to being employed.    Nephrology was consulted for HD. Vascular surgery was also consulted to evaluate left AVG. Patient left leg edema and pain is worsening.     SUBJECTIVE     Pt still complaining of left leg pain and edema. Denies fever or chill     OBJECTIVE      Visit Vitals  /67 (BP Location: Left arm, Patient Position: Lying)   Pulse 68   Temp 36.6 °C (97.9 °F) (Temporal)   Resp 18        Intake/Output Summary (Last 24 hours) at 10/11/2023 1308  Last data filed at 10/11/2023 1129  Gross per 24 hour   Intake 800 ml   Output 6000 ml   Net -5200 ml         Physical Exam   Vitals reviewed.   Constitutional:       Appearance: He is obese.      Comments: Older than stated age   HENT:      Head: Normocephalic and atraumatic.   Neck:      Comments: Left sided PIV  Cardiovascular:      Rate and Rhythm:  Normal rate and regular rhythm.      Heart sounds: Normal heart sounds.   Pulmonary:      Effort: Pulmonary effort is normal.      Breath sounds: Normal air entry.   Abdominal:      General: Bowel sounds are normal.      Palpations: Abdomen is soft.      Tenderness: There is no abdominal tenderness.   Musculoskeletal:         General: Tenderness present. No deformity.      Left lower leg: Edema present.   Skin:     General: Skin is warm and dry.   Neurological:      General: No focal deficit present.      Mental Status: He is oriented to person, place, and time.   Psychiatric:         Mood and Affect: Mood normal.         Behavior: Behavior normal.     Current Meds   amLODIPine, 10 mg, oral, Daily  apixaban, 5 mg, oral, BID  aspirin, 81 mg, oral, Daily  B complex-vitamin C-folic acid, 1 capsule, oral, Daily  cyanocobalamin, 1,000 mcg, oral, Daily  hydrocortisone, 15 mg, oral, q AM  hydrocortisone, 5 mg, oral, q PM  lactobacillus acidophilus, 1 tablet, oral, Daily  melatonin, 10 mg, oral, Daily  metoprolol tartrate, 50 mg, oral, BID  multivitamin with minerals, 1 tablet, oral, Daily  pantoprazole, 40 mg, oral, Daily before breakfast  paricalcitol, 8 mcg, intravenous, Once per day on Mon Wed Fri  piperacillin-tazobactam, 2.25 g, intravenous, q8h  psyllium, 1 packet, oral, Daily  sevelamer carbonate, 1,600 mg, oral, TID with meals       PRN medications: acetaminophen **OR** acetaminophen **OR** acetaminophen, cyclobenzaprine, diphenhydrAMINE, diphenhydrAMINE, melatonin, oxyCODONE     LABS and IMAGING     WBC   Date Value Ref Range Status   10/11/2023 5.4 4.4 - 11.3 x10*3/uL Final   10/09/2023 5.7 4.4 - 11.3 x10*3/uL Final     Hemoglobin   Date Value Ref Range Status   10/11/2023 10.3 (L) 13.5 - 17.5 g/dL Final   10/09/2023 12.3 (L) 13.5 - 17.5 g/dL Final     Hematocrit   Date Value Ref Range Status   10/11/2023 31.9 (L) 41.0 - 52.0 % Final   10/09/2023 36.7 (L) 41.0 - 52.0 % Final     Bicarbonate   Date Value Ref Range  Status   10/11/2023 22 21 - 32 mmol/L Final   10/09/2023 23 21 - 32 mmol/L Final     Creatinine   Date Value Ref Range Status   10/11/2023 9.43 (H) 0.50 - 1.30 mg/dL Final   10/09/2023 8.23 (H) 0.50 - 1.30 mg/dL Final     Calcium   Date Value Ref Range Status   10/11/2023 7.9 (L) 8.6 - 10.6 mg/dL Final   10/09/2023 8.8 8.6 - 10.6 mg/dL Final       Lower extremity venous duplex left              [place] [id number]    Tel [phone] and Fax [phone]       Vascular Lab Report  Lower Venous Duplex Ultrasound       Patient Name:     PRABHJOT CHAVES       Reading           [id number] Shahid Guevara                                        Physician:        MD  Study Date:       10/9/2023           Ordering          [id number] VINAYAK ZAMUDIO                                        Provider:  MRN/PID:          [id number]            Technologist:     Kylah Kim RVT,                                                          RUST  Accession#:       [id number]        Technologist 2:  Date of           1965 / 58      Encounter#:       [id number]  Birth/Age:        years  Gender:           M  Admission Status: Emergency           Location          [facility]                                        Performed:       Diagnosis/ICD: Localized (leg) edema-R60.0  Indication:    Limb edema       CONCLUSIONS:  Left Lower Venous: Negative for acute DVT of the visualized vessels. Possible chronic thrombus in the CFV. Patient cannot tolerate compressions of the mid distal thigh. The vessels appear patent by color flow and Doppler. Calf veins only visuslized in segments and appear patent. There are lymph nodes noted in the left groin. May wish for further evaluation. Also AVG noted and is patent. An old non functioning AVG is also noted.     Imaging & Doppler Findings:     Right Compressible Thrombus        Flow  CFV       Yes        None   Spontaneous/Phasic       Left                   Compress Thrombus        Flow  Distal External Iliac            None  CFV                     Yes      None   Spontaneous/Phasic  PFV                     Yes      None  FV Proximal             Yes      None   Spontaneous/Phasic  FV Mid                           None   Spontaneous/Phasic  FV Distal                        None   Spontaneous/Phasic  Popliteal               Yes      None   Spontaneous/Phasic       [id number] Shahid Guevara MD  Electronically signed by [id number] Shahid Guevara MD on 10/9/2023 at 9:36:08 PM       ** Final **       ASSESSMENT / PLANS      #LLE edema and pain   #LLE cellulitis   #Ambulatory dysfunction  Presented with worsening left leg edema and pain affecting patient ambulation. Patient does not want placement  -PT/OT consult  -US -ve for DVT, limited exam was negative for acute thrombus but showed persistent chronic thrombus in the AVG   -Tylenol, Oxycodone and flexeril for pain  -Vascular surgery consulted, plan to obtain CTA a/p with arterial and venous phase to rule out central stenosis and graft duplex to evaluate graft stenosis   -Will obtain TTE to r/o high output HF   -Will start broad spectrum antibiotic coverage with Vanc and Zosyn to cover questionable LLE cellulitis      #ESRD on HD  -MWF  -Renal dosing meds   -Renal diet, patient refusing renal diet and would to be placed on regular diet   -Electrolytes wnl   -Nephrology consulted      #HFpEF  -Does not appear fluid overloaded (unilateral edema, no respiratory complaints)  -Daily weights  -Continue home medications  -Follow echo      #History of DVT  -US -ve for DVT (limited)  -Continue Eliquis     #Paroxysmal afib  -Continue home medications  -No acute issues     #Adrenal insufficiency  -Continue hydrocortisone     #Lymphadenopathy  -Could be underlying cause of edema  -Patient followed by oncology    Jermaine Garcia MD

## 2023-10-11 NOTE — CARE PLAN
The patient's goals for the shift include      The clinical goals for the shift include Pt will sleep comfortably throughout the night until 10/12/23 @0700    Over the shift, the patient did make progress toward the following goals. Barriers to progression include none.

## 2023-10-11 NOTE — PROGRESS NOTES
"Vancomycin Dosing by Pharmacy- INITIAL    Nghia Hall is a 58 y.o. year old male who Pharmacy has been consulted for vancomycin dosing for cellulitis, skin and soft tissue. Based on the patient's indication and renal status this patient will be dosed based on a goal pre-HD level of 15-25.     Patient is on hemodialysis.    Visit Vitals  /67 (BP Location: Left arm, Patient Position: Lying)   Pulse 68   Temp 36.6 °C (97.9 °F) (Temporal)   Resp 18        Lab Results   Component Value Date    CREATININE 9.43 (H) 10/11/2023    CREATININE 8.23 (H) 10/09/2023    CREATININE 6.98 (H) 09/22/2023    CREATININE 5.22 (H) 09/07/2023    CREATININE 5.72 (H) 09/05/2023    CREATININE CANCELED 09/05/2023        Patient weight is No results found for: \"PTWEIGHT\"    No results found for: \"CULTURE\"     No intake/output data recorded.  [unfilled]    Lab Results   Component Value Date    PATIENTTEMP 37.0 09/05/2023    PATIENTTEMP 37.0 09/05/2023    PATIENTTEMP 37.0 08/13/2023          Assessment/Plan     Patient will be given a loading dose of 2000 mg.  Will initiate vancomycin maintenance with 1000mg after HD on MWF  Follow-up level will be ordered on 10/16 at 0400 unless clinically indicated sooner.  Will continue to monitor renal function daily while on vancomycin and order serum creatinine at least every 48 hours if not already ordered.  Follow for continued vancomycin needs, clinical response, and signs/symptoms of toxicity.       Tereso Mayes"

## 2023-10-12 ENCOUNTER — APPOINTMENT (OUTPATIENT)
Dept: VASCULAR MEDICINE | Facility: HOSPITAL | Age: 58
DRG: 252 | End: 2023-10-12
Payer: COMMERCIAL

## 2023-10-12 ENCOUNTER — APPOINTMENT (OUTPATIENT)
Dept: CARDIOLOGY | Facility: HOSPITAL | Age: 58
DRG: 252 | End: 2023-10-12
Payer: COMMERCIAL

## 2023-10-12 ENCOUNTER — APPOINTMENT (OUTPATIENT)
Dept: RADIOLOGY | Facility: HOSPITAL | Age: 58
DRG: 252 | End: 2023-10-12
Payer: COMMERCIAL

## 2023-10-12 LAB
ALBUMIN SERPL BCP-MCNC: 2.5 G/DL (ref 3.4–5)
ALP SERPL-CCNC: 204 U/L (ref 33–120)
ALT SERPL W P-5'-P-CCNC: 10 U/L (ref 10–52)
ANION GAP SERPL CALC-SCNC: 18 MMOL/L (ref 10–20)
AST SERPL W P-5'-P-CCNC: 16 U/L (ref 9–39)
BILIRUB SERPL-MCNC: 1.1 MG/DL (ref 0–1.2)
BUN SERPL-MCNC: 38 MG/DL (ref 6–23)
CALCIUM SERPL-MCNC: 7.6 MG/DL (ref 8.6–10.6)
CHLORIDE SERPL-SCNC: 97 MMOL/L (ref 98–107)
CO2 SERPL-SCNC: 24 MMOL/L (ref 21–32)
CREAT SERPL-MCNC: 7.27 MG/DL (ref 0.5–1.3)
EJECTION FRACTION APICAL 4 CHAMBER: 55.6
ERYTHROCYTE [DISTWIDTH] IN BLOOD BY AUTOMATED COUNT: 16.1 % (ref 11.5–14.5)
GFR SERPL CREATININE-BSD FRML MDRD: 8 ML/MIN/1.73M*2
GLUCOSE BLD MANUAL STRIP-MCNC: 139 MG/DL (ref 74–99)
GLUCOSE SERPL-MCNC: 64 MG/DL (ref 74–99)
HCT VFR BLD AUTO: 30.2 % (ref 41–52)
HGB BLD-MCNC: 9.7 G/DL (ref 13.5–17.5)
MAGNESIUM SERPL-MCNC: 1.68 MG/DL (ref 1.6–2.4)
MCH RBC QN AUTO: 29.1 PG (ref 26–34)
MCHC RBC AUTO-ENTMCNC: 32.1 G/DL (ref 32–36)
MCV RBC AUTO: 91 FL (ref 80–100)
NRBC BLD-RTO: 0 /100 WBCS (ref 0–0)
PHOSPHATE SERPL-MCNC: 4.2 MG/DL (ref 2.5–4.9)
PLATELET # BLD AUTO: 234 X10*3/UL (ref 150–450)
PMV BLD AUTO: 10 FL (ref 7.5–11.5)
POTASSIUM SERPL-SCNC: 4.7 MMOL/L (ref 3.5–5.3)
PROT SERPL-MCNC: 5.1 G/DL (ref 6.4–8.2)
RBC # BLD AUTO: 3.33 X10*6/UL (ref 4.5–5.9)
SODIUM SERPL-SCNC: 134 MMOL/L (ref 136–145)
WBC # BLD AUTO: 5.5 X10*3/UL (ref 4.4–11.3)

## 2023-10-12 PROCEDURE — 2500000004 HC RX 250 GENERAL PHARMACY W/ HCPCS (ALT 636 FOR OP/ED): Performed by: NURSE PRACTITIONER

## 2023-10-12 PROCEDURE — 80053 COMPREHEN METABOLIC PANEL: CPT | Performed by: STUDENT IN AN ORGANIZED HEALTH CARE EDUCATION/TRAINING PROGRAM

## 2023-10-12 PROCEDURE — 2500000001 HC RX 250 WO HCPCS SELF ADMINISTERED DRUGS (ALT 637 FOR MEDICARE OP): Performed by: FAMILY MEDICINE

## 2023-10-12 PROCEDURE — 93306 TTE W/DOPPLER COMPLETE: CPT

## 2023-10-12 PROCEDURE — 1100000001 HC PRIVATE ROOM DAILY

## 2023-10-12 PROCEDURE — 82947 ASSAY GLUCOSE BLOOD QUANT: CPT

## 2023-10-12 PROCEDURE — 74174 CTA ABD&PLVS W/CONTRAST: CPT | Mod: MG

## 2023-10-12 PROCEDURE — 2500000004 HC RX 250 GENERAL PHARMACY W/ HCPCS (ALT 636 FOR OP/ED): Performed by: STUDENT IN AN ORGANIZED HEALTH CARE EDUCATION/TRAINING PROGRAM

## 2023-10-12 PROCEDURE — 2500000001 HC RX 250 WO HCPCS SELF ADMINISTERED DRUGS (ALT 637 FOR MEDICARE OP): Performed by: NURSE PRACTITIONER

## 2023-10-12 PROCEDURE — 94660 CPAP INITIATION&MGMT: CPT

## 2023-10-12 PROCEDURE — 36415 COLL VENOUS BLD VENIPUNCTURE: CPT | Performed by: STUDENT IN AN ORGANIZED HEALTH CARE EDUCATION/TRAINING PROGRAM

## 2023-10-12 PROCEDURE — 83735 ASSAY OF MAGNESIUM: CPT | Performed by: STUDENT IN AN ORGANIZED HEALTH CARE EDUCATION/TRAINING PROGRAM

## 2023-10-12 PROCEDURE — 93306 TTE W/DOPPLER COMPLETE: CPT | Performed by: INTERNAL MEDICINE

## 2023-10-12 PROCEDURE — 85027 COMPLETE CBC AUTOMATED: CPT | Performed by: STUDENT IN AN ORGANIZED HEALTH CARE EDUCATION/TRAINING PROGRAM

## 2023-10-12 PROCEDURE — 2500000002 HC RX 250 W HCPCS SELF ADMINISTERED DRUGS (ALT 637 FOR MEDICARE OP, ALT 636 FOR OP/ED): Performed by: NURSE PRACTITIONER

## 2023-10-12 PROCEDURE — 2500000001 HC RX 250 WO HCPCS SELF ADMINISTERED DRUGS (ALT 637 FOR MEDICARE OP)

## 2023-10-12 PROCEDURE — 93990 DOPPLER FLOW TESTING: CPT | Performed by: SURGERY

## 2023-10-12 PROCEDURE — 84100 ASSAY OF PHOSPHORUS: CPT | Performed by: STUDENT IN AN ORGANIZED HEALTH CARE EDUCATION/TRAINING PROGRAM

## 2023-10-12 PROCEDURE — 99232 SBSQ HOSP IP/OBS MODERATE 35: CPT | Performed by: STUDENT IN AN ORGANIZED HEALTH CARE EDUCATION/TRAINING PROGRAM

## 2023-10-12 PROCEDURE — 2550000001 HC RX 255 CONTRASTS: Performed by: STUDENT IN AN ORGANIZED HEALTH CARE EDUCATION/TRAINING PROGRAM

## 2023-10-12 PROCEDURE — 93990 DOPPLER FLOW TESTING: CPT

## 2023-10-12 RX ORDER — VANCOMYCIN HYDROCHLORIDE 1 G/200ML
1 INJECTION, SOLUTION INTRAVENOUS
Status: DISCONTINUED | OUTPATIENT
Start: 2023-10-13 | End: 2023-10-16

## 2023-10-12 RX ADMIN — METOPROLOL TARTRATE 50 MG: 50 TABLET, FILM COATED ORAL at 14:25

## 2023-10-12 RX ADMIN — PIPERACILLIN SODIUM AND TAZOBACTAM SODIUM 2.25 G: 2; .25 INJECTION, SOLUTION INTRAVENOUS at 22:30

## 2023-10-12 RX ADMIN — AMLODIPINE BESYLATE 10 MG: 10 TABLET ORAL at 14:26

## 2023-10-12 RX ADMIN — OXYCODONE HYDROCHLORIDE 10 MG: 5 TABLET ORAL at 14:28

## 2023-10-12 RX ADMIN — IOHEXOL 100 ML: 350 INJECTION, SOLUTION INTRAVENOUS at 13:25

## 2023-10-12 RX ADMIN — OXYCODONE HYDROCHLORIDE 10 MG: 5 TABLET ORAL at 19:23

## 2023-10-12 RX ADMIN — CYCLOBENZAPRINE 5 MG: 10 TABLET, FILM COATED ORAL at 21:14

## 2023-10-12 RX ADMIN — ASPIRIN 81 MG: 81 TABLET, COATED ORAL at 14:26

## 2023-10-12 RX ADMIN — METOPROLOL TARTRATE 50 MG: 50 TABLET, FILM COATED ORAL at 21:16

## 2023-10-12 RX ADMIN — PANTOPRAZOLE SODIUM 40 MG: 40 TABLET, DELAYED RELEASE ORAL at 06:02

## 2023-10-12 RX ADMIN — NEPHROCAP 1 CAPSULE: 1 CAP ORAL at 14:25

## 2023-10-12 RX ADMIN — CYANOCOBALAMIN TAB 1000 MCG 1000 MCG: 1000 TAB at 14:26

## 2023-10-12 RX ADMIN — DIPHENHYDRAMINE HYDROCHLORIDE 25 MG: 25 CAPSULE ORAL at 21:14

## 2023-10-12 RX ADMIN — MELATONIN 10 MG: 3 TAB ORAL at 21:15

## 2023-10-12 RX ADMIN — PIPERACILLIN SODIUM AND TAZOBACTAM SODIUM 2.25 G: 2; .25 INJECTION, SOLUTION INTRAVENOUS at 06:03

## 2023-10-12 RX ADMIN — Medication 1 TABLET: at 14:25

## 2023-10-12 RX ADMIN — HYDROCORTISONE 5 MG: 5 TABLET ORAL at 21:00

## 2023-10-12 RX ADMIN — SEVELAMER CARBONATE 1600 MG: 800 TABLET, FILM COATED ORAL at 14:26

## 2023-10-12 RX ADMIN — HYDROCORTISONE 15 MG: 5 TABLET ORAL at 14:25

## 2023-10-12 RX ADMIN — CYCLOBENZAPRINE 5 MG: 10 TABLET, FILM COATED ORAL at 14:26

## 2023-10-12 RX ADMIN — PIPERACILLIN SODIUM AND TAZOBACTAM SODIUM 2.25 G: 2; .25 INJECTION, SOLUTION INTRAVENOUS at 14:25

## 2023-10-12 ASSESSMENT — PAIN - FUNCTIONAL ASSESSMENT
PAIN_FUNCTIONAL_ASSESSMENT: 0-10
PAIN_FUNCTIONAL_ASSESSMENT: 0-10

## 2023-10-12 ASSESSMENT — PAIN SCALES - GENERAL
PAINLEVEL_OUTOF10: 8
PAINLEVEL_OUTOF10: 9

## 2023-10-12 NOTE — PROGRESS NOTES
Nghia Hall is a 58 y.o. male on day 3 of admission presenting with Inability to walk.  Patient not medically ready for discharge.  Patient found to have a right groin bleed.  Scheduled for an Echo.  César rivero ot monitor patient for all home going needs      Glendy Ferrer RN

## 2023-10-12 NOTE — SIGNIFICANT EVENT
RAPID RESPONSE     10/12/23 0740   Onset Documentation   Rapid Response Initiated By Rapid response RN   Location/Room [facility]   Pager Time 0740   Arrival Time 0745   Event End Time 0810   Level II Called No   Primary Reason for Call Bleeding     Arrived to room. Patient A&O X4. Lying flat with pressure to left graft site  bleeding stopped. Dr. Porter and DACR Dr. Elmore to bedside. To consult vascular to evaluate graft today.  VSS. Patient repositioned for comfort-remains without bleeding.     Please call Rapid Response Team for any other concerns.

## 2023-10-12 NOTE — CARE PLAN
Problem: Safety  Goal: I will remain free of falls  Outcome: Met     Problem: Daily Care  Goal: Daily care needs are met  Outcome: Met     Problem: Psychosocial Needs  Goal: Demonstrates ability to cope with hospitalization/illness  Outcome: Met  Goal: Collaborate with me, my family, and caregiver to identify my specific goals  Outcome: Met   The patient's goals for the shift include      The clinical goals for the shift include Pt will remain comfortable throughout the night    Over the shift, the patient did not make progress toward the following goals. Barriers to progression include bleed from left leg femoral fistula site. Recommendations to address these barriers include consulting with vascular to figure out the source of the bleed after tests today.

## 2023-10-12 NOTE — PROGRESS NOTES
..                                                                                                                                                                                                                                                                                             INTERNAL MEDICINE PROGRESS NOTE     BRIEF NARRATIVE      Nghia Hall is a 58 y.o. male on day 3 of admission presenting with Inability to walk. Patient has past medical history of ESRD s/p DDKT (2006, subsequent graft failure 07/2021, on HD MWF at [facility] through LT groin AV graft on [address]; not currently on immunosuppression), pAfib (on Apixaban), HFpEF, HCV (s/p treatment), gout, lymphadenopathy (following with oncology, inconclusive biopsy in 01/2023), DVT, and Adrenal Insufficiency (on hydrocortisone) who presented to the ED with difficulty ambulating due to secondary LLE pain and swelling. His left leg (which is also used for his HD access) is severely edematous and exquisitely tender to palpation. He reports being compliant with his Eliquis. The edema is non dependent. He is not interested in placement at this time due to being employed.    Nephrology was consulted for HD. Vascular surgery was also consulted to evaluate left AVG. Patient left leg edema and pain is worsening. 10/12 patient dialysis access started oozing bright red blood, reached out to vascular surgery for urgent evaluation.     SUBJECTIVE     Pt still complaining of left leg pain and edema. Early this morning patient dialysis access started oozing bright red blood.     OBJECTIVE      Visit Vitals  /62   Pulse 60   Temp 36.8 °C (98.3 °F) (Tympanic)   Resp 16        Intake/Output Summary (Last 24 hours) at 10/12/2023 1134  Last data filed at 10/12/2023 0500  Gross per 24 hour   Intake 100 ml   Output --   Net 100 ml         Physical Exam   Vitals reviewed.   Constitutional:       Appearance: He is obese.      Comments: Older than stated age   HENT:       Head: Normocephalic and atraumatic.   Neck:      Comments: Left sided PIV  Cardiovascular:      Rate and Rhythm: Normal rate and regular rhythm.      Heart sounds: Normal heart sounds.   Pulmonary:      Effort: Pulmonary effort is normal.      Breath sounds: Normal air entry.   Abdominal:      General: Bowel sounds are normal.      Palpations: Abdomen is soft.      Tenderness: There is no abdominal tenderness.   Musculoskeletal:         General: Tenderness present. No deformity.      Left lower leg: Edema present.   Skin:     General: Skin is warm and dry.   Neurological:      General: No focal deficit present.      Mental Status: He is oriented to person, place, and time.   Psychiatric:         Mood and Affect: Mood normal.         Behavior: Behavior normal.     Current Meds   amLODIPine, 10 mg, oral, Daily  apixaban, 5 mg, oral, BID  aspirin, 81 mg, oral, Daily  B complex-vitamin C-folic acid, 1 capsule, oral, Daily  cyanocobalamin, 1,000 mcg, oral, Daily  [START ON 10/13/2023] epoetin urmila or biosimilar, 10,000 Units, intravenous, Every Mon/Wed/Fri  hydrocortisone, 15 mg, oral, q AM  hydrocortisone, 5 mg, oral, q PM  lactobacillus acidophilus, 1 tablet, oral, Daily  melatonin, 10 mg, oral, Daily  metoprolol tartrate, 50 mg, oral, BID  multivitamin with minerals, 1 tablet, oral, Daily  pantoprazole, 40 mg, oral, Daily before breakfast  paricalcitol, 8 mcg, intravenous, Once per day on Mon Wed Fri  piperacillin-tazobactam, 2.25 g, intravenous, q8h  psyllium, 1 packet, oral, Daily  sevelamer carbonate, 1,600 mg, oral, TID with meals  [START ON 10/14/2023] vancomycin, 1 g, intravenous, After Dialysis       PRN medications: acetaminophen **OR** acetaminophen **OR** acetaminophen, cyclobenzaprine, diphenhydrAMINE, diphenhydrAMINE, melatonin, oxyCODONE     LABS and IMAGING     WBC   Date Value Ref Range Status   10/12/2023 5.5 4.4 - 11.3 x10*3/uL Final   10/11/2023 5.4 4.4 - 11.3 x10*3/uL Final   10/09/2023 5.7 4.4 -  11.3 x10*3/uL Final     Hemoglobin   Date Value Ref Range Status   10/12/2023 9.7 (L) 13.5 - 17.5 g/dL Final   10/11/2023 10.3 (L) 13.5 - 17.5 g/dL Final   10/09/2023 12.3 (L) 13.5 - 17.5 g/dL Final     Hematocrit   Date Value Ref Range Status   10/12/2023 30.2 (L) 41.0 - 52.0 % Final   10/11/2023 31.9 (L) 41.0 - 52.0 % Final   10/09/2023 36.7 (L) 41.0 - 52.0 % Final     Bicarbonate   Date Value Ref Range Status   10/12/2023 24 21 - 32 mmol/L Final   10/11/2023 22 21 - 32 mmol/L Final   10/09/2023 23 21 - 32 mmol/L Final     Creatinine   Date Value Ref Range Status   10/12/2023 7.27 (H) 0.50 - 1.30 mg/dL Final   10/11/2023 9.43 (H) 0.50 - 1.30 mg/dL Final   10/09/2023 8.23 (H) 0.50 - 1.30 mg/dL Final     Calcium   Date Value Ref Range Status   10/12/2023 7.6 (L) 8.6 - 10.6 mg/dL Final   10/11/2023 7.9 (L) 8.6 - 10.6 mg/dL Final   10/09/2023 8.8 8.6 - 10.6 mg/dL Final       Vascular US lower extremity hemodialysis access duplex left  Preliminary Cardiology Report              [place] [address]    Tel [phone] and Fax [phone]       Preliminary Vascular Lab Report       Dialysis Access Evaluation       Patient Name:     PRABHJOT Bush Physician:   [id number] Tim Knight DO  Study Date:       10/12/2023    Referring Physician: [id number] VERONICA  MRN/PID:          [id number]      PCP:  Accession/Order#: [id number]  CC Report to:  YOB: 1965     Technologist:        Dorian Rutherford RVT  Gender:           M             Technologist 2:  Admission Status: Inpatient     Location Performed:  [facility]       Diagnosis/ICD: Pain from vascular prosthetic devices, implants and grafts,                 initial encounter-T82.055Z       PRELIMINARY CONCLUSIONS:  Dialysis Access Evaluation: Common femoral artery to common femoral vein AVG appears widely patent. At the mid segment the AVG appears to be slightly kinked, no high velocities noted  in that area. Edema and lymph nodes noted in left groin.     Imaging & Doppler Findings:     Dialysis Access Graft                  Left Velocity  Arterial Anast  292 cm/s  Flow Prox       363 cm/s  Flow Prox/Mid   168 cm/s  Mid             160 cm/s  Mid/Flow Distal 280 cm/s  Flow Distal     193 cm/s  Outflow Anast   268 cm/s  Outflow Vein    270 cm/s  Pre Anast       130 cm/s    Volume Flow 1253.00 ml/min         VASCULAR PRELIMINARY REPORT  completed by Dorian Rutherford RVT on 10/12/2023 at 10:41:44 AM       ** Final **       ASSESSMENT / PLANS      #LLE edema and pain   #LLE cellulitis   #Ambulatory dysfunction  #Left femoral access hemorrhage   Presented with worsening left leg edema and pain affecting patient ambulation. Patient does not want placement  -PT/OT consult  -US -ve for DVT, limited exam was negative for acute thrombus but showed persistent chronic thrombus in the AVG   -Tylenol, Oxycodone and flexeril for pain  -Vascular surgery consulted, plan to obtain CTA a/p with arterial and venous phase to rule out central stenosis and graft duplex to evaluate graft stenosis   -Will obtain TTE to r/o high output HF   -Will start broad spectrum antibiotic coverage with Vanc and Zosyn to cover questionable LLE cellulitis   -10/12, HD access hemorrhage noted. Vascular surgery made aware. HH stable      #ESRD on HD  -MWF  -Renal dosing meds   -Renal diet, patient refusing renal diet and would to be placed on regular diet   -Electrolytes wnl   -Nephrology consulted      #HFpEF  -Does not appear fluid overloaded (unilateral edema, no respiratory complaints)  -Daily weights  -Continue home medications  -Follow echo      #History of DVT  -US -ve for DVT (limited)  -Continue Eliquis     #Paroxysmal afib  -Continue home medications  -No acute issues     #Adrenal insufficiency  -Continue hydrocortisone     #Lymphadenopathy  -Could be underlying cause of edema  -Patient followed by oncology    Jermaine Garcia MD

## 2023-10-13 ENCOUNTER — APPOINTMENT (OUTPATIENT)
Dept: RADIOLOGY | Facility: HOSPITAL | Age: 58
DRG: 252 | End: 2023-10-13
Payer: COMMERCIAL

## 2023-10-13 ENCOUNTER — APPOINTMENT (OUTPATIENT)
Dept: DIALYSIS | Facility: HOSPITAL | Age: 58
DRG: 252 | End: 2023-10-13
Payer: COMMERCIAL

## 2023-10-13 LAB
ALBUMIN SERPL BCP-MCNC: 2.5 G/DL (ref 3.4–5)
ALP SERPL-CCNC: 216 U/L (ref 33–120)
ALT SERPL W P-5'-P-CCNC: 11 U/L (ref 10–52)
ANION GAP SERPL CALC-SCNC: 20 MMOL/L (ref 10–20)
AST SERPL W P-5'-P-CCNC: 18 U/L (ref 9–39)
BILIRUB SERPL-MCNC: 1 MG/DL (ref 0–1.2)
BUN SERPL-MCNC: 49 MG/DL (ref 6–23)
CALCIUM SERPL-MCNC: 7.7 MG/DL (ref 8.6–10.6)
CHLORIDE SERPL-SCNC: 98 MMOL/L (ref 98–107)
CO2 SERPL-SCNC: 21 MMOL/L (ref 21–32)
CREAT SERPL-MCNC: 8.58 MG/DL (ref 0.5–1.3)
ERYTHROCYTE [DISTWIDTH] IN BLOOD BY AUTOMATED COUNT: 15.9 % (ref 11.5–14.5)
GFR SERPL CREATININE-BSD FRML MDRD: 7 ML/MIN/1.73M*2
GLUCOSE BLD MANUAL STRIP-MCNC: 159 MG/DL (ref 74–99)
GLUCOSE BLD MANUAL STRIP-MCNC: 80 MG/DL (ref 74–99)
GLUCOSE SERPL-MCNC: 101 MG/DL (ref 74–99)
HCT VFR BLD AUTO: 29.2 % (ref 41–52)
HGB BLD-MCNC: 9.4 G/DL (ref 13.5–17.5)
MAGNESIUM SERPL-MCNC: 1.85 MG/DL (ref 1.6–2.4)
MCH RBC QN AUTO: 29.1 PG (ref 26–34)
MCHC RBC AUTO-ENTMCNC: 32.2 G/DL (ref 32–36)
MCV RBC AUTO: 90 FL (ref 80–100)
NRBC BLD-RTO: 0 /100 WBCS (ref 0–0)
PHOSPHATE SERPL-MCNC: 5.9 MG/DL (ref 2.5–4.9)
PLATELET # BLD AUTO: 224 X10*3/UL (ref 150–450)
PMV BLD AUTO: 10 FL (ref 7.5–11.5)
POTASSIUM SERPL-SCNC: 4.8 MMOL/L (ref 3.5–5.3)
PROT SERPL-MCNC: 5 G/DL (ref 6.4–8.2)
RBC # BLD AUTO: 3.23 X10*6/UL (ref 4.5–5.9)
SODIUM SERPL-SCNC: 134 MMOL/L (ref 136–145)
WBC # BLD AUTO: 5.8 X10*3/UL (ref 4.4–11.3)

## 2023-10-13 PROCEDURE — 99233 SBSQ HOSP IP/OBS HIGH 50: CPT | Performed by: INTERNAL MEDICINE

## 2023-10-13 PROCEDURE — C1725 CATH, TRANSLUMIN NON-LASER: HCPCS

## 2023-10-13 PROCEDURE — 99153 MOD SED SAME PHYS/QHP EA: CPT

## 2023-10-13 PROCEDURE — 84075 ASSAY ALKALINE PHOSPHATASE: CPT | Mod: CMCLAB | Performed by: STUDENT IN AN ORGANIZED HEALTH CARE EDUCATION/TRAINING PROGRAM

## 2023-10-13 PROCEDURE — 2500000001 HC RX 250 WO HCPCS SELF ADMINISTERED DRUGS (ALT 637 FOR MEDICARE OP): Performed by: FAMILY MEDICINE

## 2023-10-13 PROCEDURE — 87075 CULTR BACTERIA EXCEPT BLOOD: CPT | Mod: CMCLAB | Performed by: INTERNAL MEDICINE

## 2023-10-13 PROCEDURE — 99152 MOD SED SAME PHYS/QHP 5/>YRS: CPT

## 2023-10-13 PROCEDURE — 2500000004 HC RX 250 GENERAL PHARMACY W/ HCPCS (ALT 636 FOR OP/ED): Performed by: NURSE PRACTITIONER

## 2023-10-13 PROCEDURE — 36415 COLL VENOUS BLD VENIPUNCTURE: CPT | Mod: CMCLAB | Performed by: STUDENT IN AN ORGANIZED HEALTH CARE EDUCATION/TRAINING PROGRAM

## 2023-10-13 PROCEDURE — 067N3ZZ DILATION OF LEFT FEMORAL VEIN, PERCUTANEOUS APPROACH: ICD-10-PCS | Performed by: RADIOLOGY

## 2023-10-13 PROCEDURE — 36901 INTRO CATH DIALYSIS CIRCUIT: CPT | Performed by: RADIOLOGY

## 2023-10-13 PROCEDURE — 2500000001 HC RX 250 WO HCPCS SELF ADMINISTERED DRUGS (ALT 637 FOR MEDICARE OP)

## 2023-10-13 PROCEDURE — 36415 COLL VENOUS BLD VENIPUNCTURE: CPT | Mod: CMCLAB | Performed by: INTERNAL MEDICINE

## 2023-10-13 PROCEDURE — 2500000004 HC RX 250 GENERAL PHARMACY W/ HCPCS (ALT 636 FOR OP/ED): Performed by: STUDENT IN AN ORGANIZED HEALTH CARE EDUCATION/TRAINING PROGRAM

## 2023-10-13 PROCEDURE — 99222 1ST HOSP IP/OBS MODERATE 55: CPT | Performed by: STUDENT IN AN ORGANIZED HEALTH CARE EDUCATION/TRAINING PROGRAM

## 2023-10-13 PROCEDURE — 85027 COMPLETE CBC AUTOMATED: CPT | Performed by: STUDENT IN AN ORGANIZED HEALTH CARE EDUCATION/TRAINING PROGRAM

## 2023-10-13 PROCEDURE — B51W1ZZ FLUOROSCOPY OF DIALYSIS SHUNT/FISTULA USING LOW OSMOLAR CONTRAST: ICD-10-PCS | Performed by: RADIOLOGY

## 2023-10-13 PROCEDURE — 99232 SBSQ HOSP IP/OBS MODERATE 35: CPT | Performed by: STUDENT IN AN ORGANIZED HEALTH CARE EDUCATION/TRAINING PROGRAM

## 2023-10-13 PROCEDURE — 2500000002 HC RX 250 W HCPCS SELF ADMINISTERED DRUGS (ALT 637 FOR MEDICARE OP, ALT 636 FOR OP/ED): Performed by: NURSE PRACTITIONER

## 2023-10-13 PROCEDURE — 8010000001 HC DIALYSIS - HEMODIALYSIS PER DAY

## 2023-10-13 PROCEDURE — 76937 US GUIDE VASCULAR ACCESS: CPT | Performed by: RADIOLOGY

## 2023-10-13 PROCEDURE — 82947 ASSAY GLUCOSE BLOOD QUANT: CPT | Mod: CMCLAB

## 2023-10-13 PROCEDURE — 83735 ASSAY OF MAGNESIUM: CPT | Mod: CMCLAB | Performed by: STUDENT IN AN ORGANIZED HEALTH CARE EDUCATION/TRAINING PROGRAM

## 2023-10-13 PROCEDURE — 87077 CULTURE AEROBIC IDENTIFY: CPT | Performed by: INTERNAL MEDICINE

## 2023-10-13 PROCEDURE — 2500000004 HC RX 250 GENERAL PHARMACY W/ HCPCS (ALT 636 FOR OP/ED): Performed by: RADIOLOGY

## 2023-10-13 PROCEDURE — 84100 ASSAY OF PHOSPHORUS: CPT | Performed by: STUDENT IN AN ORGANIZED HEALTH CARE EDUCATION/TRAINING PROGRAM

## 2023-10-13 PROCEDURE — 2720000007 HC OR 272 NO HCPCS

## 2023-10-13 PROCEDURE — 94660 CPAP INITIATION&MGMT: CPT

## 2023-10-13 PROCEDURE — 87040 BLOOD CULTURE FOR BACTERIA: CPT | Mod: CMCLAB | Performed by: INTERNAL MEDICINE

## 2023-10-13 PROCEDURE — 6350000001 HC RX 635 EPOETIN >10,000 UNITS: Mod: JZ | Performed by: INTERNAL MEDICINE

## 2023-10-13 PROCEDURE — 77001 FLUOROGUIDE FOR VEIN DEVICE: CPT | Performed by: RADIOLOGY

## 2023-10-13 PROCEDURE — C1894 INTRO/SHEATH, NON-LASER: HCPCS

## 2023-10-13 PROCEDURE — 2500000001 HC RX 250 WO HCPCS SELF ADMINISTERED DRUGS (ALT 637 FOR MEDICARE OP): Performed by: NURSE PRACTITIONER

## 2023-10-13 PROCEDURE — 2500000004 HC RX 250 GENERAL PHARMACY W/ HCPCS (ALT 636 FOR OP/ED): Performed by: INTERNAL MEDICINE

## 2023-10-13 PROCEDURE — 1100000001 HC PRIVATE ROOM DAILY

## 2023-10-13 PROCEDURE — 36907 BALO ANGIOP CTR DIALYSIS SEG: CPT | Performed by: RADIOLOGY

## 2023-10-13 PROCEDURE — 36902 INTRO CATH DIALYSIS CIRCUIT: CPT | Performed by: RADIOLOGY

## 2023-10-13 PROCEDURE — C1769 GUIDE WIRE: HCPCS

## 2023-10-13 RX ORDER — PARICALCITOL 5 UG/ML
8 INJECTION, SOLUTION INTRAVENOUS 3 TIMES WEEKLY
Status: DISCONTINUED | OUTPATIENT
Start: 2023-10-13 | End: 2023-11-01

## 2023-10-13 RX ORDER — FENTANYL CITRATE 50 UG/ML
INJECTION, SOLUTION INTRAMUSCULAR; INTRAVENOUS AS NEEDED
Status: COMPLETED | OUTPATIENT
Start: 2023-10-13 | End: 2023-10-13

## 2023-10-13 RX ORDER — HEPARIN 100 UNIT/ML
SYRINGE INTRAVENOUS AS NEEDED
Status: COMPLETED | OUTPATIENT
Start: 2023-10-13 | End: 2023-10-13

## 2023-10-13 RX ADMIN — SEVELAMER CARBONATE 1600 MG: 800 TABLET, FILM COATED ORAL at 18:47

## 2023-10-13 RX ADMIN — AMLODIPINE BESYLATE 10 MG: 10 TABLET ORAL at 11:32

## 2023-10-13 RX ADMIN — PIPERACILLIN SODIUM AND TAZOBACTAM SODIUM 2.25 G: 2; .25 INJECTION, SOLUTION INTRAVENOUS at 06:30

## 2023-10-13 RX ADMIN — DIPHENHYDRAMINE HYDROCHLORIDE 25 MG: 25 CAPSULE ORAL at 06:38

## 2023-10-13 RX ADMIN — OXYCODONE HYDROCHLORIDE 10 MG: 5 TABLET ORAL at 20:35

## 2023-10-13 RX ADMIN — OXYCODONE HYDROCHLORIDE 10 MG: 5 TABLET ORAL at 00:31

## 2023-10-13 RX ADMIN — ACETAMINOPHEN 650 MG: 650 SOLUTION ORAL at 11:20

## 2023-10-13 RX ADMIN — CYCLOBENZAPRINE 5 MG: 10 TABLET, FILM COATED ORAL at 11:31

## 2023-10-13 RX ADMIN — CYANOCOBALAMIN TAB 1000 MCG 1000 MCG: 1000 TAB at 11:31

## 2023-10-13 RX ADMIN — PIPERACILLIN SODIUM AND TAZOBACTAM SODIUM 2.25 G: 2; .25 INJECTION, SOLUTION INTRAVENOUS at 14:06

## 2023-10-13 RX ADMIN — CYCLOBENZAPRINE 5 MG: 10 TABLET, FILM COATED ORAL at 18:55

## 2023-10-13 RX ADMIN — MELATONIN 10 MG: 3 TAB ORAL at 18:48

## 2023-10-13 RX ADMIN — HYDROCORTISONE 5 MG: 5 TABLET ORAL at 20:31

## 2023-10-13 RX ADMIN — NEPHROCAP 1 CAPSULE: 1 CAP ORAL at 09:00

## 2023-10-13 RX ADMIN — SEVELAMER CARBONATE 1600 MG: 800 TABLET, FILM COATED ORAL at 11:31

## 2023-10-13 RX ADMIN — METOPROLOL TARTRATE 50 MG: 50 TABLET, FILM COATED ORAL at 20:31

## 2023-10-13 RX ADMIN — Medication 1 TABLET: at 11:31

## 2023-10-13 RX ADMIN — OXYCODONE HYDROCHLORIDE 10 MG: 5 TABLET ORAL at 05:32

## 2023-10-13 RX ADMIN — VANCOMYCIN HYDROCHLORIDE 1 G: 1 INJECTION, SOLUTION INTRAVENOUS at 18:47

## 2023-10-13 RX ADMIN — PANTOPRAZOLE SODIUM 40 MG: 40 TABLET, DELAYED RELEASE ORAL at 07:00

## 2023-10-13 RX ADMIN — PIPERACILLIN SODIUM AND TAZOBACTAM SODIUM 2.25 G: 2; .25 INJECTION, SOLUTION INTRAVENOUS at 21:58

## 2023-10-13 RX ADMIN — PARICALCITOL 8 MCG: 5 INJECTION, SOLUTION INTRAVENOUS at 18:47

## 2023-10-13 RX ADMIN — ASPIRIN 81 MG: 81 TABLET, COATED ORAL at 11:32

## 2023-10-13 RX ADMIN — METOPROLOL TARTRATE 50 MG: 50 TABLET, FILM COATED ORAL at 11:31

## 2023-10-13 RX ADMIN — HYDROCORTISONE 15 MG: 5 TABLET ORAL at 11:30

## 2023-10-13 RX ADMIN — EPOETIN ALFA 10000 UNITS: 10000 SOLUTION INTRAVENOUS; SUBCUTANEOUS at 22:00

## 2023-10-13 RX ADMIN — PSYLLIUM HUSK 1 PACKET: 3.4 POWDER ORAL at 11:31

## 2023-10-13 RX ADMIN — FENTANYL CITRATE 50 MCG: 50 INJECTION, SOLUTION INTRAMUSCULAR; INTRAVENOUS at 17:27

## 2023-10-13 ASSESSMENT — PAIN - FUNCTIONAL ASSESSMENT
PAIN_FUNCTIONAL_ASSESSMENT: 0-10
PAIN_FUNCTIONAL_ASSESSMENT: NO/DENIES PAIN
PAIN_FUNCTIONAL_ASSESSMENT: 0-10
PAIN_FUNCTIONAL_ASSESSMENT: 0-10

## 2023-10-13 ASSESSMENT — COGNITIVE AND FUNCTIONAL STATUS - GENERAL
DAILY ACTIVITIY SCORE: 24
MOBILITY SCORE: 24

## 2023-10-13 ASSESSMENT — PAIN DESCRIPTION - DESCRIPTORS
DESCRIPTORS: ACHING

## 2023-10-13 ASSESSMENT — PAIN SCALES - GENERAL
PAINLEVEL_OUTOF10: 6
PAINLEVEL_OUTOF10: 6
PAINLEVEL_OUTOF10: 5 - MODERATE PAIN
PAINLEVEL_OUTOF10: 8
PAINLEVEL_OUTOF10: 5 - MODERATE PAIN
PAINLEVEL_OUTOF10: 8
PAINLEVEL_OUTOF10: 7
PAINLEVEL_OUTOF10: 6
PAINLEVEL_OUTOF10: 8
PAINLEVEL_OUTOF10: 7
PAINLEVEL_OUTOF10: 6
PAINLEVEL_OUTOF10: 2
PAINLEVEL_OUTOF10: 7
PAINLEVEL_OUTOF10: 6
PAINLEVEL_OUTOF10: 1
PAINLEVEL_OUTOF10: 0 - NO PAIN

## 2023-10-13 NOTE — PROGRESS NOTES
...                                                                                                                                                                                                                                                                                             INTERNAL MEDICINE PROGRESS NOTE     BRIEF NARRATIVE      Nghia Hall is a 58 y.o. male on day 4 of admission presenting with Inability to walk. Patient has past medical history of ESRD s/p DDKT (2006, subsequent graft failure 07/2021, on HD MWF at [facility] through LT groin AV graft on [address]; not currently on immunosuppression), pAfib (on Apixaban), HFpEF, HCV (s/p treatment), gout, lymphadenopathy (following with oncology, inconclusive biopsy in 01/2023), DVT, and Adrenal Insufficiency (on hydrocortisone) who presented to the ED with difficulty ambulating due to secondary LLE pain and swelling. His left leg (which is also used for his HD access) is severely edematous and exquisitely tender to palpation. He reports being compliant with his Eliquis. The edema is non dependent. He is not interested in placement at this time due to being employed.    Nephrology was consulted for HD. Vascular surgery was also consulted to evaluate left AVG.  On 10/12 patient dialysis access started oozing bright red blood, reached out to vascular surgery for urgent evaluation. CTA a/p and LLE doppler were performed on 10/12/23, consulted IR for fistulogram and cardiology for right HF recommendations.     SUBJECTIVE     Pt still complaining of left leg pain and edema. No acute events overnight.     OBJECTIVE      Visit Vitals  /70 (BP Location: Right arm, Patient Position: Sitting)   Pulse 76   Temp 36.5 °C (97.7 °F) (Tympanic)   Resp 18        Intake/Output Summary (Last 24 hours) at 10/13/2023 1209  Last data filed at 10/13/2023 1101  Gross per 24 hour   Intake 800 ml   Output --   Net 800 ml         Physical Exam   Vitals reviewed.    Constitutional:       Appearance: He is obese.      Comments: Older than stated age   HENT:      Head: Normocephalic and atraumatic.   Neck:      Comments: Left sided PIV  Cardiovascular:      Rate and Rhythm: Normal rate and regular rhythm.      Heart sounds: Normal heart sounds.   Pulmonary:      Effort: Pulmonary effort is normal.      Breath sounds: Normal air entry.   Abdominal:      General: Bowel sounds are normal.      Palpations: Abdomen is soft.      Tenderness: There is no abdominal tenderness.   Musculoskeletal:         General: Tenderness present. No deformity.      Left lower leg: Edema present.   Skin:     General: Skin is warm and dry.   Neurological:      General: No focal deficit present.      Mental Status: He is oriented to person, place, and time.   Psychiatric:         Mood and Affect: Mood normal.         Behavior: Behavior normal.     Current Meds   amLODIPine, 10 mg, oral, Daily  [Held by provider] apixaban, 5 mg, oral, BID  aspirin, 81 mg, oral, Daily  B complex-vitamin C-folic acid, 1 capsule, oral, Daily  cyanocobalamin, 1,000 mcg, oral, Daily  epoetin urmila or biosimilar, 10,000 Units, intravenous, Every Mon/Wed/Fri  hydrocortisone, 15 mg, oral, q AM  hydrocortisone, 5 mg, oral, q PM  lactobacillus acidophilus, 1 tablet, oral, Daily  melatonin, 10 mg, oral, Daily  metoprolol tartrate, 50 mg, oral, BID  multivitamin with minerals, 1 tablet, oral, Daily  pantoprazole, 40 mg, oral, Daily before breakfast  paricalcitoL, 8 mcg, intravenous, Once per day on Mon Wed Fri  piperacillin-tazobactam, 2.25 g, intravenous, q8h  psyllium, 1 packet, oral, Daily  sevelamer carbonate, 1,600 mg, oral, TID with meals  vancomycin, 1 g, intravenous, After Dialysis       PRN medications: acetaminophen **OR** acetaminophen **OR** acetaminophen, cyclobenzaprine, diphenhydrAMINE, diphenhydrAMINE, melatonin, oxyCODONE     LABS and IMAGING     WBC   Date Value Ref Range Status   10/13/2023 5.8 4.4 - 11.3 x10*3/uL  Final   10/12/2023 5.5 4.4 - 11.3 x10*3/uL Final   10/11/2023 5.4 4.4 - 11.3 x10*3/uL Final     Hemoglobin   Date Value Ref Range Status   10/13/2023 9.4 (L) 13.5 - 17.5 g/dL Final   10/12/2023 9.7 (L) 13.5 - 17.5 g/dL Final   10/11/2023 10.3 (L) 13.5 - 17.5 g/dL Final     Hematocrit   Date Value Ref Range Status   10/13/2023 29.2 (L) 41.0 - 52.0 % Final   10/12/2023 30.2 (L) 41.0 - 52.0 % Final   10/11/2023 31.9 (L) 41.0 - 52.0 % Final     Bicarbonate   Date Value Ref Range Status   10/13/2023 21 21 - 32 mmol/L Final   10/12/2023 24 21 - 32 mmol/L Final   10/11/2023 22 21 - 32 mmol/L Final     Creatinine   Date Value Ref Range Status   10/13/2023 8.58 (H) 0.50 - 1.30 mg/dL Final   10/12/2023 7.27 (H) 0.50 - 1.30 mg/dL Final   10/11/2023 9.43 (H) 0.50 - 1.30 mg/dL Final     Calcium   Date Value Ref Range Status   10/13/2023 7.7 (L) 8.6 - 10.6 mg/dL Final   10/12/2023 7.6 (L) 8.6 - 10.6 mg/dL Final   10/11/2023 7.9 (L) 8.6 - 10.6 mg/dL Final       CT angio abdomen pelvis w and or wo IV IV contrast  Narrative: Interpreted By:  Vic Orta,  and Doc Pike   STUDY:  CT ANGIO ABDOMEN PELVIS W AND/OR WO IV IV CONTRAST;  10/12/2023 1:24  pm      INDICATION:  Signs/Symptoms:venous obstruction, severe left leg swelling, left  femoral AVG.      COMPARISON:  CT abdomen pelvis 09/23/2023  PET-CT 10/06/2023  CTA abdomen pelvis with bilateral lower extremity runoff 12/20/2022      ACCESSION NUMBER(S):  [id number]      ORDERING CLINICIAN:  NINA EWING      PROCEDURE:  CT ANGIOGRAM OF THE ABDOMEN AND PELVIS      TECHNIQUE:  High-resolution contrast-enhanced helical CT of the  abdomen, pelvis  and both lower extremities  was performed,  without contrast, timed  to arterial phase, and timed to venous phase (three phases).  3-D  processing was performed by the physician on an independent work  station, with MIP and volume-rendering techniques.  Total of 100 ml  of Omnipaque 350 was injected during the examination.   The study was  performed without oral contrast.  The patient tolerated the injection  without complications.      Per technologist notes, the IV leaked and the arm had to be  positioned at the side due to positional IV. Best images possible  were obtained.      FINDINGS:  ABDOMEN:  Images of the aorta demonstrate  diffuse atherosclerotic change  without significant focal stenosis or aneurysm.      Celiac artery demonstrates  no significant focal stenosis.      Superior mesenteric artery demonstrates  no significant focal  stenosis.      Inferior mesenteric artery demonstrates  no significant focal  stenosis.      There  is a single right renal artery.  Right renal artery  demonstrates  no significant focal stenosis.  There  is a single  renal vein which is patent.      There  is a single left renal artery.  Left renal artery demonstrates  no significant focal stenosis.  There  is a single renal vein which  is patent.      RIGHT LEG:  The right common femoral artery patent. An AV graft remnant is again  seen and again appears occluded. An additional probable abandoned AV  graft is also seen unchanged occlusion of the right profunda femoris  artery. The right superficial femoral artery demonstrates diffuse  atherosclerotic calcification without significant focal stenosis.      LEFT LEG:  A left femoral AV graft fistula between the superficial femoral  artery and common femoral vein is again seen. The graft is patent. A  remnant of an old occluded AV graft is again seen. Diffuse  atherosclerotic calcification of the left superficial and deep  femoral arteries without focal significant stenosis.      NONVASCULAR FINDINGS:      LOWER CHEST:  Right-sided gynecomastia. Trace right pleural effusion versus  nonspecific pleural thickening. Right lung bases clear. The heart is  enlarged. Partially visualized coronary calcifications. The  visualized distal esophagus is unremarkable.      ABDOMEN:      LIVER:  Liver is enlarged  and measures 21.6 cm in craniocaudal dimension. The  liver contour is slightly nodular which can be seen with fibrotic  changes. No parenchymal lesions are identified.      BILE DUCTS:  The intrahepatic and extrahepatic ducts are not dilated.      GALLBLADDER:  Surgically absent.      PANCREAS:  The pancreas appears unremarkable without evidence of ductal  dilatation or masses.      SPLEEN:  The spleen is normal in size without focal lesions.      ADRENAL GLANDS:  Bilateral adrenal glands appear normal.      KIDNEYS AND URETERS:  Symmetric severe atrophy of the bilateral native kidneys. Multiple  cysts are again seen throughout the bilateral kidneys, overall  similar in size and appearance compared to prior studies. No  hydroureteronephrosis or nephroureterolithiasis is identified.      A transplant kidney is seen in the left iliac fossa. It demonstrates  no significant abnormality.      PELVIS:      BLADDER:  The urinary bladder is decompressed, limiting assessment. Moderate  concentric wall thickening is again seen.      REPRODUCTIVE ORGANS:  The prostate is unremarkable.      BOWEL:  The stomach is unremarkable. The small bowel is not abnormally  dilated. The large bowel demonstrates multiple diverticula without  inflammation. The appendix appears normal.      VESSELS:  As above. No discrete filling defects are seen within the image  venous system. Multiple collaterals are visualized along the  patient's anterior abdominal and thoracic walls.      PERITONEUM/RETROPERITONEUM/LYMPH NODES:  Moderate amount of abdominopelvic ascites. There is diffuse  mesenteric edema. Several enlarged left inguinal and iliac chain  lymph nodes are again seen which were FDG avid on the PET-CT from  10/06/2023.      BONES AND ABDOMINAL WALL:  No suspicious osseous lesions are identified. Diffuse anasarca  involving the thoracic, abdominal, and pelvic subcutaneous tissues.  There is asymmetric subcutaneous edema and swelling of the  left lower  extremity extending from the level of the hip joint to the edge of  the field-of-view. This is fairly severe at the distal aspect of the  thigh. No focal fluid collections are identified.      Diffuse somewhat circumferential skin thickening of the left thigh.      Impression: 1. 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  2. Unchanged AV graft remnants in the right lower extremity.  3. Diffuse atherosclerotic calcification of the visualized  vasculature without significant focal stenosis.  4. Patent venous structures, no evidence of venous obstruction.  5. Diffuse circumferential skin thickening of the left thigh with  marked underlying edema, correlate with concern for cellulitis. No  focal fluid collections to suggest abscess formation.  6. Diffuse anasarca, mesenteric edema, and abdominopelvic ascites can  be seen the setting of volume overload, correlate with the patient's  volume status.  7. Hepatomegaly with suggestion of a cirrhotic morphology, correlate  with LFTs and ultrasound.  8. Stable enlarged left inguinal and iliac chain lymph nodes.          I personally reviewed the images/study and I agree with the findings  as stated. This study was interpreted at [place], [place].      MACRO:  None.      Signed by: Vic Orta 10/12/2023 3:31 PM  Dictation workstation:   [id number]       ASSESSMENT / PLANS      #LLE edema and pain   #LLE cellulitis   #Ambulatory dysfunction  #Left femoral access hemorrhage   Presented with worsening left leg edema and pain affecting patient ambulation. Patient does not want placement  -PT/OT consult  -US -ve for DVT, limited exam was negative for acute thrombus but showed persistent chronic thrombus in the AVG   -Tylenol, Oxycodone and flexeril for pain  -Vascular surgery consulted, appreciate recs   -CTA a/p and LLE doppler obtain on 10/12 reviewed   -IR consulted  for fistulogram evaluation   -Cont broad spectrum antibiotic coverage with Vanc and Zosyn to cover questionable LLE cellulitis   -10/12, HD access hemorrhage noted. Vascular surgery made aware. HH stable   - Discussed with nephrology to increase fluid removal during HD     #ESRD on HD  -MWF  -Renal dosing meds   -Renal diet, patient refusing renal diet and would to be placed on regular diet   -Electrolytes wnl   -Nephrology following       #HFpEF  -Does not appear fluid overloaded (unilateral edema, no respiratory complaints)  -TTE on 10/12 revealed EF 50%, with reduced RV systolic function   -Cardiology consulted   -Daily weights  -Continue home medications     #History of DVT  -US -ve for DVT (limited)  -Continue Eliquis     #Paroxysmal afib  -Continue home medications  -No acute issues     #Adrenal insufficiency  -Continue hydrocortisone     #Lymphadenopathy  -Could be underlying cause of edema  -Patient followed by oncology    Jermaine Garcia MD

## 2023-10-13 NOTE — PROGRESS NOTES
Subjective   Patient ID: Nghia Hall is a 58 y.o. male.     Evaluation of patient on dialysis at [facility]  [address] [address] on 10/9/2023     Chief Complaint   Patient presents with    Leg Swelling      HPI  Seen and examined during hemodialysis. Labs and events reviewed.   C/o left leg pain and pain throughout but most significant around left femoral graft . Had bleeding and puss around it yesterday. Also had chills yesterday   Feels OK, no c/o CP/SOB/Abd pain  No c/o related to HD --> thinks maximum fluid to be removed today is 2.5L -- refuses to challenge for more     Patient Active Problem List   Diagnosis    Abnormal CT of the chest    Abscess, axilla    Acute on chronic diastolic ACC/AHA stage C congestive heart failure (CMS/HCC)    Allergic conjunctivitis of both eyes    Anemia in chronic kidney disease    Arthritis of knee, left    Arthritis of left hip    Astigmatism    Myopia    Brow ptosis, bilateral    Cerumen impaction    Chest pain    Cholelithiasis    Abdominal pain, acute, right upper quadrant    Chronic kidney disease (CKD)    Clostridium difficile diarrhea    Coag negative Staphylococcus bacteremia    Combined form of age-related cataract, both eyes    Complication of vascular access for dialysis    Cough    CRBSI (catheter-related bloodstream infection), sequela    Deep vein thrombosis (DVT) of lower extremity (CMS/HCC)    Dehydration    Depression with anxiety    Dermatochalasis of left upper eyelid    Dermatochalasis of right upper eyelid    Diabetes mellitus (CMS/HCC)    Hypoglycemia    Diarrhea    Diverticulitis of colon    Dry eyes, bilateral    Dyslipidemia    Dysuria    Edema    Epistaxis    Erectile dysfunction    ESRD (end stage renal disease) (CMS/HCC)    Facial abscess    Weakness    Fever with chills    Finger fracture    GERD (gastroesophageal reflux disease)    Gout    Headache, tension-type    Hematuria    Hepatitis C virus    Hyperlipidemia     Hypertension    Hypoxia    Reactive airway disease    Immunosuppression (CMS/HCC)    Kidney replaced by transplant    Transplant    Left lower quadrant pain    Abdominal wall hematoma    Costochondritis    Leg edema, left    Low iron    Lumbar degenerative disc disease    Mechanical complication due to tissue graft    Morbid obesity (CMS/HCC)    Obstructive sleep apnea    PAD (peripheral artery disease) (CMS/HCC)    Pancreatitis    Gallstone pancreatitis    Dermatitis    Pain of left lower extremity    Pain    Atrial fibrillation (CMS/HCC)    A-fib (CMS/HCC)    Poor appetite    Pneumonia    Ptosis of right eyelid    Shortness of breath    Dyspnea on exertion    Seasonal allergies    Atrophy of kidney (terminal)    Kidney lesion, native, left    MATTHIAS (acute kidney injury) (CMS/HCC)    Small intestinal bacterial overgrowth    Stage C chronic diastolic congestive heart failure (CMS/HCC)    CHF (congestive heart failure) (CMS/HCC)    Vitamin D deficiency    Vitamin B12 deficiency    Symptoms involving urinary system    Supraventricular premature beats    Dependence on renal dialysis (CMS/HCC)    Secondary hyperparathyroidism of renal origin (CMS/HCC)    Post-transplant lymphoproliferative disorder (PTLD) (CODE)    Abdominal lymphadenopathy    Acute diverticulitis    Adrenal insufficiency (CMS/HCC)    Chronic diarrhea    Disease due to severe acute respiratory syndrome coronavirus 2 (SARS-CoV-2)    Disease of blood and blood forming organ    Fall    Hypoalbuminemia    Irritable bowel syndrome (IBS)    Laceration of head    Lung nodule    Lymphedema, limb    Lymphoepithelial carcinoma of parotid gland (CMS/HCC)    Multiple fractures of ribs, left side, initial encounter for closed fracture    Obesity, Class I, BMI 30-34.9    Neuropathy    Pulmonary hypertension (CMS/HCC)    Recurrent major depressive disorder, in partial remission (CMS/HCC)    Rib fracture    Sprain of ankle    Status post ablation of atrial fibrillation     VTE (venous thromboembolism)    Inability to walk       Scheduled medications  amLODIPine, 10 mg, oral, Daily  [Held by provider] apixaban, 5 mg, oral, BID  aspirin, 81 mg, oral, Daily  B complex-vitamin C-folic acid, 1 capsule, oral, Daily  cyanocobalamin, 1,000 mcg, oral, Daily  epoetin urmila or biosimilar, 10,000 Units, intravenous, Every Mon/Wed/Fri  hydrocortisone, 15 mg, oral, q AM  hydrocortisone, 5 mg, oral, q PM  lactobacillus acidophilus, 1 tablet, oral, Daily  melatonin, 10 mg, oral, Daily  metoprolol tartrate, 50 mg, oral, BID  multivitamin with minerals, 1 tablet, oral, Daily  pantoprazole, 40 mg, oral, Daily before breakfast  paricalcitoL, 8 mcg, intravenous, Once per day on Mon Wed Fri  piperacillin-tazobactam, 2.25 g, intravenous, q8h  psyllium, 1 packet, oral, Daily  sevelamer carbonate, 1,600 mg, oral, TID with meals  vancomycin, 1 g, intravenous, After Dialysis      Continuous medications     PRN medications  PRN medications: acetaminophen **OR** acetaminophen **OR** acetaminophen, cyclobenzaprine, diphenhydrAMINE, diphenhydrAMINE, melatonin, oxyCODONE     Heart Rate:  [56-57]   Temp:  [35.5 °C (95.9 °F)-36.9 °C (98.4 °F)]   Resp:  [13-20]   BP: (103)/(66)   SpO2:  [98 %]    Weight: 143 kg (315 lb)   Gen: alert, NAD  HEENT: NC/AT  Neck: supple, no JVD   Pulm: crackles b/l   CVS: RRR, no rub  Abd: S/NT/ND  LE: +++ edema on left ; ++ edema on right ; no cyanosis : +++ erythema and significant tenderness on left   Neuro: no asterixis   Dialysis acces:  left femoral graft with area of erythema, tenderness and fluctuance         Results from last 7 days   Lab Units 10/13/23  0603   SODIUM mmol/L 134*   POTASSIUM mmol/L 4.8   PHOSPHORUS mg/dL 5.9*   CALCIUM mg/dL 7.7*   CO2 mmol/L 21   HEMOGLOBIN g/dL 9.4*        [unfilled]     Results from last 72 hours   Lab Units 10/13/23  0603   ALBUMIN g/dL 2.5*   GLUCOSE mg/dL 101*   HEMOGLOBIN g/dL 9.4*   WBC AUTO x10*3/uL 5.8          Results from last 72 hours    Lab Units 10/13/23  0603   SODIUM mmol/L 134*   POTASSIUM mmol/L 4.8   CO2 mmol/L 21   BUN mg/dL 49*   CREATININE mg/dL 8.58*   PHOSPHORUS mg/dL 5.9*   CALCIUM mg/dL 7.7*        A/P  ESRD-HD admitted with left leg pain and swelling currently being treated for cellulitis     Plan HD per submitted orders, UF 2.5 L ; ideally he should come for more UF tomorrow if he is in agreement ; today he refuses to be challenged for more fluid removal.   MBD - Phosphorus binder: continue with Sevelamer q AC  Anemia of CKD: EPO to be given on the floor x 3 per week   Access: his AVG has a visible area of fluid filled collection / ?abscess located at his arterial end that was bleeding yesterday; will send B/c from the graft; he is on antibiotics  BP: acceptable during treatment   Renal Diet   Daily renal MVI   Continue 3 x per week hemodialysis; if pt is in agreement we will plan for isolated UF tomorrow 10/14

## 2023-10-13 NOTE — NURSING NOTE
.Report from Sending RN:    Report From: WILLARD Jasmine  Recent Surgery of Procedure: No  Baseline Level of Consciousness (LOC): A&ox3  Oxygen Use: No  Type: Room air  Diabetic: No  Last BP Med Given Day of Dialysis: none  Last Pain Med Given: oxycodone 10mg  Lab Tests to be Obtained with Dialysis: No  Blood Transfusion to be Given During Dialysis: No  Available IV Access: No  Medications to be Administered During Dialysis: No  Continuous IV Infusion Running: No  Restraints on Currently or in the Last 24 Hours: No  Hand-Off Communication: Pt as no acute event overnight, pt leg still swollen and haven't been review by the vascular. Pt not on precaution, AVF for HD.

## 2023-10-13 NOTE — PROGRESS NOTES
Nghia Hall is a 58 y.o. male on day 4 of admission presenting with Inability to walk.      Subjective   NAOE. Patient was concerned/angry for resolution of AVG on left leg. Patient tolerating diet, went to dialysis this morning.     Objective   Vitals:    10/13/23 1116   BP: 116/70   Pulse: 76   Resp: 18   Temp: 36.5 °C (97.7 °F)   SpO2: 99%      Exam:  Constitutional: No acute distress, resting comfortably  Neuro:  AOx3, grossly intact  ENMT: moist mucous membranes  Head/neck: atraumatic  CV: no tachycardia  Pulm: non-labored on room air  GI: soft, non-tender, non-distended  Skin: warm and dry  Musculoskeletal: moving all extremities  Extremities: severe LLE edema, sensory and motor intact, triphasic L DP doppler signal    Labs:  Results from last 7 days   Lab Units 10/13/23  0603 10/12/23  0726 10/11/23  0725   WBC AUTO x10*3/uL 5.8 5.5 5.4   HEMOGLOBIN g/dL 9.4* 9.7* 10.3*   PLATELETS AUTO x10*3/uL 224 234 272      Results from last 7 days   Lab Units 10/13/23  0603 10/12/23  0726 10/11/23  0725   SODIUM mmol/L 134* 134* 138   POTASSIUM mmol/L 4.8 4.7 5.0   CHLORIDE mmol/L 98 97* 100   CO2 mmol/L 21 24 22   BUN mg/dL 49* 38* 57*   CREATININE mg/dL 8.58* 7.27* 9.43*   GLUCOSE mg/dL 101* 64* 81   MAGNESIUM mg/dL 1.85 1.68 1.84   PHOSPHORUS mg/dL 5.9* 4.2 6.2*    Scheduled medications  amLODIPine, 10 mg, oral, Daily  [Held by provider] apixaban, 5 mg, oral, BID  aspirin, 81 mg, oral, Daily  B complex-vitamin C-folic acid, 1 capsule, oral, Daily  cyanocobalamin, 1,000 mcg, oral, Daily  epoetin urmila or biosimilar, 10,000 Units, intravenous, Every Mon/Wed/Fri  hydrocortisone, 15 mg, oral, q AM  hydrocortisone, 5 mg, oral, q PM  lactobacillus acidophilus, 1 tablet, oral, Daily  melatonin, 10 mg, oral, Daily  metoprolol tartrate, 50 mg, oral, BID  multivitamin with minerals, 1 tablet, oral, Daily  pantoprazole, 40 mg, oral, Daily before breakfast  paricalcitoL, 8 mcg, intravenous, Once per day on Mon Wed  Fri  piperacillin-tazobactam, 2.25 g, intravenous, q8h  psyllium, 1 packet, oral, Daily  sevelamer carbonate, 1,600 mg, oral, TID with meals  vancomycin, 1 g, intravenous, After Dialysis      Continuous medications     PRN medications  PRN medications: acetaminophen **OR** acetaminophen **OR** acetaminophen, cyclobenzaprine, diphenhydrAMINE, diphenhydrAMINE, melatonin, oxyCODONE     Vascular US lower extremity hemodialysis access duplex left    Result Date: 10/12/2023            [place] [address]   Tel [phone] and Fax [phone]  Vascular Lab Report Dialysis Access Evaluation  Patient Name:      PRABHJOT Bush Physician:  [id number] Tim Knight DO Study Date:        10/12/2023           Ordering Provider:  [id number] VERONICA MRN/PID:           [id number]             Technologist:       Dorian Rutherford RVT Accession#:        [id number]         Technologist 2: Date of Birth/Age: 1965 / 58 years Encounter#:         [id number] Gender:            M Admission Status:  Inpatient            Location Performed: [facility]  Diagnosis/ICD: Pain from vascular prosthetic devices, implants and grafts,                initial encounter-T82.848A  CONCLUSIONS: Dialysis Access Evaluation: Common femoral artery to common femoral vein AVG appears widely patent. At the mid segment the AVG appears to be slightly kinked, no high velocities noted in that area. Edema and lymph nodes noted in left groin.  Comparison: Compared with study from 8/18/2023, no significant change.  Imaging & Doppler Findings:  Dialysis Access Graft                 Left Velocity Arterial Anast  292 cm/s Flow Prox       363 cm/s Flow Prox/Mid   168 cm/s Mid             160 cm/s Mid/Flow Distal 280 cm/s Flow Distal     193 cm/s Outflow Anast   268 cm/s Outflow Vein    270 cm/s Pre Anast       130 cm/s Volume Flow 1253.00  ml/min  [id number] Tim Knight DO Electronically signed by [id number] Tim Knight DO on 10/12/2023 at 3:52:49 PM  ** Final **     CT angio abdomen pelvis w and or wo IV IV contrast    Result Date: 10/12/2023  Interpreted By:  Vic Orta,  and Doc Pike STUDY: CT ANGIO ABDOMEN PELVIS W AND/OR WO IV IV CONTRAST;  10/12/2023 1:24 pm   INDICATION: Signs/Symptoms:venous obstruction, severe left leg swelling, left femoral AVG.   COMPARISON: CT abdomen pelvis 09/23/2023 PET-CT 10/06/2023 CTA abdomen pelvis with bilateral lower extremity runoff 12/20/2022   ACCESSION NUMBER(S): [id number]   ORDERING CLINICIAN: NINA EWING   PROCEDURE: CT ANGIOGRAM OF THE ABDOMEN AND PELVIS   TECHNIQUE: High-resolution contrast-enhanced helical CT of the  abdomen, pelvis and both lower extremities  was performed,  without contrast, timed to arterial phase, and timed to venous phase (three phases).  3-D processing was performed by the physician on an independent work station, with MIP and volume-rendering techniques.  Total of 100 ml of Omnipaque 350 was injected during the examination.  The study was performed without oral contrast.  The patient tolerated the injection without complications.   Per technologist notes, the IV leaked and the arm had to be positioned at the side due to positional IV. Best images possible were obtained.   FINDINGS: ABDOMEN: Images of the aorta demonstrate  diffuse atherosclerotic change without significant focal stenosis or aneurysm.   Celiac artery demonstrates  no significant focal stenosis.   Superior mesenteric artery demonstrates  no significant focal stenosis.   Inferior mesenteric artery demonstrates  no significant focal stenosis.   There  is a single right renal artery.  Right renal artery demonstrates  no significant focal stenosis.  There  is a single renal vein which is patent.   There  is a single left renal artery.  Left renal artery demonstrates no significant focal stenosis.  There  is a  single renal vein which is patent.   RIGHT LEG: The right common femoral artery patent. An AV graft remnant is again seen and again appears occluded. An additional probable abandoned AV graft is also seen unchanged occlusion of the right profunda femoris artery. The right superficial femoral artery demonstrates diffuse atherosclerotic calcification without significant focal stenosis.   LEFT LEG: A left femoral AV graft fistula between the superficial femoral artery and common femoral vein is again seen. The graft is patent. A remnant of an old occluded AV graft is again seen. Diffuse atherosclerotic calcification of the left superficial and deep femoral arteries without focal significant stenosis.   NONVASCULAR FINDINGS:   LOWER CHEST: Right-sided gynecomastia. Trace right pleural effusion versus nonspecific pleural thickening. Right lung bases clear. The heart is enlarged. Partially visualized coronary calcifications. The visualized distal esophagus is unremarkable.   ABDOMEN:   LIVER: Liver is enlarged and measures 21.6 cm in craniocaudal dimension. The liver contour is slightly nodular which can be seen with fibrotic changes. No parenchymal lesions are identified.   BILE DUCTS: The intrahepatic and extrahepatic ducts are not dilated.   GALLBLADDER: Surgically absent.   PANCREAS: The pancreas appears unremarkable without evidence of ductal dilatation or masses.   SPLEEN: The spleen is normal in size without focal lesions.   ADRENAL GLANDS: Bilateral adrenal glands appear normal.   KIDNEYS AND URETERS: Symmetric severe atrophy of the bilateral native kidneys. Multiple cysts are again seen throughout the bilateral kidneys, overall similar in size and appearance compared to prior studies. No hydroureteronephrosis or nephroureterolithiasis is identified.   A transplant kidney is seen in the left iliac fossa. It demonstrates no significant abnormality.   PELVIS:   BLADDER: The urinary bladder is decompressed, limiting  assessment. Moderate concentric wall thickening is again seen.   REPRODUCTIVE ORGANS: The prostate is unremarkable.   BOWEL: The stomach is unremarkable. The small bowel is not abnormally dilated. The large bowel demonstrates multiple diverticula without inflammation. The appendix appears normal.   VESSELS: As above. No discrete filling defects are seen within the image venous system. Multiple collaterals are visualized along the patient's anterior abdominal and thoracic garcía.   PERITONEUM/RETROPERITONEUM/LYMPH NODES: Moderate amount of abdominopelvic ascites. There is diffuse mesenteric edema. Several enlarged left inguinal and iliac chain lymph nodes are again seen which were FDG avid on the PET-CT from 10/06/2023.   BONES AND ABDOMINAL WALL: No suspicious osseous lesions are identified. Diffuse anasarca involving the thoracic, abdominal, and pelvic subcutaneous tissues. There is asymmetric subcutaneous edema and swelling of the left lower extremity extending from the level of the hip joint to the edge of the field-of-view. This is fairly severe at the distal aspect of the thigh. No focal fluid collections are identified.   Diffuse somewhat circumferential skin thickening of the left thigh.       1. Patent left femoral AV graft fistula between the superficial femoral artery and femoral vein. 2. Unchanged AV graft remnants in the right lower extremity. 3. Diffuse atherosclerotic calcification of the visualized vasculature without significant focal stenosis. 4. Patent venous structures, no evidence of venous obstruction. 5. Diffuse circumferential skin thickening of the left thigh with marked underlying edema, correlate with concern for cellulitis. No focal fluid collections to suggest abscess formation. 6. Diffuse anasarca, mesenteric edema, and abdominopelvic ascites can be seen the setting of volume overload, correlate with the patient's volume status. 7. Hepatomegaly with suggestion of a cirrhotic morphology,  correlate with LFTs and ultrasound. 8. Stable enlarged left inguinal and iliac chain lymph nodes.     I personally reviewed the images/study and I agree with the findings as stated. This study was interpreted at [facility], [place], [place].   MACRO: None.   Signed by: Vic Orta 10/12/2023 3:31 PM Dictation workstation:   [id number]    Transthoracic Echo (TTE) Complete    Result Date: 10/12/2023   [facility], [place], [address] [place] [address]                Tel [phone] and Fax [phone] TRANSTHORACIC ECHOCARDIOGRAM REPORT  Patient Name:      PRABHJOT ANDERSON ANDIE Bush Physician:    [id number] Patterson MD Study Date:        10/12/2023           Ordering Provider:    [id number] JOSE A VERONICA MRN/PID:           [id number]             Fellow: Accession#:        [id number]         Nurse: Date of Birth/Age: 1965 / 58 years Sonographer:          Joan Kim CS Gender:            M                    Additional Staff:     Ammy Campos                                                               Cardiac                                                               Sonographer Intern Height:            185.42 cm            Admit Date:           10/9/2023 Weight:            142.88 kg            Admission Status:     Inpatient -                                                               Routine BSA:               2.61 m2              Encounter#:           [id number]                                         Department Location:  [facility] Non                                                               Invasive Blood Pressure: 102 /62 mmHg Study Type:    TRANSTHORACIC ECHO (TTE) COMPLETE Diagnosis/ICD: Pulmonary hypertension, unspecified-I27.20 Indication:    pHTN, CHF Patient History: Pertinent History: ESRD s/p DDKT  (2006), pA-fib, HFpEF, Hep C, DVT, Leg edema. Study Detail: The following Echo studies were performed: 2D, M-Mode, Doppler and               color flow.  PHYSICIAN INTERPRETATION: Left Ventricle: The left ventricular systolic function is low normal, with an estimated ejection fraction of 50-55%. There are no regional wall motion abnormalities. The left ventricular cavity size is normal. Left ventricular diastolic filling was indeterminate. Left Atrium: The left atrium is moderate to severely dilated. Right Ventricle: The right ventricle is mildly enlarged. There is reduced right ventricular systolic function. Right Atrium: The right atrium is moderately dilated. Aortic Valve: The aortic valve is trileaflet. There is trivial aortic valve regurgitation. The peak instantaneous gradient of the aortic valve is 10.8 mmHg. The mean gradient of the aortic valve is 6.0 mmHg. Mitral Valve: The mitral valve is normal in structure. There is mild mitral valve regurgitation. Tricuspid Valve: The tricuspid valve is structurally normal. There is moderate tricuspid regurgitation. The Doppler estimated RVSP is moderately elevated at 67.9 mmHg. Pulmonic Valve: The pulmonic valve is structurally normal. There is trace to mild pulmonic valve regurgitation. Pericardium: There is no pericardial effusion noted. Aorta: The aortic root is normal.  CONCLUSIONS:  1. Left ventricular systolic function is low normal with a 50-55% estimated ejection fraction.  2. There is reduced right ventricular systolic function.  3. The left atrium is moderate to severely dilated.  4. The right atrium is moderately dilated.  5. Moderate tricuspid regurgitation visualized.  6. Moderately elevated right ventricular systolic pressure. QUANTITATIVE DATA SUMMARY: 2D MEASUREMENTS:                          Normal Ranges: Ao Root d:     3.70 cm   (2.0-3.7cm) LAs:           4.60 cm   (2.7-4.0cm) IVSd:          1.10 cm   (0.6-1.1cm) LVPWd:         1.10 cm   (0.6-1.1cm)  LVIDd:         5.40 cm   (3.9-5.9cm) LVIDs:         4.20 cm LV Mass Index: 90.0 g/m2 LV % FS        22.2 % LA VOLUME:                               Normal Ranges: LA Vol A4C:        130.5 ml   (22+/-6mL/m2) LA Vol A2C:        73.0 ml LA Vol BP:         99.4 ml LA Vol Index A4C:  50.0ml/m2 LA Vol Index A2C:  28.0 ml/m2 LA Vol Index BP:   38.1 ml/m2 LA Area A4C:       30.9 cm2 LA Area A2C:       22.7 cm2 LA Major Axis A4C: 6.2 cm LA Major Axis A2C: 6.0 cm RA VOLUME BY A/L METHOD:                       Normal Ranges: RA Area A4C: 29.2 cm2 AORTA MEASUREMENTS:                    Normal Ranges: Asc Ao, d: 3.60 cm (2.1-3.4cm) LV SYSTOLIC FUNCTION BY 2D PLANIMETRY (MOD):                     Normal Ranges: EF-A4C View: 55.6 % (>=55%) EF-A2C View: 53.2 % EF-Biplane:  54.3 % LV DIASTOLIC FUNCTION:                        Normal Ranges: MV Peak E:    1.00 m/s (0.7-1.2 m/s) MV e'         0.10 m/s (>8.0) MV lateral e' 0.11 m/s MV medial e'  0.08 m/s E/e' Ratio:   10.53    (<8.0) MV DT:        159 msec (150-240 msec) MITRAL VALVE:                 Normal Ranges: MV DT: 159 msec (150-240msec) AORTIC VALVE:                                    Normal Ranges: AoV Vmax:                1.64 m/s  (<=1.7m/s) AoV Peak PG:             10.8 mmHg (<20mmHg) AoV Mean P.0 mmHg  (1.7-11.5mmHg) LVOT Max Brett:            1.12 m/s  (<=1.1m/s) AoV VTI:                 31.20 cm  (18-25cm) LVOT VTI:                21.90 cm LVOT Diameter:           2.30 cm   (1.8-2.4cm) AoV Area, VTI:           2.92 cm2  (2.5-5.5cm2) AoV Area,Vmax:           2.84 cm2  (2.5-4.5cm2) AoV Dimensionless Index: 0.70  RIGHT VENTRICLE: RV Basal 5.72 cm RV Mid   3.47 cm RV Major 8.1 cm TAPSE:   17.7 mm RV s'    0.12 m/s TRICUSPID VALVE/RVSP:                             Normal Ranges: Peak TR Velocity: 3.87 m/s RV Syst Pressure: 67.9 mmHg (< 30mmHg) IVC Diam:         3.10 cm PULMONIC VALVE:                         Normal Ranges: PV Accel Time: 132 msec (>120ms) PV Max  Brett:    1.0 m/s  (0.6-0.9m/s) PV Max P.2 mmHg  [id number] Finesse Patterson MD Electronically signed on 10/12/2023 at 1:02:37 PM  ** Final **         Assessment/Plan     Nghia Hall is 58 y.o. male with history of ESRD s/p DDKT (, subsequent graft failure ) on HD MWF via L femoral AVG, pAfib (on Eliquis), HFpEF, HCV, gout, lymphadenopthy, adrenal insufficiency (on steroids) who presented to ED with disabling LLE pain and swelling resulting in difficulty with ambulation.     Plan:  -Recommend consider consulting cardiology evaluation for right heart failure.   -Recommend consulting IR for possible fistulogram.  -Based on results of CTA and duplex, no vascular surgery intervention at this time.   - Vascular Surgery will sign off, please page/call vascular surgery as needed.        D/w Dr. Talavera and attending, Dr. Henry Gant MD, PGY1  Vascular Surgery [id number]

## 2023-10-13 NOTE — PRE-PROCEDURE NOTE
Interventional Radiology Preprocedure Note    Indication for procedure: The primary encounter diagnosis was Inability to walk. Diagnoses of Localized edema, Pain of left lower extremity, ESRD on dialysis (CMS/[facility]), Pulmonary hypertension (CMS/[facility]), Chronic congestive heart failure, unspecified heart failure type (CMS/[facility]), and Pain due to vascular prosthetic devices, implants and grafts, initial encounter (CMS/[facility]) were also pertinent to this visit.    Procedure: fistulogram    Relevant review of systems: NA    Relevant Labs:   Lab Results   Component Value Date    CREATININE 8.58 (H) 10/13/2023    EGFR 7 (L) 10/13/2023    INR 1.2 (H) 09/22/2023    PROTIME 14.0 (H) 09/22/2023       Planned Sedation/Anesthesia: none, fentanyl only (patient not NPO)    Airway assessment: normal, markedly poor dentition    Directed physical examination:    General: NAD  Heart: normal rate and rhythm  Lungs: no respiratory distress    Mallampati: II (hard and soft palate, upper portion of tonsils anduvula visible)    ASA Score: ASA 2 - Patient with mild systemic disease with no functional limitations    Benefits, risks and alternatives of procedure and planned sedation have been discussed with the patient and/or their representative. All questions answered and they agree to proceed.

## 2023-10-13 NOTE — POST-PROCEDURE NOTE
Interventional Radiology Brief Postprocedure Note    Attending: Fam    Assistant: None    Diagnosis: Prolonged bleeding    Description of procedure: L fem-fem loop AVG.    There is spontaneous bloody/cloudy drainage from the skin overlying the lateral/inflow aspect of the AVG.    Percutaneous access was obtained superior to skin drainage, angiography shows mild/moderate venous anastomotic stenosis, resolved s/p  7 mm POBA.  The AVG remains ready for use.    The treated stenosis does not explain spontaneous drainage from skin (concern for infection) or LLE swelling.    Anesthesia:  Local  50 mcg Fentanyl, 4000 units heparin IV    Complications: None    Estimated Blood Loss: minimal        No specimens collected      See detailed result report with images in PACS.    The patient tolerated the procedure well without incident or complication and is in stable condition.

## 2023-10-13 NOTE — NURSING NOTE
Patient stated he is refusing dialysis in the am related to recent fistula bleed after dialysis. Nurse educated patient on the importance of keeping dialysis appointments, with no success. Provider contacted to come speak with patient. Will continue to monitor.

## 2023-10-13 NOTE — CARE PLAN
The patient's goals for the shift include no falls    The clinical goals for the shift include Fitzwilliam will remain neurologically intact throughout the shift.    Over the shift, the patient did not make progress toward the following goals. Barriers to progression include n. Recommendations to address these barriers include   Problem: Fall/Injury  Goal: Not fall by end of shift  Outcome: Progressing  Goal: Be free from injury by end of the shift  Outcome: Progressing  Goal: Verbalize understanding of personal risk factors for fall in the hospital  Outcome: Progressing  Goal: Verbalize understanding of risk factor reduction measures to prevent injury from fall in the home  Outcome: Progressing  Goal: Use assistive devices by end of the shift  Outcome: Progressing  Goal: Pace activities to prevent fatigue by end of the shift  Outcome: Progressing   .

## 2023-10-13 NOTE — NURSING NOTE
Report to Receiving RN:    Report From: WILLARD Laughlin  Time Report Called: 10:31am  Hand-Off Communication: No acute changed from RN to RN report.  Patient removed 2.5L without issues.  Last BP was 120/66, HR 67.  Patient is in transport.  Complications During Treatment: No  Ultrafiltration Treatment: Yes  Medications Administered During Dialysis: Yes, 25mg Benadrylo  Blood Products Administered During Dialysis: No  Labs Sent During Dialysis: Yes, blood cultures  Heparin Drip Rate Changes: No    Electronic Signatures:   (Signed )   Authored:    (Signed )   Authored:     Last Updated: 10:32 AM by DONOVNA HARP

## 2023-10-13 NOTE — CONSULTS
CARDIOLOGY CONSULT NOTE    HPI:   This is a 58 y.o. male with a complex past medical history (detailed below) for whom cardiology was consulted regarding RV dysfunction.    58M with a PMHx sig for HTN with resulting ESRD s/p DDKT (2006) with graft failure currently on HD (M-W-F via LLE AVG), stage C diastolic HF/HFpEF with associated cpcPH and right ventricular dysfunction, AF s/p RFA on DOAC therapy, LAZARO using CPAP, and h/o LE DVT who is admitted for concern for left leg AVF malfunction    -severe LLE pain + swelling, assymmetric  -no orthopnea, MCCLELLAN, chest pain  -being worked up for LLE AVF malfunction    OP cardiologist: Dr. Delcid        Hospitalizations: 11/30-12/5 For abdominal pain. Denies Cardiac hospitalization.  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    Renal transplant rejection    Kidney transplant status 12/08/2022    Kidney replaced by transplant    Personal history of immunosuppression therapy 07/04/2021    H/O immunosuppressive  "therapy    Personal history of other diseases of the nervous system and sense organs     History of cataract    Personal history of other diseases of urinary system 11/02/2021    Personal history of renal failure    Personal history of other endocrine, nutritional and metabolic disease 07/22/2013    History of hyperlipidemia    Type 2 diabetes mellitus without complications (CMS/[facility]) 12/08/2022    Diabetes mellitus    Urinary tract infection, site not specified 05/02/2018    Acute UTI       Cardiac Labs:  Lab Results   Component Value Date    TROPONINI <0.02 04/11/2022      Lab Results   Component Value Date    HGBA1C 4.4 08/15/2023      Lab Results   Component Value Date    CHOL 210 (H) 11/05/2020    CHOL 196 08/28/2019    CHOL 211 (H) 02/26/2019     Lab Results   Component Value Date    HDL 39.0 (A) 11/05/2020    HDL 37.8 (A) 08/28/2019    HDL 35.9 (A) 02/26/2019     No results found for: \"LDLCALC\"  Lab Results   Component Value Date    TRIG 480 (H) 11/05/2020    TRIG 386 (H) 08/28/2019    TRIG 835 (H) 02/26/2019     No components found for: \"CHOLHDL\"     Cardiac Hx:  No results found for this or any previous visit from the past 1095 days.    * Intraprocedure medication information is unavailable because the case start and end events have not been set *    .cath    CV 10-year risk:  The 10-year ASCVD risk score (Gabi JARRETT, et al., 2019) is: 20%    Values used to calculate the score:      Age: 58 years      Sex: Male      Is Non- : Yes      Diabetic: Yes      Tobacco smoker: No      Systolic Blood Pressure: 112 mmHg      Is BP treated: Yes      HDL Cholesterol: 39 mg/dL      Total Cholesterol: 210 mg/dL     ALLERGIES:   Allergies   Allergen Reactions    Ibuprofen Anaphylaxis    Vancomycin Itching, Unknown and Rash     face red and hot         PSH:  Past Surgical History:   Procedure Laterality Date    CT ABDOMEN PELVIS ANGIOGRAM W AND/OR WO IV CONTRAST  10/12/2023    CT ABDOMEN PELVIS " ANGIOGRAM W AND/OR WO IV CONTRAST 10/12/2023 [facility] CT    CT AORTA AND BILATERAL ILIOFEMORAL RUNOFF ANGIOGRAM W AND/OR WO IV CONTRAST  11/7/2021    CT AORTA AND BILATERAL ILIOFEMORAL RUNOFF ANGIOGRAM W AND/OR WO IV CONTRAST 11/7/2021 [facility] CLINICAL LEGACY    CT AORTA AND BILATERAL ILIOFEMORAL RUNOFF ANGIOGRAM W AND/OR WO IV CONTRAST  7/6/2022    CT AORTA AND BILATERAL ILIOFEMORAL RUNOFF ANGIOGRAM W AND/OR WO IV CONTRAST 7/6/2022 [facility] EMERGENCY LEGACY    CT AORTA AND BILATERAL ILIOFEMORAL RUNOFF ANGIOGRAM W AND/OR WO IV CONTRAST  8/17/2022    CT AORTA AND BILATERAL ILIOFEMORAL RUNOFF ANGIOGRAM W AND/OR WO IV CONTRAST 8/17/2022 [facility] EMERGENCY LEGACY    CT AORTA AND BILATERAL ILIOFEMORAL RUNOFF ANGIOGRAM W AND/OR WO IV CONTRAST  9/30/2022    CT AORTA AND BILATERAL ILIOFEMORAL RUNOFF ANGIOGRAM W AND/OR WO IV CONTRAST 9/30/2022 [facility]GAC[facility]    CT AORTA AND BILATERAL ILIOFEMORAL RUNOFF ANGIOGRAM W AND/OR WO IV CONTRAST  12/29/2022    CT AORTA AND BILATERAL ILIOFEMORAL RUNOFF ANGIOGRAM W AND/OR WO IV CONTRAST 12/29/2022 [facility]    IR VENOGRAM DIALYSIS  8/30/2022    IR VENOGRAM DIALYSIS 8/30/2022 [facility] CLINICAL LEGACY    MANDIBLE SURGERY  08/25/2015    Jaw Surgery    MR HEAD ANGIO WO IV CONTRAST  1/30/2014    MR HEAD ANGIO WO IV CONTRAST 1/30/2014 [facility]    MR NECK ANGIO WO IV CONTRAST  1/30/2014    MR NECK ANGIO WO IV CONTRAST 1/30/2014 [facility]    OTHER SURGICAL HISTORY  11/03/2021    Arteriovenous fistula creation procedure    OTHER SURGICAL HISTORY  11/02/2021    Renal Transplant    OTHER SURGICAL HISTORY  11/03/2021    Venous Thrombectomy    OTHER SURGICAL HISTORY  06/19/2014    Hand Dislocation Interphalangeal, Open Treatment    US GUIDED BIOPSY LYMPH NODE SUPERFICIAL  1/11/2023    US GUIDED BIOPSY LYMPH NODE SUPERFICIAL 1/11/2023 [facility]         OBJECTIVE:  Vitals:    10/13/23 0602 10/13/23 1037 10/13/23 1116 10/13/23 1537   BP:   116/70 112/71   BP Location:    Right arm    Patient Position:   Sitting    Pulse: 57 67 76 59   Resp:   18 17   Temp: 35.5 °C (95.9 °F) 36.5 °C (97.7 °F) 36.5 °C (97.7 °F) 36 °C (96.8 °F)   TempSrc:  Skin Tympanic Temporal   SpO2:   99% 99%   Weight:       Height:           PHYSICAL EXAM:      Physical Exam:  Constitutional: chronically-ill appearing, NAD  Eyes: no conjunctival injection, EOMI  ENT: MMM, no apparent injury  Head/Neck: Neck supple, no apparent injury  Respiratory/Thorax: Patent airways, CTAB, normal WOB  Cardiovascular: normal rate, regular rhythm, no murmur, normal S1 and S2, JVP not elevated, extremities warm, trace GODFREY  Gastrointestinal: Non-distended, soft, no tenderness  Extremities: warm, intact. LLE very edematous with tenderness around left leg AV fistula  Neurological: grossly intact, no focal deficits  Skin: Warm and dry, no lesions, no rashes        LABS:  Lab Results   Component Value Date    WBC 5.8 10/13/2023    HGB 9.4 (L) 10/13/2023    HCT 29.2 (L) 10/13/2023    MCV 90 10/13/2023     10/13/2023           IMAGING:  === 10/09/23 ===    CT ABDOMEN PELVIS ANGIOGRAM W AND/OR WO IV CONTRAST    - Impression -  1. 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  2. Unchanged AV graft remnants in the right lower extremity.  3. Diffuse atherosclerotic calcification of the visualized  vasculature without significant focal stenosis.  4. Patent venous structures, no evidence of venous obstruction.  5. Diffuse circumferential skin thickening of the left thigh with  marked underlying edema, correlate with concern for cellulitis. No  focal fluid collections to suggest abscess formation.  6. Diffuse anasarca, mesenteric edema, and abdominopelvic ascites can  be seen the setting of volume overload, correlate with the patient's  volume status.  7. Hepatomegaly with suggestion of a cirrhotic morphology, correlate  with LFTs and ultrasound.  8. Stable enlarged left inguinal and  iliac chain lymph nodes.      I personally reviewed the images/study and I agree with the findings  as stated. This study was interpreted at [place], [place].    MACRO:  None.    Signed by: Vic Orta 10/12/2023 3:31 PM  Dictation workstation:   [id number]           Cardiac workup  Echo (5/11/23):  1. Left ventricular systolic function is mildly decreased with a 45-50% estimated ejection fraction.  2. Spectral Doppler shows a pseudonormal pattern of left ventricular diastolic filling.  3. There is no evidence of left ventricular hypertrophy.  4. Moderately enlarged right ventricle.  5. There is low normal right ventricular systolic function.  6. The left atrium is moderately dilated.  7. The right atrium is moderately dilated.  8. Moderate tricuspid regurgitation visualized.  9. Moderately elevated pulmonary artery pressure.  10. There is global hypokinesis of the left ventricle with minor regional variations.     Echo (10/25/22):  1. Left ventricular systolic function is low normal with a 50-55% estimated ejection fraction.  2. Spectral Doppler shows an abnormal pattern of left ventricular diastolic filling.  3. There is inferobasal and inferolateral hypokinesia.  4. Moderately enlarged right ventricle.  5. There is low normal right ventricular systolic function.  6. Mild to moderate mitral valve regurgitation.  7. Mild to moderately elevated right ventricular systolic pressure.  8. Moderate tricuspid regurgitation visualized.     RHC (10/24/22):  1. Pulmonary HTN (PA 63/21, mean 37 mmHg) with RV failure by hemodynamics (RA 18, RVEDP 20), could not wedge due to his PHT.  2. His assumed Genevieve CO and CI were 8.1 L/min and 3.6 L/min/m2 at baseline, and became 7 L/min and 3.1 L/min/m2 after 2 minutes of left thigh AVF occlusion.     Cardiac cath (12/14/21):  1. Mild non-obstructive coronary artery disease.  2. LVEDP 24 mmHg.     Impression:  #HFpEF with combine pre- and  post- capillary pulmonary hypertension ([facility] 10/2022 - RA 18 PA 63/21 (37), PVR likely ~2-3 (could not wedge, but LVEDP unlikely far below RAP)  #KFRT s/p HD, DDKT 2006 - 2021, now on HF via RLE AVF  #asymmetric LLE    His RV failure is a consequence of HFpEF and chronic HTN and elevated left sided filling pressures. There is no role for inotrope or vasodilator therapy. Gven advanced CKD, risks > benefits of digoxin or SGLT-2i. The mainstay of therapy is volume removal, which here will be achieved through HD/UF.      Recommendations:  -optimize volume removal as per nephrology service, agree with attempts to challenge dry weight as he appears bvolume overloaded, will defer to them  -optimization of LLE AVF function and venous outflow as per vascular surgery service  -prior to discharge, message Sukhi yLman to coordinate outpatient HF follow-up  -will sign off, please call with questions    Thank you for the opportunity to contribute to the care of this patient. Above recommendations discussed with Dr. Hanley. If further questions arise, please page the general cardiology consult pager at [phone] on weekdays 7AM - 6PM and weekends 7AM - 2PM, or at [phone] at all other times.

## 2023-10-14 LAB
GLUCOSE BLD MANUAL STRIP-MCNC: 110 MG/DL (ref 74–99)
GLUCOSE BLD MANUAL STRIP-MCNC: 61 MG/DL (ref 74–99)
GLUCOSE BLD MANUAL STRIP-MCNC: 80 MG/DL (ref 74–99)

## 2023-10-14 PROCEDURE — 2500000002 HC RX 250 W HCPCS SELF ADMINISTERED DRUGS (ALT 637 FOR MEDICARE OP, ALT 636 FOR OP/ED): Performed by: NURSE PRACTITIONER

## 2023-10-14 PROCEDURE — 99232 SBSQ HOSP IP/OBS MODERATE 35: CPT | Performed by: STUDENT IN AN ORGANIZED HEALTH CARE EDUCATION/TRAINING PROGRAM

## 2023-10-14 PROCEDURE — 2500000001 HC RX 250 WO HCPCS SELF ADMINISTERED DRUGS (ALT 637 FOR MEDICARE OP): Performed by: STUDENT IN AN ORGANIZED HEALTH CARE EDUCATION/TRAINING PROGRAM

## 2023-10-14 PROCEDURE — 99222 1ST HOSP IP/OBS MODERATE 55: CPT | Performed by: INTERNAL MEDICINE

## 2023-10-14 PROCEDURE — 82947 ASSAY GLUCOSE BLOOD QUANT: CPT | Mod: CMCLAB

## 2023-10-14 PROCEDURE — 82947 ASSAY GLUCOSE BLOOD QUANT: CPT

## 2023-10-14 PROCEDURE — 2500000001 HC RX 250 WO HCPCS SELF ADMINISTERED DRUGS (ALT 637 FOR MEDICARE OP)

## 2023-10-14 PROCEDURE — 2500000001 HC RX 250 WO HCPCS SELF ADMINISTERED DRUGS (ALT 637 FOR MEDICARE OP): Performed by: FAMILY MEDICINE

## 2023-10-14 PROCEDURE — 2500000004 HC RX 250 GENERAL PHARMACY W/ HCPCS (ALT 636 FOR OP/ED): Performed by: NURSE PRACTITIONER

## 2023-10-14 PROCEDURE — 5A09357 ASSISTANCE WITH RESPIRATORY VENTILATION, LESS THAN 24 CONSECUTIVE HOURS, CONTINUOUS POSITIVE AIRWAY PRESSURE: ICD-10-PCS | Performed by: STUDENT IN AN ORGANIZED HEALTH CARE EDUCATION/TRAINING PROGRAM

## 2023-10-14 PROCEDURE — 2500000001 HC RX 250 WO HCPCS SELF ADMINISTERED DRUGS (ALT 637 FOR MEDICARE OP): Performed by: NURSE PRACTITIONER

## 2023-10-14 PROCEDURE — 2500000004 HC RX 250 GENERAL PHARMACY W/ HCPCS (ALT 636 FOR OP/ED): Performed by: STUDENT IN AN ORGANIZED HEALTH CARE EDUCATION/TRAINING PROGRAM

## 2023-10-14 PROCEDURE — 36415 COLL VENOUS BLD VENIPUNCTURE: CPT | Mod: CMCLAB | Performed by: STUDENT IN AN ORGANIZED HEALTH CARE EDUCATION/TRAINING PROGRAM

## 2023-10-14 PROCEDURE — 36415 COLL VENOUS BLD VENIPUNCTURE: CPT | Performed by: STUDENT IN AN ORGANIZED HEALTH CARE EDUCATION/TRAINING PROGRAM

## 2023-10-14 PROCEDURE — 87075 CULTR BACTERIA EXCEPT BLOOD: CPT | Mod: CMCLAB | Performed by: STUDENT IN AN ORGANIZED HEALTH CARE EDUCATION/TRAINING PROGRAM

## 2023-10-14 PROCEDURE — 94660 CPAP INITIATION&MGMT: CPT

## 2023-10-14 PROCEDURE — 1100000001 HC PRIVATE ROOM DAILY

## 2023-10-14 RX ORDER — CEFEPIME 1 G/50ML
2 INJECTION, SOLUTION INTRAVENOUS EVERY 24 HOURS
Status: DISCONTINUED | OUTPATIENT
Start: 2023-10-14 | End: 2023-10-14

## 2023-10-14 RX ORDER — OXYCODONE HYDROCHLORIDE 5 MG/1
10 TABLET ORAL EVERY 6 HOURS PRN
Status: DISCONTINUED | OUTPATIENT
Start: 2023-10-14 | End: 2023-10-30

## 2023-10-14 RX ADMIN — PIPERACILLIN SODIUM AND TAZOBACTAM SODIUM 2.25 G: 2; .25 INJECTION, SOLUTION INTRAVENOUS at 10:15

## 2023-10-14 RX ADMIN — OXYCODONE HYDROCHLORIDE 10 MG: 5 TABLET ORAL at 16:11

## 2023-10-14 RX ADMIN — HYDROCORTISONE 5 MG: 5 TABLET ORAL at 20:55

## 2023-10-14 RX ADMIN — CYCLOBENZAPRINE 5 MG: 10 TABLET, FILM COATED ORAL at 13:31

## 2023-10-14 RX ADMIN — Medication 1 TABLET: at 10:14

## 2023-10-14 RX ADMIN — ASPIRIN 81 MG: 81 TABLET, COATED ORAL at 10:13

## 2023-10-14 RX ADMIN — OXYCODONE HYDROCHLORIDE 10 MG: 5 TABLET ORAL at 20:55

## 2023-10-14 RX ADMIN — SEVELAMER CARBONATE 1600 MG: 800 TABLET, FILM COATED ORAL at 18:17

## 2023-10-14 RX ADMIN — MELATONIN 10 MG: 3 TAB ORAL at 20:53

## 2023-10-14 RX ADMIN — DIPHENHYDRAMINE HYDROCHLORIDE 25 MG: 25 CAPSULE ORAL at 16:14

## 2023-10-14 RX ADMIN — CYANOCOBALAMIN TAB 1000 MCG 1000 MCG: 1000 TAB at 10:15

## 2023-10-14 RX ADMIN — Medication 1 TABLET: at 10:13

## 2023-10-14 RX ADMIN — PANTOPRAZOLE SODIUM 40 MG: 40 TABLET, DELAYED RELEASE ORAL at 10:15

## 2023-10-14 RX ADMIN — PIPERACILLIN SODIUM AND TAZOBACTAM SODIUM 2.25 G: 2; .25 INJECTION, SOLUTION INTRAVENOUS at 18:17

## 2023-10-14 RX ADMIN — MELATONIN 3 MG: 3 TAB ORAL at 20:52

## 2023-10-14 RX ADMIN — NEPHROCAP 1 CAPSULE: 1 CAP ORAL at 10:14

## 2023-10-14 RX ADMIN — OXYCODONE HYDROCHLORIDE 10 MG: 5 TABLET ORAL at 10:13

## 2023-10-14 RX ADMIN — CYCLOBENZAPRINE 5 MG: 10 TABLET, FILM COATED ORAL at 21:01

## 2023-10-14 RX ADMIN — CYCLOBENZAPRINE 5 MG: 10 TABLET, FILM COATED ORAL at 10:13

## 2023-10-14 RX ADMIN — HYDROCORTISONE 15 MG: 5 TABLET ORAL at 10:15

## 2023-10-14 ASSESSMENT — PAIN SCALES - GENERAL
PAINLEVEL_OUTOF10: 8
PAINLEVEL_OUTOF10: 7
PAINLEVEL_OUTOF10: 8

## 2023-10-14 ASSESSMENT — COGNITIVE AND FUNCTIONAL STATUS - GENERAL
MOBILITY SCORE: 24
DAILY ACTIVITIY SCORE: 24

## 2023-10-14 ASSESSMENT — PAIN - FUNCTIONAL ASSESSMENT
PAIN_FUNCTIONAL_ASSESSMENT: 0-10
PAIN_FUNCTIONAL_ASSESSMENT: 0-10

## 2023-10-14 NOTE — PROGRESS NOTES
....                                                                                                                                                                                                                                                                                             INTERNAL MEDICINE PROGRESS NOTE     BRIEF NARRATIVE      Nghia Hall is a 58 y.o. male on day 5 of admission presenting with Inability to walk. Patient has past medical history of ESRD s/p DDKT (2006, subsequent graft failure 07/2021, on HD MWF at [facility] through LT groin AV graft on [address]; not currently on immunosuppression), pAfib (on Apixaban), HFpEF, HCV (s/p treatment), gout, lymphadenopathy (following with oncology, inconclusive biopsy in 01/2023), DVT, and Adrenal Insufficiency (on hydrocortisone) who presented to the ED with difficulty ambulating due to secondary LLE pain and swelling. His left leg (which is also used for his HD access) is severely edematous and exquisitely tender to palpation. He reports being compliant with his Eliquis. The edema is non dependent. He is not interested in placement at this time due to being employed.    Nephrology was consulted for HD. Vascular surgery was also consulted to evaluate left AVG.  On 10/12 patient dialysis access started oozing bright red blood, reached out to vascular surgery for urgent evaluation. CTA a/p and LLE doppler were performed on 10/12/23, consulted IR for fistulogram and cardiology for right HF recommendations. IR performed fistulogram on 10/13 which showed  mild/moderate venous anastomotic stenosis, resolved s/p  7 mm POBA. Cardiology also evaluated patient on 10/13 and recommended outpatient fu. 10/13 Blood cultures 2/4 started growing GPC, ID was consulted.       SUBJECTIVE     Pt still complaining of left leg pain and edema. No acute events overnight.     OBJECTIVE      Visit Vitals  BP 96/58 (BP Location: Right arm, Patient Position: Lying)   Pulse 60    Temp 37 °C (98.6 °F) (Temporal)   Resp 17        Intake/Output Summary (Last 24 hours) at 10/14/2023 1126  Last data filed at 10/13/2023 2047  Gross per 24 hour   Intake 200 ml   Output --   Net 200 ml         Physical Exam   Vitals reviewed.   Constitutional:       Appearance: He is obese.      Comments: Older than stated age   HENT:      Head: Normocephalic and atraumatic.   Neck:      Comments: Left sided PIV  Cardiovascular:      Rate and Rhythm: Normal rate and regular rhythm.      Heart sounds: Normal heart sounds.   Pulmonary:      Effort: Pulmonary effort is normal.      Breath sounds: Normal air entry.   Abdominal:      General: Bowel sounds are normal.      Palpations: Abdomen is soft.      Tenderness: There is no abdominal tenderness.   Musculoskeletal:         General: Tenderness present. No deformity.      Left lower leg: Edema present.   Skin:     General: Skin is warm and dry.   Neurological:      General: No focal deficit present.      Mental Status: He is oriented to person, place, and time.   Psychiatric:         Mood and Affect: Mood normal.         Behavior: Behavior normal.     Current Meds   amLODIPine, 10 mg, oral, Daily  [Held by provider] apixaban, 5 mg, oral, BID  aspirin, 81 mg, oral, Daily  B complex-vitamin C-folic acid, 1 capsule, oral, Daily  cyanocobalamin, 1,000 mcg, oral, Daily  epoetin urmila or biosimilar, 10,000 Units, intravenous, Every Mon/Wed/Fri  hydrocortisone, 15 mg, oral, q AM  hydrocortisone, 5 mg, oral, q PM  lactobacillus acidophilus, 1 tablet, oral, Daily  melatonin, 10 mg, oral, Daily  metoprolol tartrate, 50 mg, oral, BID  multivitamin with minerals, 1 tablet, oral, Daily  pantoprazole, 40 mg, oral, Daily before breakfast  paricalcitoL, 8 mcg, intravenous, Once per day on Mon Wed Fri  piperacillin-tazobactam, 2.25 g, intravenous, q8h  psyllium, 1 packet, oral, Daily  sevelamer carbonate, 1,600 mg, oral, TID with meals  vancomycin, 1 g, intravenous, After Dialysis        PRN medications: acetaminophen **OR** acetaminophen **OR** acetaminophen, cyclobenzaprine, diphenhydrAMINE, diphenhydrAMINE, melatonin, oxyCODONE     LABS and IMAGING     WBC   Date Value Ref Range Status   10/13/2023 5.8 4.4 - 11.3 x10*3/uL Final   10/12/2023 5.5 4.4 - 11.3 x10*3/uL Final     Hemoglobin   Date Value Ref Range Status   10/13/2023 9.4 (L) 13.5 - 17.5 g/dL Final   10/12/2023 9.7 (L) 13.5 - 17.5 g/dL Final     Hematocrit   Date Value Ref Range Status   10/13/2023 29.2 (L) 41.0 - 52.0 % Final   10/12/2023 30.2 (L) 41.0 - 52.0 % Final     Bicarbonate   Date Value Ref Range Status   10/13/2023 21 21 - 32 mmol/L Final   10/12/2023 24 21 - 32 mmol/L Final     Creatinine   Date Value Ref Range Status   10/13/2023 8.58 (H) 0.50 - 1.30 mg/dL Final   10/12/2023 7.27 (H) 0.50 - 1.30 mg/dL Final     Calcium   Date Value Ref Range Status   10/13/2023 7.7 (L) 8.6 - 10.6 mg/dL Final   10/12/2023 7.6 (L) 8.6 - 10.6 mg/dL Final       CT angio abdomen pelvis w and or wo IV IV contrast  Narrative: Interpreted By:  Vic Orta,  and Doc Pike   STUDY:  CT ANGIO ABDOMEN PELVIS W AND/OR WO IV IV CONTRAST;  10/12/2023 1:24  pm      INDICATION:  Signs/Symptoms:venous obstruction, severe left leg swelling, left  femoral AVG.      COMPARISON:  CT abdomen pelvis 09/23/2023  PET-CT 10/06/2023  CTA abdomen pelvis with bilateral lower extremity runoff 12/20/2022      ACCESSION NUMBER(S):  [id number]      ORDERING CLINICIAN:  NINA EWING      PROCEDURE:  CT ANGIOGRAM OF THE ABDOMEN AND PELVIS      TECHNIQUE:  High-resolution contrast-enhanced helical CT of the  abdomen, pelvis  and both lower extremities  was performed,  without contrast, timed  to arterial phase, and timed to venous phase (three phases).  3-D  processing was performed by the physician on an independent work  station, with MIP and volume-rendering techniques.  Total of 100 ml  of Omnipaque 350 was injected during the examination.  The study  was  performed without oral contrast.  The patient tolerated the injection  without complications.      Per technologist notes, the IV leaked and the arm had to be  positioned at the side due to positional IV. Best images possible  were obtained.      FINDINGS:  ABDOMEN:  Images of the aorta demonstrate  diffuse atherosclerotic change  without significant focal stenosis or aneurysm.      Celiac artery demonstrates  no significant focal stenosis.      Superior mesenteric artery demonstrates  no significant focal  stenosis.      Inferior mesenteric artery demonstrates  no significant focal  stenosis.      There  is a single right renal artery.  Right renal artery  demonstrates  no significant focal stenosis.  There  is a single  renal vein which is patent.      There  is a single left renal artery.  Left renal artery demonstrates  no significant focal stenosis.  There  is a single renal vein which  is patent.      RIGHT LEG:  The right common femoral artery patent. An AV graft remnant is again  seen and again appears occluded. An additional probable abandoned AV  graft is also seen unchanged occlusion of the right profunda femoris  artery. The right superficial femoral artery demonstrates diffuse  atherosclerotic calcification without significant focal stenosis.      LEFT LEG:  A left femoral AV graft fistula between the superficial femoral  artery and common femoral vein is again seen. The graft is patent. A  remnant of an old occluded AV graft is again seen. Diffuse  atherosclerotic calcification of the left superficial and deep  femoral arteries without focal significant stenosis.      NONVASCULAR FINDINGS:      LOWER CHEST:  Right-sided gynecomastia. Trace right pleural effusion versus  nonspecific pleural thickening. Right lung bases clear. The heart is  enlarged. Partially visualized coronary calcifications. The  visualized distal esophagus is unremarkable.      ABDOMEN:      LIVER:  Liver is enlarged and  measures 21.6 cm in craniocaudal dimension. The  liver contour is slightly nodular which can be seen with fibrotic  changes. No parenchymal lesions are identified.      BILE DUCTS:  The intrahepatic and extrahepatic ducts are not dilated.      GALLBLADDER:  Surgically absent.      PANCREAS:  The pancreas appears unremarkable without evidence of ductal  dilatation or masses.      SPLEEN:  The spleen is normal in size without focal lesions.      ADRENAL GLANDS:  Bilateral adrenal glands appear normal.      KIDNEYS AND URETERS:  Symmetric severe atrophy of the bilateral native kidneys. Multiple  cysts are again seen throughout the bilateral kidneys, overall  similar in size and appearance compared to prior studies. No  hydroureteronephrosis or nephroureterolithiasis is identified.      A transplant kidney is seen in the left iliac fossa. It demonstrates  no significant abnormality.      PELVIS:      BLADDER:  The urinary bladder is decompressed, limiting assessment. Moderate  concentric wall thickening is again seen.      REPRODUCTIVE ORGANS:  The prostate is unremarkable.      BOWEL:  The stomach is unremarkable. The small bowel is not abnormally  dilated. The large bowel demonstrates multiple diverticula without  inflammation. The appendix appears normal.      VESSELS:  As above. No discrete filling defects are seen within the image  venous system. Multiple collaterals are visualized along the  patient's anterior abdominal and thoracic walls.      PERITONEUM/RETROPERITONEUM/LYMPH NODES:  Moderate amount of abdominopelvic ascites. There is diffuse  mesenteric edema. Several enlarged left inguinal and iliac chain  lymph nodes are again seen which were FDG avid on the PET-CT from  10/06/2023.      BONES AND ABDOMINAL WALL:  No suspicious osseous lesions are identified. Diffuse anasarca  involving the thoracic, abdominal, and pelvic subcutaneous tissues.  There is asymmetric subcutaneous edema and swelling of the left  lower  extremity extending from the level of the hip joint to the edge of  the field-of-view. This is fairly severe at the distal aspect of the  thigh. No focal fluid collections are identified.      Diffuse somewhat circumferential skin thickening of the left thigh.      Impression: 1. 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  2. Unchanged AV graft remnants in the right lower extremity.  3. Diffuse atherosclerotic calcification of the visualized  vasculature without significant focal stenosis.  4. Patent venous structures, no evidence of venous obstruction.  5. Diffuse circumferential skin thickening of the left thigh with  marked underlying edema, correlate with concern for cellulitis. No  focal fluid collections to suggest abscess formation.  6. Diffuse anasarca, mesenteric edema, and abdominopelvic ascites can  be seen the setting of volume overload, correlate with the patient's  volume status.  7. Hepatomegaly with suggestion of a cirrhotic morphology, correlate  with LFTs and ultrasound.  8. Stable enlarged left inguinal and iliac chain lymph nodes.          I personally reviewed the images/study and I agree with the findings  as stated. This study was interpreted at [place], [place].      MACRO:  None.      Signed by: Vic Orta 10/12/2023 3:31 PM  Dictation workstation:   [id number]       ASSESSMENT / PLANS      #LLE edema and pain   #Ambulatory dysfunction  #Left femoral access hemorrhage   Presented with worsening left leg edema and pain affecting patient ambulation. Patient does not want placement  -PT/OT consult  -US -ve for DVT, limited exam was negative for acute thrombus but showed persistent chronic thrombus in the AVG   -Tylenol, Oxycodone and flexeril for pain  -Vascular surgery consulted, appreciate recs   -CTA a/p and LLE doppler obtain on 10/12 reviewed   -IR consulted, fistulogram on 1/13   shows mild/moderate venous anastomotic stenosis, resolved s/p  7 mm POBA    -10/12, HD access hemorrhage noted. Vascular surgery made aware. HH stable   - Discussed with nephrology to increase fluid removal during HD     #GPC bacteremia  #LLE cellulitis   10/13 blood cultures ( 2 out of 4) started growing GPC in clusters   -source of bacteremia most likely cellulitis   -repeat cultures sent, follow   -ID consulted   -will cont Vancomycin and zosyn for now pending ID recommendations      #ESRD on HD  -MWF  -Renal dosing meds   -Renal diet, patient refusing renal diet and would to be placed on regular diet   -Electrolytes wnl   -Nephrology following       #HFpEF  -Does not appear fluid overloaded (unilateral edema, no respiratory complaints)  -TTE on 10/12 revealed EF 50%, with reduced RV systolic function   -Cardiology consulted, recs noted   -Daily weights  -Continue home medications     #History of DVT  -US -ve for DVT (limited)  -Continue Eliquis     #Paroxysmal afib  -Continue home medications  -No acute issues     #Adrenal insufficiency  -Continue hydrocortisone     #Lymphadenopathy  -Could be underlying cause of edema  -Patient followed by oncology  -Start compression sock as needed       Jermaine Garcia MD

## 2023-10-14 NOTE — CONSULTS
Inpatient consult to Infectious Diseases  Consult performed by: Alexei Bullokc MD  Consult ordered by: Jermaine Garcia MD        Referred by Jermaine Garcia MD    Primary MD: Luis Petersen MD    Reason For Consult antibiotics recommendation for Bacteremia / cellulitis     History Of Present Illness  Nghia Hall is a 58 y.o. male presenting with PMH of ESRD post DDKT with graft failure on HD through L groin AV graft (not currently on immunosuppression)    Patient presented to ED with chronic left lower extremity pain and swelling on 10/9.  Patient was seen on 10/6 per PCP as routine care, was found L lower DVT. However, patient had worsening swelling and pain, treated with zosyn and vancomycin for cellulitis and was admitted on 10/9. ID was consulted for blood culture being positive, gram positive cluster, in the setting of cellulitis.      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    Surgical History  He has a past surgical history that includes Other surgical history (11/03/2021); Other surgical history (11/02/2021); Other surgical history (11/03/2021); Mandible surgery (08/25/2015); MR angio neck wo IV contrast (1/30/2014); MR angio head wo IV contrast (1/30/2014); CT angio aorta and bilateral iliofemoral runoff w and or wo IV contrast (11/7/2021); Other surgical history (06/19/2014); US guided biopsy lymph node superficial (1/11/2023); IR  venogram dialysis (8/30/2022); CT angio aorta and bilateral iliofemoral runoff w and or wo IV contrast (7/6/2022); CT angio aorta and bilateral iliofemoral runoff w and or wo IV contrast (8/17/2022); CT angio aorta and bilateral iliofemoral runoff w and or wo IV contrast (9/30/2022); CT angio aorta and bilateral iliofemoral runoff w and or wo IV contrast (12/29/2022); and CT angio abdomen pelvis w and or wo IV IV contrast (10/12/2023).     Social History     Occupational History    Not on file   Tobacco Use    Smoking status: Former     Types: Cigarettes    Smokeless tobacco: Never   Substance and Sexual Activity    Alcohol use: Yes     Alcohol/week: 1.0 standard drink of alcohol     Types: 1 Shots of liquor per week    Drug use: Never    Sexual activity: Not on file     Travel History   Travel since 09/14/23    No documented travel since 09/14/23          Family History  Family History   Problem Relation Name Age of Onset    Kidney failure Other Sibling      Allergies  Ibuprofen and Vancomycin   Review of Systems     Objective  Range of Vitals (last 24 hours)  Heart Rate:  [59-84]   Temp:  [36 °C (96.8 °F)-37 °C (98.6 °F)]   Resp:  [9-20]   BP: ()/(58-74)   SpO2:  [98 %-100 %]   Daily Weight  10/09/23 : 143 kg (315 lb)    Body mass index is 41.56 kg/m².     Physical Exam  General: alert, able to answer questions  HEENT: no conjunctival injection. anicteric.  Ext: Swelling and tenderness of the LLE. L groin AV graft placed. Serum drainage from the L thigh covered with bandage     Relevant Results    Labs  Results from last 72 hours   Lab Units 10/13/23  0603 10/12/23  0726   WBC AUTO x10*3/uL 5.8 5.5   HEMOGLOBIN g/dL 9.4* 9.7*   HEMATOCRIT % 29.2* 30.2*   PLATELETS AUTO x10*3/uL 224 234     Results from last 72 hours   Lab Units 10/13/23  0603 10/12/23  0726   SODIUM mmol/L 134* 134*   POTASSIUM mmol/L 4.8 4.7   CHLORIDE mmol/L 98 97*   CO2 mmol/L 21 24   BUN mg/dL 49* 38*   CREATININE mg/dL 8.58* 7.27*    GLUCOSE mg/dL 101* 64*   CALCIUM mg/dL 7.7* 7.6*   ANION GAP mmol/L 20 18   EGFR mL/min/1.73m*2 7* 8*   PHOSPHORUS mg/dL 5.9* 4.2     Results from last 72 hours   Lab Units 10/13/23  0603 10/12/23  0726   ALK PHOS U/L 216* 204*   BILIRUBIN TOTAL mg/dL 1.0 1.1   PROTEIN TOTAL g/dL 5.0* 5.1*   ALT U/L 11 10   AST U/L 18 16   ALBUMIN g/dL 2.5* 2.5*     Estimated Creatinine Clearance: 13.8 mL/min (A) (by C-G formula based on SCr of 8.58 mg/dL (H)).  CRP   Date Value Ref Range Status   05/14/2022 4.51 (A) mg/dL Final     Comment:     REF VALUE  < 1.00     11/10/2021 6.91 (A) mg/dL Final     Comment:     REF VALUE  < 1.00     11/09/2021 8.77 (A) mg/dL Final     Comment:     REF VALUE  < 1.00       Sedimentation Rate   Date Value Ref Range Status   01/21/2021 36 (H) 0 - 20 mm/h Final   08/24/2020 18 0 - 20 mm/h Final   12/07/2019 30 (H) 0 - 20 mm/h Final     HIV 1 and 2 Screen   Date Value Ref Range Status   03/02/2023 NONREACTIVE NONREACTIVE Final     Comment:      HIV Ag/Ab screen is performed using the [employer]   HIV Ag/Ab Combo assay which detects the presence of HIV    p24 antigen as well as antibodies to HIV-1   (Group M and O) and HIV-2.  .  No laboratory evidence of HIV infection. If acute HIV infection is   suspected, consider testing for HIV RNA by PCR (viral load).       Hepatitis C Ab   Date Value Ref Range Status   03/02/2023 REACTIVE (A) NONREACTIVE Final     Comment:      Results from patients taking biotin supplements or receiving   high-dose biotin therapy should be interpreted with caution   due to possible interference with this test. Providers may    contact their local laboratory for further information.   HCV antibody detected. HCV RNA PCR has been ordered   to evaluate the possibility of a current infection.       HCV PCR Quant   Date Value Ref Range Status   03/02/2023 NOT DETECTED IU/mL Final     Comment:     REF VALUE  NOT DETECTED       Microbiology  10/13 Bcx GPC  10/14  Bcx    Antimicrobial agents  10/11- Zosyn  10/11- Vancomycin    Imaging  IMPRESSION: 10/10 CT angio A/P  1. 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  2. Unchanged AV graft remnants in the right lower extremity.  3. Diffuse atherosclerotic calcification of the visualized  vasculature without significant focal stenosis.  4. Patent venous structures, no evidence of venous obstruction.  5. Diffuse circumferential skin thickening of the left thigh with  marked underlying edema, correlate with concern for cellulitis. No  focal fluid collections to suggest abscess formation.  6. Diffuse anasarca, mesenteric edema, and abdominopelvic ascites can  be seen the setting of volume overload, correlate with the patient's  volume status.  7. Hepatomegaly with suggestion of a cirrhotic morphology, correlate  with LFTs and ultrasound.  8. Stable enlarged left inguinal and iliac chain lymph nodes.    Assessment/Plan  #LLE swelling and pain in the setting of chronic swelling c/f cellulitis and DVT  #L groin AV graft on HD c/f graft infection  #GPC bacteremia under vancomycin use    Patient had worsening LLE swelling and pan with fever in the setting of L groin AV graft on HD. Initially treated with vanco and zosyn, but patient had fever on 10/11. Blood cultures from 10/13 were positive for Gram positive cocci, clusters even under vancomycin use. Patient has still swelling and pain without any improvement, but was afebrile with clinically stable status. From ID standpoints, would recommend continue vancomycin and zosyn and await the identification.    Recommendations  -Please continue vancomycin and zosyn  -Will await the identification of GPC from blood cultures 10/13  -Please obtain followup blood cultures     Discussed with Dr. Herrmann. Please text me via Epic chat if you have any questions or concerns regarding this patient. ID will continue to follow up this patient.    Alexei  MD Kobe  ID fellow [id number]  Team B Pager [phone]

## 2023-10-14 NOTE — CARE PLAN
Problem: Fall/Injury  Goal: Not fall by end of shift  Outcome: Met  Goal: Be free from injury by end of the shift  Outcome: Met  Goal: Verbalize understanding of personal risk factors for fall in the hospital  Outcome: Met  Goal: Verbalize understanding of risk factor reduction measures to prevent injury from fall in the home  Outcome: Met  Goal: Use assistive devices by end of the shift  Outcome: Met  Goal: Pace activities to prevent fatigue by end of the shift  Outcome: Met   The patient's goals for the shift include      The clinical goals for the shift include Nghia will be free from bleeding from his left femoral AV fistula by the end of the shift.

## 2023-10-14 NOTE — NURSING NOTE
Report from Sending RN:    Report From: Lester Haynes RN)  Recent Surgery of Procedure: Yes, angiograph 10/14/23  Baseline Level of Consciousness (LOC): A/O x 4  Oxygen Use: No  Type: none  Diabetic: No  Last BP Med Given Day of Dialysis: none  Last Pain Med Given: none  Lab Tests to be Obtained with Dialysis: No  Blood Transfusion to be Given During Dialysis: No  Available IV Access: yes  Medications to be Administered During Dialysis: No  Continuous IV Infusion Running: No  Restraints on Currently or in the Last 24 Hours: No  Hand-Off Communication: No acute overnight or morning events; vss; Pt did take morning medications; no labs needed; Pt is a full code; Celeste Apple RN.

## 2023-10-15 LAB
ALBUMIN SERPL BCP-MCNC: 2.5 G/DL (ref 3.4–5)
ALP SERPL-CCNC: 203 U/L (ref 33–120)
ALT SERPL W P-5'-P-CCNC: 12 U/L (ref 10–52)
ANION GAP SERPL CALC-SCNC: 22 MMOL/L (ref 10–20)
AST SERPL W P-5'-P-CCNC: 18 U/L (ref 9–39)
BILIRUB SERPL-MCNC: 0.9 MG/DL (ref 0–1.2)
BUN SERPL-MCNC: 35 MG/DL (ref 6–23)
CALCIUM SERPL-MCNC: 7.9 MG/DL (ref 8.6–10.6)
CHLORIDE SERPL-SCNC: 100 MMOL/L (ref 98–107)
CO2 SERPL-SCNC: 20 MMOL/L (ref 21–32)
CREAT SERPL-MCNC: 7.1 MG/DL (ref 0.5–1.3)
ERYTHROCYTE [DISTWIDTH] IN BLOOD BY AUTOMATED COUNT: 16.1 % (ref 11.5–14.5)
ERYTHROCYTE [DISTWIDTH] IN BLOOD BY AUTOMATED COUNT: 16.1 % (ref 11.5–14.5)
GFR SERPL CREATININE-BSD FRML MDRD: 8 ML/MIN/1.73M*2
GLUCOSE BLD MANUAL STRIP-MCNC: 145 MG/DL (ref 74–99)
GLUCOSE BLD MANUAL STRIP-MCNC: 68 MG/DL (ref 74–99)
GLUCOSE BLD MANUAL STRIP-MCNC: 81 MG/DL (ref 74–99)
GLUCOSE BLD MANUAL STRIP-MCNC: 82 MG/DL (ref 74–99)
GLUCOSE BLD MANUAL STRIP-MCNC: 92 MG/DL (ref 74–99)
GLUCOSE SERPL-MCNC: 68 MG/DL (ref 74–99)
HCT VFR BLD AUTO: 33.2 % (ref 41–52)
HCT VFR BLD AUTO: 36.3 % (ref 41–52)
HGB BLD-MCNC: 10.3 G/DL (ref 13.5–17.5)
HGB BLD-MCNC: 9.8 G/DL (ref 13.5–17.5)
MCH RBC QN AUTO: 28.6 PG (ref 26–34)
MCH RBC QN AUTO: 28.9 PG (ref 26–34)
MCHC RBC AUTO-ENTMCNC: 28.4 G/DL (ref 32–36)
MCHC RBC AUTO-ENTMCNC: 29.5 G/DL (ref 32–36)
MCV RBC AUTO: 101 FL (ref 80–100)
MCV RBC AUTO: 98 FL (ref 80–100)
NRBC BLD-RTO: 0 /100 WBCS (ref 0–0)
NRBC BLD-RTO: 0 /100 WBCS (ref 0–0)
PLATELET # BLD AUTO: 224 X10*3/UL (ref 150–450)
PLATELET # BLD AUTO: 265 X10*3/UL (ref 150–450)
PMV BLD AUTO: 10.1 FL (ref 7.5–11.5)
PMV BLD AUTO: 9.9 FL (ref 7.5–11.5)
POTASSIUM SERPL-SCNC: 4.7 MMOL/L (ref 3.5–5.3)
PROT SERPL-MCNC: 5.5 G/DL (ref 6.4–8.2)
RBC # BLD AUTO: 3.39 X10*6/UL (ref 4.5–5.9)
RBC # BLD AUTO: 3.6 X10*6/UL (ref 4.5–5.9)
SODIUM SERPL-SCNC: 137 MMOL/L (ref 136–145)
WBC # BLD AUTO: 5.3 X10*3/UL (ref 4.4–11.3)
WBC # BLD AUTO: 6.7 X10*3/UL (ref 4.4–11.3)

## 2023-10-15 PROCEDURE — 85027 COMPLETE CBC AUTOMATED: CPT | Mod: CMCLAB | Performed by: STUDENT IN AN ORGANIZED HEALTH CARE EDUCATION/TRAINING PROGRAM

## 2023-10-15 PROCEDURE — 99232 SBSQ HOSP IP/OBS MODERATE 35: CPT | Performed by: STUDENT IN AN ORGANIZED HEALTH CARE EDUCATION/TRAINING PROGRAM

## 2023-10-15 PROCEDURE — 2500000001 HC RX 250 WO HCPCS SELF ADMINISTERED DRUGS (ALT 637 FOR MEDICARE OP): Performed by: STUDENT IN AN ORGANIZED HEALTH CARE EDUCATION/TRAINING PROGRAM

## 2023-10-15 PROCEDURE — 94660 CPAP INITIATION&MGMT: CPT

## 2023-10-15 PROCEDURE — 2500000004 HC RX 250 GENERAL PHARMACY W/ HCPCS (ALT 636 FOR OP/ED): Performed by: STUDENT IN AN ORGANIZED HEALTH CARE EDUCATION/TRAINING PROGRAM

## 2023-10-15 PROCEDURE — 2500000001 HC RX 250 WO HCPCS SELF ADMINISTERED DRUGS (ALT 637 FOR MEDICARE OP): Performed by: NURSE PRACTITIONER

## 2023-10-15 PROCEDURE — 80053 COMPREHEN METABOLIC PANEL: CPT | Performed by: STUDENT IN AN ORGANIZED HEALTH CARE EDUCATION/TRAINING PROGRAM

## 2023-10-15 PROCEDURE — 36415 COLL VENOUS BLD VENIPUNCTURE: CPT | Performed by: STUDENT IN AN ORGANIZED HEALTH CARE EDUCATION/TRAINING PROGRAM

## 2023-10-15 PROCEDURE — 85027 COMPLETE CBC AUTOMATED: CPT | Performed by: STUDENT IN AN ORGANIZED HEALTH CARE EDUCATION/TRAINING PROGRAM

## 2023-10-15 PROCEDURE — 36415 COLL VENOUS BLD VENIPUNCTURE: CPT | Mod: CMCLAB | Performed by: STUDENT IN AN ORGANIZED HEALTH CARE EDUCATION/TRAINING PROGRAM

## 2023-10-15 PROCEDURE — 82947 ASSAY GLUCOSE BLOOD QUANT: CPT | Mod: CMCLAB

## 2023-10-15 PROCEDURE — 1100000001 HC PRIVATE ROOM DAILY

## 2023-10-15 PROCEDURE — 2500000004 HC RX 250 GENERAL PHARMACY W/ HCPCS (ALT 636 FOR OP/ED): Performed by: NURSE PRACTITIONER

## 2023-10-15 PROCEDURE — 2500000001 HC RX 250 WO HCPCS SELF ADMINISTERED DRUGS (ALT 637 FOR MEDICARE OP): Performed by: FAMILY MEDICINE

## 2023-10-15 PROCEDURE — 2500000002 HC RX 250 W HCPCS SELF ADMINISTERED DRUGS (ALT 637 FOR MEDICARE OP, ALT 636 FOR OP/ED): Performed by: NURSE PRACTITIONER

## 2023-10-15 RX ADMIN — PIPERACILLIN SODIUM AND TAZOBACTAM SODIUM 2.25 G: 2; .25 INJECTION, SOLUTION INTRAVENOUS at 09:20

## 2023-10-15 RX ADMIN — METOPROLOL TARTRATE 50 MG: 50 TABLET, FILM COATED ORAL at 09:22

## 2023-10-15 RX ADMIN — AMLODIPINE BESYLATE 10 MG: 10 TABLET ORAL at 09:22

## 2023-10-15 RX ADMIN — HYDROCORTISONE 5 MG: 5 TABLET ORAL at 20:36

## 2023-10-15 RX ADMIN — CYCLOBENZAPRINE 5 MG: 10 TABLET, FILM COATED ORAL at 15:44

## 2023-10-15 RX ADMIN — CYCLOBENZAPRINE 5 MG: 10 TABLET, FILM COATED ORAL at 09:21

## 2023-10-15 RX ADMIN — DIPHENHYDRAMINE HYDROCHLORIDE 25 MG: 25 CAPSULE ORAL at 01:34

## 2023-10-15 RX ADMIN — SEVELAMER CARBONATE 1600 MG: 800 TABLET, FILM COATED ORAL at 09:21

## 2023-10-15 RX ADMIN — PIPERACILLIN SODIUM AND TAZOBACTAM SODIUM 2.25 G: 2; .25 INJECTION, SOLUTION INTRAVENOUS at 17:24

## 2023-10-15 RX ADMIN — OXYCODONE HYDROCHLORIDE 10 MG: 5 TABLET ORAL at 15:44

## 2023-10-15 RX ADMIN — METOPROLOL TARTRATE 50 MG: 50 TABLET, FILM COATED ORAL at 20:36

## 2023-10-15 RX ADMIN — Medication 1 TABLET: at 09:22

## 2023-10-15 RX ADMIN — NEPHROCAP 1 CAPSULE: 1 CAP ORAL at 09:22

## 2023-10-15 RX ADMIN — ASPIRIN 81 MG: 81 TABLET, COATED ORAL at 09:22

## 2023-10-15 RX ADMIN — CYANOCOBALAMIN TAB 1000 MCG 1000 MCG: 1000 TAB at 09:22

## 2023-10-15 RX ADMIN — MELATONIN 10 MG: 3 TAB ORAL at 20:34

## 2023-10-15 RX ADMIN — MELATONIN 3 MG: 3 TAB ORAL at 20:36

## 2023-10-15 RX ADMIN — PIPERACILLIN SODIUM AND TAZOBACTAM SODIUM 2.25 G: 2; .25 INJECTION, SOLUTION INTRAVENOUS at 01:34

## 2023-10-15 RX ADMIN — OXYCODONE HYDROCHLORIDE 10 MG: 5 TABLET ORAL at 09:21

## 2023-10-15 RX ADMIN — OXYCODONE HYDROCHLORIDE 10 MG: 5 TABLET ORAL at 20:34

## 2023-10-15 RX ADMIN — HYDROCORTISONE 15 MG: 5 TABLET ORAL at 09:21

## 2023-10-15 RX ADMIN — PSYLLIUM HUSK 1 PACKET: 3.4 POWDER ORAL at 09:00

## 2023-10-15 RX ADMIN — DIPHENHYDRAMINE HYDROCHLORIDE 25 MG: 25 CAPSULE ORAL at 19:31

## 2023-10-15 ASSESSMENT — PAIN SCALES - GENERAL
PAINLEVEL_OUTOF10: 8

## 2023-10-15 ASSESSMENT — PAIN - FUNCTIONAL ASSESSMENT
PAIN_FUNCTIONAL_ASSESSMENT: 0-10
PAIN_FUNCTIONAL_ASSESSMENT: 0-10

## 2023-10-15 NOTE — PROGRESS NOTES
.....                                                                                                                                                                                                                                                                                             INTERNAL MEDICINE PROGRESS NOTE     BRIEF NARRATIVE      Nghia Hall is a 58 y.o. male on day 6 of admission presenting with Inability to walk. Patient has past medical history of ESRD s/p DDKT (2006, subsequent graft failure 07/2021, on HD MWF at [facility] through LT groin AV graft on [address]; not currently on immunosuppression), pAfib (on Apixaban), HFpEF, HCV (s/p treatment), gout, lymphadenopathy (following with oncology, inconclusive biopsy in 01/2023), DVT, and Adrenal Insufficiency (on hydrocortisone) who presented to the ED with difficulty ambulating due to secondary LLE pain and swelling. His left leg (which is also used for his HD access) is severely edematous and exquisitely tender to palpation. He reports being compliant with his Eliquis. The edema is non dependent. He is not interested in placement at this time due to being employed.    Nephrology was consulted for HD. Vascular surgery was also consulted to evaluate left AVG.  On 10/12 patient dialysis access started oozing bright red blood, reached out to vascular surgery for urgent evaluation. CTA a/p and LLE doppler were performed on 10/12/23, consulted IR for fistulogram and cardiology for right HF recommendations. IR performed fistulogram on 10/13 which showed  mild/moderate venous anastomotic stenosis, resolved s/p  7 mm POBA. Cardiology also evaluated patient on 10/13 and recommended outpatient fu. 10/13 Blood cultures 2/4 started growing GPC (patient was already on Vanc for 2 for 2 days prior to positive bcx), ID was consulted.       SUBJECTIVE     Pt still complaining of left leg pain and edema. No acute events overnight.     OBJECTIVE      Visit Vitals  BP  117/74   Pulse 62   Temp 36.3 °C (97.3 °F) (Temporal)   Resp 18      No intake or output data in the 24 hours ending 10/15/23 0755    Physical Exam   Vitals reviewed.   Constitutional:       Appearance: He is obese.      Comments: Older than stated age   HENT:      Head: Normocephalic and atraumatic.   Neck:      Comments: Left sided PIV  Cardiovascular:      Rate and Rhythm: Normal rate and regular rhythm.      Heart sounds: Normal heart sounds.   Pulmonary:      Effort: Pulmonary effort is normal.      Breath sounds: Normal air entry.   Abdominal:      General: Bowel sounds are normal.      Palpations: Abdomen is soft.      Tenderness: There is no abdominal tenderness.   Musculoskeletal:         General: Tenderness present. No deformity.      Left lower leg: Edema present.   Skin:     General: Skin is warm and dry.   Neurological:      General: No focal deficit present.      Mental Status: He is oriented to person, place, and time.   Psychiatric:         Mood and Affect: Mood normal.         Behavior: Behavior normal.     Current Meds   amLODIPine, 10 mg, oral, Daily  [Held by provider] apixaban, 5 mg, oral, BID  aspirin, 81 mg, oral, Daily  B complex-vitamin C-folic acid, 1 capsule, oral, Daily  cyanocobalamin, 1,000 mcg, oral, Daily  epoetin urmila or biosimilar, 10,000 Units, intravenous, Every Mon/Wed/Fri  hydrocortisone, 15 mg, oral, q AM  hydrocortisone, 5 mg, oral, q PM  lactobacillus acidophilus, 1 tablet, oral, Daily  melatonin, 10 mg, oral, Daily  metoprolol tartrate, 50 mg, oral, BID  multivitamin with minerals, 1 tablet, oral, Daily  pantoprazole, 40 mg, oral, Daily before breakfast  paricalcitoL, 8 mcg, intravenous, Once per day on Mon Wed Fri  piperacillin-tazobactam, 2.25 g, intravenous, q8h  psyllium, 1 packet, oral, Daily  sevelamer carbonate, 1,600 mg, oral, TID with meals  vancomycin, 1 g, intravenous, After Dialysis       PRN medications: acetaminophen **OR** acetaminophen **OR** acetaminophen,  cyclobenzaprine, diphenhydrAMINE, diphenhydrAMINE, melatonin, oxyCODONE     LABS and IMAGING     WBC   Date Value Ref Range Status   10/13/2023 5.8 4.4 - 11.3 x10*3/uL Final     Hemoglobin   Date Value Ref Range Status   10/13/2023 9.4 (L) 13.5 - 17.5 g/dL Final     Hematocrit   Date Value Ref Range Status   10/13/2023 29.2 (L) 41.0 - 52.0 % Final     Bicarbonate   Date Value Ref Range Status   10/13/2023 21 21 - 32 mmol/L Final     Creatinine   Date Value Ref Range Status   10/13/2023 8.58 (H) 0.50 - 1.30 mg/dL Final     Calcium   Date Value Ref Range Status   10/13/2023 7.7 (L) 8.6 - 10.6 mg/dL Final       IR angiogram fistula graft  Narrative: Interpreted By:  Tereso Otto,   STUDY:  IR ANGIOGRAM FISTULA GRAFT; ;  10/13/2023 6:29 pm  1. ULTRASOUND EVALUATION LEFT FEMORAL FEMORAL ARTERIOVENOUS LOOP  GRAFT BY MD  2. ULTRASOUND-GUIDED VASCULAR ACCESS  3. ARTERIOVENOUS GRAFT ANGIOGRAPHY  4. VENOUS ANASTOMOTIC OUTFLOW STENOSIS ANGIOPLASTY  5. COMPLETION ARTERIOVENOUS GRAFT ANGIOGRAPHY          INDICATION:  . 58-year-old man with end-stage renal disease, left groin  arteriovenous graft, reported bleeding after dialysis, concern for  infection with open skin wound overlying the graft.      COMPARISON:  CT angiogram 10/12/2023, angiography 08/30/2022      ACCESSION NUMBER(S):  [id number]      ORDERING CLINICIAN:  JOSE A VERONICA      TECHNIQUE:      ATTENDING : Tereso Otto M.D.      TECHNICAL DESCRIPTION/FINDINGS: The procedure, including all risks,  benefits and alternatives were explained to the patient in detail.  All questions were answered and written informed consent was obtained.      The patient was positioned supine on the angiography table. A  time-out was performed.      The left groin and proximal left thigh were prepped and draped in  usual sterile fashion. In the mid to upstream/lateral portion of the  graft, there was skin ulceration with spontaneous  bloody/cloudy/purulent  drainage. Gentle compression adjacent to the  ulcer resulted in additional spontaneous drainage of the  cloudy/bloody fluid. The finding is concerning for overlying  infection in this location.      Upstream of the skin ulcer in the more proximal and lateral aspect of  the graft, focused ultrasound was performed demonstrating patency and  compressibility. Ultrasound images were saved. 1% lidocaine was  administered subcutaneously for local anesthesia. Under real-time  ultrasound guidance, a 21 gauge access needle was used to access the  graft in antegrade direction using micropuncture technique.  Ultrasound images were saved. An 018 guidewire was advanced into the  graft under fluoroscopic visualization, and a micropuncture  transitional introducer was placed. Initial graft angiography  performed through the micropuncture sheath demonstrated brisk flow  through the graft and previously placed stents within the graft.  There is mild venous anastomotic narrowing at the common femoral  venous anastomosis.      The access was upsized over an 035 wire for a 6 Serbian antegrade  sheath. A 5 Serbian BloomBoardpe the catheter was utilized to position an  Amplatz guidewire into the IV C. over the wire, a 7 mm x 40 mm  noncompliant angioplasty balloon was then advanced and inflated to  maximum burst pressure at the venous anastomosis for 60 seconds. The  balloon was then deflated and removed from the sheath. Post  angioplasty angiography demonstrated resolution of the previously  shown venous anastomotic stenosis, with persistent brisk flow through  the graft. Central venography was performed demonstrating gross  patency of the enlarged left iliac deep veins as well as gross  patency of the IV C.      The wires and sheath were then removed and hemostasis was obtained  with a purse string suture. Sterile dressing was applied.      SEDATION/MEDICATIONS: Continuous cardiopulmonary monitoring was  performed by a radiology nurse for the  "duration of the procedure.  50 mcg Fentanyl was administered for analgesia. Procedural duration  20 minutes. 5 cc 1% Lidocaine was administered subcutaneously for  local anesthesia. SPECIMENS: None.  ESTIMATED BLOOD LOSS:  5 cc  FLOUROSCOPY:  1.5 minutes; DAP  06074 mGy-cm*2; Air Kerma  131.16 mGy  CONTRAST: 30 cc Omnipaque 350      FINDINGS:  Test      Impression: 1. Overall mild venous anastomotic stenosis of the left femoral  femoral arteriovenous loop graft.  2. Resolution of venous anastomotic stenosis status post 7 mm balloon  angioplasty.      Plan:  The graft is ready for ongoing use. The mild venous anastomotic  stenosis does not explain spontaneous drainage from the skin (concern  for inflection) or patient's left lower extremity swelling.      MACRO:  None      Signed by: Tereso Otto 10/14/2023 7:31 PM  Dictation workstation:   [id number]       ASSESSMENT / PLANS      #LLE edema and pain   #Ambulatory dysfunction  #Left femoral access hemorrhage   Presented with worsening left leg edema and pain affecting patient ambulation. Patient does not want placement  -PT/OT consulted  -US -ve for DVT, limited exam was negative for acute thrombus but showed persistent chronic thrombus in the AVG   -Tylenol, Oxycodone and flexeril for pain  -10/12, HD access hemorrhage noted. HH stable   -Vascular surgery consulted, recommended CTA a/p and LE doppler   -CTA a/p and LLE doppler obtain on 10/12 reviewed as above   -IR was consulted, performed fistulogram on 1/13  which showed mild/moderate venous anastomotic stenosis, resolved s/p  7 mm POBA    - Discussed with nephrology to increase fluid removal during HD   -Recommended patient to start using compression stock to help with chronic lymphedema. Pt still unsatisfied with current management of his leg edema. He wants surgery to remove \"all\" the fluid from his leg. I explained in details vascular, nephro, ID , cardiology and IM current recommendations    #GPC " bacteremia  #LLE cellulitis   #Fever   10/13 blood cultures ( 2 out of 4) started growing GPC in clusters   -source of bacteremia most likely cellulitis   -repeat cultures sent, follow organism identification   -ID consulted   -will cont Vancomycin and zosyn for now pending ID recommendations      #ESRD on HD  -MWF  -Renal dosing meds   -Renal diet, patient refusing renal diet and would to be placed on regular diet   -Electrolytes wnl   -Nephrology following       #HFpEF  -Does not appear fluid overloaded (unilateral edema, no respiratory complaints)  -TTE on 10/12 revealed EF 50%, with reduced RV systolic function   -Cardiology consulted, recs noted   -Daily weights  -Continue home medications     #History of DVT  -US -ve for DVT (limited)  -Continue Eliquis     #Paroxysmal afib  -Continue home medications  -No acute issues     #Adrenal insufficiency  -Continue hydrocortisone     #Lymphadenopathy  -Could be underlying cause of edema  -Patient followed by oncology  -Start compression sock as needed     Dipso: Continue antibiotic treatment, follow blood culture. Patient will need outpatient follow up on  chronic lymphedema management.      Jermaine Garcia MD

## 2023-10-15 NOTE — SIGNIFICANT EVENT
LATE ENTRY   PT was scheduled for isolated UF 10/14 but REFUSED. He also refuses more than 2L-2.5L fluid removal with regular dialysis. His graft is infected with GPC and may need to be removed. His next HD will be 10/16. Continue Abx per ID

## 2023-10-15 NOTE — CARE PLAN
Problem: Skin  Goal: Decreased wound size/increased tissue granulation at next dressing change  Outcome: Progressing  Goal: Participates in plan/prevention/treatment measures  Outcome: Progressing  Goal: Prevent/manage excess moisture  Outcome: Progressing  Goal: Prevent/minimize sheer/friction injuries  Outcome: Progressing  Goal: Promote/optimize nutrition  Outcome: Progressing  Goal: Promote skin healing  Outcome: Progressing   The patient's goals for the shift include no falld    The clinical goals for the shift include [place] will be free from bleeding from his left femoral AV fistula by the end of the shift.    Over the shift, the patient did not make progress toward the following goals. Barriers to progression include n. Recommendations to address these barriers include n.

## 2023-10-15 NOTE — CARE PLAN
Problem: Pain  Goal: Takes deep breaths with improved pain control throughout the shift  Outcome: Progressing  Goal: Turns in bed with improved pain control throughout the shift  Outcome: Progressing  Goal: Walks with improved pain control throughout the shift  Outcome: Progressing  Goal: Performs ADL's with improved pain control throughout shift  Outcome: Progressing  Goal: Participates in PT with improved pain control throughout the shift  Outcome: Progressing   The patient's goals for the shift include      The clinical goals for the shift include Nghia will have his bleeding well controlled by the end of the shift.    Over the shift, the patient did not make progress toward the following goals. Barriers to progression include bleeding from his femoral AV fistula site. Recommendations to address these barriers include elevated leg throughout the shift.

## 2023-10-16 ENCOUNTER — APPOINTMENT (OUTPATIENT)
Dept: DIALYSIS | Facility: HOSPITAL | Age: 58
DRG: 252 | End: 2023-10-16
Payer: COMMERCIAL

## 2023-10-16 LAB
GLUCOSE BLD MANUAL STRIP-MCNC: 130 MG/DL (ref 74–99)
GLUCOSE BLD MANUAL STRIP-MCNC: 79 MG/DL (ref 74–99)
VANCOMYCIN TROUGH SERPL-MCNC: 15.4 UG/ML (ref 5–20)

## 2023-10-16 PROCEDURE — 87075 CULTR BACTERIA EXCEPT BLOOD: CPT | Mod: CMCLAB | Performed by: INTERNAL MEDICINE

## 2023-10-16 PROCEDURE — 94660 CPAP INITIATION&MGMT: CPT

## 2023-10-16 PROCEDURE — 99233 SBSQ HOSP IP/OBS HIGH 50: CPT | Performed by: INTERNAL MEDICINE

## 2023-10-16 PROCEDURE — 99232 SBSQ HOSP IP/OBS MODERATE 35: CPT | Performed by: INTERNAL MEDICINE

## 2023-10-16 PROCEDURE — 80202 ASSAY OF VANCOMYCIN: CPT | Performed by: STUDENT IN AN ORGANIZED HEALTH CARE EDUCATION/TRAINING PROGRAM

## 2023-10-16 PROCEDURE — 8010000001 HC DIALYSIS - HEMODIALYSIS PER DAY

## 2023-10-16 PROCEDURE — 2500000004 HC RX 250 GENERAL PHARMACY W/ HCPCS (ALT 636 FOR OP/ED): Performed by: NURSE PRACTITIONER

## 2023-10-16 PROCEDURE — 2500000004 HC RX 250 GENERAL PHARMACY W/ HCPCS (ALT 636 FOR OP/ED): Performed by: INTERNAL MEDICINE

## 2023-10-16 PROCEDURE — 36415 COLL VENOUS BLD VENIPUNCTURE: CPT | Mod: CMCLAB | Performed by: INTERNAL MEDICINE

## 2023-10-16 PROCEDURE — 36415 COLL VENOUS BLD VENIPUNCTURE: CPT | Performed by: INTERNAL MEDICINE

## 2023-10-16 PROCEDURE — 2500000001 HC RX 250 WO HCPCS SELF ADMINISTERED DRUGS (ALT 637 FOR MEDICARE OP): Performed by: NURSE PRACTITIONER

## 2023-10-16 PROCEDURE — 6350000001 HC RX 635 EPOETIN >10,000 UNITS: Mod: JZ | Performed by: INTERNAL MEDICINE

## 2023-10-16 PROCEDURE — 2500000001 HC RX 250 WO HCPCS SELF ADMINISTERED DRUGS (ALT 637 FOR MEDICARE OP): Performed by: FAMILY MEDICINE

## 2023-10-16 PROCEDURE — 2500000002 HC RX 250 W HCPCS SELF ADMINISTERED DRUGS (ALT 637 FOR MEDICARE OP, ALT 636 FOR OP/ED): Performed by: NURSE PRACTITIONER

## 2023-10-16 PROCEDURE — 87040 BLOOD CULTURE FOR BACTERIA: CPT | Performed by: INTERNAL MEDICINE

## 2023-10-16 PROCEDURE — 90935 HEMODIALYSIS ONE EVALUATION: CPT | Performed by: INTERNAL MEDICINE

## 2023-10-16 PROCEDURE — 82947 ASSAY GLUCOSE BLOOD QUANT: CPT | Mod: CMCLAB

## 2023-10-16 PROCEDURE — 2500000001 HC RX 250 WO HCPCS SELF ADMINISTERED DRUGS (ALT 637 FOR MEDICARE OP): Performed by: STUDENT IN AN ORGANIZED HEALTH CARE EDUCATION/TRAINING PROGRAM

## 2023-10-16 PROCEDURE — 1100000001 HC PRIVATE ROOM DAILY

## 2023-10-16 PROCEDURE — 2500000004 HC RX 250 GENERAL PHARMACY W/ HCPCS (ALT 636 FOR OP/ED): Performed by: STUDENT IN AN ORGANIZED HEALTH CARE EDUCATION/TRAINING PROGRAM

## 2023-10-16 RX ORDER — CEFAZOLIN SODIUM 1 G/50ML
1 SOLUTION INTRAVENOUS DAILY
Status: DISCONTINUED | OUTPATIENT
Start: 2023-10-17 | End: 2023-10-30

## 2023-10-16 RX ADMIN — SEVELAMER CARBONATE 1600 MG: 800 TABLET, FILM COATED ORAL at 16:52

## 2023-10-16 RX ADMIN — EPOETIN ALFA 10000 UNITS: 10000 SOLUTION INTRAVENOUS; SUBCUTANEOUS at 20:04

## 2023-10-16 RX ADMIN — SEVELAMER CARBONATE 1600 MG: 800 TABLET, FILM COATED ORAL at 11:49

## 2023-10-16 RX ADMIN — ACETAMINOPHEN 650 MG: 325 TABLET ORAL at 16:52

## 2023-10-16 RX ADMIN — PIPERACILLIN SODIUM AND TAZOBACTAM SODIUM 2.25 G: 2; .25 INJECTION, SOLUTION INTRAVENOUS at 11:48

## 2023-10-16 RX ADMIN — PIPERACILLIN SODIUM AND TAZOBACTAM SODIUM 2.25 G: 2; .25 INJECTION, SOLUTION INTRAVENOUS at 19:38

## 2023-10-16 RX ADMIN — MELATONIN 10 MG: 3 TAB ORAL at 21:31

## 2023-10-16 RX ADMIN — AMLODIPINE BESYLATE 10 MG: 10 TABLET ORAL at 11:48

## 2023-10-16 RX ADMIN — NEPHROCAP 1 CAPSULE: 1 CAP ORAL at 11:48

## 2023-10-16 RX ADMIN — ASPIRIN 81 MG: 81 TABLET, COATED ORAL at 11:48

## 2023-10-16 RX ADMIN — OXYCODONE HYDROCHLORIDE 10 MG: 5 TABLET ORAL at 11:49

## 2023-10-16 RX ADMIN — HYDROCORTISONE 15 MG: 5 TABLET ORAL at 11:49

## 2023-10-16 RX ADMIN — METOPROLOL TARTRATE 50 MG: 50 TABLET, FILM COATED ORAL at 11:49

## 2023-10-16 RX ADMIN — CYCLOBENZAPRINE 5 MG: 10 TABLET, FILM COATED ORAL at 02:51

## 2023-10-16 RX ADMIN — CYANOCOBALAMIN TAB 1000 MCG 1000 MCG: 1000 TAB at 11:48

## 2023-10-16 RX ADMIN — PIPERACILLIN SODIUM AND TAZOBACTAM SODIUM 2.25 G: 2; .25 INJECTION, SOLUTION INTRAVENOUS at 02:52

## 2023-10-16 RX ADMIN — PSYLLIUM HUSK 1 PACKET: 3.4 POWDER ORAL at 11:48

## 2023-10-16 RX ADMIN — VANCOMYCIN HYDROCHLORIDE 1 G: 1 INJECTION, SOLUTION INTRAVENOUS at 12:43

## 2023-10-16 RX ADMIN — MELATONIN 3 MG: 3 TAB ORAL at 21:31

## 2023-10-16 RX ADMIN — Medication 1 TABLET: at 11:50

## 2023-10-16 RX ADMIN — OXYCODONE HYDROCHLORIDE 10 MG: 5 TABLET ORAL at 19:39

## 2023-10-16 RX ADMIN — HYDROCORTISONE 5 MG: 5 TABLET ORAL at 20:04

## 2023-10-16 RX ADMIN — METOPROLOL TARTRATE 50 MG: 50 TABLET, FILM COATED ORAL at 20:04

## 2023-10-16 RX ADMIN — PARICALCITOL 8 MCG: 5 INJECTION, SOLUTION INTRAVENOUS at 19:37

## 2023-10-16 RX ADMIN — CYCLOBENZAPRINE 5 MG: 10 TABLET, FILM COATED ORAL at 16:52

## 2023-10-16 RX ADMIN — OXYCODONE HYDROCHLORIDE 10 MG: 5 TABLET ORAL at 02:51

## 2023-10-16 ASSESSMENT — PAIN SCALES - GENERAL
PAINLEVEL_OUTOF10: 8
PAINLEVEL_OUTOF10: 5 - MODERATE PAIN
PAINLEVEL_OUTOF10: 5 - MODERATE PAIN
PAINLEVEL_OUTOF10: 7

## 2023-10-16 ASSESSMENT — PAIN - FUNCTIONAL ASSESSMENT: PAIN_FUNCTIONAL_ASSESSMENT: 0-10

## 2023-10-16 NOTE — PROGRESS NOTES
"Vancomycin Dosing by Pharmacy- FOLLOW UP    Nghia Hall is a 58 y.o. year old male who Pharmacy has been consulted for vancomycin dosing for cellulitis, skin and soft tissue. Based on the patient's indication and renal status this patient is being dosed based on a goal pre-HD level of 15-25.     Renal function is currently HD    Current vancomycin dose: 1 g  post HD    Most recent random level: 15.4 mcg/mL    Visit Vitals  /72 (BP Location: Left arm)   Pulse 50   Temp 36.9 °C (98.4 °F) (Temporal)   Resp 18        Lab Results   Component Value Date    CREATININE 7.10 (H) 10/15/2023    CREATININE 8.58 (H) 10/13/2023    CREATININE 7.27 (H) 10/12/2023    CREATININE 9.43 (H) 10/11/2023        Patient weight is No results found for: \"PTWEIGHT\"    No results found for: \"CULTURE\"     No intake/output data recorded.  [unfilled]    Lab Results   Component Value Date    PATIENTTEMP 37.0 09/05/2023    PATIENTTEMP 37.0 09/05/2023    PATIENTTEMP 37.0 08/13/2023        Assessment/Plan    Within goal random/trough level  The next level will be obtained on 10/18 at with AM labs. May be obtained sooner if clinically indicated.   Will continue to monitor renal function daily while on vancomycin and order serum creatinine at least every 48 hours if not already ordered.  Follow for continued vancomycin needs, clinical response, and signs/symptoms of toxicity.       Bro Lopez RP           "

## 2023-10-16 NOTE — PROGRESS NOTES
Nghia Hall is a 58 y.o. male on day 7 of admission presenting with Inability to walk.    Subjective   Interval History: Patient was afebrile from the chart. Reported no change or improvement.    Review of Systems    Objective   Range of Vitals (last 24 hours)  Heart Rate:  [50-69]   Temp:  [36.1 °C (97 °F)-37 °C (98.6 °F)]   Resp:  [17-18]   BP: (112-120)/(70-72)   SpO2:  [95 %-100 %]   Daily Weight  10/09/23 : 143 kg (315 lb)    Body mass index is 41.56 kg/m².    Microbiology  Susceptibility data from last 14 days.  Collected Specimen Info Organism Clindamycin Erythromycin Oxacillin Tetracycline Trimethoprim/Sulfamethoxazole Vancomycin   10/13/23 Blood culture from Dialysis Staphylococcus aureus         10/13/23 Blood culture from Arterial Line Methicillin Susceptible Staphylococcus aureus (MSSA) S S S S S S     10/13 Bcx MSSA  10/14 Bcx NGTD     Antimicrobial agents  10/11-10/16 Zosyn  10/11-10/16 Vancomycin  10/16- Cefazolin    Imaging  IMPRESSION: 10/10 CT angio A/P  1. 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  2. Unchanged AV graft remnants in the right lower extremity.  3. Diffuse atherosclerotic calcification of the visualized  vasculature without significant focal stenosis.  4. Patent venous structures, no evidence of venous obstruction.  5. Diffuse circumferential skin thickening of the left thigh with  marked underlying edema, correlate with concern for cellulitis. No  focal fluid collections to suggest abscess formation.  6. Diffuse anasarca, mesenteric edema, and abdominopelvic ascites can  be seen the setting of volume overload, correlate with the patient's  volume status.  7. Hepatomegaly with suggestion of a cirrhotic morphology, correlate  with LFTs and ultrasound.  8. Stable enlarged left inguinal and iliac chain lymph nodes.     Assessment/Plan  #LLE swelling and pain in the setting of chronic swelling c/f cellulitis and DVT  #L groin AV  graft on HD c/f graft infection  #MSSA bacteremia under vancomycin use     Patient had worsening LLE swelling and pan with fever in the setting of L groin AV graft on HD. Patient developed MSSA bacteremia under the vancomycin/Zosyn use. From ID standpoint, we would have concern of L groin AV graft related infection. Would recommend TTE. From micro perspective, would discontinue vanco and zosyn, and switch to cefazolin for now.     Recommendations  -Please discontinue vancomycin and zosyn  -Please start cefazolin (renally dose on HD)  -Please obtain TTE  -Please obtain followup 2 sets of blood cultures      Discussed with Dr. Herrmann. Please text me via Epic chat if you have any questions or concerns regarding this patient. ID will continue to follow up this patient.     Alexei Bullock MD  ID fellow [id number]  Team B Pager [phone]

## 2023-10-16 NOTE — SIGNIFICANT EVENT
Pt stated bleeding from left femoral site. Rn was able to stop bleeding with pressure. RN placed pressure tape dressing over site. MD on way to assess site and pt

## 2023-10-16 NOTE — CARE PLAN
Problem: Pain  Goal: Takes deep breaths with improved pain control throughout the shift  Outcome: Progressing  Goal: Turns in bed with improved pain control throughout the shift  Outcome: Progressing  Goal: Walks with improved pain control throughout the shift  Outcome: Progressing  Goal: Performs ADL's with improved pain control throughout shift  Outcome: Progressing  Goal: Participates in PT with improved pain control throughout the shift  Outcome: Progressing   The patient's goals for the shift include      The clinical goals for the shift include pt will have bleeding well controlled by end of fit   Pt bled one time this shift. RN was able to control bleeding with pressure and then securing dressing with pressure tape.

## 2023-10-16 NOTE — PROGRESS NOTES
Nghia Hall is a 58 y.o. male on day 7 of admission presenting with Inability to walk.      Subjective   Patient seen while on dialysis, tolerating HD well.  C/o leg pain that has been chronic       Objective   Heart S1 S2 RRR. Lungs CTA       Vitals 24HR  Heart Rate:  [50-69]   Temp:  [36.1 °C (97 °F)-37 °C (98.6 °F)]   Resp:  [17-18]   BP: (112-120)/(70-72)   SpO2:  [95 %-100 %]     Not a candidate for transplant      Intake/Output last 3 Shifts:    Intake/Output Summary (Last 24 hours) at 10/16/2023 0937  Last data filed at 10/16/2023 0629  Gross per 24 hour   Intake 200 ml   Output --   Net 200 ml       Physical Exam    Relevant Results  Scheduled medications  amLODIPine, 10 mg, oral, Daily  [Held by provider] apixaban, 5 mg, oral, BID  aspirin, 81 mg, oral, Daily  B complex-vitamin C-folic acid, 1 capsule, oral, Daily  cyanocobalamin, 1,000 mcg, oral, Daily  epoetin urmila or biosimilar, 10,000 Units, intravenous, Every Mon/Wed/Fri  hydrocortisone, 15 mg, oral, q AM  hydrocortisone, 5 mg, oral, q PM  lactobacillus acidophilus, 1 tablet, oral, Daily  melatonin, 10 mg, oral, Daily  metoprolol tartrate, 50 mg, oral, BID  multivitamin with minerals, 1 tablet, oral, Daily  pantoprazole, 40 mg, oral, Daily before breakfast  paricalcitoL, 8 mcg, intravenous, Once per day on Mon Wed Fri  piperacillin-tazobactam, 2.25 g, intravenous, q8h  psyllium, 1 packet, oral, Daily  sevelamer carbonate, 1,600 mg, oral, TID with meals  vancomycin, 1 g, intravenous, After Dialysis      Continuous medications     PRN medications  PRN medications: acetaminophen **OR** acetaminophen **OR** acetaminophen, cyclobenzaprine, diphenhydrAMINE, diphenhydrAMINE, melatonin, oxyCODONE          Results for orders placed or performed during the hospital encounter of 10/09/23 (from the past 24 hour(s))   POCT GLUCOSE   Result Value Ref Range    POCT Glucose 81 74 - 99 mg/dL   POCT GLUCOSE   Result Value Ref Range    POCT Glucose 68 (L) 74 - 99 mg/dL    CBC   Result Value Ref Range    WBC 6.7 4.4 - 11.3 x10*3/uL    nRBC 0.0 0.0 - 0.0 /100 WBCs    RBC 3.60 (L) 4.50 - 5.90 x10*6/uL    Hemoglobin 10.3 (L) 13.5 - 17.5 g/dL    Hematocrit 36.3 (L) 41.0 - 52.0 %     (H) 80 - 100 fL    MCH 28.6 26.0 - 34.0 pg    MCHC 28.4 (L) 32.0 - 36.0 g/dL    RDW 16.1 (H) 11.5 - 14.5 %    Platelets 265 150 - 450 x10*3/uL    MPV 9.9 7.5 - 11.5 fL   POCT GLUCOSE   Result Value Ref Range    POCT Glucose 82 74 - 99 mg/dL   Vancomycin, Trough Please draw before dialysis   Result Value Ref Range    Vancomycin, Trough 15.4 5.0 - 20.0 ug/mL               Assessment/Plan              Principal Problem:    Inability to walk  Active Problems:    ESRD (end stage renal disease) (CMS/[facility])       ESRD patient with inability to walk, recent leg AVG angioplasty and bacteremia.  Is on abx, ID following.     Will follow overall management per primary team and continue regular HD while in house. Will continue to optimize fluid management as tolerated.    Cortez Ngo MD

## 2023-10-16 NOTE — NURSING NOTE
.Report from Sending RN:    Report From: WILLARD Willoughby  Recent Surgery of Procedure: No  Baseline Level of Consciousness (LOC): A&OX3  Oxygen Use: No  Type: room air  Diabetic: No  Last BP Med Given Day of Dialysis: metropolol  Last Pain Med Given: oxycodone  Lab Tests to be Obtained with Dialysis: No  Blood Transfusion to be Given During Dialysis: No  Available IV Access: Yes  Medications to be Administered During Dialysis: No  Continuous IV Infusion Running: No  Restraints on Currently or in the Last 24 Hours: No  Hand-Off Communication: Pt had no acute event overnight. Vital sign stable. AVF for dialysis.

## 2023-10-16 NOTE — NURSING NOTE
Report to Receiving RN:    Report To: Pilar LAMAR  Time Report Called: [id number]  Hand-Off Communication: HD completed and tolerated well. UF removed: 3 L NET. VSS. No issues noted during treatment. Pt stable in no distress post treatment.   Complications During Treatment: No  Ultrafiltration Treatment: Yes  Medications Administered During Dialysis: No  Blood Products Administered During Dialysis: No  Labs Sent During Dialysis: N/A  Heparin Drip Rate Changes: NA

## 2023-10-16 NOTE — CARE PLAN
The patient's goals for the shift include      The clinical goals for the shift include Patient will have his bleeding well controlled by the end of the shift.    Over the shift, the patient remained safe and free of injury during night. Pain controlled with oxycodone and flexeril. Vanc trough therapeutic this morning, 15.4. Continued antibiotic treatment. No other bleeding on left thigh during night. Off unit for dialysis 6am.     Fartun Fink RN

## 2023-10-17 ENCOUNTER — APPOINTMENT (OUTPATIENT)
Dept: DIALYSIS | Facility: HOSPITAL | Age: 58
DRG: 252 | End: 2023-10-17
Payer: COMMERCIAL

## 2023-10-17 LAB
GLUCOSE BLD MANUAL STRIP-MCNC: 105 MG/DL (ref 74–99)
GLUCOSE BLD MANUAL STRIP-MCNC: 94 MG/DL (ref 74–99)
HBV SURFACE AB SER-ACNC: 24.6 MIU/ML
HBV SURFACE AG SERPL QL IA: NONREACTIVE

## 2023-10-17 PROCEDURE — 2500000002 HC RX 250 W HCPCS SELF ADMINISTERED DRUGS (ALT 637 FOR MEDICARE OP, ALT 636 FOR OP/ED): Performed by: NURSE PRACTITIONER

## 2023-10-17 PROCEDURE — 2500000001 HC RX 250 WO HCPCS SELF ADMINISTERED DRUGS (ALT 637 FOR MEDICARE OP): Performed by: NURSE PRACTITIONER

## 2023-10-17 PROCEDURE — 86706 HEP B SURFACE ANTIBODY: CPT | Mod: CMCLAB | Performed by: INTERNAL MEDICINE

## 2023-10-17 PROCEDURE — 1100000001 HC PRIVATE ROOM DAILY

## 2023-10-17 PROCEDURE — 36415 COLL VENOUS BLD VENIPUNCTURE: CPT | Performed by: INTERNAL MEDICINE

## 2023-10-17 PROCEDURE — 99233 SBSQ HOSP IP/OBS HIGH 50: CPT | Performed by: INTERNAL MEDICINE

## 2023-10-17 PROCEDURE — 82947 ASSAY GLUCOSE BLOOD QUANT: CPT

## 2023-10-17 PROCEDURE — 2500000001 HC RX 250 WO HCPCS SELF ADMINISTERED DRUGS (ALT 637 FOR MEDICARE OP): Performed by: FAMILY MEDICINE

## 2023-10-17 PROCEDURE — 94660 CPAP INITIATION&MGMT: CPT

## 2023-10-17 PROCEDURE — 2500000001 HC RX 250 WO HCPCS SELF ADMINISTERED DRUGS (ALT 637 FOR MEDICARE OP): Performed by: STUDENT IN AN ORGANIZED HEALTH CARE EDUCATION/TRAINING PROGRAM

## 2023-10-17 PROCEDURE — 87340 HEPATITIS B SURFACE AG IA: CPT | Performed by: INTERNAL MEDICINE

## 2023-10-17 PROCEDURE — 2500000004 HC RX 250 GENERAL PHARMACY W/ HCPCS (ALT 636 FOR OP/ED): Performed by: NURSE PRACTITIONER

## 2023-10-17 PROCEDURE — 8010000001 HC DIALYSIS - HEMODIALYSIS PER DAY

## 2023-10-17 PROCEDURE — 99232 SBSQ HOSP IP/OBS MODERATE 35: CPT | Performed by: INTERNAL MEDICINE

## 2023-10-17 PROCEDURE — 76705 ECHO EXAM OF ABDOMEN: CPT | Performed by: RADIOLOGY

## 2023-10-17 PROCEDURE — 2500000004 HC RX 250 GENERAL PHARMACY W/ HCPCS (ALT 636 FOR OP/ED): Performed by: INTERNAL MEDICINE

## 2023-10-17 RX ADMIN — CYANOCOBALAMIN TAB 1000 MCG 1000 MCG: 1000 TAB at 10:44

## 2023-10-17 RX ADMIN — OXYCODONE HYDROCHLORIDE 10 MG: 5 TABLET ORAL at 18:18

## 2023-10-17 RX ADMIN — DIPHENHYDRAMINE HYDROCHLORIDE 25 MG: 25 CAPSULE ORAL at 21:18

## 2023-10-17 RX ADMIN — SEVELAMER CARBONATE 1600 MG: 800 TABLET, FILM COATED ORAL at 10:44

## 2023-10-17 RX ADMIN — Medication 1 TABLET: at 09:00

## 2023-10-17 RX ADMIN — CEFAZOLIN SODIUM 1 G: 1 INJECTION, SOLUTION INTRAVENOUS at 10:44

## 2023-10-17 RX ADMIN — CYCLOBENZAPRINE 5 MG: 10 TABLET, FILM COATED ORAL at 18:18

## 2023-10-17 RX ADMIN — SEVELAMER CARBONATE 1600 MG: 800 TABLET, FILM COATED ORAL at 18:18

## 2023-10-17 RX ADMIN — Medication 1 TABLET: at 10:44

## 2023-10-17 RX ADMIN — MELATONIN 3 MG: 3 TAB ORAL at 21:18

## 2023-10-17 RX ADMIN — OXYCODONE HYDROCHLORIDE 10 MG: 5 TABLET ORAL at 12:17

## 2023-10-17 RX ADMIN — CYCLOBENZAPRINE 5 MG: 10 TABLET, FILM COATED ORAL at 12:17

## 2023-10-17 RX ADMIN — CYCLOBENZAPRINE 5 MG: 10 TABLET, FILM COATED ORAL at 06:05

## 2023-10-17 RX ADMIN — HYDROCORTISONE 15 MG: 5 TABLET ORAL at 10:44

## 2023-10-17 RX ADMIN — ASPIRIN 81 MG: 81 TABLET, COATED ORAL at 10:44

## 2023-10-17 RX ADMIN — AMLODIPINE BESYLATE 10 MG: 10 TABLET ORAL at 10:44

## 2023-10-17 RX ADMIN — METOPROLOL TARTRATE 50 MG: 50 TABLET, FILM COATED ORAL at 10:45

## 2023-10-17 RX ADMIN — HYDROCORTISONE 5 MG: 5 TABLET ORAL at 21:18

## 2023-10-17 RX ADMIN — ACETAMINOPHEN 650 MG: 325 TABLET ORAL at 10:44

## 2023-10-17 RX ADMIN — OXYCODONE HYDROCHLORIDE 10 MG: 5 TABLET ORAL at 06:05

## 2023-10-17 RX ADMIN — NEPHROCAP 1 CAPSULE: 1 CAP ORAL at 10:44

## 2023-10-17 RX ADMIN — MELATONIN 10 MG: 3 TAB ORAL at 21:27

## 2023-10-17 RX ADMIN — PANTOPRAZOLE SODIUM 40 MG: 40 TABLET, DELAYED RELEASE ORAL at 06:05

## 2023-10-17 RX ADMIN — PSYLLIUM HUSK 1 PACKET: 3.4 POWDER ORAL at 10:44

## 2023-10-17 ASSESSMENT — PAIN SCALES - GENERAL
PAINLEVEL_OUTOF10: 7
PAINLEVEL_OUTOF10: 7
PAINLEVEL_OUTOF10: 8
PAINLEVEL_OUTOF10: 6
PAINLEVEL_OUTOF10: 5 - MODERATE PAIN
PAINLEVEL_OUTOF10: 7

## 2023-10-17 ASSESSMENT — PAIN DESCRIPTION - DESCRIPTORS
DESCRIPTORS: THROBBING

## 2023-10-17 ASSESSMENT — PAIN - FUNCTIONAL ASSESSMENT
PAIN_FUNCTIONAL_ASSESSMENT: 0-10

## 2023-10-17 NOTE — CARE PLAN
The patient's goals for the shift include      The clinical goals for the shift include Pt will have bleeding well controlled by the end of shift.    Pt remained safe and free of injury during night. Pain controlled with oxycodone. No bleeding on left thigh. No other distress noted. Call light in reach. Resting quietly at this time.     Fartun Fink RN

## 2023-10-17 NOTE — NURSING NOTE
.Report from Sending RN:    Report From: Fartun  Recent Surgery of Procedure: No  Baseline Level of Consciousness (LOC): A&OX3  Oxygen Use: No  Type: ROOM AIR  Diabetic: No  Last BP Med Given Day of Dialysis: Metoprolol  Last Pain Med Given: oxycodone  Lab Tests to be Obtained with Dialysis:   Blood Transfusion to be Given During Dialysis: No  Available IV Access: Yes  Medications to be Administered During Dialysis: No  Continuous IV Infusion Running: No  Restraints on Currently or in the Last 24 Hours: No  Hand-Off Communication: Pt had no acute event overnight, not on precaution. Had a graft for HD.

## 2023-10-17 NOTE — PROGRESS NOTES
Nghia Hall is a 58 y.o. male on day 8 of admission presenting with Inability to walk.    Subjective   Interval History: Patient was afebrile and had still pain with bleeding from the thigh near the graft. Reported no change or improvement.    Review of Systems    Objective   Range of Vitals (last 24 hours)  Heart Rate:  [52-67]   Temp:  [35.8 °C (96.4 °F)-36.4 °C (97.5 °F)]   Resp:  [14-20]   BP: (112-116)/(68-71)   SpO2:  [95 %-100 %]   Daily Weight  10/09/23 : 143 kg (315 lb)    Body mass index is 41.56 kg/m².    Microbiology  Susceptibility data from last 14 days.  Collected Specimen Info Organism Clindamycin Erythromycin Oxacillin Tetracycline Trimethoprim/Sulfamethoxazole Vancomycin   10/13/23 Blood culture from Dialysis Staphylococcus aureus         10/13/23 Blood culture from Arterial Line Methicillin Susceptible Staphylococcus aureus (MSSA) S S S S S S       10/13 Bcx MSSA  10/14 Bcx NGTD     Antimicrobial agents  10/11-10/16 Zosyn  10/11-10/16 Vancomycin  10/16- Cefazolin    Imaging  IMPRESSION: 10/10 CT angio A/P  1. 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  2. Unchanged AV graft remnants in the right lower extremity.  3. Diffuse atherosclerotic calcification of the visualized  vasculature without significant focal stenosis.  4. Patent venous structures, no evidence of venous obstruction.  5. Diffuse circumferential skin thickening of the left thigh with  marked underlying edema, correlate with concern for cellulitis. No  focal fluid collections to suggest abscess formation.  6. Diffuse anasarca, mesenteric edema, and abdominopelvic ascites can  be seen the setting of volume overload, correlate with the patient's  volume status.  7. Hepatomegaly with suggestion of a cirrhotic morphology, correlate  with LFTs and ultrasound.  8. Stable enlarged left inguinal and iliac chain lymph nodes.     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 Patient developed MSSA bacteremia under the vancomycin/Zosyn use. From ID standpoint, we would have concern of L groin AV graft related infection. Would recommend remove the graft c/f the infection, which could cause left leg swelling and pain. Would continue IV cefazolin.     Recommendations  -Please start cefazolin (renally dose on HD)  -Will follow on the surgical removal of the graft, which will be sent to micro  -Will follow on the blood cultures on 10/14 and 10/16     Discussed with Dr. Herrmann. Please text me via Epic chat if you have any questions or concerns regarding this patient. ID will continue to follow up this patient.     Alexei Bullock MD  ID fellow [id number]  Team B Pager [phone]

## 2023-10-17 NOTE — CONSULTS
Wound Care Consult     Visit Date: 10/17/2023      Patient Name: Nghia Hall         MRN: [id number]           YOB: 1965     Reason for Consult: left thigh wound      Wound Assessment:  Wound 10/17/23 Other (comment) Leg Left;Proximal;Upper;Anterior (Active)   Site Assessment Dark edges;Hematoma;Induration;Painful;Red;Swelling 10/17/23 1709   Non-staged Wound Description Not applicable 10/17/23 1709   Drainage Amount Copious 10/17/23 1709   Dressing Hydrofiber;Silicone border dressing 10/17/23 1709   Dressing Status Clean 10/17/23 1709       Wound Team Summary Assessment: pinpoint hole/opening in upper anterior thigh, close proximity to dialysis graft sites.      Wound Team Plan: Area cleaned with NS and allowed to dry. Aquacel AG applied in folded accordion to act as pressure to site followed by a mepilex border dressing. Additional tape used to edges to secure dressing as patient stated dressings keep falling off.  Change daily or as needed.     Cassandra Olivares RN  10/17/2023  5:11 PM

## 2023-10-17 NOTE — PROGRESS NOTES
"Nghia Hall is a 58 y.o. male on day 7 of admission presenting with Inability to walk.    Subjective   Lying in bed. No distress.          Objective     Physical Exam    Last Recorded Vitals  Blood pressure 112/69, pulse 62, temperature 36.7 °C (98.1 °F), temperature source Temporal, resp. rate 18, height 1.854 m (6' 1\"), weight 143 kg (315 lb), SpO2 100 %.  Intake/Output last 3 Shifts:  I/O last 3 completed shifts:  In: 800 [I.V.:400; Other:400]  Out: 3403 [Other:3403]    Relevant Results  amLODIPine, 10 mg, oral, Daily  [Held by provider] apixaban, 5 mg, oral, BID  aspirin, 81 mg, oral, Daily  B complex-vitamin C-folic acid, 1 capsule, oral, Daily  cyanocobalamin, 1,000 mcg, oral, Daily  epoetin urmila or biosimilar, 10,000 Units, intravenous, Every Mon/Wed/Fri  hydrocortisone, 15 mg, oral, q AM  hydrocortisone, 5 mg, oral, q PM  lactobacillus acidophilus, 1 tablet, oral, Daily  melatonin, 10 mg, oral, Daily  metoprolol tartrate, 50 mg, oral, BID  multivitamin with minerals, 1 tablet, oral, Daily  pantoprazole, 40 mg, oral, Daily before breakfast  paricalcitoL, 8 mcg, intravenous, Once per day on Mon Wed Fri  piperacillin-tazobactam, 2.25 g, intravenous, q8h  psyllium, 1 packet, oral, Daily  sevelamer carbonate, 1,600 mg, oral, TID with meals  vancomycin, 1 g, intravenous, After Dialysis           PRN medications: acetaminophen **OR** acetaminophen **OR** acetaminophen, cyclobenzaprine, diphenhydrAMINE, diphenhydrAMINE, melatonin, oxyCODONE     Results for orders placed or performed during the hospital encounter of 10/09/23 (from the past 24 hour(s))   POCT GLUCOSE   Result Value Ref Range    POCT Glucose 82 74 - 99 mg/dL   Vancomycin, Trough Please draw before dialysis   Result Value Ref Range    Vancomycin, Trough 15.4 5.0 - 20.0 ug/mL   POCT GLUCOSE   Result Value Ref Range    POCT Glucose 79 74 - 99 mg/dL   Blood Culture    Specimen: Peripheral Venipuncture; Blood culture   Result Value Ref Range    Blood " Culture Loaded on Instrument - Culture in progress    POCT GLUCOSE   Result Value Ref Range    POCT Glucose 130 (H) 74 - 99 mg/dL      IR angiogram fistula graft    Result Date: 10/14/2023  Interpreted By:  Tereso Otto, STUDY: IR ANGIOGRAM FISTULA GRAFT; ;  10/13/2023 6:29 pm 1. ULTRASOUND EVALUATION LEFT FEMORAL FEMORAL ARTERIOVENOUS LOOP GRAFT BY MD 2. ULTRASOUND-GUIDED VASCULAR ACCESS 3. ARTERIOVENOUS GRAFT ANGIOGRAPHY 4. VENOUS ANASTOMOTIC OUTFLOW STENOSIS ANGIOPLASTY 5. COMPLETION ARTERIOVENOUS GRAFT ANGIOGRAPHY     INDICATION: . 58-year-old man with end-stage renal disease, left groin arteriovenous graft, reported bleeding after dialysis, concern for infection with open skin wound overlying the graft.   COMPARISON: CT angiogram 10/12/2023, angiography 08/30/2022   ACCESSION NUMBER(S): [id number]   ORDERING CLINICIAN: JOSE A VERONICA   TECHNIQUE:   ATTENDING : Tereso Otto M.D.   TECHNICAL DESCRIPTION/FINDINGS: The procedure, including all risks, benefits and alternatives were explained to the patient in detail. All questions were answered and written informed consent was obtained.   The patient was positioned supine on the angiography table. A time-out was performed.   The left groin and proximal left thigh were prepped and draped in usual sterile fashion. In the mid to upstream/lateral portion of the graft, there was skin ulceration with spontaneous bloody/cloudy/purulent drainage. Gentle compression adjacent to the ulcer resulted in additional spontaneous drainage of the cloudy/bloody fluid. The finding is concerning for overlying infection in this location.   Upstream of the skin ulcer in the more proximal and lateral aspect of the graft, focused ultrasound was performed demonstrating patency and compressibility. Ultrasound images were saved. 1% lidocaine was administered subcutaneously for local anesthesia. Under real-time ultrasound guidance, a 21 gauge access needle was used  to access the graft in antegrade direction using micropuncture technique. Ultrasound images were saved. An 018 guidewire was advanced into the graft under fluoroscopic visualization, and a micropuncture transitional introducer was placed. Initial graft angiography performed through the micropuncture sheath demonstrated brisk flow through the graft and previously placed stents within the graft. There is mild venous anastomotic narrowing at the common femoral venous anastomosis.   The access was upsized over an 035 wire for a 6 Ethiopian antegrade sheath. A 5 Ethiopian Mobile Experiencepe the catheter was utilized to position an Amplatz guidewire into the IV C. over the wire, a 7 mm x 40 mm noncompliant angioplasty balloon was then advanced and inflated to maximum burst pressure at the venous anastomosis for 60 seconds. The balloon was then deflated and removed from the sheath. Post angioplasty angiography demonstrated resolution of the previously shown venous anastomotic stenosis, with persistent brisk flow through the graft. Central venography was performed demonstrating gross patency of the enlarged left iliac deep veins as well as gross patency of the IV C.   The wires and sheath were then removed and hemostasis was obtained with a purse string suture. Sterile dressing was applied.   SEDATION/MEDICATIONS: Continuous cardiopulmonary monitoring was performed by a radiology nurse for the duration of the procedure. 50 mcg Fentanyl was administered for analgesia. Procedural duration 20 minutes. 5 cc 1% Lidocaine was administered subcutaneously for local anesthesia. SPECIMENS: None. ESTIMATED BLOOD LOSS:  5 cc FLOUROSCOPY:  1.5 minutes; DAP  39316 mGy-cm*2; Air Kerma  131.16 mGy CONTRAST: 30 cc Omnipaque 350   FINDINGS: Test       1. Overall mild venous anastomotic stenosis of the left femoral femoral arteriovenous loop graft. 2. Resolution of venous anastomotic stenosis status post 7 mm balloon angioplasty.   Plan: The graft is ready for  ongoing use. The mild venous anastomotic stenosis does not explain spontaneous drainage from the skin (concern for inflection) or patient's left lower extremity swelling.   MACRO: None   Signed by: Tereso Otto 10/14/2023 7:31 PM Dictation workstation:   [id number]    Vascular US lower extremity hemodialysis access duplex left    Result Date: 10/12/2023            [place] [address]   Tel [phone] and Fax [phone]  Vascular Lab Report Dialysis Access Evaluation  Patient Name:      PRABHJOT CHAVES        Eleazar Physician:  [id number] Tim Knight DO Study Date:        10/12/2023           Ordering Provider:  [id number] JOSE A VERONICA MRN/PID:           [id number]             Technologist:       Dorian Rutherford RVT Accession#:        [id number]         Technologist 2: Date of Birth/Age: 1965 / 58 years Encounter#:         [id number] Gender:            M Admission Status:  Inpatient            Location Performed: [facility]  Diagnosis/ICD: Pain from vascular prosthetic devices, implants and grafts,                initial encounter-T82.848A  CONCLUSIONS: Dialysis Access Evaluation: Common femoral artery to common femoral vein AVG appears widely patent. At the mid segment the AVG appears to be slightly kinked, no high velocities noted in that area. Edema and lymph nodes noted in left groin.  Comparison: Compared with study from 8/18/2023, no significant change.  Imaging & Doppler Findings:  Dialysis Access Graft                 Left Velocity Arterial Anast  292 cm/s Flow Prox       363 cm/s Flow Prox/Mid   168 cm/s Mid             160 cm/s Mid/Flow Distal 280 cm/s Flow Distal     193 cm/s Outflow Anast   268 cm/s Outflow Vein    270 cm/s Pre Anast       130 cm/s Volume Flow 1253.00 ml/min  [id number] Tim Knight DO Electronically signed by [id number] Knight DO on 10/12/2023 at 3:52:49 PM  ** Final  **     CT angio abdomen pelvis w and or wo IV IV contrast    Result Date: 10/12/2023  Interpreted By:  Vic Orta,  and Doc Pike STUDY: CT ANGIO ABDOMEN PELVIS W AND/OR WO IV IV CONTRAST;  10/12/2023 1:24 pm   INDICATION: Signs/Symptoms:venous obstruction, severe left leg swelling, left femoral AVG.   COMPARISON: CT abdomen pelvis 09/23/2023 PET-CT 10/06/2023 CTA abdomen pelvis with bilateral lower extremity runoff 12/20/2022   ACCESSION NUMBER(S): [id number]   ORDERING CLINICIAN: NINA EWING   PROCEDURE: CT ANGIOGRAM OF THE ABDOMEN AND PELVIS   TECHNIQUE: High-resolution contrast-enhanced helical CT of the  abdomen, pelvis and both lower extremities  was performed,  without contrast, timed to arterial phase, and timed to venous phase (three phases).  3-D processing was performed by the physician on an independent work station, with MIP and volume-rendering techniques.  Total of 100 ml of Omnipaque 350 was injected during the examination.  The study was performed without oral contrast.  The patient tolerated the injection without complications.   Per technologist notes, the IV leaked and the arm had to be positioned at the side due to positional IV. Best images possible were obtained.   FINDINGS: ABDOMEN: Images of the aorta demonstrate  diffuse atherosclerotic change without significant focal stenosis or aneurysm.   Celiac artery demonstrates  no significant focal stenosis.   Superior mesenteric artery demonstrates  no significant focal stenosis.   Inferior mesenteric artery demonstrates  no significant focal stenosis.   There  is a single right renal artery.  Right renal artery demonstrates  no significant focal stenosis.  There  is a single renal vein which is patent.   There  is a single left renal artery.  Left renal artery demonstrates no significant focal stenosis.  There  is a single renal vein which is patent.   RIGHT LEG: The right common femoral artery patent. An AV graft remnant is  again seen and again appears occluded. An additional probable abandoned AV graft is also seen unchanged occlusion of the right profunda femoris artery. The right superficial femoral artery demonstrates diffuse atherosclerotic calcification without significant focal stenosis.   LEFT LEG: A left femoral AV graft fistula between the superficial femoral artery and common femoral vein is again seen. The graft is patent. A remnant of an old occluded AV graft is again seen. Diffuse atherosclerotic calcification of the left superficial and deep femoral arteries without focal significant stenosis.   NONVASCULAR FINDINGS:   LOWER CHEST: Right-sided gynecomastia. Trace right pleural effusion versus nonspecific pleural thickening. Right lung bases clear. The heart is enlarged. Partially visualized coronary calcifications. The visualized distal esophagus is unremarkable.   ABDOMEN:   LIVER: Liver is enlarged and measures 21.6 cm in craniocaudal dimension. The liver contour is slightly nodular which can be seen with fibrotic changes. No parenchymal lesions are identified.   BILE DUCTS: The intrahepatic and extrahepatic ducts are not dilated.   GALLBLADDER: Surgically absent.   PANCREAS: The pancreas appears unremarkable without evidence of ductal dilatation or masses.   SPLEEN: The spleen is normal in size without focal lesions.   ADRENAL GLANDS: Bilateral adrenal glands appear normal.   KIDNEYS AND URETERS: Symmetric severe atrophy of the bilateral native kidneys. Multiple cysts are again seen throughout the bilateral kidneys, overall similar in size and appearance compared to prior studies. No hydroureteronephrosis or nephroureterolithiasis is identified.   A transplant kidney is seen in the left iliac fossa. It demonstrates no significant abnormality.   PELVIS:   BLADDER: The urinary bladder is decompressed, limiting assessment. Moderate concentric wall thickening is again seen.   REPRODUCTIVE ORGANS: The prostate is  unremarkable.   BOWEL: The stomach is unremarkable. The small bowel is not abnormally dilated. The large bowel demonstrates multiple diverticula without inflammation. The appendix appears normal.   VESSELS: As above. No discrete filling defects are seen within the image venous system. Multiple collaterals are visualized along the patient's anterior abdominal and thoracic garcía.   PERITONEUM/RETROPERITONEUM/LYMPH NODES: Moderate amount of abdominopelvic ascites. There is diffuse mesenteric edema. Several enlarged left inguinal and iliac chain lymph nodes are again seen which were FDG avid on the PET-CT from 10/06/2023.   BONES AND ABDOMINAL WALL: No suspicious osseous lesions are identified. Diffuse anasarca involving the thoracic, abdominal, and pelvic subcutaneous tissues. There is asymmetric subcutaneous edema and swelling of the left lower extremity extending from the level of the hip joint to the edge of the field-of-view. This is fairly severe at the distal aspect of the thigh. No focal fluid collections are identified.   Diffuse somewhat circumferential skin thickening of the left thigh.       1. Patent left femoral AV graft fistula between the superficial femoral artery and femoral vein. 2. Unchanged AV graft remnants in the right lower extremity. 3. Diffuse atherosclerotic calcification of the visualized vasculature without significant focal stenosis. 4. Patent venous structures, no evidence of venous obstruction. 5. Diffuse circumferential skin thickening of the left thigh with marked underlying edema, correlate with concern for cellulitis. No focal fluid collections to suggest abscess formation. 6. Diffuse anasarca, mesenteric edema, and abdominopelvic ascites can be seen the setting of volume overload, correlate with the patient's volume status. 7. Hepatomegaly with suggestion of a cirrhotic morphology, correlate with LFTs and ultrasound. 8. Stable enlarged left inguinal and iliac chain lymph nodes.     I  personally reviewed the images/study and I agree with the findings as stated. This study was interpreted at [facility], [place], [place].   MACRO: None.   Signed by: Vic Orta 10/12/2023 3:31 PM Dictation workstation:   [id number]      Lower extremity venous duplex left    Result Date: 10/9/2023            [place] [address]   Tel [phone] and Fax [phone]  Vascular Lab Report Lower Venous Duplex Ultrasound  Patient Name:     PRABHJOT CHAVES       Reading           [id number] Shahid Guevara                                       Physician:        MD Study Date:       10/9/2023           Ordering          [id number] VINAYAK ZAMUDIO                                       Provider: MRN/PID:          [id number]            Technologist:     Kylah Kim RVT                                                         [facility] Accession#:       [id number]        Technologist 2: Date of           1965 / 58      Encounter#:       [id number] Birth/Age:        years Gender:           M Admission Status: Emergency           Location          [facility]                                       Performed:  Diagnosis/ICD: Localized (leg) edema-R60.0 Indication:    Limb edema  CONCLUSIONS: Left Lower Venous: Negative for acute DVT of the visualized vessels. Possible chronic thrombus in the CFV. Patient cannot tolerate compressions of the mid distal thigh. The vessels appear patent by color flow and Doppler. Calf veins only visuslized in segments and appear patent. There are lymph nodes noted in the left groin. May wish for further evaluation. Also AVG noted and is patent. An old non functioning AVG is also noted.  Imaging & Doppler Findings:  Right Compressible Thrombus        Flow CFV       Yes        None   Spontaneous/Phasic  Left                  Compress Thrombus        Flow Distal External Iliac            None CFV                      Yes      None   Spontaneous/Phasic PFV                     Yes      None FV Proximal             Yes      None   Spontaneous/Phasic FV Mid                           None   Spontaneous/Phasic FV Distal                        None   Spontaneous/Phasic Popliteal               Yes      None   Spontaneous/Phasic  [id number] Shahid Guevara MD Electronically signed by [id number] Shahid Guevara MD on 10/9/2023 at 9:36:08 PM  ** Final **     NM PET CT lymphoma staging    Result Date: 10/6/2023  Interpreted By:  Jay Dee  and Edgar Lopez STUDY: NM PET CT LYMPHOMA STAGING;  10/6/2023 9:24 am   INDICATION: Signs/Symptoms:Lymphadenopathy. 58-year-old male with history of end-stage renal disease on dialysis with failed renal transplant. Found to have lymphadenopathy on prior CTs and PET-CT with concerns of posttreatment lymphoproliferative disorder. Per EMR: Biopsy in 2/23 of the left iliac lymph node showed no concerns for lymphoproliferative disorder. Follow-up PET-CT   COMPARISON: CT AP 09/23/2023 PET-CT 01/04/2023   ACCESSION NUMBER(S): [id number]   ORDERING CLINICIAN: JOVANNY VELASQUEZ   TECHNIQUE: DIVISION OF NUCLEAR MEDICINE POSITRON EMISSION TOMOGRAPHY (PET-CT)   The patient received an intravenous dose of 11.8 mCi of Fluorine-18 fluorodeoxyglucose (FDG). Positron emission tomographic (PET) images from mid-thigh to skull base were then acquired after a one hour delay. Also acquired was a contemporaneous low dose non-contrast CT scan performed for attenuation correction of PET images and anatomic localization.  The PET and CT images were digitally fused for display.  All images were acquired on a combined PET-CT scanner unit. Some areas of FDG accumulation may be described in standardized uptake value (SUV) units.   CODING: Subsequent Treatment Strategy (PS)   CALIBRATION: Dose Injection-to-Scan Interval (mins): 61 min Mediastinal bloodpool SUV (normal 1.5-2.5): 1.9 Blood glucose: 60 mg/dL   FINDINGS: Evaluation is  limited secondary to increased FDG activity throughout the soft tissues.   NECK: No focal hypermetabolic soft tissue lesion is seen in the neck. There are numerous bilateral predominantly subcentimeter cervical lymph nodes which demonstrate only mild FDG avidity. The largest among these is a left supraclavicular lymph node measuring 1.1 cm in short axis with SUV max of 1.5. This is not significantly changed from the prior.   CHEST: No focal hypermetabolic lesion is seen in the lung parenchyma. A small right pleural effusion is seen without significant FDG avidity. There are similar numerous bilateral axillary and mediastinal lymph nodes with only mild FDG avidity similar to blood pool.   ABDOMEN AND PELVIS: No hypermetabolic soft tissue lesion is present in the abdomen and pelvis. There is similar extent of mildly FDG avid retroperitoneal lymph nodes including para-aortic, aortocaval, pericaval, bilateral common iliac, bilateral external iliac, and inguinal lymph node stations. The largest among these is a left external iliac lymph node measuring 1.5 cm in the short axis with SUV max of 3.7. Physiologic radiotracer uptake is present in the liver and spleen with excretion into the bowel loops and the genitourinary tract.   MUSCULOSKELETAL: There is no focal hypermetabolic lesion to suggest osseous metastasis.       Limited examination due to increased activity throughout the soft tissues. Within the limitation:   Similar extensive predominantly subcentimeter lymphadenopathy both above and below the diaphragm which may represent a low-grade lymphomatous/leukemic process versus an inflammatory etiology. No new focal hypermetabolic activity to suggest malignancy elsewhere.   I personally reviewed the image(s) / study and agree with the findings and interpretation as stated. This study was interpreted at [facility].   MACRO: None           Signed by: Jay Dee 10/6/2023 10:21 AM  Dictation workstation:   [id number]    Hospital course:  Nghia Hall is a 58 y.o. male presenting with Inability to walk. Patient has past medical history of ESRD s/p DDKT (2006, subsequent graft failure 07/2021, on HD MWF at [facility] through LT groin AV graft on [address]; not currently on immunosuppression), pAfib (on Apixaban), HFpEF, HCV (s/p treatment), gout, lymphadenopathy (following with oncology, inconclusive biopsy in 01/2023), DVT, and Adrenal Insufficiency (on hydrocortisone) who presented to the ED with difficulty ambulating due to secondary LLE pain and swelling. His left leg (which is also used for his HD access) is severely edematous and exquisitely tender to palpation. He reports being compliant with his Eliquis. The edema is non dependent. He is not interested in placement at this time due to being employed.  Nephrology was consulted for HD. Vascular surgery was also consulted to evaluate left AVG.  On 10/12 patient dialysis access started oozing bright red blood, reached out to vascular surgery for urgent evaluation. Vascular surgery saw the patient and did not feel that there was any surgical interventions to offer. CTA a/p and LLE doppler were performed on 10/12/23, consulted IR for fistulogram and cardiology for right HF recommendations. IR performed fistulogram on 10/13 which showed  mild/moderate venous anastomotic stenosis, resolved s/p  7 mm POBA.  Interventional radiology did not feel stenosis would explain drainage or swelling.  Cardiology also evaluated patient on 10/13 and recommended outpatient follow-up. 10/13 Blood cultures 2/4 started growing GPC (patient was already on Vanc for 2 for 2 days prior to positive bcx), ID was consulted.        Assessment/Plan     LLE edema and pain   Ambulatory dysfunction  Left femoral access hemorrhage   -Presented with worsening left leg edema and pain affecting patient ambulation. Patient does not want placement.  -US -ve for DVT, limited exam  was negative for acute thrombus but showed persistent chronic thrombus in the AVG.  -There appears to be a superficial skin split in his thigh where his AVG is located.  This is where he seems to be oozing blood intermittently.  Pressure dressing placed on it which has stopped the bleeding.  Wound care consult.  -Tylenol, Oxycodone and flexeril for pain  -Vascular surgery consulted, no surgical intervention from vascular surgery and they have signed off.  -CTA a/p and LLE doppler obtain on 10/12 reviewed as above   -IR was consulted, performed fistulogram on 1/13  which showed mild/moderate venous anastomotic stenosis, resolved s/p  7 mm POBA.  -Nephrology offered increased fluid removal by ultrafiltration with patient and he initially refused.  With further discussion with patient he has agreed to have ultrafiltration of fluid removal for now.  Patient does not believe that this will be effective for reducing his left leg edema.  Advised patient that this is a cumulative effect of removing fluid and treating infection.  -Recommended patient to start using compression stock to help with chronic lymphedema. Pt still unsatisfied with current management of his leg edema.      MSSA bacteremia  LLE cellulitis   -10/13 blood cultures ( 2 out of 4) started growing GPC in clusters.  Repeat culture from 10/14 no growth to date, another set obtained today.  -source of bacteremia most likely cellulitis   -ID following.  -will cont Vancomycin and zosyn for now pending further ID recommendations       ESRD on HD  -MWF  -Renal dosing meds   -Patient historically has refused renal diet and only wants regular diet, stating that he knows how to regulate himself.  -Electrolytes wnl   -Nephrology following    -Patient is agreeable to ultrafiltration on Tuesday and Thursdays.     Chronic HFpEF  -Does not appear fluid overloaded (unilateral edema, no respiratory complaints)  -TTE on 10/12 revealed EF 50%, with reduced RV systolic  function   -Cardiology consulted, recs noted   -Daily weights  -Continue home medications     History of DVT  -US -ve for DVT (limited)  -Continue Eliquis     Paroxysmal afib  -Continue home medications  -No acute issues     Adrenal insufficiency  -Continue hydrocortisone     Lymphadenopathy  -Could be underlying cause of edema  -Patient followed by oncology  -Start compression sock as needed     Total of 65 minutes spent reviewing medications and lab work, discussing with patient issues and answering questions, and coordinating care.    Ramin Fonseca MD

## 2023-10-17 NOTE — PROGRESS NOTES
Subjective     Interval History: Nghia Hall seen on dialysis. C/o leg pain.      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    Renal transplant rejection    Kidney transplant status 12/08/2022    Kidney replaced by transplant    Personal history of immunosuppression therapy 07/04/2021    H/O immunosuppressive therapy    Personal history of other diseases of the nervous system and sense organs     History of cataract    Personal history of other diseases of urinary system 11/02/2021    Personal history of renal failure    Personal history of other endocrine, nutritional and metabolic disease 07/22/2013    History of hyperlipidemia    Type 2 diabetes mellitus without complications (CMS/[facility]) 12/08/2022    Diabetes mellitus    Urinary tract infection, site not specified 05/02/2018    Acute UTI      Objective       Current Facility-Administered Medications:     acetaminophen (Tylenol) tablet 650 mg, 650 mg, oral, q4h PRN, 650 mg at 10/16/23 1652 **OR** acetaminophen (Tylenol) oral liquid 650 mg, 650 mg, oral, q4h PRN, 650 mg at 10/13/23 1120 **OR** acetaminophen (Tylenol) suppository 650 mg, 650 mg, rectal, q4h PRN, AMARIS Atkinson-CNP    amLODIPine (Norvasc) tablet 10 mg, 10 mg, oral, Daily, Ben Mcbride APRN-CNP, 10 mg at 10/16/23 1148    [Held by provider] apixaban (Eliquis) tablet 5 mg, 5 mg, oral, BID, AMARIS Atkinson-CNP, 5 mg at 10/11/23 2115    aspirin EC tablet 81 mg, 81 mg, oral, Daily, AMARIS Atkinson-CNP, 81 mg at 10/16/23 1148    B complex-vitamin C-folic acid (Nephrocaps) capsule 1 capsule, 1 capsule, oral, Daily, AMARIS Atkinson-CNP, 1 capsule at 10/16/23 1148    ceFAZolin in dextrose (iso-os) (Ancef) IVPB 1 g, 1 g, intravenous, Daily, Ramin Fonseca MD    cyanocobalamin (Vitamin B-12) tablet 1,000 mcg, 1,000 mcg, oral, Daily, Ben BOBO  AMARIS Mcbride-CNP, 1,000 mcg at 10/16/23 1148    cyclobenzaprine (Flexeril) tablet 5 mg, 5 mg, oral, TID PRN, Ferny Grady MD, 5 mg at 10/17/23 0605    diphenhydrAMINE (BENADryl) capsule 25 mg, 25 mg, oral, q6h PRN, Ferny Grady MD, 25 mg at 10/14/23 1614    diphenhydrAMINE (BENADryl) capsule 25 mg, 25 mg, oral, q6h PRN, Jermaine Garcia MD, 25 mg at 10/15/23 1931    epoetin urmila (Epogen,Procrit) injection 10,000 Units, 10,000 Units, intravenous, Every Mon/Wed/Fri, Josette Alexander MD MPH, 10,000 Units at 10/16/23 2004    hydrocortisone (Cortef) tablet 15 mg, 15 mg, oral, q AM, AMARIS Atkinson-CNP, 15 mg at 10/16/23 1149    hydrocortisone (Cortef) tablet 5 mg, 5 mg, oral, q PM, AMARIS Atkinson-CNP, 5 mg at 10/16/23 2004    lactobacillus acidophilus tablet 1 tablet, 1 tablet, oral, Daily, AMARIS Atkinson-CNP, 1 tablet at 10/16/23 1150    melatonin tablet 10 mg, 10 mg, oral, Daily, AMARIS Atkinson-CNP, 10 mg at 10/16/23 2131    melatonin tablet 3 mg, 3 mg, oral, Nightly PRN, AMARIS Atkinson-CNP, 3 mg at 10/16/23 2131    metoprolol tartrate (Lopressor) tablet 50 mg, 50 mg, oral, BID, Jermaine Garcia MD, 50 mg at 10/16/23 2004    multivitamin with minerals 1 tablet, 1 tablet, oral, Daily, AMARIS Atkinson-CNP, 1 tablet at 10/15/23 0922    oxyCODONE (Roxicodone) immediate release tablet 10 mg, 10 mg, oral, q6h PRN, Jermaine Garcia MD, 10 mg at 10/17/23 0605    pantoprazole (ProtoNix) EC tablet 40 mg, 40 mg, oral, Daily before breakfast, CHRISTI Atkinson, 40 mg at 10/17/23 0605    paricalcitoL (Zemplar) injection 8 mcg, 8 mcg, intravenous, Once per day on Mon Wed Fri, Josette Alexander MD MPH, 8 mcg at 10/16/23 1937    psyllium (Metamucil) 3.4 gram packet 1 packet, 1 packet, oral, Daily, CHRISTI Atkinson, 1 packet at 10/16/23 1148    sevelamer carbonate (Renvela) tablet 1,600 mg, 1,600 mg, oral, TID with meals, Ben Mcbride,  APRN-CNP, 1,600 mg at 10/16/23 1652    Physical Exam  Physical Exam  Heart S1 S2 RRR, Lungs CTA, ++ edema left leg      Vital signs in last 24 hours:  Temp:  [36.3 °C (97.3 °F)-36.8 °C (98.2 °F)] 36.4 °C (97.5 °F)  Heart Rate:  [52-75] 52  Resp:  [17-20] 18  BP: (105-122)/(66-69) 116/68         Labs:  Results from last 7 days   Lab Units 10/15/23  1901   WBC AUTO x10*3/uL 6.7   RBC AUTO x10*6/uL 3.60*   HEMOGLOBIN g/dL 10.3*   HEMATOCRIT % 36.3*     Results from last 7 days   Lab Units 10/15/23  0810 10/13/23  0603   SODIUM mmol/L 137 134*   POTASSIUM mmol/L 4.7 4.8   CHLORIDE mmol/L 100 98   CO2 mmol/L 20* 21   BUN mg/dL 35* 49*   CREATININE mg/dL 7.10* 8.58*   CALCIUM mg/dL 7.9* 7.7*   PHOSPHORUS mg/dL  --  5.9*   MAGNESIUM mg/dL  --  1.85   BILIRUBIN TOTAL mg/dL 0.9 1.0   ALT U/L 12 11   AST U/L 18 18              Assessment/Plan   Patient seen and examined while on dialysis, recent events, labs, medications reviewed. Will follow overall management per primary team, and continue regular dialysis.    Given CT findings suggestive of anasarca, agree with continued efforts to optimize volume status. Patient agreeable for another extra ultrafiltration on 10/19, but may not continue extra UF after that. Will try to continue fuid removal as tolerated with HD/UF.    Also as discussed with PCP, evaluate for thrombosis/other local cause for continued left leg swelling.    In the setting of gram positive bacteremia, and local swelling appreciate ID thoughts about whether the graft is infected, in which case surgical removal of the graft may need to be considered.    Principal Problem:    Inability to walk  Active Problems:    ESRD (end stage renal disease) (CMS/[facility])        Cortez Ngo MD  10/17/2023  10:17 AM

## 2023-10-17 NOTE — NURSING NOTE
Patient dressing saturated. Large amount of blood oozing from patient's left leg. Pressure applied and dressing changed. Dr Fiordaliza Fonseca notified.

## 2023-10-17 NOTE — PROGRESS NOTES
"Nghia Hall is a 58 y.o. male on day 8 of admission presenting with Inability to walk.    Subjective   Lying in bed. No distress.  Patient appears to be sleeping but arousable.  States that he does not feel that his left lower leg is any better and swelling.  States that the pain is about the same.  States that he does not know of any liver disease or does not recall being told of any thrombus/blood clots in the past.  On room air.         Objective     Physical Exam    Last Recorded Vitals  Blood pressure 115/71, pulse 56, temperature 35.8 °C (96.4 °F), temperature source Temporal, resp. rate 14, height 1.854 m (6' 1\"), weight 143 kg (315 lb), SpO2 100 %.  On room air.    Intake/Output last 3 Shifts:  I/O last 3 completed shifts:  In: 800 [I.V.:400; Other:400]  Out: 3403 [Other:3403]    Relevant Results  amLODIPine, 10 mg, oral, Daily  [Held by provider] apixaban, 5 mg, oral, BID  aspirin, 81 mg, oral, Daily  B complex-vitamin C-folic acid, 1 capsule, oral, Daily  ceFAZolin, 1 g, intravenous, Daily  cyanocobalamin, 1,000 mcg, oral, Daily  epoetin urmila or biosimilar, 10,000 Units, intravenous, Every Mon/Wed/Fri  hydrocortisone, 15 mg, oral, q AM  hydrocortisone, 5 mg, oral, q PM  lactobacillus acidophilus, 1 tablet, oral, Daily  melatonin, 10 mg, oral, Daily  metoprolol tartrate, 50 mg, oral, BID  multivitamin with minerals, 1 tablet, oral, Daily  pantoprazole, 40 mg, oral, Daily before breakfast  paricalcitoL, 8 mcg, intravenous, Once per day on Mon Wed Fri  psyllium, 1 packet, oral, Daily  sevelamer carbonate, 1,600 mg, oral, TID with meals           PRN medications: acetaminophen **OR** acetaminophen **OR** acetaminophen, cyclobenzaprine, diphenhydrAMINE, diphenhydrAMINE, melatonin, oxyCODONE     Vascular US lower extremity hemodialysis access duplex left    Result Date: 10/12/2023            [place] [address]   Tel [phone] and Fax [phone]  Vascular " Lab Report Dialysis Access Evaluation  Patient Name:      PRABHJOT T ANDIE Bush Physician:  [id number] Tim Knight DO Study Date:        10/12/2023           Ordering Provider:  [id number] JOSE A VERONICA MRN/PID:           [id number]             Technologist:       Dorian Rutherford RVT Accession#:        [id number]         Technologist 2: Date of Birth/Age: 1965 / 58 years Encounter#:         [id number] Gender:            M Admission Status:  Inpatient            Location Performed: [facility]  Diagnosis/ICD: Pain from vascular prosthetic devices, implants and grafts,                initial encounter-T82.848A  CONCLUSIONS: Dialysis Access Evaluation: Common femoral artery to common femoral vein AVG appears widely patent. At the mid segment the AVG appears to be slightly kinked, no high velocities noted in that area. Edema and lymph nodes noted in left groin.  Comparison: Compared with study from 8/18/2023, no significant change.  Imaging & Doppler Findings:  Dialysis Access Graft                 Left Velocity Arterial Anast  292 cm/s Flow Prox       363 cm/s Flow Prox/Mid   168 cm/s Mid             160 cm/s Mid/Flow Distal 280 cm/s Flow Distal     193 cm/s Outflow Anast   268 cm/s Outflow Vein    270 cm/s Pre Anast       130 cm/s Volume Flow 1253.00 ml/min  [id number] Tim Knight DO Electronically signed by [id number] Knight DO on 10/12/2023 at 3:52:49 PM  ** Final **acute      Lower extremity venous duplex left    Result Date: 10/9/2023            [place] [address]   Tel [phone] and Fax [phone]  Vascular Lab Report Lower Venous Duplex Ultrasound  Patient Name:     PRABHJOT ANDERSON ANDIE Bush           [id number] Shahid Guevara                                       Physician:        MD Study Date:       10/9/2023           Ordering          [id number] VINAYAK ZAMUDIO                                        Provider: MRN/PID:          [id number]            Technologist:     Kylah Kim RVT,                                                         [facility] Accession#:       [id number]        Technologist 2: Date of           1965 / 58      Encounter#:       [id number] Birth/Age:        years Gender:           M Admission Status: Emergency           Location          [facility]                                       Performed:  Diagnosis/ICD: Localized (leg) edema-R60.0 Indication:    Limb edema  CONCLUSIONS: Left Lower Venous: Negative for acute DVT of the visualized vessels. Possible chronic thrombus in the CFV. Patient cannot tolerate compressions of the mid distal thigh. The vessels appear patent by color flow and Doppler. Calf veins only visuslized in segments and appear patent. There are lymph nodes noted in the left groin. May wish for further evaluation. Also AVG noted and is patent. An old non functioning AVG is also noted.  Imaging & Doppler Findings:  Right Compressible Thrombus        Flow CFV       Yes        None   Spontaneous/Phasic  Left                  Compress Thrombus        Flow Distal External Iliac            None CFV                     Yes      None   Spontaneous/Phasic PFV                     Yes      None FV Proximal             Yes      None   Spontaneous/Phasic FV Mid                           None   Spontaneous/Phasic FV Distal                        None   Spontaneous/Phasic Popliteal               Yes      None   Spontaneous/Phasic  [id number] Shahid Guevara MD Electronically signed by [id number] Shahid Guevara MD on 10/9/2023 at 9:36:08 PM  ** Final **     NM PET CT lymphoma staging    Result Date: 10/6/2023  Interpreted By:  Jay Dee and Abuhamdeh Imran STUDY: NM PET CT LYMPHOMA STAGING;  10/6/2023 9:24 am   INDICATION: Signs/Symptoms:Lymphadenopathy. 58-year-old male with history of end-stage renal disease on dialysis with failed renal transplant. Found to have  lymphadenopathy on prior CTs and PET-CT with concerns of posttreatment lymphoproliferative disorder. Per EMR: Biopsy in 2/23 of the left iliac lymph node showed no concerns for lymphoproliferative disorder. Follow-up PET-CT   COMPARISON: CT AP 09/23/2023 PET-CT 01/04/2023   ACCESSION NUMBER(S): [id number]   ORDERING CLINICIAN: JOVANNY VELASQUEZ   TECHNIQUE: DIVISION OF NUCLEAR MEDICINE POSITRON EMISSION TOMOGRAPHY (PET-CT)   The patient received an intravenous dose of 11.8 mCi of Fluorine-18 fluorodeoxyglucose (FDG). Positron emission tomographic (PET) images from mid-thigh to skull base were then acquired after a one hour delay. Also acquired was a contemporaneous low dose non-contrast CT scan performed for attenuation correction of PET images and anatomic localization.  The PET and CT images were digitally fused for display.  All images were acquired on a combined PET-CT scanner unit. Some areas of FDG accumulation may be described in standardized uptake value (SUV) units.   CODING: Subsequent Treatment Strategy (PS)   CALIBRATION: Dose Injection-to-Scan Interval (mins): 61 min Mediastinal bloodpool SUV (normal 1.5-2.5): 1.9 Blood glucose: 60 mg/dL   FINDINGS: Evaluation is limited secondary to increased FDG activity throughout the soft tissues.   NECK: No focal hypermetabolic soft tissue lesion is seen in the neck. There are numerous bilateral predominantly subcentimeter cervical lymph nodes which demonstrate only mild FDG avidity. The largest among these is a left supraclavicular lymph node measuring 1.1 cm in short axis with SUV max of 1.5. This is not significantly changed from the prior.   CHEST: No focal hypermetabolic lesion is seen in the lung parenchyma. A small right pleural effusion is seen without significant FDG avidity. There are similar numerous bilateral axillary and mediastinal lymph nodes with only mild FDG avidity similar to blood pool.   ABDOMEN AND PELVIS: No hypermetabolic soft tissue lesion  is present in the abdomen and pelvis. There is similar extent of mildly FDG avid retroperitoneal lymph nodes including para-aortic, aortocaval, pericaval, bilateral common iliac, bilateral external iliac, and inguinal lymph node stations. The largest among these is a left external iliac lymph node measuring 1.5 cm in the short axis with SUV max of 3.7. Physiologic radiotracer uptake is present in the liver and spleen with excretion into the bowel loops and the genitourinary tract.   MUSCULOSKELETAL: There is no focal hypermetabolic lesion to suggest osseous metastasis.       Limited examination due to increased activity throughout the soft tissues. Within the limitation:   Similar extensive predominantly subcentimeter lymphadenopathy both above and below the diaphragm which may represent a low-grade lymphomatous/leukemic process versus an inflammatory etiology. No new focal hypermetabolic activity to suggest malignancy elsewhere.   I personally reviewed the image(s) / study and agree with the findings and interpretation as stated. This study was interpreted at [facility].   MACRO: None           Signed by: Jay Dee 10/6/2023 10:21 AM Dictation workstation:   [id number]    Hospital course:  Nghia Hall is a 58 y.o. male presenting with Inability to walk. Patient has past medical history of ESRD s/p DDKT (2006, subsequent graft failure 07/2021, on HD MWF at [facility] through LT groin AV graft on [address]; not currently on immunosuppression), pAfib (on Apixaban), HFpEF, HCV (s/p treatment), gout, lymphadenopathy (following with oncology, inconclusive biopsy in 01/2023), DVT, and Adrenal Insufficiency (on hydrocortisone) who presented to the ED with difficulty ambulating due to secondary LLE pain and swelling. His left leg (which is also used for his HD access) is severely edematous and exquisitely tender to palpation. He reports being compliant with his Eliquis. The edema is  non dependent. He is not interested in placement at this time due to being employed.  Nephrology was consulted for HD. Vascular surgery was also consulted to evaluate left AVG.  On 10/12 patient dialysis access started oozing bright red blood, reached out to vascular surgery for urgent evaluation. Vascular surgery saw the patient and did not feel that there was any surgical interventions to offer. CTA a/p and LLE doppler were performed on 10/12/23, consulted IR for fistulogram and cardiology for right HF recommendations. IR performed fistulogram on 10/13 which showed  mild/moderate venous anastomotic stenosis, resolved s/p  7 mm POBA.  Interventional radiology did not feel stenosis would explain drainage or swelling.  Cardiology also evaluated patient on 10/13 and recommended outpatient follow-up. 10/13 Blood cultures 2/4 started growing GPC (patient was already on Vanc for 2 for 2 days prior to positive bcx), ID was consulted.        Assessment/Plan     LLE edema and pain   Ambulatory dysfunction  Left femoral access hemorrhage   -Presented with worsening left leg edema and pain affecting patient ambulation. Patient does not want placement.  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  Previous imaging had mentioned this.  Discussed this briefly with the hematology/oncology fellow, who states that since not acute and chronic would not call this Eliquis failure.  Unknown how long the thrombus has been there.  Hematology/oncology fellow recommending continuing Eliquis for now.  Outpatient follow-up with hematology.  -There appears to be a superficial skin split in his thigh where his AVG is located.  This is where he seems to be oozing blood intermittently.  Pressure dressing placed on it which has stopped the bleeding so far.  Wound care consulted.  -Tylenol, Oxycodone and flexeril for pain  -Vascular surgery consulted, no surgical intervention  from vascular surgery and they have signed off.  -IR was consulted, performed fistulogram on 1/13  which showed mild/moderate venous anastomotic stenosis, resolved s/p  7 mm POBA.  -Discussed with nephrology, at this time thought process would be combination of fluid body overload combined with superimposed infection.     MSSA bacteremia  LLE cellulitis   -10/13 blood cultures ( 2 out of 4) started growing GPC in clusters.  Repeat culture from 10/14 and 10/16 no growth to date.  -source of bacteremia most likely cellulitis   -Transthoracic echo done on 10/9 already.  No signs of endocarditis.  -ID following.  -will cont Vancomycin and zosyn for now pending further ID recommendations.     ESRD on HD  -MWF.  Patient makes no urine.  -Renal dosing meds   -Patient historically has refused renal diet and only wants regular diet, stating that he knows how to regulate himself.  -Electrolytes wnl   -Nephrology following    -Patient is agreeable to ultrafiltration on Tuesday and Thursdays.     Chronic HFpEF  -Does not appear fluid overloaded (unilateral edema, no respiratory complaints)  -TTE on 10/12 revealed EF 50%, with reduced RV systolic function   -Cardiology consulted, recs noted   -Daily weights  -Continue home medications     History of DVT??  -On further discussion with patient he states that he does not recall being told of any thrombus in the past.  He states that he is on Eliquis for A-fib.  -US done this admission no acute DVT but question chronic thrombus of the CFV.  -Discussed with hematology fellow, they are doubting Eliquis failure, recommended to continue Eliquis and see hematology outpatient.      Paroxysmal afib  -Continue home medications  -No acute issues     Adrenal insufficiency  -Continue hydrocortisone     Lymphadenopathy  -Could be underlying cause of edema  -Patient followed by oncology.  Discussed with patient's oncologist Dr. Castellon, she states that at this time his previous biopsy has been  negative and she has no good evidence of  lymphoma.  No acute evaluations needed inpatient.  Can follow-up outpatient.  -compression sock as needed.     Ramin Fonseca MD

## 2023-10-17 NOTE — NURSING NOTE
Report to Receiving RN:    Report From: Called x 2, never connected with nurse, message sent.   Time Report Called: 0958  Hand-Off Communication: Pt was antsy with tx so he had many alarms but full UF of 1.5L was achieved. He cont to co pain at access site and BFR was decreased many times to accommodate elevated arteriole pressures.   Complications During Treatment: No  Ultrafiltration Treatment: Yes, 1.5L  Medications Administered During Dialysis: No  Blood Products Administered During Dialysis: No  Labs Sent During Dialysis: No  Heparin Drip Rate Changes: N/A        Last Updated: 9:58 AM by THEO VIDALES

## 2023-10-18 ENCOUNTER — APPOINTMENT (OUTPATIENT)
Dept: DIALYSIS | Facility: HOSPITAL | Age: 58
DRG: 252 | End: 2023-10-18
Payer: COMMERCIAL

## 2023-10-18 ENCOUNTER — APPOINTMENT (OUTPATIENT)
Dept: RADIOLOGY | Facility: HOSPITAL | Age: 58
DRG: 252 | End: 2023-10-18
Payer: COMMERCIAL

## 2023-10-18 LAB
GLUCOSE BLD MANUAL STRIP-MCNC: 148 MG/DL (ref 74–99)
GLUCOSE BLD MANUAL STRIP-MCNC: 155 MG/DL (ref 74–99)

## 2023-10-18 PROCEDURE — 2500000001 HC RX 250 WO HCPCS SELF ADMINISTERED DRUGS (ALT 637 FOR MEDICARE OP): Performed by: STUDENT IN AN ORGANIZED HEALTH CARE EDUCATION/TRAINING PROGRAM

## 2023-10-18 PROCEDURE — 8010000001 HC DIALYSIS - HEMODIALYSIS PER DAY

## 2023-10-18 PROCEDURE — 2500000001 HC RX 250 WO HCPCS SELF ADMINISTERED DRUGS (ALT 637 FOR MEDICARE OP): Performed by: FAMILY MEDICINE

## 2023-10-18 PROCEDURE — 6350000001 HC RX 635 EPOETIN >10,000 UNITS: Mod: JZ | Performed by: INTERNAL MEDICINE

## 2023-10-18 PROCEDURE — 2500000002 HC RX 250 W HCPCS SELF ADMINISTERED DRUGS (ALT 637 FOR MEDICARE OP, ALT 636 FOR OP/ED): Performed by: NURSE PRACTITIONER

## 2023-10-18 PROCEDURE — 76705 ECHO EXAM OF ABDOMEN: CPT

## 2023-10-18 PROCEDURE — 2500000004 HC RX 250 GENERAL PHARMACY W/ HCPCS (ALT 636 FOR OP/ED): Performed by: NURSE PRACTITIONER

## 2023-10-18 PROCEDURE — 2500000004 HC RX 250 GENERAL PHARMACY W/ HCPCS (ALT 636 FOR OP/ED): Performed by: INTERNAL MEDICINE

## 2023-10-18 PROCEDURE — 2500000001 HC RX 250 WO HCPCS SELF ADMINISTERED DRUGS (ALT 637 FOR MEDICARE OP): Performed by: NURSE PRACTITIONER

## 2023-10-18 PROCEDURE — 99233 SBSQ HOSP IP/OBS HIGH 50: CPT | Performed by: INTERNAL MEDICINE

## 2023-10-18 PROCEDURE — 90935 HEMODIALYSIS ONE EVALUATION: CPT | Performed by: INTERNAL MEDICINE

## 2023-10-18 PROCEDURE — 1100000001 HC PRIVATE ROOM DAILY

## 2023-10-18 PROCEDURE — 2500000004 HC RX 250 GENERAL PHARMACY W/ HCPCS (ALT 636 FOR OP/ED)

## 2023-10-18 PROCEDURE — 99232 SBSQ HOSP IP/OBS MODERATE 35: CPT | Performed by: INTERNAL MEDICINE

## 2023-10-18 PROCEDURE — 82947 ASSAY GLUCOSE BLOOD QUANT: CPT

## 2023-10-18 RX ORDER — PARICALCITOL 5 UG/ML
INJECTION, SOLUTION INTRAVENOUS
Status: COMPLETED
Start: 2023-10-18 | End: 2023-10-18

## 2023-10-18 RX ADMIN — HYDROCORTISONE 15 MG: 5 TABLET ORAL at 13:19

## 2023-10-18 RX ADMIN — PANTOPRAZOLE SODIUM 40 MG: 40 TABLET, DELAYED RELEASE ORAL at 13:20

## 2023-10-18 RX ADMIN — PARICALCITOL 8 MCG: 5 INJECTION, SOLUTION INTRAVENOUS at 09:15

## 2023-10-18 RX ADMIN — AMLODIPINE BESYLATE 10 MG: 10 TABLET ORAL at 13:20

## 2023-10-18 RX ADMIN — CYCLOBENZAPRINE 5 MG: 10 TABLET, FILM COATED ORAL at 21:03

## 2023-10-18 RX ADMIN — Medication 1 TABLET: at 13:27

## 2023-10-18 RX ADMIN — CEFAZOLIN SODIUM 1 G: 1 INJECTION, SOLUTION INTRAVENOUS at 13:19

## 2023-10-18 RX ADMIN — OXYCODONE HYDROCHLORIDE 10 MG: 5 TABLET ORAL at 19:47

## 2023-10-18 RX ADMIN — OXYCODONE HYDROCHLORIDE 10 MG: 5 TABLET ORAL at 07:12

## 2023-10-18 RX ADMIN — NEPHROCAP 1 CAPSULE: 1 CAP ORAL at 13:19

## 2023-10-18 RX ADMIN — EPOETIN ALFA 10000 UNITS: 10000 SOLUTION INTRAVENOUS; SUBCUTANEOUS at 10:53

## 2023-10-18 RX ADMIN — MELATONIN 10 MG: 3 TAB ORAL at 21:02

## 2023-10-18 RX ADMIN — OXYCODONE HYDROCHLORIDE 10 MG: 5 TABLET ORAL at 00:33

## 2023-10-18 RX ADMIN — METOPROLOL TARTRATE 50 MG: 50 TABLET, FILM COATED ORAL at 21:03

## 2023-10-18 RX ADMIN — OXYCODONE HYDROCHLORIDE 10 MG: 5 TABLET ORAL at 13:18

## 2023-10-18 RX ADMIN — CYANOCOBALAMIN TAB 1000 MCG 1000 MCG: 1000 TAB at 13:20

## 2023-10-18 RX ADMIN — SEVELAMER CARBONATE 1600 MG: 800 TABLET, FILM COATED ORAL at 17:29

## 2023-10-18 RX ADMIN — HYDROCORTISONE 5 MG: 5 TABLET ORAL at 21:03

## 2023-10-18 RX ADMIN — SEVELAMER CARBONATE 1600 MG: 800 TABLET, FILM COATED ORAL at 13:19

## 2023-10-18 RX ADMIN — Medication 1 TABLET: at 13:20

## 2023-10-18 ASSESSMENT — COGNITIVE AND FUNCTIONAL STATUS - GENERAL
DAILY ACTIVITIY SCORE: 24
MOBILITY SCORE: 24

## 2023-10-18 ASSESSMENT — PAIN SCALES - GENERAL
PAINLEVEL_OUTOF10: 4
PAINLEVEL_OUTOF10: 9
PAINLEVEL_OUTOF10: 8
PAINLEVEL_OUTOF10: 6
PAINLEVEL_OUTOF10: 8

## 2023-10-18 ASSESSMENT — PAIN DESCRIPTION - DESCRIPTORS
DESCRIPTORS: ACHING

## 2023-10-18 ASSESSMENT — PAIN - FUNCTIONAL ASSESSMENT
PAIN_FUNCTIONAL_ASSESSMENT: 0-10
PAIN_FUNCTIONAL_ASSESSMENT: 0-10

## 2023-10-18 NOTE — PROGRESS NOTES
Subjective     Interval History: Nghia Hall tolerating HD well.    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    Renal transplant rejection    Kidney transplant status 12/08/2022    Kidney replaced by transplant    Personal history of immunosuppression therapy 07/04/2021    H/O immunosuppressive therapy    Personal history of other diseases of the nervous system and sense organs     History of cataract    Personal history of other diseases of urinary system 11/02/2021    Personal history of renal failure    Personal history of other endocrine, nutritional and metabolic disease 07/22/2013    History of hyperlipidemia    Type 2 diabetes mellitus without complications (CMS/[facility]) 12/08/2022    Diabetes mellitus    Urinary tract infection, site not specified 05/02/2018    Acute UTI      Objective       Current Facility-Administered Medications:     acetaminophen (Tylenol) tablet 650 mg, 650 mg, oral, q4h PRN, 650 mg at 10/17/23 1044 **OR** acetaminophen (Tylenol) oral liquid 650 mg, 650 mg, oral, q4h PRN, 650 mg at 10/13/23 1120 **OR** acetaminophen (Tylenol) suppository 650 mg, 650 mg, rectal, q4h PRN, Ben Mcbride APRN-CNP    amLODIPine (Norvasc) tablet 10 mg, 10 mg, oral, Daily, Ben Mcbride APRN-CNP, 10 mg at 10/17/23 1044    [Held by provider] apixaban (Eliquis) tablet 5 mg, 5 mg, oral, BID, AMARIS Atkinson-CNP, 5 mg at 10/11/23 2115    [Held by provider] aspirin EC tablet 81 mg, 81 mg, oral, Daily, Ben Mcbride APRN-CNP, 81 mg at 10/17/23 1044    B complex-vitamin C-folic acid (Nephrocaps) capsule 1 capsule, 1 capsule, oral, Daily, AMARIS Atkinson-CNP, 1 capsule at 10/17/23 1044    ceFAZolin in dextrose (iso-os) (Ancef) IVPB 1 g, 1 g, intravenous, Daily, Ramin Fonseca MD, Stopped at 10/17/23 1114    cyanocobalamin (Vitamin B-12) tablet 1,000 mcg, 1,000  mcg, oral, Daily, AMARIS Atkinsno-CNP, 1,000 mcg at 10/17/23 1044    cyclobenzaprine (Flexeril) tablet 5 mg, 5 mg, oral, TID PRN, Ferny Grady MD, 5 mg at 10/17/23 1818    diphenhydrAMINE (BENADryl) capsule 25 mg, 25 mg, oral, q6h PRN, Ferny Grady MD, 25 mg at 10/17/23 2118    diphenhydrAMINE (BENADryl) capsule 25 mg, 25 mg, oral, q6h PRN, Jermaine Garcia MD, 25 mg at 10/15/23 1931    epoetin urmila (Epogen,Procrit) injection 10,000 Units, 10,000 Units, intravenous, Every Mon/Wed/Fri, Josette Alexander MD MPH, 10,000 Units at 10/18/23 1053    hydrocortisone (Cortef) tablet 15 mg, 15 mg, oral, q AM, AMARIS Atkinson-CNP, 15 mg at 10/17/23 1044    hydrocortisone (Cortef) tablet 5 mg, 5 mg, oral, q PM, AMARIS Atkinson-CNP, 5 mg at 10/17/23 2118    lactobacillus acidophilus tablet 1 tablet, 1 tablet, oral, Daily, Ben Mcbride APRN-CNP, 1 tablet at 10/17/23 1044    melatonin tablet 10 mg, 10 mg, oral, Daily, AMARIS Atkinson-CNP, 10 mg at 10/17/23 2127    melatonin tablet 3 mg, 3 mg, oral, Nightly PRN, AMARIS Atkinson-CNP, 3 mg at 10/17/23 2118    metoprolol tartrate (Lopressor) tablet 50 mg, 50 mg, oral, BID, Jermaine Garcia MD, 50 mg at 10/17/23 1045    multivitamin with minerals 1 tablet, 1 tablet, oral, Daily, Ben Mcbride APRN-CNP, 1 tablet at 10/17/23 0900    oxyCODONE (Roxicodone) immediate release tablet 10 mg, 10 mg, oral, q6h PRN, Jermaine Garcia MD, 10 mg at 10/18/23 0712    pantoprazole (ProtoNix) EC tablet 40 mg, 40 mg, oral, Daily before breakfast, AMARIS Atkinson-CNP, 40 mg at 10/17/23 0605    paricalcitoL (Zemplar) injection 8 mcg, 8 mcg, intravenous, Once per day on Mon Wed Fri, Josette Alexander MD MPH, 8 mcg at 10/18/23 0915    psyllium (Metamucil) 3.4 gram packet 1 packet, 1 packet, oral, Daily, CHRISTI Atkinson, 1 packet at 10/17/23 1044    sevelamer carbonate (Renvela) tablet 1,600 mg, 1,600 mg, oral, TID  with meals, Ben Mcbride APRN-CNP, 1,600 mg at 10/17/23 1818    Physical Exam  Physical Exam  Sleeping on HD, no acute distress, +  edema left side      Vital signs in last 24 hours:  Temp:  [35.8 °C (96.4 °F)-36.2 °C (97.2 °F)] 36.2 °C (97.2 °F)  Heart Rate:  [53-76] 76  Resp:  [14-16] 16  BP: (108-120)/(69-74) 108/69         Labs:  Results from last 7 days   Lab Units 10/15/23  1901   WBC AUTO x10*3/uL 6.7   RBC AUTO x10*6/uL 3.60*   HEMOGLOBIN g/dL 10.3*   HEMATOCRIT % 36.3*     Results from last 7 days   Lab Units 10/15/23  0810 10/13/23  0603   SODIUM mmol/L 137 134*   POTASSIUM mmol/L 4.7 4.8   CHLORIDE mmol/L 100 98   CO2 mmol/L 20* 21   BUN mg/dL 35* 49*   CREATININE mg/dL 7.10* 8.58*   CALCIUM mg/dL 7.9* 7.7*   PHOSPHORUS mg/dL  --  5.9*   MAGNESIUM mg/dL  --  1.85   BILIRUBIN TOTAL mg/dL 0.9 1.0   ALT U/L 12 11   AST U/L 18 18              Assessment/Plan   Patient seen and examined while on dialysis, recent events, labs, medications reviewed. Will follow overall management per primary team, and continue regular dialysis.  Note ID recs for AV graft removal due to concerns for infection, await vascular surgery evaluation.        Principal Problem:    Inability to walk  Active Problems:    ESRD (end stage renal disease) (CMS/[facility])        Cortez Ngo MD  10/18/2023  11:39 AM

## 2023-10-18 NOTE — PROGRESS NOTES
Nghia Hall is a 58 y.o. male on day 9 of admission presenting with Inability to walk.    Subjective   Interval History: Patient was afebrile and had still pain with bleeding from the thigh near the graft. Reported no change or improvement.    Review of Systems    Objective   Range of Vitals (last 24 hours)  Heart Rate:  [53-90]   Temp:  [35.8 °C (96.4 °F)-36.6 °C (97.9 °F)]   Resp:  [16-18]   BP: (108-127)/(62-74)   SpO2:  [95 %-100 %]   Daily Weight  10/09/23 : 143 kg (315 lb)    Body mass index is 41.56 kg/m².    Microbiology  Susceptibility data from last 14 days.  Collected Specimen Info Organism Clindamycin Erythromycin Oxacillin Tetracycline Trimethoprim/Sulfamethoxazole Vancomycin   10/13/23 Blood culture from Dialysis Staphylococcus aureus         10/13/23 Blood culture from Arterial Line Methicillin Susceptible Staphylococcus aureus (MSSA) S S S S S S       10/13 Bcx MSSA  10/14 Bcx NGTD     Antimicrobial agents  10/11-10/16 Zosyn  10/11-10/16 Vancomycin  10/16- Cefazolin    Imaging  IMPRESSION: 10/10 CT angio A/P  1. 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  2. Unchanged AV graft remnants in the right lower extremity.  3. Diffuse atherosclerotic calcification of the visualized  vasculature without significant focal stenosis.  4. Patent venous structures, no evidence of venous obstruction.  5. Diffuse circumferential skin thickening of the left thigh with  marked underlying edema, correlate with concern for cellulitis. No  focal fluid collections to suggest abscess formation.  6. Diffuse anasarca, mesenteric edema, and abdominopelvic ascites can  be seen the setting of volume overload, correlate with the patient's  volume status.  7. Hepatomegaly with suggestion of a cirrhotic morphology, correlate  with LFTs and ultrasound.  8. Stable enlarged left inguinal and iliac chain lymph nodes.     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 Patient developed MSSA bacteremia under the vancomycin/Zosyn use. From ID standpoint, we would have concern of L groin AV graft related infection. Pending PET CT scan to make sure the proven evidence of the infection. Would continue IV cefazolin.     Recommendations  -Please start cefazolin (renally dose on HD)  -Will follow on the surgical removal of the graft, which will be sent to micro  -Will follow on PET CT for further evaluation     Discussed with Dr. Herrmann. Please text me via Epic chat if you have any questions or concerns regarding this patient. ID will continue to follow up this patient.     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  -neurology following  - Repeat MRI--areas of lt thalamic hemorrhages, small subacute infarction Lt midbrain  -cont adeq bp control  -PT/Rehab  -cont tele monitoring    # Afib RVR, Tachy-shiva syndrome  -s/p ppm placement (10/24)--pod#2  -f/u EPS recomm  -cont tele monitoring  -no Antiplatelet or AC at this time--f/u with neuro timeline when can start (?tikosyn next week)  -rate control----rapid response this afternoon for af with rvr- cardizem 10 mg iv and lopressor 5 mg iv   -cont tele monitoring  -check trop and 12 lead ekg     # Hypothyroidism: Cont synthroid    # HTN: cont lisinopril, metoprolol     GI/DVT PPX  guarded prognosis    FULL CODE    #Progress Note Handoff  Pending (specify): cont tele monitoring  Disposition: undetermined at this time--will likely need STR

## 2023-10-18 NOTE — PROGRESS NOTES
"Nghia Hall is a 58 y.o. male on day 9 of admission presenting with Inability to walk.    Subjective   Sitting at bedside about to eat a meal.  Claims that his left lower leg feels no better.         Objective     General: Sitting in chair at bedside without distress.  Skin: Superficial skin split in his left thigh near his AVG site.  HEENT: Sclera is white.  Mucous membranes moist.  Neck: Supple.  No JVD.  Cardiac: Regular rate and rhythm, S1/S2 normal.  Lungs: Clear to auscultation bilaterally, no wheezing or crackles, no accessory muscle use at rest.  Abdomen: Soft, nontender, mild to moderately obese abdomen, BS +  Extremities: No cyanosis.  Mild edema noted in upper extremity and right lower extremity, significant 2-3+ edema in left lower extremity.  Neurologic: Alert and oriented x3.  No focal deficits.  Psychiatric: Currently no agitation.    Last Recorded Vitals  Blood pressure 127/72, pulse 90, temperature 36.2 °C (97.2 °F), temperature source Temporal, resp. rate 18, height 1.854 m (6' 1\"), weight 143 kg (315 lb), SpO2 95 %.  On room air.    Intake/Output last 3 Shifts:  I/O last 3 completed shifts:  In: 1650 [I.V.:1200; Other:400; IV Piggyback:50]  Out: 3800 [Other:3800]    Relevant Results  amLODIPine, 10 mg, oral, Daily  [Held by provider] apixaban, 5 mg, oral, BID  [Held by provider] aspirin, 81 mg, oral, Daily  B complex-vitamin C-folic acid, 1 capsule, oral, Daily  ceFAZolin, 1 g, intravenous, Daily  cyanocobalamin, 1,000 mcg, oral, Daily  epoetin urmila or biosimilar, 10,000 Units, intravenous, Every Mon/Wed/Fri  hydrocortisone, 15 mg, oral, q AM  hydrocortisone, 5 mg, oral, q PM  lactobacillus acidophilus, 1 tablet, oral, Daily  melatonin, 10 mg, oral, Daily  metoprolol tartrate, 50 mg, oral, BID  multivitamin with minerals, 1 tablet, oral, Daily  pantoprazole, 40 mg, oral, Daily before breakfast  paricalcitoL, 8 mcg, intravenous, Once per day on Mon Wed Fri  psyllium, 1 packet, oral, " Daily  sevelamer carbonate, 1,600 mg, oral, TID with meals           PRN medications: acetaminophen **OR** acetaminophen **OR** acetaminophen, cyclobenzaprine, diphenhydrAMINE, diphenhydrAMINE, melatonin, oxyCODONE         Hospital course:  Nghia Hall is a 58 y.o. male presenting with Inability to walk. Patient has past medical history of ESRD s/p DDKT (2006, subsequent graft failure 07/2021, on HD MWF at [facility] through LT groin AV graft on [address]; not currently on immunosuppression), pAfib (on Apixaban), HFpEF, HCV (s/p treatment), gout, lymphadenopathy (following with oncology, inconclusive biopsy in 01/2023), DVT, and Adrenal Insufficiency (on hydrocortisone) who presented to the ED with difficulty ambulating due to secondary LLE pain and swelling. His left leg (which is also used for his HD access) is severely edematous and exquisitely tender to palpation. He reports being compliant with his Eliquis. The edema is non dependent. He is not interested in placement at this time due to being employed.  Nephrology was consulted for HD. Vascular surgery was also consulted to evaluate left AVG.  On 10/12 patient dialysis access started oozing bright red blood, reached out to vascular surgery for urgent evaluation. Vascular surgery saw the patient and did not feel that there was any surgical interventions to offer. CTA a/p and LLE doppler were performed on 10/12/23, consulted IR for fistulogram and cardiology for right HF recommendations. IR performed fistulogram on 10/13 which showed  mild/moderate venous anastomotic stenosis, resolved s/p  7 mm POBA.  Interventional radiology did not feel stenosis would explain drainage or swelling.  Cardiology also evaluated patient on 10/13 and recommended outpatient follow-up. 10/13 Blood cultures 2/4 started growing GPC (patient was already on Vanc for 2 for 2 days prior to positive bcx), ID was consulted.  Infectious disease is concerned that patient's left AVG is  infected.  Vascular surgery reconsulted.       Assessment/Plan     LLE edema and pain   Ambulatory dysfunction  Left femoral access hemorrhage   -Presented with worsening left leg edema and pain affecting patient ambulation. Patient does not want placement.  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  Previous imaging had mentioned this.  Discussed this briefly with the hematology/oncology fellow, who states that since not acute and chronic would not call this Eliquis failure.  Unknown how long the thrombus has been there.  Discussed with hematology/oncology fellow on 10/17 and recommending continuing Eliquis for now.  Outpatient follow-up with hematology.  -There appears to be a superficial skin split in his thigh where his AVG is located.  This is where he seems to be oozing blood intermittently, including outside dialysis.  Continue pressure dressing placement.  Patient reports still intermittent oozing.  -Tylenol, Oxycodone and flexeril for pain  -Vascular surgery consulted, no surgical intervention from vascular surgery and they had signed off.  Reconsulting vascular surgery today for opinion about infected AVG and to discuss treatment.  -IR was consulted, performed fistulogram on 1/13  which showed mild/moderate venous anastomotic stenosis, resolved s/p  7 mm POBA.  -Discussed with nephrology, at this time thought process would be combination of fluid body overload combined with superimposed infection.  -Ultrasound of the liver pending result.     MSSA bacteremia  LLE cellulitis   -10/13 blood cultures ( 2 out of 4) started growing GPC in clusters.  Repeat culture from 10/14 and 10/16 no growth to date.  -In discussion with infectious disease, they are indicating they have significant concern that patient's AVG is infected.  We contacted vascular surgery to come back to reevaluate.  -Transthoracic echo done on 10/9 already.  No signs of  endocarditis.  -ID following.  -Initial antibiotics were vancomycin and Zosyn.  Antibiotics have been changed to IV cefazolin on 10/17.     ESRD on HD  -MWF.  Patient makes no urine.  -Renal dosing meds   -Patient historically has refused renal diet and only wants regular diet, stating that he knows how to regulate himself.  -Electrolytes wnl   -Nephrology following    -Patient is agreeable to ultrafiltration on Tuesday and Thursdays.     Chronic HFpEF  -Does not appear fluid overloaded (unilateral edema, no respiratory complaints)  -TTE on 10/12 revealed EF 50%, with reduced RV systolic function   -Cardiology consulted, recs noted   -Daily weights  -Continue home medications.  Patient makes no urine.  For fluid removal patient is completely dialysis dependent.     History of DVT??  -On further discussion with patient he states that he does not recall being told of any thrombus in the past.  He states that he is on Eliquis for A-fib.  -US done this admission no acute DVT but question chronic thrombus of the CFV.  -Discussed with hematology fellow, they are doubting Eliquis failure, recommended to continue Eliquis and see hematology outpatient.      Paroxysmal afib  -Continue home medications  -No acute issues     Adrenal insufficiency  -Continue hydrocortisone     Lymphadenopathy  -Could be underlying cause of edema  -Patient followed by oncology.  Discussed with patient's oncologist Dr. Castellon, she states that at this time his previous biopsy has been negative and she has no good evidence of  lymphoma.  No acute evaluations needed inpatient.  Can follow-up outpatient.    Ramin Fonseca MD

## 2023-10-18 NOTE — NURSING NOTE
Report to Receiving RN:    Report To: WILLARD Mistry   Time Report Called: 1111  Hand-Off Communication: HD completed and tolerated well. UF removed: 2L NET. VSS. Epo and Zemplar given during HD per patient. Medicated for pain with PRN.   Complications During Treatment: No  Ultrafiltration Treatment: Yes  Medications Administered During Dialysis: Yes, Oxycodone 10mg, Epogen, Zemplar. See MAR.   Blood Products Administered During Dialysis: No  Labs Sent During Dialysis: No  Heparin Drip Rate Changes: N/A

## 2023-10-18 NOTE — NURSING NOTE
.Report from Sending RN:    Report From: WILLARD Sethi  Recent Surgery of Procedure: No  Baseline Level of Consciousness (LOC): A&OX3  Oxygen Use: No  Type: Room air  Diabetic: No  Last BP Med Given Day of Dialysis: none  Last Pain Med Given: oxycodone  Lab Tests to be Obtained with Dialysis: No  Blood Transfusion to be Given During Dialysis: No  Available IV Access: Yes  Medications to be Administered During Dialysis: No  Continuous IV Infusion Running: No  Restraints on Currently or in the Last 24 Hours: No  Hand-Off Communication: Pt had no acute event overnight, not on precaution. Morning medication not given, has AVF for HD.

## 2023-10-19 LAB
BACTERIA BLD AEROBE CULT: ABNORMAL
BACTERIA BLD AEROBE CULT: ABNORMAL
BACTERIA BLD CULT: ABNORMAL
BACTERIA BLD CULT: ABNORMAL
GLUCOSE BLD MANUAL STRIP-MCNC: 95 MG/DL (ref 74–99)
GRAM STN SPEC: ABNORMAL
GRAM STN SPEC: ABNORMAL
HAV IGM SER QL: NONREACTIVE
HBV CORE IGM SER QL: NONREACTIVE
HBV SURFACE AG SERPL QL IA: NONREACTIVE
HCV AB SER QL: REACTIVE
INR PPP: 1.2 (ref 0.9–1.1)
PROTHROMBIN TIME: 13.7 SECONDS (ref 9.8–12.8)

## 2023-10-19 PROCEDURE — 1100000001 HC PRIVATE ROOM DAILY

## 2023-10-19 PROCEDURE — 87522 HEPATITIS C REVRS TRNSCRPJ: CPT | Performed by: INTERNAL MEDICINE

## 2023-10-19 PROCEDURE — 94660 CPAP INITIATION&MGMT: CPT

## 2023-10-19 PROCEDURE — 36415 COLL VENOUS BLD VENIPUNCTURE: CPT | Performed by: INTERNAL MEDICINE

## 2023-10-19 PROCEDURE — 99232 SBSQ HOSP IP/OBS MODERATE 35: CPT | Performed by: NURSE PRACTITIONER

## 2023-10-19 PROCEDURE — 85610 PROTHROMBIN TIME: CPT | Mod: CMCLAB | Performed by: INTERNAL MEDICINE

## 2023-10-19 PROCEDURE — 2500000001 HC RX 250 WO HCPCS SELF ADMINISTERED DRUGS (ALT 637 FOR MEDICARE OP): Performed by: STUDENT IN AN ORGANIZED HEALTH CARE EDUCATION/TRAINING PROGRAM

## 2023-10-19 PROCEDURE — 99233 SBSQ HOSP IP/OBS HIGH 50: CPT | Performed by: INTERNAL MEDICINE

## 2023-10-19 PROCEDURE — 2500000004 HC RX 250 GENERAL PHARMACY W/ HCPCS (ALT 636 FOR OP/ED): Performed by: INTERNAL MEDICINE

## 2023-10-19 PROCEDURE — 2500000001 HC RX 250 WO HCPCS SELF ADMINISTERED DRUGS (ALT 637 FOR MEDICARE OP): Performed by: FAMILY MEDICINE

## 2023-10-19 PROCEDURE — 2500000002 HC RX 250 W HCPCS SELF ADMINISTERED DRUGS (ALT 637 FOR MEDICARE OP, ALT 636 FOR OP/ED): Performed by: NURSE PRACTITIONER

## 2023-10-19 PROCEDURE — 82947 ASSAY GLUCOSE BLOOD QUANT: CPT

## 2023-10-19 PROCEDURE — 80074 ACUTE HEPATITIS PANEL: CPT | Mod: CMCLAB | Performed by: INTERNAL MEDICINE

## 2023-10-19 PROCEDURE — 2500000001 HC RX 250 WO HCPCS SELF ADMINISTERED DRUGS (ALT 637 FOR MEDICARE OP): Performed by: NURSE PRACTITIONER

## 2023-10-19 RX ADMIN — OXYCODONE HYDROCHLORIDE 10 MG: 5 TABLET ORAL at 09:10

## 2023-10-19 RX ADMIN — AMLODIPINE BESYLATE 10 MG: 10 TABLET ORAL at 09:11

## 2023-10-19 RX ADMIN — NEPHROCAP 1 CAPSULE: 1 CAP ORAL at 09:10

## 2023-10-19 RX ADMIN — DIPHENHYDRAMINE HYDROCHLORIDE 25 MG: 25 CAPSULE ORAL at 09:10

## 2023-10-19 RX ADMIN — Medication 1 TABLET: at 09:00

## 2023-10-19 RX ADMIN — HYDROCORTISONE 5 MG: 5 TABLET ORAL at 21:54

## 2023-10-19 RX ADMIN — CYCLOBENZAPRINE 5 MG: 10 TABLET, FILM COATED ORAL at 21:54

## 2023-10-19 RX ADMIN — MELATONIN 10 MG: 3 TAB ORAL at 21:54

## 2023-10-19 RX ADMIN — CEFAZOLIN SODIUM 1 G: 1 INJECTION, SOLUTION INTRAVENOUS at 09:12

## 2023-10-19 RX ADMIN — CYANOCOBALAMIN TAB 1000 MCG 1000 MCG: 1000 TAB at 09:11

## 2023-10-19 RX ADMIN — Medication 1 TABLET: at 09:12

## 2023-10-19 RX ADMIN — OXYCODONE HYDROCHLORIDE 10 MG: 5 TABLET ORAL at 21:54

## 2023-10-19 RX ADMIN — OXYCODONE HYDROCHLORIDE 10 MG: 5 TABLET ORAL at 15:42

## 2023-10-19 RX ADMIN — CYCLOBENZAPRINE 5 MG: 10 TABLET, FILM COATED ORAL at 09:10

## 2023-10-19 RX ADMIN — PSYLLIUM HUSK 1 PACKET: 3.4 POWDER ORAL at 09:00

## 2023-10-19 RX ADMIN — HYDROCORTISONE 15 MG: 5 TABLET ORAL at 13:28

## 2023-10-19 ASSESSMENT — PAIN SCALES - GENERAL
PAINLEVEL_OUTOF10: 2
PAINLEVEL_OUTOF10: 0 - NO PAIN
PAINLEVEL_OUTOF10: 9
PAINLEVEL_OUTOF10: 7
PAINLEVEL_OUTOF10: 8

## 2023-10-19 ASSESSMENT — COGNITIVE AND FUNCTIONAL STATUS - GENERAL
MOBILITY SCORE: 24
DAILY ACTIVITIY SCORE: 24

## 2023-10-19 ASSESSMENT — PAIN DESCRIPTION - DESCRIPTORS: DESCRIPTORS: ACHING;CRAMPING

## 2023-10-19 ASSESSMENT — PAIN - FUNCTIONAL ASSESSMENT
PAIN_FUNCTIONAL_ASSESSMENT: 0-10

## 2023-10-19 NOTE — PROGRESS NOTES
Nghia Hall is a 58 y.o. male on day 10 of admission presenting with Inability to walk.    Subjective   Interval History: Patient was afebrile and had still pain with bleeding from the thigh near the graft. Reported no change or improvement. However, we saw patient walking smoothly.    Review of Systems    Objective   Range of Vitals (last 24 hours)  Heart Rate:  [54-62]   Temp:  [36.2 °C (97.2 °F)-36.6 °C (97.9 °F)]   Resp:  [11-17]   BP: (102-124)/(57-75)   SpO2:  [98 %-99 %]   Daily Weight  10/09/23 : 143 kg (315 lb)    Body mass index is 41.56 kg/m².    Microbiology  Susceptibility data from last 14 days.  Collected Specimen Info Organism Clindamycin Erythromycin Oxacillin Tetracycline Trimethoprim/Sulfamethoxazole Vancomycin   10/13/23 Blood culture from Dialysis Staphylococcus aureus         10/13/23 Blood culture from Arterial Line Methicillin Susceptible Staphylococcus aureus (MSSA) S S S S S S       10/13 Bcx MSSA  10/14 Bcx NGTD     Antimicrobial agents  10/11-10/16 Zosyn  10/11-10/16 Vancomycin  10/16- Cefazolin    Imaging  IMPRESSION: 10/10 CT angio A/P  1. 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  2. Unchanged AV graft remnants in the right lower extremity.  3. Diffuse atherosclerotic calcification of the visualized  vasculature without significant focal stenosis.  4. Patent venous structures, no evidence of venous obstruction.  5. Diffuse circumferential skin thickening of the left thigh with  marked underlying edema, correlate with concern for cellulitis. No  focal fluid collections to suggest abscess formation.  6. Diffuse anasarca, mesenteric edema, and abdominopelvic ascites can  be seen the setting of volume overload, correlate with the patient's  volume status.  7. Hepatomegaly with suggestion of a cirrhotic morphology, correlate  with LFTs and ultrasound.  8. Stable enlarged left inguinal and iliac chain lymph nodes.      Assessment/Plan  #LLE swelling and pain in the setting of chronic swelling   #L groin AV graft on HD c/f graft infection  #MSSA bacteremia under vancomycin use     Patient had worsening LLE swelling and pan with fever in the setting of L groin AV graft on HD. Patient developed MSSA bacteremia under the vancomycin/Zosyn use on 10/13. Blood cultures were negative from 10/14 and 10/16. TTE did not show any findings suggestive of endocarditis. Pending PET CT scan. Primary team plans to take out the graft with new temporal HD access. Would recommend complete multiple weeks with IV cefazolin from the removal of graft.       Recommendations  -Please continue IV cefazolin (renally dose on HD)  -Will follow on the surgical removal of the graft, which will be sent to micro  -Pending PET CT scan  -Will monitor clinically      Discussed with Dr. Herrmann. Please text me via Epic chat if you have any questions or concerns regarding this patient. ID will continue to follow up this patient.     Alexei Bullock MD  ID fellow PGY4  Team B Pager [phone]

## 2023-10-19 NOTE — NURSING NOTE
"Report from Sending RN:    Report From: Yari  Recent Surgery of Procedure: No  Baseline Level of Consciousness (LOC): A and O x 4  Oxygen Use: No  Type:   Diabetic: No  Last BP Med Given Day of Dialysis: none  Last Pain Med Given: not given.  Lab Tests to be Obtained with Dialysis: No  Blood Transfusion to be Given During Dialysis: No  Available IV Access: Yes  Medications to be Administered During Dialysis: No  Continuous IV Infusion Running: No  Restraints on Currently or in the Last 24 Hours: N/A  Hand-Off Communication: Pt is not \"in a good mood\" and will be told about UF.    "

## 2023-10-19 NOTE — CARE PLAN
The patient's goals for the shift include      The clinical goals for the shift include pain will be controleld by end of shift      Problem: Pain  Goal: Takes deep breaths with improved pain control throughout the shift  Outcome: Not Progressing  Goal: Turns in bed with improved pain control throughout the shift  Outcome: Not Progressing  Goal: Walks with improved pain control throughout the shift  Outcome: Not Progressing  Goal: Performs ADL's with improved pain control throughout shift  Outcome: Not Progressing  Goal: Participates in PT with improved pain control throughout the shift  Outcome: Not Progressing   Problem: Pain  Goal: Takes deep breaths with improved pain control throughout the shift  Outcome: Not Progressing  Goal: Turns in bed with improved pain control throughout the shift  Outcome: Not Progressing  Goal: Walks with improved pain control throughout the shift  Outcome: Not Progressing  Goal: Performs ADL's with improved pain control throughout shift  Outcome: Not Progressing  Goal: Participates in PT with improved pain control throughout the shift  Outcome: Not Progressing

## 2023-10-19 NOTE — CARE PLAN
The patient's goals for the shift include      The clinical goals for the shift include complete pet scan as planned        Problem: Pain  Goal: Takes deep breaths with improved pain control throughout the shift  Outcome: Not Progressing  Goal: Turns in bed with improved pain control throughout the shift  Outcome: Not Progressing  Goal: Walks with improved pain control throughout the shift  Outcome: Not Progressing  Goal: Performs ADL's with improved pain control throughout shift  Outcome: Not Progressing  Goal: Participates in PT with improved pain control throughout the shift  Outcome: Not Progressing

## 2023-10-19 NOTE — CARE PLAN
Problem: Pain  Goal: Takes deep breaths with improved pain control throughout the shift  Outcome: Progressing  Goal: Turns in bed with improved pain control throughout the shift  Outcome: Progressing  Goal: Walks with improved pain control throughout the shift  Outcome: Progressing  Goal: Performs ADL's with improved pain control throughout shift  Outcome: Progressing  Goal: Participates in PT with improved pain control throughout the shift  Outcome: Progressing   The patient's goals for the shift include      The clinical goals for the shift include complete pet scan as planned    Over the shift, the patient did not make progress toward the following goals. Barriers to progression include left leg swelling and pain. Recommendations to address these barriers include replacing dialysis line tomorrow.

## 2023-10-19 NOTE — PROGRESS NOTES
"Nghia Hall is a 58 y.o. male on day 10 of admission presenting with Inability to walk.    Subjective   Pt sitting up in chair. Denies sob, n/v/d, fever, cough, chills, chest pains, soreness to lt leg graft area       Objective     Physical Exam  Vitals and nursing note reviewed.   Constitutional:       Appearance: Normal appearance.   Cardiovascular:      Rate and Rhythm: Regular rhythm.   Pulmonary:      Effort: Pulmonary effort is normal.      Breath sounds: Normal breath sounds.   Abdominal:      General: There is distension.      Comments: Non-tender to palpation   Genitourinary:     Comments: anuric  Musculoskeletal:      Right lower leg: Edema present.      Left lower leg: Edema present.      Comments: Rt leg pitting +1-2  Lt leg with 2-3 pitting   Skin:     General: Skin is warm and dry.   Neurological:      Mental Status: He is alert and oriented to person, place, and time.   Psychiatric:         Mood and Affect: Mood normal.         Behavior: Behavior normal.         Last Recorded Vitals  Blood pressure 103/57, pulse 62, temperature 36.6 °C (97.9 °F), temperature source Temporal, resp. rate 11, height 1.854 m (6' 1\"), weight 143 kg (315 lb), SpO2 99 %.  Intake/Output last 3 Shifts:  I/O last 3 completed shifts:  In: 850 [I.V.:800; IV Piggyback:50]  Out: 2405 [Other:2405]  Scheduled medications  amLODIPine, 10 mg, oral, Daily  [Held by provider] apixaban, 5 mg, oral, BID  [Held by provider] aspirin, 81 mg, oral, Daily  B complex-vitamin C-folic acid, 1 capsule, oral, Daily  ceFAZolin, 1 g, intravenous, Daily  cyanocobalamin, 1,000 mcg, oral, Daily  epoetin urmila or biosimilar, 10,000 Units, intravenous, Every Mon/Wed/Fri  hydrocortisone, 15 mg, oral, q AM  hydrocortisone, 5 mg, oral, q PM  lactobacillus acidophilus, 1 tablet, oral, Daily  melatonin, 10 mg, oral, Daily  metoprolol tartrate, 50 mg, oral, BID  multivitamin with minerals, 1 tablet, oral, Daily  pantoprazole, 40 mg, oral, Daily before " breakfast  paricalcitoL, 8 mcg, intravenous, Once per day on Mon Wed Fri  psyllium, 1 packet, oral, Daily  sevelamer carbonate, 1,600 mg, oral, TID with meals      Continuous medications     PRN medications  PRN medications: acetaminophen **OR** acetaminophen **OR** acetaminophen, cyclobenzaprine, diphenhydrAMINE, diphenhydrAMINE, melatonin, oxyCODONE     Results for orders placed or performed during the hospital encounter of 10/09/23 (from the past 96 hour(s))   POCT GLUCOSE   Result Value Ref Range    POCT Glucose 68 (L) 74 - 99 mg/dL   CBC   Result Value Ref Range    WBC 6.7 4.4 - 11.3 x10*3/uL    nRBC 0.0 0.0 - 0.0 /100 WBCs    RBC 3.60 (L) 4.50 - 5.90 x10*6/uL    Hemoglobin 10.3 (L) 13.5 - 17.5 g/dL    Hematocrit 36.3 (L) 41.0 - 52.0 %     (H) 80 - 100 fL    MCH 28.6 26.0 - 34.0 pg    MCHC 28.4 (L) 32.0 - 36.0 g/dL    RDW 16.1 (H) 11.5 - 14.5 %    Platelets 265 150 - 450 x10*3/uL    MPV 9.9 7.5 - 11.5 fL   POCT GLUCOSE   Result Value Ref Range    POCT Glucose 82 74 - 99 mg/dL   Vancomycin, Trough Please draw before dialysis   Result Value Ref Range    Vancomycin, Trough 15.4 5.0 - 20.0 ug/mL   POCT GLUCOSE   Result Value Ref Range    POCT Glucose 79 74 - 99 mg/dL   Blood Culture    Specimen: Peripheral Venipuncture; Blood culture   Result Value Ref Range    Blood Culture No growth at 2 days    POCT GLUCOSE   Result Value Ref Range    POCT Glucose 130 (H) 74 - 99 mg/dL   Hepatitis B surface antigen   Result Value Ref Range    Hepatitis B Surface AG Nonreactive Nonreactive   Hepatitis B surface antibody   Result Value Ref Range    Hepatitis B Surface AB 24.6 (H) <10.0 mIU/mL   POCT GLUCOSE   Result Value Ref Range    POCT Glucose 94 74 - 99 mg/dL   POCT GLUCOSE   Result Value Ref Range    POCT Glucose 105 (H) 74 - 99 mg/dL   POCT GLUCOSE   Result Value Ref Range    POCT Glucose 155 (H) 74 - 99 mg/dL   POCT GLUCOSE   Result Value Ref Range    POCT Glucose 148 (H) 74 - 99 mg/dL   POCT GLUCOSE   Result Value  Ref Range    POCT Glucose 95 74 - 99 mg/dL                      Assessment/Plan   Principal Problem:    Inability to walk  Active Problems:    ESRD (end stage renal disease) (CMS/HCC)    Tolerated hemodialysis yesterday with net fluid loss 2405    Is hemodynamically stable, hypervolemic on exam and has stable electrolytes      Outpatient Dialysis schedule:  [facility] /Dr. Mccormick     Access: lt leg avg- no issues - able to achieve   Vasc recommend not to use graft r/t concern for bleeding. Pt is for tunneled cath placement on 10/20     Anemia of ESRD:  epogen 10,000 units on dialysis..current hgb 10.3..will cont to monitor         CKD-MBD Phosphate Binder:B complex-vitamin C-folic acid (Nephrocaps) capsule 1 capsule daily, paricalcitoL (Zemplar) injection 8 mcg daily, sevelamer carbonate (Renvela) tablet 1,600 mg tid ac    Plan HD tomorrow with UF as tolerated     Renal diet      Please obtain daily standing wt (if possible)     Medication to be adjusted for ESRD      Patient to continue regular HD schedule while inpatient and to follow with the outpatient nephrologist at discharge              AMARIS Munoz-CNP

## 2023-10-19 NOTE — PROGRESS NOTES
"Nghia Hall is a 58 y.o. male on day 10 of admission presenting with Inability to walk.    Subjective   Sitting at bedside, no distress.  Reports left lower extremity swelling and pain has not changed.         Objective     General: Sitting in chair at bedside without distress.  Skin: Superficial skin split in his left thigh near his AVG site.  HEENT: Sclera is white.  Mucous membranes moist.  Neck: Supple.  No JVD.  Cardiac: Regular rate and rhythm, S1/S2 normal.  Lungs: Clear to auscultation bilaterally, no wheezing or crackles, no accessory muscle use at rest.  Abdomen: Soft, nontender, mild to moderately obese abdomen, BS +  Extremities: No cyanosis.  Mild edema noted in upper extremity and right lower extremity, significant 3+ edema in left lower extremity.  Edema in left lower leg does not appear to have changed much.  Currently no active bleeding from left groin area.  Neurologic: Alert and oriented x3.  No focal deficits.  Psychiatric: Currently no agitation.    Last Recorded Vitals  Blood pressure 116/69, pulse 58, temperature 36.6 °C (97.9 °F), temperature source Temporal, resp. rate 11, height 1.854 m (6' 1\"), weight 143 kg (315 lb), SpO2 98 %.  On room air.    Intake/Output last 3 Shifts:  I/O last 3 completed shifts:  In: 850 [I.V.:800; IV Piggyback:50]  Out: 2405 [Other:2405]    Relevant Results  amLODIPine, 10 mg, oral, Daily  [Held by provider] apixaban, 5 mg, oral, BID  [Held by provider] aspirin, 81 mg, oral, Daily  B complex-vitamin C-folic acid, 1 capsule, oral, Daily  ceFAZolin, 1 g, intravenous, Daily  cyanocobalamin, 1,000 mcg, oral, Daily  epoetin urmila or biosimilar, 10,000 Units, intravenous, Every Mon/Wed/Fri  hydrocortisone, 15 mg, oral, q AM  hydrocortisone, 5 mg, oral, q PM  lactobacillus acidophilus, 1 tablet, oral, Daily  melatonin, 10 mg, oral, Daily  metoprolol tartrate, 50 mg, oral, BID  multivitamin with minerals, 1 tablet, oral, Daily  pantoprazole, 40 mg, oral, Daily before " breakfast  paricalcitoL, 8 mcg, intravenous, Once per day on Mon Wed Fri  psyllium, 1 packet, oral, Daily  sevelamer carbonate, 1,600 mg, oral, TID with meals           PRN medications: acetaminophen **OR** acetaminophen **OR** acetaminophen, cyclobenzaprine, diphenhydrAMINE, diphenhydrAMINE, melatonin, oxyCODONE         Hospital course:  Nghia Hall is a 58 y.o. male presenting with Inability to walk. Patient has past medical history of ESRD s/p DDKT (2006, subsequent graft failure 07/2021, on HD MWF at [facility] through LT groin AV graft on [address]; not currently on immunosuppression), pAfib (on Apixaban), HFpEF, HCV (s/p treatment), gout, lymphadenopathy (following with oncology, inconclusive biopsy in 01/2023), DVT, and Adrenal Insufficiency (on hydrocortisone) who presented to the ED with difficulty ambulating due to secondary LLE pain and swelling. His left leg (which is also used for his HD access) is severely edematous and exquisitely tender to palpation. He reports being compliant with his Eliquis. The edema is non dependent. He is not interested in placement at this time due to being employed.  Nephrology was consulted for HD. Vascular surgery was also consulted to evaluate left AVG.  On 10/12 patient dialysis access started oozing bright red blood, reached out to vascular surgery for urgent evaluation. Vascular surgery saw the patient and did not feel that there was any surgical interventions to offer. CTA a/p and LLE doppler were performed on 10/12/23, consulted IR for fistulogram and cardiology for right HF recommendations. IR performed fistulogram on 10/13 which showed  mild/moderate venous anastomotic stenosis, resolved s/p  7 mm POBA.  Interventional radiology did not feel stenosis would explain drainage or swelling.  Cardiology also evaluated patient on 10/13 and recommended outpatient follow-up. 10/13 Blood cultures 2/4 started growing GPC (patient was already on Vanc for 2 for 2 days prior to  positive bcx), ID was consulted.  Infectious disease is concerned that patient's left AVG is infected.  Vascular surgery reconsulted.       Assessment/Plan     LLE edema and pain   Ambulatory dysfunction  Left femoral access hemorrhage with superficial skin split  -Presented with worsening left leg edema and pain affecting patient ambulation. Patient does not want placement.  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  Previous imaging had mentioned this.  Discussed this briefly with the hematology/oncology fellow, who states that since not acute and chronic would not call this Eliquis failure.  Unknown how long the thrombus has been there.  Discussed with hematology/oncology fellow on 10/17 and recommending continuing Eliquis for now.  Outpatient follow-up with hematology.  -There appears to be a superficial skin split in his thigh where his AVG is located.  This is where he seems to be oozing blood intermittently, including outside dialysis.  Continue with pressure dressing.  Vascular surgery has recommended not to access the left AVG anymore.  Vascular surgery indicates concern for possible blowout.  Patient appears to be recommended for excision of AVG next week.  Eliquis currently on hold.  Aspirin currently on hold.  -Tylenol, Oxycodone and flexeril for pain  -Vascular surgery following.  -IR was consulted, performed fistulogram on 1/13  which showed mild/moderate venous anastomotic stenosis, resolved s/p  7 mm POBA.  -Discussed with nephrology, at this time thought process would be combination of fluid body overload combined with superimposed infection.  -Ultrasound of the liver shows a cirrhotic looking liver, if patient has cirrhosis of the liver this may be contributing to overall body edema.  -Patient has multiple reasons to have edema, chronic diastolic congestive heart failure, ESRD and makes no urine, underlying liver  cirrhosis.    Liver cirrhosis  -Etiology currently not clear.  Patient only socially drinks.  Patient does report history of hepatitis C 12 years ago but was treated per patient.  -Repeat hepatitis panel.    MSSA bacteremia  LLE cellulitis   -10/13 blood cultures ( 2 out of 4) started growing GPC in clusters.  Repeat culture from 10/14 and 10/16 no growth to date.  -In discussion with infectious disease, they are indicating they have significant concern that patient's AVG is infected.  PET CT scan pending to be done.  -Transthoracic echo done on 10/9 already.  No signs of endocarditis.  -ID following.  -Initial antibiotics were vancomycin and Zosyn.  Antibiotics have been changed to IV cefazolin on 10/17.     ESRD on HD  -MWF.  Patient makes no urine.  -Renal dosing meds   -Patient historically has refused renal diet and only wants regular diet, stating that he knows how to regulate himself.  -Electrolytes wnl   -Nephrology following    -Patient is agreeable to ultrafiltration on Tuesday and Thursdays.  -IR consult placed for temporary dialysis catheter to be placed in right groin.  Patient has very limited access for dialysis.     Chronic HFpEF  -TTE on 10/12 revealed EF 50%, with reduced RV systolic function   -Cardiology consulted, recs noted   -Daily weights  -Continue home medications.  Patient makes no urine.  For fluid removal patient is completely dialysis dependent.     History of DVT??  -On further discussion with patient he states that he does not recall being told of any thrombus in the past.  He states that he is on Eliquis for A-fib.  -US done this admission no acute DVT but question chronic thrombus of the CFV.  -Discussed with hematology fellow, they are doubting Eliquis failure, recommended to continue Eliquis and see hematology outpatient.   -For now Eliquis on hold due to concern for bleeding from groin area and plan potential surgery with vascular surgery.     Paroxysmal afib  -Continue home  medications  -No acute issues     Adrenal insufficiency  -Continue hydrocortisone     Lymphadenopathy  -Could be underlying cause of edema  -Patient followed by oncology.  Discussed with patient's oncologist Dr. Castellon, she states that at this time his previous biopsy has been negative and she has no good evidence of  lymphoma.  No acute evaluations needed inpatient.  Can follow-up outpatient.    Ramin Fonseca MD

## 2023-10-19 NOTE — PROGRESS NOTES
VASCULAR SURGERY PROGRESS NOTE  Subjective   Vascular surgery re-engaged with concerns for AVG graft infection.     Patient reports  severe tenderness over AVG on palpation. No drainage, however multiple episodes of bleeding from two areas on the AVG (not the HD cannulation sites). The bleeding episodes started a week ago. He describes it as large quantity of bleeding.     Objective   Vitals:    10/18/23 2102   BP: 124/75   Pulse: 60   Resp: 14   Temp: 36.6 °C (97.9 °F)   SpO2: 98%      Exam:  Constitutional: No acute distress, resting comfortably  Neuro:  AOx3, grossly intact  ENMT: moist mucous membranes  CV: no tachycardia  Pulm: non-labored on room air  GI: soft, non-tender, non-distended  Skin: warm and dry  Musculoskeletal: moving all extremities  Extremities: left femoral AVG with palpable thrill, two small areas of skin ulceration, no exposed graft, no active bleeding, no purulent drainage, most recent HD cannulations site not bleeding, tenderness and erythema over the skin ulcerations, no palpable fluid collections    Labs:  Results from last 7 days   Lab Units 10/15/23  1901 10/15/23  0810 10/13/23  0603   WBC AUTO x10*3/uL 6.7 5.3 5.8   HEMOGLOBIN g/dL 10.3* 9.8* 9.4*   PLATELETS AUTO x10*3/uL 265 224 224      Results from last 7 days   Lab Units 10/15/23  0810 10/13/23  0603 10/12/23  0726   SODIUM mmol/L 137 134* 134*   POTASSIUM mmol/L 4.7 4.8 4.7   CHLORIDE mmol/L 100 98 97*   CO2 mmol/L 20* 21 24   BUN mg/dL 35* 49* 38*   CREATININE mg/dL 7.10* 8.58* 7.27*   GLUCOSE mg/dL 68* 101* 64*   MAGNESIUM mg/dL  --  1.85 1.68   PHOSPHORUS mg/dL  --  5.9* 4.2          Assessment/Plan   Nghia Hall is 58 y.o. male with history of ESRD s/p DDKT (2006 with subsequent graft failure in 2021) on HD MWF via L femoral AVG, pAfib (on Eliquis), HFpEF, HCV, gouth, lymphadenopathy, adrenal insufficiency (on steroids) who initially presented with severe LLE swelling and pain s/p IR fistulogram with PTA of venous  anastomosis (10/13).    Vascular Surgery re-engaged with concerns for infected AVG.    Two areas of skin ulceration over the AVG. No active bleeding, but multiple prior reports of bleedings from these sites. No purulent drainage or palpation fluid collections    Positive blood cultures on 10/13 (2/2), growing MSSA. TTE negative for endocarditis.     Plan:  - given skin ulceration, there is high risk for graft blowout, and would recommend excising the AVG  - patient will likely be catheter dependent for HD given multiple prior AV access in upper extremities and right lower extremity  - avoid using left AVG for HD access going forward  - recommend IR consult for right femoral temporary HD catheter for dialysis   - will plan to excise L femoral AVG next week, pending OR availability  - Hold Eliquis, okay to start heparin gtt if needed for anticoagulation  - obtain PET scan to determine if any other prior grafts are infected  - continue antibiotics per ID recs    D/w attending, Dr. Henry Oliver MD  Vascular Surgery PGY-4  Team pager: [phone]

## 2023-10-20 ENCOUNTER — APPOINTMENT (OUTPATIENT)
Dept: DIALYSIS | Facility: HOSPITAL | Age: 58
DRG: 252 | End: 2023-10-20
Payer: COMMERCIAL

## 2023-10-20 ENCOUNTER — APPOINTMENT (OUTPATIENT)
Dept: RADIOLOGY | Facility: HOSPITAL | Age: 58
DRG: 252 | End: 2023-10-20
Payer: COMMERCIAL

## 2023-10-20 LAB
BACTERIA BLD CULT: NORMAL
GLUCOSE BLD MANUAL STRIP-MCNC: 91 MG/DL (ref 74–99)
HCV RNA SERPL NAA+PROBE-ACNC: NOT DETECTED K[IU]/ML
HCV RNA SERPL NAA+PROBE-LOG IU: NORMAL {LOG_IU}/ML

## 2023-10-20 PROCEDURE — 2500000004 HC RX 250 GENERAL PHARMACY W/ HCPCS (ALT 636 FOR OP/ED): Performed by: INTERNAL MEDICINE

## 2023-10-20 PROCEDURE — 3430000001 HC RX 343 DIAGNOSTIC RADIOPHARMACEUTICALS: Performed by: INTERNAL MEDICINE

## 2023-10-20 PROCEDURE — C1750 CATH, HEMODIALYSIS,LONG-TERM: HCPCS

## 2023-10-20 PROCEDURE — 1100000001 HC PRIVATE ROOM DAILY

## 2023-10-20 PROCEDURE — 94660 CPAP INITIATION&MGMT: CPT

## 2023-10-20 PROCEDURE — 2500000004 HC RX 250 GENERAL PHARMACY W/ HCPCS (ALT 636 FOR OP/ED): Performed by: STUDENT IN AN ORGANIZED HEALTH CARE EDUCATION/TRAINING PROGRAM

## 2023-10-20 PROCEDURE — 6350000001 HC RX 635 EPOETIN >10,000 UNITS: Mod: JZ | Performed by: INTERNAL MEDICINE

## 2023-10-20 PROCEDURE — 82947 ASSAY GLUCOSE BLOOD QUANT: CPT | Mod: CMCLAB

## 2023-10-20 PROCEDURE — 36558 INSERT TUNNELED CV CATH: CPT | Mod: RT,GC | Performed by: STUDENT IN AN ORGANIZED HEALTH CARE EDUCATION/TRAINING PROGRAM

## 2023-10-20 PROCEDURE — 2500000001 HC RX 250 WO HCPCS SELF ADMINISTERED DRUGS (ALT 637 FOR MEDICARE OP): Performed by: STUDENT IN AN ORGANIZED HEALTH CARE EDUCATION/TRAINING PROGRAM

## 2023-10-20 PROCEDURE — 2550000001 HC RX 255 CONTRASTS: Performed by: STUDENT IN AN ORGANIZED HEALTH CARE EDUCATION/TRAINING PROGRAM

## 2023-10-20 PROCEDURE — 78815 PET IMAGE W/CT SKULL-THIGH: CPT | Performed by: NUCLEAR MEDICINE

## 2023-10-20 PROCEDURE — 90935 HEMODIALYSIS ONE EVALUATION: CPT | Performed by: INTERNAL MEDICINE

## 2023-10-20 PROCEDURE — 2500000004 HC RX 250 GENERAL PHARMACY W/ HCPCS (ALT 636 FOR OP/ED): Performed by: NURSE PRACTITIONER

## 2023-10-20 PROCEDURE — 8010000001 HC DIALYSIS - HEMODIALYSIS PER DAY

## 2023-10-20 PROCEDURE — A9552 F18 FDG: HCPCS | Performed by: INTERNAL MEDICINE

## 2023-10-20 PROCEDURE — 99233 SBSQ HOSP IP/OBS HIGH 50: CPT | Performed by: INTERNAL MEDICINE

## 2023-10-20 PROCEDURE — C1769 GUIDE WIRE: HCPCS

## 2023-10-20 PROCEDURE — 86900 BLOOD TYPING SEROLOGIC ABO: CPT

## 2023-10-20 PROCEDURE — 0JHL3XZ INSERTION OF TUNNELED VASCULAR ACCESS DEVICE INTO RIGHT UPPER LEG SUBCUTANEOUS TISSUE AND FASCIA, PERCUTANEOUS APPROACH: ICD-10-PCS | Performed by: INTERNAL MEDICINE

## 2023-10-20 PROCEDURE — 2720000007 HC OR 272 NO HCPCS

## 2023-10-20 PROCEDURE — C1894 INTRO/SHEATH, NON-LASER: HCPCS

## 2023-10-20 PROCEDURE — 2780000003 HC OR 278 NO HCPCS

## 2023-10-20 PROCEDURE — 06HY33Z INSERTION OF INFUSION DEVICE INTO LOWER VEIN, PERCUTANEOUS APPROACH: ICD-10-PCS | Performed by: INTERNAL MEDICINE

## 2023-10-20 PROCEDURE — 36415 COLL VENOUS BLD VENIPUNCTURE: CPT

## 2023-10-20 PROCEDURE — 2500000001 HC RX 250 WO HCPCS SELF ADMINISTERED DRUGS (ALT 637 FOR MEDICARE OP): Performed by: NURSE PRACTITIONER

## 2023-10-20 PROCEDURE — 78815 PET IMAGE W/CT SKULL-THIGH: CPT

## 2023-10-20 PROCEDURE — 2500000001 HC RX 250 WO HCPCS SELF ADMINISTERED DRUGS (ALT 637 FOR MEDICARE OP): Performed by: FAMILY MEDICINE

## 2023-10-20 PROCEDURE — 36558 INSERT TUNNELED CV CATH: CPT | Performed by: STUDENT IN AN ORGANIZED HEALTH CARE EDUCATION/TRAINING PROGRAM

## 2023-10-20 RX ORDER — MIDAZOLAM HYDROCHLORIDE 1 MG/ML
INJECTION INTRAMUSCULAR; INTRAVENOUS AS NEEDED
Status: COMPLETED | OUTPATIENT
Start: 2023-10-20 | End: 2023-10-20

## 2023-10-20 RX ORDER — FENTANYL CITRATE 50 UG/ML
INJECTION, SOLUTION INTRAMUSCULAR; INTRAVENOUS AS NEEDED
Status: COMPLETED | OUTPATIENT
Start: 2023-10-20 | End: 2023-10-20

## 2023-10-20 RX ORDER — FLUDEOXYGLUCOSE F 18 200 MCI/ML
14.04 INJECTION, SOLUTION INTRAVENOUS
Status: COMPLETED | OUTPATIENT
Start: 2023-10-20 | End: 2023-10-20

## 2023-10-20 RX ADMIN — MELATONIN 3 MG: 3 TAB ORAL at 21:16

## 2023-10-20 RX ADMIN — CYCLOBENZAPRINE 5 MG: 10 TABLET, FILM COATED ORAL at 16:43

## 2023-10-20 RX ADMIN — OXYCODONE HYDROCHLORIDE 10 MG: 5 TABLET ORAL at 09:13

## 2023-10-20 RX ADMIN — EPOETIN ALFA 10000 UNITS: 10000 SOLUTION INTRAVENOUS; SUBCUTANEOUS at 21:17

## 2023-10-20 RX ADMIN — CYCLOBENZAPRINE 5 MG: 10 TABLET, FILM COATED ORAL at 09:14

## 2023-10-20 RX ADMIN — MIDAZOLAM HYDROCHLORIDE 1 MG: 1 INJECTION, SOLUTION INTRAMUSCULAR; INTRAVENOUS at 11:25

## 2023-10-20 RX ADMIN — CYANOCOBALAMIN TAB 1000 MCG 1000 MCG: 1000 TAB at 18:08

## 2023-10-20 RX ADMIN — OXYCODONE HYDROCHLORIDE 10 MG: 5 TABLET ORAL at 16:43

## 2023-10-20 RX ADMIN — IOHEXOL 50 ML: 350 INJECTION, SOLUTION INTRAVENOUS at 12:25

## 2023-10-20 RX ADMIN — Medication 1 TABLET: at 18:09

## 2023-10-20 RX ADMIN — METOPROLOL TARTRATE 50 MG: 50 TABLET, FILM COATED ORAL at 21:21

## 2023-10-20 RX ADMIN — FENTANYL CITRATE 50 MCG: 50 INJECTION, SOLUTION INTRAMUSCULAR; INTRAVENOUS at 11:25

## 2023-10-20 RX ADMIN — FLUDEOXYGLUCOSE F 18 14.04 MILLICURIE: 200 INJECTION, SOLUTION INTRAVENOUS at 07:15

## 2023-10-20 RX ADMIN — MELATONIN 10 MG: 3 TAB ORAL at 21:22

## 2023-10-20 RX ADMIN — PARICALCITOL 8 MCG: 5 INJECTION, SOLUTION INTRAVENOUS at 18:09

## 2023-10-20 RX ADMIN — NEPHROCAP 1 CAPSULE: 1 CAP ORAL at 18:08

## 2023-10-20 RX ADMIN — SEVELAMER CARBONATE 1600 MG: 800 TABLET, FILM COATED ORAL at 18:07

## 2023-10-20 RX ADMIN — Medication 1 TABLET: at 16:00

## 2023-10-20 RX ADMIN — CEFAZOLIN SODIUM 1 G: 1 INJECTION, SOLUTION INTRAVENOUS at 18:08

## 2023-10-20 RX ADMIN — HYDROCORTISONE 15 MG: 5 TABLET ORAL at 18:09

## 2023-10-20 ASSESSMENT — COGNITIVE AND FUNCTIONAL STATUS - GENERAL
MOBILITY SCORE: 24
MOBILITY SCORE: 24
DAILY ACTIVITIY SCORE: 24
DAILY ACTIVITIY SCORE: 24

## 2023-10-20 ASSESSMENT — PAIN - FUNCTIONAL ASSESSMENT
PAIN_FUNCTIONAL_ASSESSMENT: 0-10
PAIN_FUNCTIONAL_ASSESSMENT: 0-10
PAIN_FUNCTIONAL_ASSESSMENT: NO/DENIES PAIN
PAIN_FUNCTIONAL_ASSESSMENT: 0-10

## 2023-10-20 ASSESSMENT — PAIN SCALES - GENERAL
PAINLEVEL_OUTOF10: 0 - NO PAIN
PAINLEVEL_OUTOF10: 8
PAINLEVEL_OUTOF10: 0 - NO PAIN
PAINLEVEL_OUTOF10: 0 - NO PAIN
PAINLEVEL_OUTOF10: 7
PAINLEVEL_OUTOF10: 0 - NO PAIN
PAINLEVEL_OUTOF10: 8
PAINLEVEL_OUTOF10: 0 - NO PAIN

## 2023-10-20 NOTE — PROGRESS NOTES
Nghia continues to complain of pain, swelling and bleeding in his left thigh near his graft site although he was observed walking in the halls smoothly. Patient not medically cleared for discharge at this time. I will continue to follow with a safe discharge plan.

## 2023-10-20 NOTE — PROGRESS NOTES
PATIENT NOT SEEN TODAY Nghia Hall is a 58 y.o. male on day 11 of admission presenting with Inability to walk.    Subjective   Interval History: Patient was afebrile from chart review. Patient was not seen in room because of doing PET CT scan this morning.        Microbiology  Susceptibility data from last 14 days.  Collected Specimen Info Organism Clindamycin Erythromycin Oxacillin Tetracycline Trimethoprim/Sulfamethoxazole Vancomycin   10/13/23 Blood culture from Dialysis Staphylococcus aureus         10/13/23 Blood culture from Arterial Line Methicillin Susceptible Staphylococcus aureus (MSSA) S S S S S S       10/13 Bcx MSSA  10/14 Bcx NGTD     Antimicrobial agents  10/11-10/16 Zosyn  10/11-10/16 Vancomycin  10/16- Cefazolin    Imaging  IMPRESSION: 10/10 CT angio A/P  1. 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  2. Unchanged AV graft remnants in the right lower extremity.  3. Diffuse atherosclerotic calcification of the visualized  vasculature without significant focal stenosis.  4. Patent venous structures, no evidence of venous obstruction.  5. Diffuse circumferential skin thickening of the left thigh with  marked underlying edema, correlate with concern for cellulitis. No  focal fluid collections to suggest abscess formation.  6. Diffuse anasarca, mesenteric edema, and abdominopelvic ascites can  be seen the setting of volume overload, correlate with the patient's  volume status.  7. Hepatomegaly with suggestion of a cirrhotic morphology, correlate  with LFTs and ultrasound.  8. Stable enlarged left inguinal and iliac chain lymph nodes.     Assessment/Plan  #LLE swelling and pain in the setting of chronic swelling   #L groin AV graft on HD c/f graft infection  #MSSA bacteremia under vancomycin use  #Hx of vancomycin infusion reaction, not allergy for vancomycin (vancomycin is tolerable)     Patient had worsening LLE swelling and pan with fever in the  setting of L groin AV graft on HD. Patient developed MSSA bacteremia under the vancomycin/Zosyn use on 10/13. Blood cultures were negative from 10/14 and 10/16. TTE did not show any findings suggestive of endocarditis. Patient had PET CT scan done 10/20, pending report. Would recommend continue IV cefazolin  from the removal of graft.       Recommendations  PATIENT NOT SEEN BUT DISCUSSED ONGOING RECOMMENDATIONS  -Please continue IV cefazolin (renally dose on HD)  -Will follow on the surgical removal of the graft, which will be sent to micro  -Will monitor clinically      Discussed with Dr. Lam. Please text me via Epic chat if you have any questions or concerns regarding this patient. ID will continue to follow up this patient.     Alexei Bullock MD  ID fellow [id number]  Team B Pager [phone]

## 2023-10-20 NOTE — PROGRESS NOTES
"Nghia Hall is a 58 y.o. male on day 11 of admission presenting with inability to walk due to swelling and pain of left leg.    Subjective   Sitting at bedside, no distress.  Reports left lower extremity swelling and pain has not changed.  No significant bleeding from left groin area in the last 24 hours.        Objective     General: Lying in bed in dialysis center.  Cooperative.  Skin: Superficial skin split in his left thigh near his AVG site.  HEENT: Sclera is white.  Mucous membranes moist.  Neck: Supple.  No JVD.  Cardiac: Regular rate and rhythm, S1/S2 normal.  Lungs: Clear to auscultation bilaterally, no wheezing or crackles, no accessory muscle use at rest.  Abdomen: Soft, nontender, mild to moderately obese abdomen, BS +  Extremities: No cyanosis.  Mild edema noted in upper extremity and right lower extremity, significant 3+ edema in left lower extremity.  Edema in left lower leg does not appear to have changed much.  Currently no active bleeding from left groin area.  Neurologic: Alert and oriented x3.  No focal deficits.  Psychiatric: Currently no agitation.    Last Recorded Vitals  Blood pressure 111/57, pulse 66, temperature 36.5 °C (97.7 °F), temperature source Temporal, resp. rate 16, height 1.854 m (6' 1\"), weight 143 kg (315 lb), SpO2 98 %.  On room air.    Intake/Output last 3 Shifts:  I/O last 3 completed shifts:  In: 50 [IV Piggyback:50]  Out: -     Relevant Results  amLODIPine, 10 mg, oral, Daily  [Held by provider] apixaban, 5 mg, oral, BID  [Held by provider] aspirin, 81 mg, oral, Daily  B complex-vitamin C-folic acid, 1 capsule, oral, Daily  ceFAZolin, 1 g, intravenous, Daily  cyanocobalamin, 1,000 mcg, oral, Daily  epoetin urmila or biosimilar, 10,000 Units, intravenous, Every Mon/Wed/Fri  hydrocortisone, 15 mg, oral, q AM  hydrocortisone, 5 mg, oral, q PM  lactobacillus acidophilus, 1 tablet, oral, Daily  melatonin, 10 mg, oral, Daily  metoprolol tartrate, 50 mg, oral, BID  multivitamin " with minerals, 1 tablet, oral, Daily  pantoprazole, 40 mg, oral, Daily before breakfast  paricalcitoL, 8 mcg, intravenous, Once per day on Mon Wed Fri  psyllium, 1 packet, oral, Daily  sevelamer carbonate, 1,600 mg, oral, TID with meals           PRN medications: acetaminophen **OR** acetaminophen **OR** acetaminophen, cyclobenzaprine, diphenhydrAMINE, diphenhydrAMINE, melatonin, oxyCODONE         Hospital course:  Nghia Hall is a 58 y.o. male presenting with Inability to walk. Patient has past medical history of ESRD s/p DDKT (2006, subsequent graft failure 07/2021, on HD MWF at [facility] through LT groin AV graft on [address]; not currently on immunosuppression), pAfib (on Apixaban), HFpEF, HCV (s/p treatment), gout, lymphadenopathy (following with oncology, inconclusive biopsy in 01/2023), DVT, and Adrenal Insufficiency (on hydrocortisone) who presented to the ED with difficulty ambulating due to secondary LLE pain and swelling. His left leg (which is also used for his HD access) is severely edematous and exquisitely tender to palpation. He reports being compliant with his Eliquis. The edema is non dependent. He is not interested in placement at this time due to being employed.  Nephrology was consulted for HD. Vascular surgery was also consulted to evaluate left AVG.  On 10/12 patient dialysis access started oozing bright red blood, reached out to vascular surgery for urgent evaluation. Vascular surgery saw the patient and did not feel that there was any surgical interventions to offer. CTA a/p and LLE doppler were performed on 10/12/23, consulted IR for fistulogram and cardiology for right HF recommendations. IR performed fistulogram on 10/13 which showed  mild/moderate venous anastomotic stenosis, resolved s/p  7 mm POBA.  Interventional radiology did not feel stenosis would explain drainage or swelling.  Cardiology also evaluated patient on 10/13 and recommended outpatient follow-up. 10/13 Blood cultures  2/4 started growing GPC (patient was already on Vanc for 2 for 2 days prior to positive bcx), ID was consulted.  Infectious disease is concerned that patient's left AVG is infected.  Vascular surgery reconsulted.  PET CT scan obtained to further clarify if AVG infected.  PET/CT scan did indicate concern for infection of the area.  In addition vascular surgery concern for graft blowout.  They have recommended excision of AVG.       Assessment/Plan     LLE edema and pain   Ambulatory dysfunction  Left femoral access hemorrhage with superficial skin split  -Presented with worsening left leg edema and pain affecting patient ambulation. Patient does not want placement.  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  Previous imaging had mentioned this.  Discussed this briefly with the hematology/oncology fellow, who states that since not acute and chronic would not call this Eliquis failure.  Unknown how long the thrombus has been there.  Discussed with hematology/oncology fellow on 10/17 and recommending continuing Eliquis for now.  Outpatient follow-up with hematology.  -There appears to be a superficial skin split in his thigh where his AVG is located.  This is where he seems to be oozing blood intermittently, including outside dialysis.  Continue with pressure dressing.  Vascular surgery has recommended not to access the left AVG anymore.  Vascular surgery indicates concern for possible blowout.  Patient appears to be recommended for excision of AVG next week.  Eliquis currently on hold.  Aspirin currently on hold.  -Tylenol, Oxycodone and flexeril for pain.  -Vascular surgery following.  -IR was consulted, performed fistulogram on 10/13  which showed mild/moderate venous anastomotic stenosis, resolved s/p  7 mm POBA.  IR also placed a tunneled dialysis catheter in the right groin on 10/20.  -Ultrasound of the liver shows a cirrhotic looking liver.   Discussed with hepatology team, they recommend at this time imaging alone not enough to confirm cirrhosis.  They recommend nothing to do inpatient and just have him follow-up with them outpatient.  -Suspect primary reason for left lower extremity swelling at this time is combination of acute infection and generalized body edema from ESRD and anuric with history of chronic diastolic congestive heart failure.    Cirrhotic looking liver on imaging  -At this time not confirmed, but suspected on imaging by CT and ultrasound.  -Discussed with hepatology team, at this time low suspicion for true cirrhosis.  They recommend outpatient follow-up.    MSSA bacteremia  LLE cellulitis   -10/13 blood cultures ( 2 out of 4) started growing GPC in clusters.  Repeat culture from 10/14 and 10/16 no growth to date.  -PET/CT scan done further supports suspicion for infection of ABG.  -Transthoracic echo done on 10/9 already.  No signs of endocarditis.  -ID following.  -Initial antibiotics were vancomycin and Zosyn.  Antibiotics have been changed to IV cefazolin on 10/17.     ESRD on HD  -MWF.  Patient makes no urine.  -Renal dosing meds   -Patient historically has refused renal diet and only wants regular diet, stating that he knows how to regulate himself.  -Electrolytes wnl   -Nephrology following    -Patient now has tunneled dialysis catheter in the right femoral for dialysis access placed on 10/20.     Chronic HFpEF  -TTE on 10/12 revealed EF 50%, with reduced RV systolic function   -Cardiology consulted, recs noted   -Daily weights  -Continue home medications.  Patient makes no urine.  For fluid removal patient is completely dialysis dependent.     History of DVT??  -On further discussion with patient he states that he does not recall being told of any thrombus in the past.  He states that he is on Eliquis for A-fib.  -US done this admission no acute DVT but question chronic thrombus of the CFV.  -Discussed with hematology fellow,  they are doubting Eliquis failure, recommended to continue Eliquis and see hematology outpatient.   -For now Eliquis on hold due to concern for bleeding from groin area and plan potential surgery with vascular surgery.  If bleeding from groin becomes stable we will likely place patient on IV heparin drip.     Paroxysmal afib  -Continue home medications  -No acute issues     Adrenal insufficiency  -Continue hydrocortisone     Lymphadenopathy  -Could be underlying cause of edema  -Patient followed by oncology.  Discussed with patient's oncologist Dr. Castellon, she states that at this time his previous biopsy has been negative and she has no good evidence of  lymphoma.  No acute evaluations needed inpatient.  Can follow-up outpatient.    Ramin Fonseca MD

## 2023-10-20 NOTE — NURSING NOTE
Report from Sending RN:    Report From: Kvng LAMAR  Recent Surgery of Procedure: Yes, placement of right femoral HD line today  Baseline Level of Consciousness (LOC): A&OX4  Oxygen Use: No  Type: N/A  Diabetic: No  Last BP Med Given Day of Dialysis: none   Last Pain Med Given: 1125 - fentanyl 50 mcg IV, versed 1 mg IV  0914 - oxycodone 10 mg po, flexeril 5 mg po  Lab Tests to be Obtained with Dialysis: No  Blood Transfusion to be Given During Dialysis: Yes  Available IV Access: No  Medications to be Administered During Dialysis: No  Continuous IV Infusion Running: No  Restraints on Currently or in the Last 24 Hours: No  Hand-Off Communication: report from IR as follows, Full Code, right femoral HD line placed, no bleeding, A&OX3, HR 96, /96, admitted for leg swelling D/T infected right femoral AVG, graft removed, hx ESRD, PAD, pulmonary HTN

## 2023-10-20 NOTE — CARE PLAN
The patient's goals for the shift include      The clinical goals for the shift include decrease in pain    Over the shift, the patient did not make progress toward the following goals. Barriers to progression include none. Recommendations to address these barriers include none  Problem: Pain  Goal: Takes deep breaths with improved pain control throughout the shift  Outcome: Progressing  Goal: Turns in bed with improved pain control throughout the shift  Outcome: Progressing  Goal: Walks with improved pain control throughout the shift  Outcome: Progressing  Goal: Performs ADL's with improved pain control throughout shift  Outcome: Progressing  Goal: Participates in PT with improved pain control throughout the shift  Outcome: Progressing   .

## 2023-10-20 NOTE — PROGRESS NOTES
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    Renal transplant rejection    Kidney transplant status 12/08/2022    Kidney replaced by transplant    Personal history of immunosuppression therapy 07/04/2021    H/O immunosuppressive therapy    Personal history of other diseases of the nervous system and sense organs     History of cataract    Personal history of other diseases of urinary system 11/02/2021    Personal history of renal failure    Personal history of other endocrine, nutritional and metabolic disease 07/22/2013    History of hyperlipidemia    Type 2 diabetes mellitus without complications (CMS/[facility]) 12/08/2022    Diabetes mellitus    Urinary tract infection, site not specified 05/02/2018    Acute UTI            Current Facility-Administered Medications:     acetaminophen (Tylenol) tablet 650 mg, 650 mg, oral, q4h PRN, 650 mg at 10/17/23 1044 **OR** acetaminophen (Tylenol) oral liquid 650 mg, 650 mg, oral, q4h PRN, 650 mg at 10/13/23 1120 **OR** acetaminophen (Tylenol) suppository 650 mg, 650 mg, rectal, q4h PRN, Ben Mcbride APRN-CNP    amLODIPine (Norvasc) tablet 10 mg, 10 mg, oral, Daily, Ben Mcbride APRN-CNP, 10 mg at 10/19/23 0911    [Held by provider] apixaban (Eliquis) tablet 5 mg, 5 mg, oral, BID, AMARIS Atkinson-CNP, 5 mg at 10/11/23 2115    [Held by provider] aspirin EC tablet 81 mg, 81 mg, oral, Daily, Ben Mcbride APRN-CNP, 81 mg at 10/17/23 1044    B complex-vitamin C-folic acid (Nephrocaps) capsule 1 capsule, 1 capsule, oral, Daily, AMARIS Atkinson-CNP, 1 capsule at 10/19/23 0910    ceFAZolin in dextrose (iso-os) (Ancef) IVPB 1 g, 1 g, intravenous, Daily, Ramin Fonseca MD, Stopped at 10/19/23 0942    cyanocobalamin (Vitamin B-12) tablet  1,000 mcg, 1,000 mcg, oral, Daily, Ben Mcbride APRN-CNP, 1,000 mcg at 10/19/23 0911    cyclobenzaprine (Flexeril) tablet 5 mg, 5 mg, oral, TID PRN, Ferny Grady MD, 5 mg at 10/20/23 0914    diphenhydrAMINE (BENADryl) capsule 25 mg, 25 mg, oral, q6h PRN, Ferny Grady MD, 25 mg at 10/19/23 0910    diphenhydrAMINE (BENADryl) capsule 25 mg, 25 mg, oral, q6h PRN, Jermaine Garcia MD, 25 mg at 10/15/23 1931    epoetin urmila (Epogen,Procrit) injection 10,000 Units, 10,000 Units, intravenous, Every Mon/Wed/Fri, Josette Alexander MD MPH, 10,000 Units at 10/18/23 1053    hydrocortisone (Cortef) tablet 15 mg, 15 mg, oral, q AM, Ben Mcbride APRN-CNP, 15 mg at 10/19/23 1328    hydrocortisone (Cortef) tablet 5 mg, 5 mg, oral, q PM, Ben Mcbride APRN-CNP, 5 mg at 10/19/23 2154    lactobacillus acidophilus tablet 1 tablet, 1 tablet, oral, Daily, Ben Mcbride APRN-CNP, 1 tablet at 10/19/23 0912    melatonin tablet 10 mg, 10 mg, oral, Daily, Ben Mcbride APRN-CNP, 10 mg at 10/19/23 2154    melatonin tablet 3 mg, 3 mg, oral, Nightly PRN, Ben Mcbride APRN-CNP, 3 mg at 10/17/23 2118    metoprolol tartrate (Lopressor) tablet 50 mg, 50 mg, oral, BID, Jermaine Garcia MD, 50 mg at 10/18/23 2103    multivitamin with minerals 1 tablet, 1 tablet, oral, Daily, AMARIS Atkinson-CNP, 1 tablet at 10/19/23 0900    oxyCODONE (Roxicodone) immediate release tablet 10 mg, 10 mg, oral, q6h PRN, Jermaine Garcia MD, 10 mg at 10/20/23 0913    pantoprazole (ProtoNix) EC tablet 40 mg, 40 mg, oral, Daily before breakfast, AMARIS Atkinson-CNP, 40 mg at 10/18/23 1320    paricalcitoL (Zemplar) injection 8 mcg, 8 mcg, intravenous, Once per day on Mon Wed Fri, Josette Alexander MD MPH, 8 mcg at 10/18/23 0915    psyllium (Metamucil) 3.4 gram packet 1 packet, 1 packet, oral, Daily, AMARIS Atkinson-CNP, 1 packet at 10/19/23 0900    sevelamer carbonate (Renvela) tablet 1,600 mg, 1,600  mg, oral, TID with meals, Ben Mcbride, APRN-CNP, 1,600 mg at 10/18/23 1729    Physical Exam  Physical Exam  Heart S1 S2 RRR, Lungs CTA, + edema left leg      Vital signs in last 24 hours:  Temp:  [36 °C (96.8 °F)-36.5 °C (97.7 °F)] 36.5 °C (97.7 °F)  Heart Rate:  [58-84] 61  Resp:  [12-20] 16  BP: (108-133)/(55-86) 111/57         Labs:  Results from last 7 days   Lab Units 10/15/23  1901   WBC AUTO x10*3/uL 6.7   RBC AUTO x10*6/uL 3.60*   HEMOGLOBIN g/dL 10.3*   HEMATOCRIT % 36.3*     Results from last 7 days   Lab Units 10/15/23  0810   SODIUM mmol/L 137   POTASSIUM mmol/L 4.7   CHLORIDE mmol/L 100   CO2 mmol/L 20*   BUN mg/dL 35*   CREATININE mg/dL 7.10*   CALCIUM mg/dL 7.9*   BILIRUBIN TOTAL mg/dL 0.9   ALT U/L 12   AST U/L 18              Assessment/Plan   Patient seen and examined while on dialysis, recent events, labs, medications reviewed. Will follow overall management per primary team, and continue regular dialysis.  Using tunelled catheter for HD.  For AVG removal next week per vascular surgery.    Principal Problem:    Inability to walk  Active Problems:    ESRD (end stage renal disease) (CMS/[facility])        Cortez Ngo MD  10/20/2023  3:41 PM

## 2023-10-20 NOTE — PRE-PROCEDURE NOTE
Interventional Radiology Preprocedure Note - Tunneled HD catheter    Indication for procedure: The primary encounter diagnosis was Inability to walk. Diagnoses of Localized edema, Pain of left lower extremity, ESRD on dialysis (CMS/HCC), Pulmonary hypertension (CMS/HCC), Chronic congestive heart failure, unspecified heart failure type (CMS/HCC), Pain due to vascular prosthetic devices, implants and grafts, initial encounter (CMS/HCC), and Hepatic cirrhosis, unspecified hepatic cirrhosis type, unspecified whether ascites present (CMS/HCC) were also pertinent to this visit.    Relevant review of systems: NA    Relevant Labs:   Lab Results   Component Value Date    CREATININE 7.10 (H) 10/15/2023    EGFR 8 (L) 10/15/2023    INR 1.2 (H) 10/19/2023    PROTIME 13.7 (H) 10/19/2023       Planned Sedation/Anesthesia: Moderate    Airway assessment: normal    Directed physical examination:    GEN: NAD, A&Ox3  CARD: RRR, normal S1S2, no MRG  PULM: CTAB  ABD: NTND  MSK: Full ROM  NEURO: Grossly intact      Mallampati: II (hard and soft palate, upper portion of tonsils anduvula visible)    ASA Score: ASA 2 - Patient with mild systemic disease with no functional limitations    Benefits, risks and alternatives of procedure and planned sedation have been discussed with the patient and/or their representative. All questions answered and they agree to proceed.

## 2023-10-20 NOTE — POST-PROCEDURE NOTE
Interventional Radiology Brief Postprocedure Note    Attending: Dr. Eli Arceo    Assistant: HENRY    Diagnosis: Tunneled HD line placement    Description of procedure: Technically successful placement of a tunneled hemodialysis catheter. The tunneled dialysis catheter is ready to use.  It is charged with a dwell solution of heparin 1000 units/mL.       Anesthesia:  Local    Complications: None    Estimated Blood Loss: none    Medications  As of 10/20/23 1312      oxyCODONE (Roxicodone) immediate release tablet 10 mg (mg) Total dose:  350 mg Dosing weight:  143      Date/Time Rate/Dose/Volume Action       10/09/23  0653 10 mg Given      1520 10 mg Given      2144 10 mg Given     10/10/23  0257 10 mg Given      0843 10 mg Given      1456 10 mg Given      2038 10 mg Given     10/11/23  0511 10 mg Given      1209 10 mg Given      1912 10 mg Given     10/12/23  1428 10 mg Given      1923 10 mg Given     10/13/23  0031 10 mg Given      0532 10 mg Given      2035 10 mg Given     10/14/23  1013 10 mg Given      1611 10 mg Given      2055 10 mg Given     10/15/23  0921 10 mg Given      1544 10 mg Given      2034 10 mg Given     10/16/23  0251 10 mg Given      1149 10 mg Given      1939 10 mg Given     10/17/23  0605 10 mg Given      1217 10 mg Given      1818 10 mg Given     10/18/23  0033 10 mg Given      0712 10 mg Given      1318 10 mg Given      1947 10 mg Given     10/19/23  0910 10 mg Given      1542 10 mg Given      2154 10 mg Given     10/20/23  0913 10 mg Given               acetaminophen (Tylenol) tablet 650 mg (mg) Total dose:  1,950 mg* Dosing weight:  143   *Administration not included in total     Date/Time Rate/Dose/Volume Action       10/10/23  0257 650 mg Given      2034 *Not included in total See Alternative     10/13/23  1120 *Not included in total See Alternative     10/14/23  1002 *650 mg Missed     10/16/23  1140 *650 mg Missed      1141 *Not included in total See Alternative      1652 650 mg  Given     10/17/23  1044 650 mg Given               acetaminophen (Tylenol) oral liquid 650 mg (mg) Total dose:  650 mg* Dosing weight:  143   *Administration not included in total     Date/Time Rate/Dose/Volume Action       10/10/23  0257 *Not included in total See Alternative      2034 *650 mg Missed     10/13/23  1120 650 mg Given     10/14/23  1002 *Not included in total See Alternative     10/16/23  1140 *Not included in total See Alternative      1141 *650 mg Missed      1652 *Not included in total See Alternative     10/17/23  1044 *Not included in total See Alternative               acetaminophen (Tylenol) suppository 650 mg (mg) Total dose:  Cannot be calculated* Dosing weight:  143   *Administration dose not documented     Date/Time Rate/Dose/Volume Action       10/10/23  0257 *Not included in total See Alternative      2034 *Not included in total See Alternative     10/13/23  1120 *Not included in total See Alternative     10/14/23  1002 *Not included in total See Alternative     10/16/23  1140 *Not included in total See Alternative      1141 *Not included in total See Alternative      1652 *Not included in total See Alternative     10/17/23  1044 *Not included in total See Alternative               melatonin tablet 10 mg (mg) Total dose:  100 mg* Dosing weight:  143   *Administration not included in total     Date/Time Rate/Dose/Volume Action       10/09/23  2144 10 mg Given     10/10/23  2100 *10 mg Missed     10/11/23  2115 10 mg Given     10/12/23  2115 10 mg Given     10/13/23  1848 10 mg Given     10/14/23  2053 10 mg Given     10/15/23  2034 10 mg Given     10/16/23  2131 10 mg Given     10/17/23  2127 10 mg Given     10/18/23  2102 10 mg Given     10/19/23  2154 10 mg Given               psyllium (Metamucil) 3.4 gram packet 1 packet (packet) Total dose:  6 packet* Dosing weight:  143   *Administration not included in total     Date/Time Rate/Dose/Volume Action       10/10/23  0900 *1 packet Missed      10/11/23  1209 1 packet Given     10/12/23  0900 *1 packet Missed     10/13/23  1131 1 packet Given     10/14/23  0900 *1 packet Missed     10/15/23  0900 1 packet Given     10/16/23  1148 1 packet Given     10/17/23  1044 1 packet Given     10/18/23  0900 *1 packet Missed     10/19/23  0900 1 packet Given     10/20/23  0900 *1 packet Missed               apixaban (Eliquis) tablet 5 mg (mg) Total dose:  25 mg* Dosing weight:  143   *Administration not included in total     Date/Time Rate/Dose/Volume Action       10/10/23  0123 5 mg Given      0843 5 mg Given      2038 5 mg Given     10/11/23  1209 5 mg Given      2115 5 mg Given     10/12/23  0900 *5 mg Missed      1402 *Not included in total Held by provider      2100 *Not included in total Automatically Held      2211 *5 mg Missed     10/13/23  0900 *Not included in total Automatically Held      2100 *Not included in total Automatically Held     10/14/23  0900 *5 mg Missed      2100 *Not included in total Automatically Held     10/15/23  0900 *5 mg Missed      2100 *5 mg Missed     10/16/23  0900 *5 mg Missed      2100 *5 mg Missed     10/17/23  0900 *Not included in total Automatically Held      1524 *Not included in total Unheld by provider      1846 *Not included in total Held by provider      2100 *5 mg Missed     10/18/23  0900 *5 mg Missed      2100 *5 mg Missed     10/19/23  0900 *Not included in total Automatically Held      2100 *Not included in total Automatically Held     10/20/23  0900 *5 mg Missed               melatonin tablet 3 mg (mg) Total dose:  15 mg*   *Administration not included in total     Date/Time Rate/Dose/Volume Action       10/10/23  2040 3 mg Given     10/12/23  2108 *3 mg Missed     10/14/23  2052 3 mg Given     10/15/23  2036 3 mg Given     10/16/23  2131 3 mg Given     10/17/23  2118 3 mg Given               sevelamer carbonate (Renvela) tablet 1,600 mg (mg) Total dose:  24,000 mg*   *Administration not included in total      Date/Time Rate/Dose/Volume Action       10/10/23  0843 1,600 mg Given      1228 1,600 mg Given      1700 *1,600 mg Missed     10/11/23  0800 *1,600 mg Missed      1209 1,600 mg Given      1907 1,600 mg Given     10/12/23  0800 *1,600 mg Missed      1426 1,600 mg Given      1700 *1,600 mg Missed     10/13/23  0800 *1,600 mg Missed      1131 1,600 mg Given      1847 1,600 mg Given     10/14/23  0800 *1,600 mg Missed      1200 *1,600 mg Missed      1817 1,600 mg Given     10/15/23  0921 1,600 mg Given      1200 *1,600 mg Missed      1700 *1,600 mg Missed     10/16/23  0800 *1,600 mg Missed      1149 1,600 mg Given      1652 1,600 mg Given     10/17/23  1044 1,600 mg Given      1200 *1,600 mg Missed      1818 1,600 mg Given     10/18/23  0800 *1,600 mg Missed      1319 1,600 mg Given      1729 1,600 mg Given     10/19/23  0800 *1,600 mg Missed      1200 *1,600 mg Missed      1700 *1,600 mg Missed     10/20/23  0800 *1,600 mg Missed      1200 *1,600 mg Missed               aspirin EC tablet 81 mg (mg) Total dose:  648 mg*   *Administration not included in total     Date/Time Rate/Dose/Volume Action       10/10/23  0843 81 mg Given     10/11/23  1209 81 mg Given     10/12/23  1426 81 mg Given     10/13/23  1132 81 mg Given     10/14/23  1013 81 mg Given     10/15/23  0922 81 mg Given     10/16/23  1148 81 mg Given     10/17/23  1044 81 mg Given      1846 *Not included in total Held by provider     10/18/23  0900 *81 mg Missed     10/19/23  0900 *Not included in total Automatically Held     10/20/23  0900 *81 mg Missed               amLODIPine (Norvasc) tablet 10 mg (mg) Total dose:  90 mg*   *Administration not included in total     Date/Time Rate/Dose/Volume Action       10/10/23  0843 10 mg Given     10/11/23  1209 10 mg Given     10/12/23  1426 10 mg Given     10/13/23  1132 10 mg Given     10/14/23  0900 *10 mg Missed     10/15/23  0922 10 mg Given     10/16/23  1148 10 mg Given     10/17/23  1044 10 mg Given      10/18/23  1320 10 mg Given     10/19/23  0911 10 mg Given               cyanocobalamin (Vitamin B-12) tablet 1,000 mcg (mcg) Total dose:  10,000 mcg      Date/Time Rate/Dose/Volume Action       10/10/23  0843 1,000 mcg Given     10/11/23  1209 1,000 mcg Given     10/12/23  1426 1,000 mcg Given     10/13/23  1131 1,000 mcg Given     10/14/23  1015 1,000 mcg Given     10/15/23  0922 1,000 mcg Given     10/16/23  1148 1,000 mcg Given     10/17/23  1044 1,000 mcg Given     10/18/23  1320 1,000 mcg Given     10/19/23  0911 1,000 mcg Given               hydrocortisone (Cortef) tablet 15 mg (mg) Total dose:  150 mg* Dosing weight:  143   *Administration not included in total     Date/Time Rate/Dose/Volume Action       10/10/23  0843 15 mg Given     10/11/23  1209 15 mg Given     10/12/23  1425 15 mg Given      2116 *15 mg Missed     10/13/23  1130 15 mg Given     10/14/23  1015 15 mg Given     10/15/23  0921 15 mg Given     10/16/23  1149 15 mg Given     10/17/23  1044 15 mg Given     10/18/23  1319 15 mg Given     10/19/23  1328 15 mg Given               metoprolol tartrate (Lopressor) tablet 50 mg (mg) Total dose:  750 mg*   *Administration not included in total     Date/Time Rate/Dose/Volume Action       10/10/23  0123 50 mg Given      0843 50 mg Given      2039 50 mg Given     10/11/23  1209 50 mg Given      2115 50 mg Given     10/12/23  1425 50 mg Given      2116 50 mg Given     10/13/23  1131 50 mg Given      2031 50 mg Given     10/14/23  0900 *50 mg Missed      2054 *50 mg Missed     10/15/23  0922 50 mg Given      2036 50 mg Given     10/16/23  1149 50 mg Given      2004 50 mg Given     10/17/23  1045 50 mg Given      2100 *50 mg Missed     10/18/23  0900 *50 mg Missed      2103 50 mg Given     10/19/23  0900 *50 mg Missed      2100 *50 mg Missed     10/20/23  0900 *50 mg Missed               pantoprazole (ProtoNix) EC tablet 40 mg (mg) Total dose:  280 mg* Dosing weight:  143   *Administration not included in  total     Date/Time Rate/Dose/Volume Action       10/10/23  0616 40 mg Given     10/11/23  1209 40 mg Given     10/12/23  0602 40 mg Given     10/13/23  0700 40 mg Given     10/14/23  1015 40 mg Given     10/15/23  0700 *40 mg Missed     10/16/23  0700 *40 mg Missed     10/17/23  0605 40 mg Given     10/18/23  1320 40 mg Given     10/19/23  0700 *40 mg Missed     10/20/23  0700 *40 mg Missed               hydrocortisone (Cortef) tablet 5 mg (mg) Total dose:  50 mg*   *Administration not included in total     Date/Time Rate/Dose/Volume Action       10/10/23  0200 *5 mg Missed      2041 5 mg Given     10/11/23  2115 5 mg Given     10/12/23  2100 5 mg Given     10/13/23  2031 5 mg Given     10/14/23  2055 5 mg Given     10/15/23  2036 5 mg Given     10/16/23  2004 5 mg Given     10/17/23  2118 5 mg Given     10/18/23  2103 5 mg Given     10/19/23  2154 5 mg Given               multivitamin with minerals 1 tablet (tablet) Total dose:  9 tablet* Dosing weight:  143   *Administration not included in total     Date/Time Rate/Dose/Volume Action       10/10/23  0843 1 tablet Given     10/11/23  1209 1 tablet Given     10/12/23  1425 1 tablet Given     10/13/23  1131 1 tablet Given     10/14/23  1014 1 tablet Given     10/15/23  0922 1 tablet Given     10/16/23  0900 *1 tablet Missed     10/17/23  0900 1 tablet Given     10/18/23  1327 1 tablet Given     10/19/23  0900 1 tablet Given               lactobacillus acidophilus tablet 1 tablet (tablet) Total dose:  9 tablet* Dosing weight:  143   *Administration not included in total     Date/Time Rate/Dose/Volume Action       10/10/23  0900 *1 tablet Missed     10/11/23  1209 1 tablet Given     10/12/23  1425 1 tablet Given     10/13/23  1131 1 tablet Given     10/14/23  1013 1 tablet Given     10/15/23  0922 1 tablet Given     10/16/23  1150 1 tablet Given     10/17/23  1044 1 tablet Given     10/18/23  1320 1 tablet Given     10/19/23  0912 1 tablet Given               B  complex-vitamin C-folic acid (Nephrocaps) capsule 1 capsule (capsule) Total dose:  9 capsule* Dosing weight:  143   *Administration not included in total     Date/Time Rate/Dose/Volume Action       10/10/23  0900 *1 capsule Missed     10/11/23  1209 1 capsule Given     10/12/23  1425 1 capsule Given     10/13/23  0900 1 capsule Given     10/14/23  1014 1 capsule Given     10/15/23  0922 1 capsule Given     10/16/23  1148 1 capsule Given     10/17/23  1044 1 capsule Given     10/18/23  1319 1 capsule Given     10/19/23  0910 1 capsule Given               paricalcitol (Zemplar) injection 8 mcg (mcg) Total dose:  8 mcg Dosing weight:  143      Date/Time Rate/Dose/Volume Action       10/11/23  1909 8 mcg Given               diphenhydrAMINE (BENADryl) capsule 25 mg (mg) Total dose:  225 mg* Dosing weight:  143   *Administration not included in total     Date/Time Rate/Dose/Volume Action       10/10/23  2039 25 mg Given     10/11/23  1911 *25 mg Missed      1912 25 mg Given     10/12/23  2114 25 mg Given     10/13/23  0638 25 mg Given     10/14/23  1614 25 mg Given     10/15/23  0134 25 mg Given      1930 *25 mg Missed      1931 25 mg Given     10/17/23  2118 25 mg Given     10/19/23  0904 *25 mg Missed      0910 25 mg Given               cyclobenzaprine (Flexeril) tablet 5 mg (mg) Total dose:  100 mg* Dosing weight:  143   *Administration not included in total     Date/Time Rate/Dose/Volume Action       10/10/23  2038 5 mg Given     10/11/23  1209 5 mg Given     10/12/23  1426 5 mg Given      2114 5 mg Given     10/13/23  1131 5 mg Given      1855 5 mg Given     10/14/23  1013 5 mg Given      1331 5 mg Given      2101 5 mg Given     10/15/23  0921 5 mg Given      1544 5 mg Given     10/16/23  0251 5 mg Given      1140 *5 mg Missed      1652 5 mg Given     10/17/23  0605 5 mg Given      1217 5 mg Given      1818 5 mg Given     10/18/23  2103 5 mg Given     10/19/23  0910 5 mg Given      2154 5 mg Given     10/20/23  0914  5 mg Given               piperacillin-tazobactam-dextrose (Zosyn) IV 2.25 g (mL/hr) Total dose:  4.5 g* Dosing weight:  143   *From user-documented volume     Date/Time Rate/Dose/Volume Action       10/11/23  1439 2.25 g - 100 mL/hr (over 30 min) New Bag      1509  (over 30 min) Stopped      2316 2.25 g - 100 mL/hr (over 30 min) New Bag      2346  (over 30 min) Stopped     10/12/23  0500 100 mL       0603 2.25 g - 100 mL/hr (over 30 min) New Bag      0633  (over 30 min) Stopped      1425 2.25 g - 100 mL/hr (over 30 min) New Bag      1455  (over 30 min) Stopped      2230 2.25 g - 100 mL/hr (over 30 min) New Bag      2300  (over 30 min) Stopped     10/13/23  0630 2.25 g - 100 mL/hr (over 30 min) New Bag      0700  (over 30 min) Stopped      1406 2.25 g - 100 mL/hr (over 30 min) New Bag      1436  (over 30 min) Stopped      2158 2.25 g - 100 mL/hr (over 30 min) New Bag      2228  (over 30 min) Stopped     10/14/23  1015 2.25 g - 100 mL/hr (over 30 min) New Bag      1045  (over 30 min) Stopped      1817 2.25 g - 100 mL/hr (over 30 min) New Bag      1847  (over 30 min) Stopped     10/15/23  0134 2.25 g - 100 mL/hr (over 30 min) New Bag      0204  (over 30 min) Stopped      0920 2.25 g - 100 mL/hr (over 30 min) New Bag      0950  (over 30 min) Stopped      1724 2.25 g - 100 mL/hr (over 30 min) New Bag      1754  (over 30 min) Stopped     10/16/23  0252 2.25 g - 100 mL/hr (over 30 min) New Bag      0322  (over 30 min) Stopped      1148 2.25 g - 100 mL/hr (over 30 min) New Bag      1218  (over 30 min) Stopped      1938 2.25 g - 100 mL/hr (over 30 min) New Bag      2008  (over 30 min) Stopped               vancomycin (Vancocin) 2 g in dextrose 5 % in water (D5W) 500 mL IV (mL/hr) Total volume:  Not documented* Dosing weight:  143   *Total volume has not been documented. View each administration to see the amount administered.     Date/Time Rate/Dose/Volume Action       10/11/23  3075 2 g - 135 mL/hr (over 240 min) New Bag       2308  (over 240 min) Stopped               epoetin urmila (Epogen,Procrit) injection 10,000 Units (Units) Total dose:  30,000 Units Dosing weight:  143      Date/Time Rate/Dose/Volume Action       10/13/23  2200 10,000 Units Given     10/16/23  2004 10,000 Units Given     10/18/23  1053 10,000 Units Given      Canceled Entry               iohexol (OMNIPaque) 350 mg iodine/mL injection 100 mL (mL) Total volume:  100 mL Dosing weight:  143      Date/Time Rate/Dose/Volume Action       10/12/23  1325 100 mL Given               iohexol (OMNIPaque) 350 mg iodine/mL solution 50 mL (mL) Total volume:  50 mL Dosing weight:  143      Date/Time Rate/Dose/Volume Action       10/20/23  1225 50 mL Given               vancomycin in dextrose 5 % (Vancocin) IVPB 1 g (mL/hr) Total dose:  1 g* Dosing weight:  143   *From user-documented volume     Date/Time Rate/Dose/Volume Action       10/13/23  1847 1 g - 100 mL/hr (over 120 min) New Bag      2047 200 mL Stopped     10/16/23  1243 1 g - 100 mL/hr (over 120 min) New Bag      1443  (over 120 min) Stopped               paricalcitoL (Zemplar) injection 8 mcg (mcg) Total dose:  24 mcg Dosing weight:  143      Date/Time Rate/Dose/Volume Action       10/13/23  1847 8 mcg Given     10/16/23  1937 8 mcg Given     10/18/23  0915 8 mcg Given      Canceled Entry               paricalcitoL (Zemplar) injection  - Omnicell Override Pull (mcg) Total dose:  8 mcg      Date/Time Rate/Dose/Volume Action       10/18/23  0915 8 mcg Given               paricalcitoL (Zemplar) injection  - Omnicell Override Pull Total dose:  Cannot be calculated*   *Administration dose not documented     Date/Time Rate/Dose/Volume Action       10/18/23  0915 *Not included in total Override Pull               fentaNYL PF (Sublimaze) injection (mcg) Total dose:  100 mcg      Date/Time Rate/Dose/Volume Action       10/13/23  1727 50 mcg Given     10/20/23  1125 50 mcg Given               heparin flush 100 unit/mL injection  Total dose:  0 *   *Administration not included in total     Date/Time Rate/Dose/Volume Action       10/13/23  Canceled Entry               ceFAZolin in dextrose (iso-os) (Ancef) IVPB 1 g (mL/hr) Total dose:  2 g* Dosing weight:  143   *From user-documented volume     Date/Time Rate/Dose/Volume Action       10/17/23  1044 1 g - 100 mL/hr (over 30 min) New Bag      1114  (over 30 min) Stopped     10/18/23  0000 50 mL       1319 1 g - 100 mL/hr (over 30 min) New Bag      1355  (over 30 min) Stopped     10/19/23  0910 50 mL       0912 1 g - 100 mL/hr (over 30 min) New Bag      0942  (over 30 min) Stopped               fludeoxyglucose F-18 injection 14.04 millicurie (millicurie) Total dose:  14.04 millicurie Dosing weight:  143      Date/Time Rate/Dose/Volume Action       10/20/23  0715 14.04 millicurie Given               midazolam (Versed) injection (mg) Total dose:  1 mg      Date/Time Rate/Dose/Volume Action       10/20/23  1125 1 mg Given                   No specimens collected      See detailed result report with images in PACS.    The patient tolerated the procedure well without incident or complication and is in stable condition.

## 2023-10-21 ENCOUNTER — ANESTHESIA EVENT (OUTPATIENT)
Dept: OPERATING ROOM | Facility: HOSPITAL | Age: 58
DRG: 252 | End: 2023-10-21
Payer: COMMERCIAL

## 2023-10-21 LAB
ABO GROUP (TYPE) IN BLOOD: NORMAL
ANTIBODY SCREEN: NORMAL
GLUCOSE BLD MANUAL STRIP-MCNC: 80 MG/DL (ref 74–99)
RH FACTOR (ANTIGEN D): NORMAL

## 2023-10-21 PROCEDURE — 94660 CPAP INITIATION&MGMT: CPT

## 2023-10-21 PROCEDURE — 99233 SBSQ HOSP IP/OBS HIGH 50: CPT | Performed by: INTERNAL MEDICINE

## 2023-10-21 PROCEDURE — 2500000001 HC RX 250 WO HCPCS SELF ADMINISTERED DRUGS (ALT 637 FOR MEDICARE OP): Performed by: STUDENT IN AN ORGANIZED HEALTH CARE EDUCATION/TRAINING PROGRAM

## 2023-10-21 PROCEDURE — 82947 ASSAY GLUCOSE BLOOD QUANT: CPT | Mod: CMCLAB

## 2023-10-21 PROCEDURE — 2500000002 HC RX 250 W HCPCS SELF ADMINISTERED DRUGS (ALT 637 FOR MEDICARE OP, ALT 636 FOR OP/ED): Performed by: NURSE PRACTITIONER

## 2023-10-21 PROCEDURE — 1100000001 HC PRIVATE ROOM DAILY

## 2023-10-21 PROCEDURE — 2500000004 HC RX 250 GENERAL PHARMACY W/ HCPCS (ALT 636 FOR OP/ED): Performed by: NURSE PRACTITIONER

## 2023-10-21 PROCEDURE — 2500000004 HC RX 250 GENERAL PHARMACY W/ HCPCS (ALT 636 FOR OP/ED): Performed by: INTERNAL MEDICINE

## 2023-10-21 PROCEDURE — 2500000001 HC RX 250 WO HCPCS SELF ADMINISTERED DRUGS (ALT 637 FOR MEDICARE OP): Performed by: NURSE PRACTITIONER

## 2023-10-21 PROCEDURE — 2500000001 HC RX 250 WO HCPCS SELF ADMINISTERED DRUGS (ALT 637 FOR MEDICARE OP): Performed by: FAMILY MEDICINE

## 2023-10-21 PROCEDURE — 99232 SBSQ HOSP IP/OBS MODERATE 35: CPT | Performed by: INTERNAL MEDICINE

## 2023-10-21 RX ADMIN — MELATONIN 3 MG: 3 TAB ORAL at 21:44

## 2023-10-21 RX ADMIN — HYDROCORTISONE 5 MG: 5 TABLET ORAL at 21:43

## 2023-10-21 RX ADMIN — AMLODIPINE BESYLATE 10 MG: 10 TABLET ORAL at 08:43

## 2023-10-21 RX ADMIN — METOPROLOL TARTRATE 50 MG: 50 TABLET, FILM COATED ORAL at 08:44

## 2023-10-21 RX ADMIN — Medication 1 TABLET: at 08:44

## 2023-10-21 RX ADMIN — HYDROCORTISONE 15 MG: 5 TABLET ORAL at 08:43

## 2023-10-21 RX ADMIN — SEVELAMER CARBONATE 1600 MG: 800 TABLET, FILM COATED ORAL at 12:14

## 2023-10-21 RX ADMIN — SEVELAMER CARBONATE 1600 MG: 800 TABLET, FILM COATED ORAL at 08:43

## 2023-10-21 RX ADMIN — CYCLOBENZAPRINE 5 MG: 10 TABLET, FILM COATED ORAL at 21:39

## 2023-10-21 RX ADMIN — OXYCODONE HYDROCHLORIDE 10 MG: 5 TABLET ORAL at 08:43

## 2023-10-21 RX ADMIN — DIPHENHYDRAMINE HYDROCHLORIDE 25 MG: 25 CAPSULE ORAL at 21:40

## 2023-10-21 RX ADMIN — SEVELAMER CARBONATE 1600 MG: 800 TABLET, FILM COATED ORAL at 17:50

## 2023-10-21 RX ADMIN — OXYCODONE HYDROCHLORIDE 10 MG: 5 TABLET ORAL at 17:50

## 2023-10-21 RX ADMIN — PSYLLIUM HUSK 1 PACKET: 3.4 POWDER ORAL at 08:44

## 2023-10-21 RX ADMIN — Medication 1 TABLET: at 08:45

## 2023-10-21 RX ADMIN — CEFAZOLIN SODIUM 1 G: 1 INJECTION, SOLUTION INTRAVENOUS at 08:43

## 2023-10-21 RX ADMIN — CYANOCOBALAMIN TAB 1000 MCG 1000 MCG: 1000 TAB at 08:44

## 2023-10-21 RX ADMIN — MELATONIN 10 MG: 3 TAB ORAL at 21:40

## 2023-10-21 RX ADMIN — METOPROLOL TARTRATE 50 MG: 50 TABLET, FILM COATED ORAL at 21:40

## 2023-10-21 RX ADMIN — NEPHROCAP 1 CAPSULE: 1 CAP ORAL at 08:44

## 2023-10-21 RX ADMIN — PANTOPRAZOLE SODIUM 40 MG: 40 TABLET, DELAYED RELEASE ORAL at 06:38

## 2023-10-21 ASSESSMENT — PAIN SCALES - GENERAL
PAINLEVEL_OUTOF10: 5 - MODERATE PAIN
PAINLEVEL_OUTOF10: 0 - NO PAIN
PAINLEVEL_OUTOF10: 8
PAINLEVEL_OUTOF10: 5 - MODERATE PAIN
PAINLEVEL_OUTOF10: 5 - MODERATE PAIN
PAINLEVEL_OUTOF10: 8

## 2023-10-21 ASSESSMENT — PAIN - FUNCTIONAL ASSESSMENT
PAIN_FUNCTIONAL_ASSESSMENT: 0-10

## 2023-10-21 NOTE — SIGNIFICANT EVENT
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 Vascular surgery planning for Left AV graft explant 10/22/23 with Dr. James. Patient has up to date type and screen and will need to be NPO at midnight. Consent has been obtained. Plan discussed with primary team and patient.    Please page with questions.    China Kang MD  General Surgery PGY-1  [id number]

## 2023-10-21 NOTE — CARE PLAN
Problem: Pain  Goal: Takes deep breaths with improved pain control throughout the shift  10/20/2023 1825 by Jenny Garces RN  Outcome: Progressing     Problem: Pain  Goal: Turns in bed with improved pain control throughout the shift  10/20/2023 1825 by Jenny Garces RN  Outcome: Progressing     Problem: Pain  Goal: Walks with improved pain control throughout the shift  10/20/2023 1825 by Jenny Garces RN  Outcome: Progressing     Problem: Pain  Goal: Performs ADL's with improved pain control throughout shift  10/20/2023 1825 by Jenny Garces RN  Outcome: Progressing     Problem: Pain  Goal: Participates in PT with improved pain control throughout the shift  10/20/2023 1825 by Jenny Garces RN  Outcome: Progressing     Problem: Discharge Planning  Goal: Discharge to home or other facility with appropriate resources  10/20/2023 1825 by Jenny Garces RN  Outcome: Progressing     Problem: Chronic Conditions and Co-morbidities  Goal: Patient's chronic conditions and co-morbidity symptoms are monitored and maintained or improved  10/20/2023 1825 by Jenny Garces RN  Outcome: Progressing     Problem: Pain - Adult  Goal: Verbalizes/displays adequate comfort level or baseline comfort level  10/20/2023 1825 by Jenny Garces RN  Outcome: Progressing     Problem: Safety - Adult  Goal: Free from fall injury  10/20/2023 1825 by Jenny Garces RN  Outcome: Progressing   The patient's goals for the shift include      The clinical goals for the shift include decrease pain

## 2023-10-21 NOTE — CARE PLAN
Problem: Pain  Goal: Takes deep breaths with improved pain control throughout the shift  Outcome: Progressing  Goal: Turns in bed with improved pain control throughout the shift  Outcome: Progressing  Goal: Walks with improved pain control throughout the shift  Outcome: Progressing  Goal: Performs ADL's with improved pain control throughout shift  Outcome: Progressing  Goal: Participates in PT with improved pain control throughout the shift  Outcome: Progressing     Problem: Discharge Planning  Goal: Discharge to home or other facility with appropriate resources  Outcome: Progressing     Problem: Chronic Conditions and Co-morbidities  Goal: Patient's chronic conditions and co-morbidity symptoms are monitored and maintained or improved  Outcome: Progressing   The patient's goals for the shift include      The clinical goals for the shift include decrease pain

## 2023-10-21 NOTE — PROGRESS NOTES
Nghia Hall is a 58 y.o. male on day 12 of admission presenting with Inability to walk.    Subjective  and Interval History:          Patient seen during early afternoon rounds.       Patient complains of ongoing severe pain.  Cooperative but not very interactive       Denied fever, chills, headache, shortness of breath, chest pain         New right femoral HD catheter placed 7/20/2023    Objective   Range of Vitals (last 24 hours)  Heart Rate:  [65-68]   Temp:  [36.2 °C (97.2 °F)-36.5 °C (97.7 °F)]   Resp:  [18-19]   BP: (117-134)/(65-75)   SpO2:  [98 %-100 %]   Daily Weight  10/09/23 : 143 kg (315 lb)    Body mass index is 41.56 kg/m².    Physical Exam  Resting supine in the left lateral position  No acute distress  Anterior chest clear  Distant S1 and S2  Nontender abdomen    Left leg: (Limited exam)       Slightly tense and swollen left proximal leg from knee to the inguinal canal.       Anterior thigh dressing in place over previous AVG      Relevant Results  Labs              CrCl cannot be calculated (Patient's most recent lab result is older than the maximum 3 days allowed.).  CRP   Date Value Ref Range Status   05/14/2022 4.51 (A) mg/dL Final     Comment:     REF VALUE  < 1.00     11/10/2021 6.91 (A) mg/dL Final     Comment:     REF VALUE  < 1.00     11/09/2021 8.77 (A) mg/dL Final     Comment:     REF VALUE  < 1.00       Microbiology  Susceptibility data from last 14 days.  Collected Specimen Info Organism Clindamycin Erythromycin Oxacillin Tetracycline Trimethoprim/Sulfamethoxazole Vancomycin   10/13/23 Blood culture from Dialysis Staphylococcus aureus         10/13/23 Blood culture from Arterial Line Methicillin Susceptible Staphylococcus aureus (MSSA) S S S S S S   10/09/2023 Vasc study:       Left Lower Venous: Negative for acute DVT of the visualized vessels. Possible chronic thrombus in the CFV. Patient cannot tolerate compressions of the mid distal thigh.     10/11/2023 TTE:    CONCLUSIONS:        1. Left ventricular systolic function is low normal with a 50-55% estimated ejection fraction.       2. There is reduced right ventricular systolic function.       3. The left atrium is moderate to severely dilated.       4. The right atrium is moderately dilated.       5. Moderate tricuspid regurgitation visualized.       6. Moderately elevated right ventricular systolic pressure.    10/20/2023 PET:       Limited examination due to increased activity throughout the soft   tissues. Within the limitation:        1. Similar extent of predominantly subcentimeter lymphadenopathy both \above and below the diaphragm which may represent a low-grade  lymphomatous/leukemic process versus inflammatory etiology.        2. Interval development of hypermetabolic activity within the left  femoral arteriovenous graft as well as an FDG avid focus adjacent to  the native left SFA, both of which are concerning for an   infectious/inflammatory process.        3. Diffuse anasarca throughout the body wall and extremities.     Assessment/Plan      #  Acute, severe and painful LLE swelling in the setting of chronic swelling (probable chronic left CFV thrombus) and MSSA left femoral AVG infection    #  L femoral AV graft MSSA infection    #7/13/2023 MSSA bacteremia under vancomycin treatment       10/14 and 10/17 blood cultures negative    #10/20/23 NEW right femoral HD catheter placement        10/21/2023 update:       Patient reports probable left femoral AV graft excision on 10/22/2023          Recommendations  -Please continue IV cefazolin (renally dose on HD)  -Repeat blood cultures for fever  -Pending left femoral AV graft removal.  Please send intraoperative samples for culture    I spent 20 minutes in the professional and overall care of this patient.      Arnold Lam MD

## 2023-10-22 ENCOUNTER — ANESTHESIA (OUTPATIENT)
Dept: OPERATING ROOM | Facility: HOSPITAL | Age: 58
DRG: 252 | End: 2023-10-22
Payer: COMMERCIAL

## 2023-10-22 PROBLEM — Z79.01 ANTICOAGULANT LONG-TERM USE: Status: ACTIVE | Noted: 2023-10-22

## 2023-10-22 LAB
GLUCOSE BLD MANUAL STRIP-MCNC: 171 MG/DL (ref 74–99)
GLUCOSE BLD MANUAL STRIP-MCNC: 77 MG/DL (ref 74–99)

## 2023-10-22 PROCEDURE — 82947 ASSAY GLUCOSE BLOOD QUANT: CPT

## 2023-10-22 PROCEDURE — 2500000004 HC RX 250 GENERAL PHARMACY W/ HCPCS (ALT 636 FOR OP/ED): Performed by: NURSE PRACTITIONER

## 2023-10-22 PROCEDURE — 7100000001 HC RECOVERY ROOM TIME - INITIAL BASE CHARGE: Performed by: SURGERY

## 2023-10-22 PROCEDURE — A36832 PR REVISE AV FISTULA,W/O THROMBECTOMY: Performed by: ANESTHESIOLOGY

## 2023-10-22 PROCEDURE — 2500000001 HC RX 250 WO HCPCS SELF ADMINISTERED DRUGS (ALT 637 FOR MEDICARE OP): Performed by: NURSE PRACTITIONER

## 2023-10-22 PROCEDURE — 88302 TISSUE EXAM BY PATHOLOGIST: CPT | Performed by: PATHOLOGY

## 2023-10-22 PROCEDURE — A4217 STERILE WATER/SALINE, 500 ML: HCPCS | Performed by: SURGERY

## 2023-10-22 PROCEDURE — 3700000001 HC GENERAL ANESTHESIA TIME - INITIAL BASE CHARGE: Performed by: SURGERY

## 2023-10-22 PROCEDURE — 2500000004 HC RX 250 GENERAL PHARMACY W/ HCPCS (ALT 636 FOR OP/ED)

## 2023-10-22 PROCEDURE — 2500000002 HC RX 250 W HCPCS SELF ADMINISTERED DRUGS (ALT 637 FOR MEDICARE OP, ALT 636 FOR OP/ED): Performed by: NURSE PRACTITIONER

## 2023-10-22 PROCEDURE — 7100000002 HC RECOVERY ROOM TIME - EACH INCREMENTAL 1 MINUTE: Performed by: SURGERY

## 2023-10-22 PROCEDURE — 35903 EXCISION GRAFT EXTREMITY: CPT | Performed by: SURGERY

## 2023-10-22 PROCEDURE — 94660 CPAP INITIATION&MGMT: CPT

## 2023-10-22 PROCEDURE — 2500000004 HC RX 250 GENERAL PHARMACY W/ HCPCS (ALT 636 FOR OP/ED): Performed by: STUDENT IN AN ORGANIZED HEALTH CARE EDUCATION/TRAINING PROGRAM

## 2023-10-22 PROCEDURE — 2500000005 HC RX 250 GENERAL PHARMACY W/O HCPCS

## 2023-10-22 PROCEDURE — 3600000004 HC OR TIME - INITIAL BASE CHARGE - PROCEDURE LEVEL FOUR: Performed by: SURGERY

## 2023-10-22 PROCEDURE — A9999 DME SUPPLY OR ACCESSORY, NOS: HCPCS | Performed by: SURGERY

## 2023-10-22 PROCEDURE — 2500000005 HC RX 250 GENERAL PHARMACY W/O HCPCS: Performed by: STUDENT IN AN ORGANIZED HEALTH CARE EDUCATION/TRAINING PROGRAM

## 2023-10-22 PROCEDURE — 87075 CULTR BACTERIA EXCEPT BLOOD: CPT | Mod: CMCLAB | Performed by: INTERNAL MEDICINE

## 2023-10-22 PROCEDURE — 87116 MYCOBACTERIA CULTURE: CPT | Mod: CMCLAB | Performed by: SURGERY

## 2023-10-22 PROCEDURE — 99233 SBSQ HOSP IP/OBS HIGH 50: CPT | Performed by: INTERNAL MEDICINE

## 2023-10-22 PROCEDURE — 36415 COLL VENOUS BLD VENIPUNCTURE: CPT | Mod: CMCLAB | Performed by: INTERNAL MEDICINE

## 2023-10-22 PROCEDURE — 2500000001 HC RX 250 WO HCPCS SELF ADMINISTERED DRUGS (ALT 637 FOR MEDICARE OP): Performed by: STUDENT IN AN ORGANIZED HEALTH CARE EDUCATION/TRAINING PROGRAM

## 2023-10-22 PROCEDURE — 88302 TISSUE EXAM BY PATHOLOGIST: CPT | Mod: TC,SUR | Performed by: SURGERY

## 2023-10-22 PROCEDURE — 2500000004 HC RX 250 GENERAL PHARMACY W/ HCPCS (ALT 636 FOR OP/ED): Performed by: INTERNAL MEDICINE

## 2023-10-22 PROCEDURE — 3700000002 HC GENERAL ANESTHESIA TIME - EACH INCREMENTAL 1 MINUTE: Performed by: SURGERY

## 2023-10-22 PROCEDURE — 06PY0JZ REMOVAL OF SYNTHETIC SUBSTITUTE FROM LOWER VEIN, OPEN APPROACH: ICD-10-PCS | Performed by: SURGERY

## 2023-10-22 PROCEDURE — 2500000001 HC RX 250 WO HCPCS SELF ADMINISTERED DRUGS (ALT 637 FOR MEDICARE OP): Performed by: FAMILY MEDICINE

## 2023-10-22 PROCEDURE — 1100000001 HC PRIVATE ROOM DAILY

## 2023-10-22 PROCEDURE — 2720000007 HC OR 272 NO HCPCS: Performed by: SURGERY

## 2023-10-22 PROCEDURE — 2500000005 HC RX 250 GENERAL PHARMACY W/O HCPCS: Performed by: SURGERY

## 2023-10-22 PROCEDURE — 2500000004 HC RX 250 GENERAL PHARMACY W/ HCPCS (ALT 636 FOR OP/ED): Performed by: SURGERY

## 2023-10-22 PROCEDURE — 3600000009 HC OR TIME - EACH INCREMENTAL 1 MINUTE - PROCEDURE LEVEL FOUR: Performed by: SURGERY

## 2023-10-22 RX ORDER — ALBUTEROL SULFATE 0.83 MG/ML
2.5 SOLUTION RESPIRATORY (INHALATION) ONCE AS NEEDED
Status: DISCONTINUED | OUTPATIENT
Start: 2023-10-22 | End: 2023-10-22 | Stop reason: HOSPADM

## 2023-10-22 RX ORDER — LIDOCAINE HYDROCHLORIDE 10 MG/ML
0.1 INJECTION, SOLUTION EPIDURAL; INFILTRATION; INTRACAUDAL; PERINEURAL ONCE
Status: DISCONTINUED | OUTPATIENT
Start: 2023-10-22 | End: 2023-10-22 | Stop reason: HOSPADM

## 2023-10-22 RX ORDER — PROPOFOL 10 MG/ML
INJECTION, EMULSION INTRAVENOUS AS NEEDED
Status: DISCONTINUED | OUTPATIENT
Start: 2023-10-22 | End: 2023-10-22

## 2023-10-22 RX ORDER — ONDANSETRON HYDROCHLORIDE 2 MG/ML
4 INJECTION, SOLUTION INTRAVENOUS ONCE AS NEEDED
Status: DISCONTINUED | OUTPATIENT
Start: 2023-10-22 | End: 2023-10-22 | Stop reason: HOSPADM

## 2023-10-22 RX ORDER — ONDANSETRON HYDROCHLORIDE 2 MG/ML
INJECTION, SOLUTION INTRAVENOUS AS NEEDED
Status: DISCONTINUED | OUTPATIENT
Start: 2023-10-22 | End: 2023-10-22

## 2023-10-22 RX ORDER — FENTANYL CITRATE 50 UG/ML
INJECTION, SOLUTION INTRAMUSCULAR; INTRAVENOUS AS NEEDED
Status: DISCONTINUED | OUTPATIENT
Start: 2023-10-22 | End: 2023-10-22

## 2023-10-22 RX ORDER — ROCURONIUM BROMIDE 10 MG/ML
INJECTION, SOLUTION INTRAVENOUS AS NEEDED
Status: DISCONTINUED | OUTPATIENT
Start: 2023-10-22 | End: 2023-10-22

## 2023-10-22 RX ORDER — SODIUM CHLORIDE, SODIUM LACTATE, POTASSIUM CHLORIDE, CALCIUM CHLORIDE 600; 310; 30; 20 MG/100ML; MG/100ML; MG/100ML; MG/100ML
100 INJECTION, SOLUTION INTRAVENOUS CONTINUOUS
Status: DISCONTINUED | OUTPATIENT
Start: 2023-10-22 | End: 2023-10-22 | Stop reason: HOSPADM

## 2023-10-22 RX ORDER — ACETAMINOPHEN 325 MG/1
650 TABLET ORAL EVERY 4 HOURS PRN
Status: DISCONTINUED | OUTPATIENT
Start: 2023-10-22 | End: 2023-10-22 | Stop reason: HOSPADM

## 2023-10-22 RX ORDER — LIDOCAINE IN NACL,ISO-OSMOT/PF 30 MG/3 ML
SYRINGE (ML) INJECTION AS NEEDED
Status: DISCONTINUED | OUTPATIENT
Start: 2023-10-22 | End: 2023-10-22

## 2023-10-22 RX ORDER — MIDAZOLAM HYDROCHLORIDE 1 MG/ML
INJECTION INTRAMUSCULAR; INTRAVENOUS AS NEEDED
Status: DISCONTINUED | OUTPATIENT
Start: 2023-10-22 | End: 2023-10-22

## 2023-10-22 RX ORDER — PHENYLEPHRINE HYDROCHLORIDE 10 MG/ML
INJECTION INTRAVENOUS AS NEEDED
Status: DISCONTINUED | OUTPATIENT
Start: 2023-10-22 | End: 2023-10-22

## 2023-10-22 RX ORDER — DIPHENHYDRAMINE HYDROCHLORIDE 50 MG/ML
25 INJECTION INTRAMUSCULAR; INTRAVENOUS ONCE AS NEEDED
Status: DISCONTINUED | OUTPATIENT
Start: 2023-10-22 | End: 2023-10-22 | Stop reason: HOSPADM

## 2023-10-22 RX ORDER — OXYCODONE HYDROCHLORIDE 5 MG/1
5 TABLET ORAL EVERY 4 HOURS PRN
Status: DISCONTINUED | OUTPATIENT
Start: 2023-10-22 | End: 2023-10-22 | Stop reason: HOSPADM

## 2023-10-22 RX ORDER — OXYCODONE HYDROCHLORIDE 5 MG/1
10 TABLET ORAL EVERY 4 HOURS PRN
Status: DISCONTINUED | OUTPATIENT
Start: 2023-10-22 | End: 2023-10-22 | Stop reason: HOSPADM

## 2023-10-22 RX ORDER — HYDRALAZINE HYDROCHLORIDE 20 MG/ML
5 INJECTION INTRAMUSCULAR; INTRAVENOUS EVERY 30 MIN PRN
Status: DISCONTINUED | OUTPATIENT
Start: 2023-10-22 | End: 2023-10-22 | Stop reason: HOSPADM

## 2023-10-22 RX ORDER — DEXAMETHASONE SODIUM PHOSPHATE 100 MG/10ML
INJECTION INTRAMUSCULAR; INTRAVENOUS AS NEEDED
Status: DISCONTINUED | OUTPATIENT
Start: 2023-10-22 | End: 2023-10-22

## 2023-10-22 RX ORDER — SODIUM CHLORIDE 0.9 G/100ML
IRRIGANT IRRIGATION AS NEEDED
Status: DISCONTINUED | OUTPATIENT
Start: 2023-10-22 | End: 2023-10-22 | Stop reason: HOSPADM

## 2023-10-22 RX ADMIN — MIDAZOLAM HYDROCHLORIDE 0.5 MG: 1 INJECTION, SOLUTION INTRAMUSCULAR; INTRAVENOUS at 08:36

## 2023-10-22 RX ADMIN — METOPROLOL TARTRATE 50 MG: 50 TABLET, FILM COATED ORAL at 20:31

## 2023-10-22 RX ADMIN — HYDROMORPHONE HYDROCHLORIDE 0.5 MG: 2 INJECTION, SOLUTION INTRAMUSCULAR; INTRAVENOUS; SUBCUTANEOUS at 10:40

## 2023-10-22 RX ADMIN — MELATONIN 10 MG: 3 TAB ORAL at 20:21

## 2023-10-22 RX ADMIN — PHENYLEPHRINE HYDROCHLORIDE 80 MCG: 10 INJECTION INTRAVENOUS at 08:36

## 2023-10-22 RX ADMIN — PHENYLEPHRINE HYDROCHLORIDE 80 MCG: 10 INJECTION INTRAVENOUS at 09:10

## 2023-10-22 RX ADMIN — HYDROMORPHONE HYDROCHLORIDE 0.5 MG: 2 INJECTION, SOLUTION INTRAMUSCULAR; INTRAVENOUS; SUBCUTANEOUS at 10:50

## 2023-10-22 RX ADMIN — PANTOPRAZOLE SODIUM 40 MG: 40 TABLET, DELAYED RELEASE ORAL at 06:43

## 2023-10-22 RX ADMIN — ONDANSETRON 4 MG: 2 INJECTION INTRAMUSCULAR; INTRAVENOUS at 09:44

## 2023-10-22 RX ADMIN — AMLODIPINE BESYLATE 10 MG: 10 TABLET ORAL at 12:14

## 2023-10-22 RX ADMIN — PSYLLIUM HUSK 1 PACKET: 3.4 POWDER ORAL at 12:15

## 2023-10-22 RX ADMIN — HYDROMORPHONE HYDROCHLORIDE 0.5 MG: 2 INJECTION, SOLUTION INTRAMUSCULAR; INTRAVENOUS; SUBCUTANEOUS at 10:45

## 2023-10-22 RX ADMIN — CYANOCOBALAMIN TAB 1000 MCG 1000 MCG: 1000 TAB at 12:13

## 2023-10-22 RX ADMIN — Medication 70 MG: at 08:36

## 2023-10-22 RX ADMIN — PROPOFOL 90 MG: 10 INJECTION, EMULSION INTRAVENOUS at 08:37

## 2023-10-22 RX ADMIN — SUGAMMADEX 400 MG: 100 INJECTION, SOLUTION INTRAVENOUS at 10:14

## 2023-10-22 RX ADMIN — FENTANYL CITRATE 100 MCG: 50 INJECTION, SOLUTION INTRAMUSCULAR; INTRAVENOUS at 08:36

## 2023-10-22 RX ADMIN — CEFAZOLIN SODIUM 3 G: 1 INJECTION, SOLUTION INTRAVENOUS at 08:49

## 2023-10-22 RX ADMIN — MELATONIN 3 MG: 3 TAB ORAL at 20:20

## 2023-10-22 RX ADMIN — Medication 1 TABLET: at 12:15

## 2023-10-22 RX ADMIN — HYDROMORPHONE HYDROCHLORIDE 0.5 MG: 2 INJECTION, SOLUTION INTRAMUSCULAR; INTRAVENOUS; SUBCUTANEOUS at 10:30

## 2023-10-22 RX ADMIN — DEXAMETHASONE SODIUM PHOSPHATE 4 MG: 10 INJECTION INTRAMUSCULAR; INTRAVENOUS at 08:49

## 2023-10-22 RX ADMIN — ROCURONIUM BROMIDE 50 MG: 10 INJECTION, SOLUTION INTRAVENOUS at 08:38

## 2023-10-22 RX ADMIN — SEVELAMER CARBONATE 1600 MG: 800 TABLET, FILM COATED ORAL at 16:47

## 2023-10-22 RX ADMIN — OXYCODONE HYDROCHLORIDE 10 MG: 5 TABLET ORAL at 16:45

## 2023-10-22 RX ADMIN — NEPHROCAP 1 CAPSULE: 1 CAP ORAL at 12:13

## 2023-10-22 RX ADMIN — HYDROCORTISONE 15 MG: 5 TABLET ORAL at 12:13

## 2023-10-22 RX ADMIN — OXYCODONE HYDROCHLORIDE 10 MG: 5 TABLET ORAL at 06:43

## 2023-10-22 RX ADMIN — SODIUM CHLORIDE, SODIUM LACTATE, POTASSIUM CHLORIDE, AND CALCIUM CHLORIDE: 600; 310; 30; 20 INJECTION, SOLUTION INTRAVENOUS at 08:35

## 2023-10-22 RX ADMIN — SEVELAMER CARBONATE 1600 MG: 800 TABLET, FILM COATED ORAL at 12:13

## 2023-10-22 RX ADMIN — CYCLOBENZAPRINE 5 MG: 10 TABLET, FILM COATED ORAL at 20:20

## 2023-10-22 RX ADMIN — Medication 1 TABLET: at 12:14

## 2023-10-22 RX ADMIN — HYDROCORTISONE 5 MG: 5 TABLET ORAL at 20:20

## 2023-10-22 RX ADMIN — DIPHENHYDRAMINE HYDROCHLORIDE 25 MG: 25 CAPSULE ORAL at 20:20

## 2023-10-22 ASSESSMENT — PAIN SCALES - GENERAL
PAINLEVEL_OUTOF10: 4
PAINLEVEL_OUTOF10: 5 - MODERATE PAIN
PAIN_LEVEL: 3
PAINLEVEL_OUTOF10: 7
PAINLEVEL_OUTOF10: 8
PAINLEVEL_OUTOF10: 7
PAINLEVEL_OUTOF10: 6

## 2023-10-22 ASSESSMENT — PAIN - FUNCTIONAL ASSESSMENT
PAIN_FUNCTIONAL_ASSESSMENT: 0-10

## 2023-10-22 NOTE — SIGNIFICANT EVENT
Post-Op Check    S:    59 yo M L femoral infected AVG POD 0 from L thigh graft explant, PMHx ESRD s/p DDKT (2006, subsequent graft failure 2021) on HD MWF via L femoral AVG, pAfib (on Eliquis), HFpEF, HCV, gout, lymphadenopthy, adrenal insufficiency (on steroids).    -Patient's pain is well controlled  -patient comfortable, eating well  -no bm or gas at this time    O:   Vital signs are stable, normotensive, afebrile, no new or worsening oxygen requirement, not tachycardic  Visit Vitals  /69 (BP Location: Right arm, Patient Position: Lying)   Pulse 53   Temp 36 °C (96.8 °F) (Tympanic)   Resp 16        Constitutional: no acute distress  Skin: warm and dry overall   Neuro: A/O x4, no gross deficits   HEENT: Atraumatic, no scleral icterus  Cardiac: regular rhythm, mild bradycardia (50's)  Pulmonary: Unlabored respirations   Abdomen: Non distended, non tender  GI: Voiding  Surgical Site: Dressing clean dry and intact with minimal amounts of strikethrough, appropriately tender (abd and tape seen, ABDs CDI)  LLE: DP/PT pulses present on doppler, warm, motor/sensory intact    A/P:  Overall, patient is doing well postoperatively with no acute concerns.  Will continue to optimize pain control as needed.  Will continue to monitor clinical exam, vitals, I&O's, and labs when available.  Will follow up on the patient in the a.m. or sooner as needed.    Saad Hyatt MD  PGY1  General Surgery  Vascular Surgery - [id number]

## 2023-10-22 NOTE — SIGNIFICANT EVENT
Patient was seen for soaking of dressing on LLE with serosang fluid. Patient had similar swelling of LLE as before (LLE > RLE). Patient was wrapping up dinner on arrival, pain was well controlled. LLE was warm, motor and sensory function intact, PT and DP pulses were strong on doppler. Dressings were changed with ABD's and tape. VSS.    Vascular fellow Dr. Edmonds was notified. It was explained to patient is some drainage is expected. It was explained to the patient if drainage becomes dark red, patient develops motor or sensory deficit, pain out of proportion or other concerning changes, they can notify the team.    Saad Hyatt MD  PGY1  General Surgery  Vascular Surgery - [id number]

## 2023-10-22 NOTE — OP NOTE
Resection of infected graft (L) Operative Note     Date: 10/22/2023  OR Location: [facility] OR    Name: Nghia Hall, : 1965, Age: 58 y.o., MRN: [id number], Sex: male    Diagnosis  Pre-op Diagnosis     * ESRD on dialysis (CMS/HCC) [N18.6, Z99.2] Post-op Diagnosis     * ESRD on dialysis (CMS/HCC) [N18.6, Z99.2]     Procedures  Resection of infected graft left thigh      Surgeons      * Arelis James - Primary    Resident/Fellow/Other Assistant:  Surgeon(s) and Role:     * Chiki Edmonds MD - Fellow    Procedure Summary  Anesthesia: General  ASA: IV  Anesthesia Staff: Anesthesiologist: Alexis Sears MD  Anesthesia Resident: Emilie Espinoza MD; Jay Lawson MD  Estimated Blood Loss: 10mL  Intra-op Medications: * No intraprocedure medications in log *           Anesthesia Record               Intraprocedure I/O Totals          Intake    .00 mL    Total Intake 250 mL          Specimen:   ID Type Source Tests Collected by Time   1 : LEFT THIGH GRAFT Tissue VASCULAR GRAFT MATERIAL SURGICAL PATHOLOGY EXAM Arelis James MD 10/22/2023 0943   A : INFECTED GRAFT Tissue VASCULAR GRAFT MATERIAL AFB CULTURE/SMEAR, TISSUE/WOUND CULTURE/SMEAR Arelis James MD 10/22/2023 0951   B : PUS Tissue VASCULAR GRAFT MATERIAL AFB CULTURE/SMEAR Arelis James MD 10/22/2023 0957        Staff:   Circulator: Iliana Sandoval RN  Scrub Person: Ritika Peguero         Drains and/or Catheters: * None in log *    Tourniquet Times:   * Missing tourniquet times found for documented tourniquets in lo *     Implants:     Findings: Focal infection of graft on distal most (transversely oriented) portion of loop    Indications: Nghia Hall is an 58 y.o. male who is having surgery for ESRD on dialysis (CMS/HCC) [N18.6, Z99.2].  He has been experiencing infection and drainage from his thigh graft.  PET/CT shows active uptake of tracer into the close proximal loop.  Clinically no infection of the venous and arterial anastomosis  site.  We will explore and determine clinically if the whole graft or partial graft excision is required.    The patient was seen in the preoperative area. The risks, benefits, complications, treatment options, non-operative alternatives, expected recovery and outcomes were discussed with the patient. The possibilities of reaction to medication, pulmonary aspiration, injury to surrounding structures, bleeding, recurrent infection, the need for additional procedures, failure to diagnose a condition, and creating a complication requiring transfusion or operation were discussed with the patient. The patient concurred with the proposed plan, giving informed consent.  The site of surgery was properly noted/marked if necessary per policy. The patient has been actively warmed in preoperative area. Preoperative antibiotics are not indicated. Venous thrombosis prophylaxis are not indicated.    Procedure Details: Supine position, left groin and lower extremity prepped and draped.  Ioban draping of the whole field to exclude the infected sinuses on the distal loop.  Transverse incision was made over the arteriovenous site of the graft.  The graft on both sides were well incorporated and there were ligated and oversewn with Stephanie sutures and the site for the loop was mobilized and partially resected.  The exposed graft in the venous and arterial sites were covered with subcutaneous tissues.  The wounds were then irrigated with aseptic solution and closed with running mattressed nylon sutures.  Clean wounds were then redraped with Ioban.  The dirty wounds were then incised connecting the 2 sinuses and going medial and lateral along the graft to mobilize them.  The graft disintegrated and the arterial side.  Possible with swabs for culture as well as clear fluid on the venous side.  The graft that was infected was also sent for culture.  The graft came out nicely and the wounds were irrigated and hemostasis was achieved and  then packed open.    Complications: none  Disposition: PACU - hemodynamically stable.  Condition: stable         Additional Details:     Attending Attestation: I was present and scrubbed for the entire procedure.    Arelis James  Phone Number: [phone]

## 2023-10-22 NOTE — PROGRESS NOTES
"Nghia Hall is a 58 y.o. male on day 13 of admission presenting with inability to walk due to swelling and pain of left leg.    Subjective   Currently lying in bed without distress.  Had left AVG removed this morning.  Currently denying any active bleeding.  Overall still feels that his left leg swelling has not changed much.  Pain controlled with pain medications.        Objective     General: Lying in bed in dialysis center.  Cooperative.  Skin: Superficial skin split in his left thigh near his AVG site.  HEENT: Sclera is white.  Mucous membranes moist.  Neck: Supple.  No JVD.  Cardiac: Regular rate and rhythm, S1/S2 normal.  Lungs: Clear to auscultation bilaterally, no wheezing or crackles, no accessory muscle use at rest.  Abdomen: Soft, nontender, mild to moderately obese abdomen, BS +  Extremities: No cyanosis.  Mild edema noted in upper extremity and right lower extremity, significant 3+ edema in left lower extremity.  Edema in left lower leg does not appear to have changed much.  Currently no active bleeding from left groin area.  Neurologic: Alert and oriented x3.  No focal deficits.  Psychiatric: Currently no agitation.    Last Recorded Vitals  Blood pressure 116/69, pulse 53, temperature 36 °C (96.8 °F), temperature source Tympanic, resp. rate 16, height 1.854 m (6' 1\"), weight 143 kg (315 lb), SpO2 99 %.  On room air.    Intake/Output last 3 Shifts:  No intake/output data recorded.    Relevant Results  amLODIPine, 10 mg, oral, Daily  [Held by provider] apixaban, 5 mg, oral, BID  [Held by provider] aspirin, 81 mg, oral, Daily  B complex-vitamin C-folic acid, 1 capsule, oral, Daily  ceFAZolin, 1 g, intravenous, Daily  cyanocobalamin, 1,000 mcg, oral, Daily  epoetin urmila or biosimilar, 10,000 Units, intravenous, Every Mon/Wed/Fri  hydrocortisone, 15 mg, oral, q AM  hydrocortisone, 5 mg, oral, q PM  lactobacillus acidophilus, 1 tablet, oral, Daily  melatonin, 10 mg, oral, Daily  metoprolol tartrate, 50 mg, " oral, BID  multivitamin with minerals, 1 tablet, oral, Daily  pantoprazole, 40 mg, oral, Daily before breakfast  paricalcitoL, 8 mcg, intravenous, Once per day on Mon Wed Fri  psyllium, 1 packet, oral, Daily  sevelamer carbonate, 1,600 mg, oral, TID with meals           PRN medications: acetaminophen **OR** acetaminophen **OR** acetaminophen, cyclobenzaprine, diphenhydrAMINE, diphenhydrAMINE, melatonin, oxyCODONE         Hospital course:  Nghia Hall is a 58 y.o. male presenting with Inability to walk. Patient has past medical history of ESRD s/p DDKT (2006, subsequent graft failure 07/2021, on HD MWF at [facility] through LT groin AV graft on [address]; not currently on immunosuppression), pAfib (on Apixaban), HFpEF, HCV (s/p treatment), gout, lymphadenopathy (following with oncology, inconclusive biopsy in 01/2023), DVT, and Adrenal Insufficiency (on hydrocortisone) who presented to the ED with difficulty ambulating due to secondary LLE pain and swelling. His left leg (which is also used for his HD access) is severely edematous and exquisitely tender to palpation. He reports being compliant with his Eliquis. The edema is non dependent. He is not interested in placement at this time due to being employed.  Nephrology was consulted for HD. Vascular surgery was also consulted to evaluate left AVG.  On 10/12 patient dialysis access started oozing bright red blood, reached out to vascular surgery for urgent evaluation. Vascular surgery saw the patient and did not feel that there was any surgical interventions to offer. CTA a/p and LLE doppler were performed on 10/12/23, consulted IR for fistulogram and cardiology for right HF recommendations. IR performed fistulogram on 10/13 which showed  mild/moderate venous anastomotic stenosis, resolved s/p  7 mm POBA.  Interventional radiology did not feel stenosis would explain drainage or swelling.  Cardiology also evaluated patient on 10/13 and recommended outpatient follow-up.  10/13 Blood cultures 2/4 started growing GPC (patient was already on Vanc for 2 for 2 days prior to positive bcx), ID was consulted.  Infectious disease is concerned that patient's left AVG is infected.  Vascular surgery reconsulted.  PET CT scan obtained to further clarify if AVG infected.  PET/CT scan did indicate concern for infection of the area.  In addition vascular surgery concern for graft blowout.  They have recommended excision of AVG which was removed on 10/22.  Patient noted to have murky fluid identified around midpoint of graft.  Graft and fluid swab sent for culture.       Assessment/Plan     LLE edema and pain   Ambulatory dysfunction  Left femoral access hemorrhage with superficial skin split  -Presented with worsening left leg edema and pain affecting patient ambulation. Patient does not want placement.  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  On review of some previous ultrasounds did not see mention of this but there is a large gap of time in between.  Discussed this briefly with the hematology/oncology fellow, who states that since not acute and this is chronic chronic would not call this Eliquis failure.  Unknown how long the thrombus has been there.  Discussed with hematology/oncology fellow on 10/17 and recommending continuing Eliquis for now and outpatient follow-up with hematology.  -There appears to be a superficial skin split in his thigh where his AVG is located.  This is where he seems to be oozing blood intermittently, including outside dialysis.  Continue with pressure dressing.  Vascular surgery has recommended not to access the left AVG anymore.  Vascular surgery indicates concern for possible blowout.  Patient has been recommended for excision of AVG for tomorrow.  Currently still holding Eliquis and aspirin.  Tomorrow postop day #1 if no signs of active bleeding will discuss with patient about restarting  Eliquis and aspirin.  -Tylenol, Oxycodone and flexeril for pain.  -Vascular surgery following.  -IR was consulted, performed fistulogram on 10/13  which showed mild/moderate venous anastomotic stenosis, resolved s/p  7 mm POBA.  IR also placed a tunneled dialysis catheter in the right groin on 10/20.  -Ultrasound of the liver shows a cirrhotic looking liver.  Discussed with hepatology team, they recommend at this time imaging alone not enough to confirm cirrhosis.  They recommend nothing to do inpatient and just have him follow-up with them outpatient.  -Suspect primary reason for left lower extremity swelling at this time is combination of acute infection and generalized body edema from ESRD and anuric with history of chronic diastolic congestive heart failure, acute infection, and possibly some contribution from chronic thrombus.  -Had lengthy discussion with patient that although we can improve his pain and we will continue to hopefully attempt to improve his swelling, his edema may not drastically improve by the time he is ready to leave the hospital.  Patient states understanding.    Cirrhotic looking liver on imaging  -At this time not confirmed, but suspected on imaging by CT and ultrasound.  -Discussed with hepatology team, at this time low suspicion for true cirrhosis.  They recommend outpatient follow-up.    MSSA bacteremia  LLE cellulitis   Infection of left AVG  -10/13 blood cultures ( 2 out of 4) started growing GPC in clusters.  Repeat culture from 10/14 and 10/16 no growth to date.  Currently afebrile.  -PET/CT scan done further supports suspicion for infection of AVG.  -Transthoracic echo done on 10/9 already.  No signs of endocarditis.  -ID following.  -Initial antibiotics were vancomycin and Zosyn.  Antibiotics have been changed to renally dosed IV cefazolin on 10/17.  -Status post excision of left AVG on 10/22.  -Waiting on final antibiotic recommendations.     ESRD on HD  -MWF.  Patient makes no  urine.  -Renal dosing meds   -Patient historically has refused renal diet and only wants regular diet, stating that he knows how to regulate himself.  -Electrolytes wnl   -Nephrology following    -Patient now has tunneled dialysis catheter in the right femoral for dialysis access as requested by nephrology placed on 10/20.     Chronic HFpEF  -TTE on 10/12 revealed EF 50%, with reduced RV systolic function   -Cardiology consulted, recs noted   -Daily weights  -Continue home medications.  Patient makes no urine.  For fluid removal patient is completely dialysis dependent.     History of DVT??  -On further discussion with patient he states that he does not recall being told of any thrombus in the past.  He states that he is on Eliquis for A-fib.  -US done this admission no acute DVT but question chronic thrombus of the CFV.  -Discussed with hematology fellow, they are doubting Eliquis failure, recommended to continue Eliquis and see hematology outpatient.   -For now Eliquis on hold due to concern for bleeding from groin area and plan potential surgery with vascular surgery.  Tomorrow if no further signs of bleeding will discuss with patient about restarting Eliquis.     Paroxysmal afib  -Continue home medications  -No acute issues     Adrenal insufficiency  -Continue hydrocortisone     Lymphadenopathy  -Patient followed by oncology.  Discussed with patient's oncologist Dr. Castellon, she states that at this time his previous biopsy has been negative and she has no good evidence of  lymphoma.  No acute evaluations needed inpatient.  Can follow-up outpatient.    Disposition: Waiting for cultures and final antibiotic recommendations from infectious disease.  Monitor left leg for bleeding.  Continue pain control.    Ramin Fonseca MD         possibly 2/2 to GI bleed given history of bleeding GI ulcer   improving. continue to monitor H/h  continue to transfuse if hb< 7  continue protonix 40 mg ID IV  fobt negative for occult blood but minimal specimen obtained, I doubt this is true negative based on pt detailing h/o intermittent dark colored stool in her history  hold all AC, INR 1.87 s/p vitamin K yesterday in preparation for ERCP today  f/u GI (Dr. Coyne): for ERCP today  cardio michelle-operative risk stratification is pending  heme/once (Dr. Diana): folate and b12 wnl, they will follow possibly 2/2 to GI bleed given history of bleeding GI ulcer   improving. continue to monitor H/h  continue to transfuse if hb< 7  continue protonix 40 mg ID IV  fobt negative for occult blood but minimal specimen obtained, I doubt this is true negative based on pt detailing h/o intermittent dark colored stool in her history  hold all AC, INR 1.87 s/p vitamin K yesterday in preparation for ERCP today  f/u GI (Dr. Coyne): for ERCP today  cardio cleared for ercp  heme/once (Dr. Diana): folate and b12 wnl, they will follow CT abdomen showin mm common hepatic duct calculus with mild biliary dilatation.   for ercp today with possible sphincterotomy  s/p vitamin k with INR of 1.87 today  LFTs wnl. Electrolytes repleted  npo for procedure  f/u GI (Dr. Coyne), unlikely obstructive based on labs and symptoms  Cardiac cleared for ercp (dr. De Los Santos)  RCRI:2. she is medically optimized and classified intermediate risk for low to intermediate risk procedure

## 2023-10-22 NOTE — ANESTHESIA PROCEDURE NOTES
Peripheral IV  Date/Time: 10/22/2023 8:35 AM  Inserted by: Alexis Sears MD    Placement  Needle size: 20 G  Laterality: right  Location: forearm  Local anesthetic: injectable  Site prep: chlorhexidine  Technique: ultrasound guided  Attempts: 2  Difficult Venous Access: Yes  Unsuccessful Attempt Location(s): left hand and right hand

## 2023-10-22 NOTE — ANESTHESIA PREPROCEDURE EVALUATION
Patient: Nghia Hall    Procedure Information       Date/Time: 10/22/23 0730    Procedure: Revision Arteriovenous Shunt (Left)    Location: [facility] OR 16 / [facility] [place] [facility] OR    Surgeons: Arelis James MD        58yom with infected IV graft here for explant    Relevant Problems   Cardiovascular   (+) A-fib (CMS/HCC)   (+) Acute on chronic diastolic ACC/AHA stage C congestive heart failure (CMS/HCC)   (+) Atrial fibrillation (CMS/HCC)   (+) CHF (congestive heart failure) (CMS/HCC)   (+) Chest pain   (+) Hyperlipidemia   (+) Hypertension   (+) PAD (peripheral artery disease) (CMS/HCC)   (+) Pulmonary hypertension (CMS/HCC)   (+) Stage C chronic diastolic congestive heart failure (CMS/HCC)   (+) Supraventricular premature beats   (+) VTE (venous thromboembolism)      Endocrine   (+) Morbid obesity (CMS/HCC)   (+) Secondary hyperparathyroidism of renal origin (CMS/HCC)      GI   (+) Chronic diarrhea   (+) GERD (gastroesophageal reflux disease)   (+) Gallstone pancreatitis   (+) Irritable bowel syndrome (IBS)   (+) Pancreatitis      /Renal   (+) MATTHIAS (acute kidney injury) (CMS/HCC)   (+) Atrophy of kidney (terminal)   (+) Chronic kidney disease (CKD)   (+) Dependence on renal dialysis (CMS/HCC)   (+) ESRD (end stage renal disease) (CMS/HCC)   (+) Hepatitis C virus   (+) Kidney lesion, native, left      Neuro/Psych   (+) Depression with anxiety   (+) Recurrent major depressive disorder, in partial remission (CMS/HCC)      Pulmonary   (+) Dyspnea on exertion   (+) Obstructive sleep apnea   (+) Pneumonia   (+) Pulmonary hypertension (CMS/HCC)      GI/Hepatic   (+) Cholelithiasis   (+) Hepatitis C virus   (+) Lymphoepithelial carcinoma of parotid gland (CMS/HCC)      Hematology   (+) Anemia in chronic kidney disease   (+) Anticoagulant long-term use (On eliquis, off for >3 days)   (+) VTE (venous thromboembolism)      Musculoskeletal   (+) Lumbar degenerative disc disease      Infectious Disease   (+) CRBSI  (catheter-related bloodstream infection), sequela   (+) Clostridium difficile diarrhea   (+) Disease due to severe acute respiratory syndrome coronavirus 2 (SARS-CoV-2)   (+) Hepatitis C virus   (+) Small intestinal bacterial overgrowth      Other   (+) Abdominal lymphadenopathy   (+) Arthritis of knee, left   (+) Arthritis of left hip   (+) Disease of blood and blood forming organ       Clinical information reviewed:    Allergies  Meds   Med Hx             NPO Detail:  No data recorded     Physical Exam    Airway  Mallampati: III  TM distance: >3 FB     Cardiovascular    Dental   (+) lower dentures  Comments: Poor dentition   Pulmonary    Abdominal            Anesthesia Plan    ASA 4     general     intravenous induction   Postoperative administration of opioids is intended.  Trial extubation is planned.  Anesthetic plan and risks discussed with patient.  Use of blood products discussed with patient who consented to blood products.    Plan discussed with resident and attending.

## 2023-10-22 NOTE — ANESTHESIA POSTPROCEDURE EVALUATION
Patient: Nghia Hall    Procedure Summary       Date: 10/22/23 Room / Location: [facility] OR 16 / [facility] [place]    Anesthesia Start: 0748 Anesthesia Stop: 1032    Procedure: Revision Arteriovenous Shunt (Left) Diagnosis:       ESRD on dialysis (CMS/[facility])      (ESRD on dialysis (CMS/[facility]) [N18.6, Z99.2])    Surgeons: Arelis James MD Responsible Provider: Alexis Sears MD    Anesthesia Type: general ASA Status: 4            Anesthesia Type: general    Vitals Value Taken Time   /66 10/22/23 1027   Temp 36 10/22/23 1032   Pulse 54 10/22/23 1029   Resp 23 10/22/23 1029   SpO2 100 % 10/22/23 1029   Vitals shown include unvalidated device data.    Anesthesia Post Evaluation    Patient location during evaluation: bedside  Patient participation: complete - patient participated  Level of consciousness: awake and alert  Pain score: 3  Pain management: adequate  Multimodal analgesia pain management approach  Airway patency: patent  Cardiovascular status: acceptable and blood pressure returned to baseline  Respiratory status: acceptable  Hydration status: acceptable        There were no known notable events for this encounter.

## 2023-10-22 NOTE — SIGNIFICANT EVENT
Vascular Post Op Plan of Care    S/p explant of left thigh graft, murky fluid identified around mid point of graft. Arterial and venous anastomoses were well incorporated. Sent graft and fluid swabs for culture.   Wound packed with betadine-soaked kerlix. Clean wounds covered with island dressings.  - OK for diet  - Continue dialysis per nephro  - Continue abx per ID    Chiki Edmonds MD  Vascular Surgery Fellow  Service Pager: [phone]

## 2023-10-22 NOTE — ANESTHESIA PROCEDURE NOTES
Airway  Date/Time: 10/22/2023 8:39 AM  Urgency: elective    Airway not difficult    Staffing  Performed: resident   Authorized by: Alexis Sears MD    Performed by: Emilie Espinoza MD  Patient location during procedure: OR    Indications and Patient Condition  Indications for airway management: anesthesia  Spontaneous Ventilation: absent  Sedation level: deep  Preoxygenated: yes  Patient position: sniffing  Mask difficulty assessment: 2 - vent by mask + OA or adjuvant +/- NMBA  Planned trial extubation    Final Airway Details  Final airway type: endotracheal airway      Successful airway: ETT  Cuffed: yes   Successful intubation technique: video laryngoscopy  Facilitating devices/methods: intubating stylet  Endotracheal tube insertion site: oral  Blade: Marinelli  Blade size: #4  ETT size (mm): 8.0  Cormack-Lehane Classification: grade I - full view of glottis  Placement verified by: chest auscultation, capnometry and palpation of cuff   Measured from: lips  ETT to lips (cm): 23  Number of attempts at approach: 1  Ventilation between attempts: BVM  Number of other approaches attempted: 0

## 2023-10-23 ENCOUNTER — APPOINTMENT (OUTPATIENT)
Dept: DIALYSIS | Facility: HOSPITAL | Age: 58
DRG: 252 | End: 2023-10-23
Payer: COMMERCIAL

## 2023-10-23 LAB
BACTERIA BLD CULT: NORMAL
GLUCOSE BLD MANUAL STRIP-MCNC: 100 MG/DL (ref 74–99)
GLUCOSE BLD MANUAL STRIP-MCNC: 63 MG/DL (ref 74–99)

## 2023-10-23 PROCEDURE — 2500000004 HC RX 250 GENERAL PHARMACY W/ HCPCS (ALT 636 FOR OP/ED): Performed by: INTERNAL MEDICINE

## 2023-10-23 PROCEDURE — 2500000004 HC RX 250 GENERAL PHARMACY W/ HCPCS (ALT 636 FOR OP/ED): Performed by: NURSE PRACTITIONER

## 2023-10-23 PROCEDURE — 6350000001 HC RX 635 EPOETIN >10,000 UNITS: Mod: JZ | Performed by: INTERNAL MEDICINE

## 2023-10-23 PROCEDURE — 8010000001 HC DIALYSIS - HEMODIALYSIS PER DAY

## 2023-10-23 PROCEDURE — 2500000001 HC RX 250 WO HCPCS SELF ADMINISTERED DRUGS (ALT 637 FOR MEDICARE OP): Performed by: STUDENT IN AN ORGANIZED HEALTH CARE EDUCATION/TRAINING PROGRAM

## 2023-10-23 PROCEDURE — 90935 HEMODIALYSIS ONE EVALUATION: CPT | Performed by: INTERNAL MEDICINE

## 2023-10-23 PROCEDURE — 2500000002 HC RX 250 W HCPCS SELF ADMINISTERED DRUGS (ALT 637 FOR MEDICARE OP, ALT 636 FOR OP/ED): Performed by: NURSE PRACTITIONER

## 2023-10-23 PROCEDURE — 2500000001 HC RX 250 WO HCPCS SELF ADMINISTERED DRUGS (ALT 637 FOR MEDICARE OP): Performed by: NURSE PRACTITIONER

## 2023-10-23 PROCEDURE — 82947 ASSAY GLUCOSE BLOOD QUANT: CPT

## 2023-10-23 PROCEDURE — 1100000001 HC PRIVATE ROOM DAILY

## 2023-10-23 PROCEDURE — 2500000001 HC RX 250 WO HCPCS SELF ADMINISTERED DRUGS (ALT 637 FOR MEDICARE OP): Performed by: FAMILY MEDICINE

## 2023-10-23 PROCEDURE — 99232 SBSQ HOSP IP/OBS MODERATE 35: CPT | Performed by: STUDENT IN AN ORGANIZED HEALTH CARE EDUCATION/TRAINING PROGRAM

## 2023-10-23 PROCEDURE — 99231 SBSQ HOSP IP/OBS SF/LOW 25: CPT | Performed by: INTERNAL MEDICINE

## 2023-10-23 RX ADMIN — Medication 1 TABLET: at 11:17

## 2023-10-23 RX ADMIN — OXYCODONE HYDROCHLORIDE 10 MG: 5 TABLET ORAL at 22:28

## 2023-10-23 RX ADMIN — SEVELAMER CARBONATE 1600 MG: 800 TABLET, FILM COATED ORAL at 16:07

## 2023-10-23 RX ADMIN — CEFAZOLIN SODIUM 1 G: 1 INJECTION, SOLUTION INTRAVENOUS at 11:18

## 2023-10-23 RX ADMIN — METOPROLOL TARTRATE 50 MG: 50 TABLET, FILM COATED ORAL at 11:16

## 2023-10-23 RX ADMIN — METOPROLOL TARTRATE 50 MG: 50 TABLET, FILM COATED ORAL at 22:28

## 2023-10-23 RX ADMIN — CYANOCOBALAMIN TAB 1000 MCG 1000 MCG: 1000 TAB at 11:18

## 2023-10-23 RX ADMIN — HYDROCORTISONE 5 MG: 5 TABLET ORAL at 22:27

## 2023-10-23 RX ADMIN — OXYCODONE HYDROCHLORIDE 10 MG: 5 TABLET ORAL at 00:08

## 2023-10-23 RX ADMIN — CYCLOBENZAPRINE 5 MG: 10 TABLET, FILM COATED ORAL at 22:28

## 2023-10-23 RX ADMIN — HYDROCORTISONE 15 MG: 5 TABLET ORAL at 11:17

## 2023-10-23 RX ADMIN — AMLODIPINE BESYLATE 10 MG: 10 TABLET ORAL at 11:16

## 2023-10-23 RX ADMIN — SEVELAMER CARBONATE 1600 MG: 800 TABLET, FILM COATED ORAL at 11:17

## 2023-10-23 RX ADMIN — PSYLLIUM HUSK 1 PACKET: 3.4 POWDER ORAL at 11:17

## 2023-10-23 RX ADMIN — OXYCODONE HYDROCHLORIDE 10 MG: 5 TABLET ORAL at 11:18

## 2023-10-23 RX ADMIN — OXYCODONE HYDROCHLORIDE 10 MG: 5 TABLET ORAL at 16:07

## 2023-10-23 RX ADMIN — PANTOPRAZOLE SODIUM 40 MG: 40 TABLET, DELAYED RELEASE ORAL at 11:16

## 2023-10-23 RX ADMIN — Medication 1 TABLET: at 11:19

## 2023-10-23 RX ADMIN — EPOETIN ALFA 10000 UNITS: 10000 SOLUTION INTRAVENOUS; SUBCUTANEOUS at 06:48

## 2023-10-23 RX ADMIN — NEPHROCAP 1 CAPSULE: 1 CAP ORAL at 11:29

## 2023-10-23 RX ADMIN — PARICALCITOL 8 MCG: 5 INJECTION, SOLUTION INTRAVENOUS at 06:52

## 2023-10-23 RX ADMIN — MELATONIN 10 MG: 3 TAB ORAL at 22:28

## 2023-10-23 RX ADMIN — EPOETIN ALFA 10000 UNITS: 10000 SOLUTION INTRAVENOUS; SUBCUTANEOUS at 22:29

## 2023-10-23 RX ADMIN — CYCLOBENZAPRINE 5 MG: 10 TABLET, FILM COATED ORAL at 11:18

## 2023-10-23 RX ADMIN — CYCLOBENZAPRINE 5 MG: 10 TABLET, FILM COATED ORAL at 16:07

## 2023-10-23 ASSESSMENT — PAIN - FUNCTIONAL ASSESSMENT
PAIN_FUNCTIONAL_ASSESSMENT: 0-10
PAIN_FUNCTIONAL_ASSESSMENT: NO/DENIES PAIN

## 2023-10-23 ASSESSMENT — PAIN SCALES - GENERAL
PAINLEVEL_OUTOF10: 8
PAINLEVEL_OUTOF10: 8
PAINLEVEL_OUTOF10: 7
PAINLEVEL_OUTOF10: 9
PAINLEVEL_OUTOF10: 7
PAINLEVEL_OUTOF10: 9
PAINLEVEL_OUTOF10: 8

## 2023-10-23 ASSESSMENT — PAIN DESCRIPTION - DESCRIPTORS: DESCRIPTORS: ACHING;TENDER

## 2023-10-23 NOTE — PROGRESS NOTES
VASCULAR SURGERY PROGRESS NOTE  Subjective   Patient seen today for dressing change. He is doing well, pain is well controlled. Vital signs within normal limits.    Objective   Vitals:    10/23/23 1119   BP: 159/82   Pulse: 66   Resp: 18   Temp: 36.3 °C (97.3 °F)   SpO2: 98%      Exam:  Constitutional: No acute distress, resting comfortably  Neuro:  AOx3, grossly intact  ENMT: moist mucous membranes  CV: no tachycardia  Pulm: non-labored on room air  GI: soft, non-tender, non-distended  Skin: warm and dry  Musculoskeletal: moving all extremities  Extremities: L groin incision repacked with betadine soaked Kerlix, 4x4s, ABD pad. 2 additional incisions with sutures intact, c/d/I.    Labs:            Results from last 7 days   Lab Units 10/19/23  1440   INR  1.2*   PROTIME seconds 13.7*     Assessment/Plan   Nghia Hall is 58 y.o. male with history of ESRD s/p DDKT (2006, subsequent graft failure 2021) on HD MWF, pAfib (on Eliquis), HFpEF, HCV, gout, lymphadenopthy, adrenal insufficiency (on steroids) now POD 1 L femoral AVG explant.    Plan:  - Daily dressing change by vascular surgery team: can reinforce with ABD pads if there is increased drainage.  - Continue antibiotics per primary team  - Recommend CBC d/t recent surgery and infection    D/w attending, Dr. Henry Kang MD  General Surgery PGY-1  [id number]

## 2023-10-23 NOTE — PROGRESS NOTES
INTERNAL MEDICINE PROGRESS NOTE     BRIEF NARRATIVE      Nghia Hall is a 58 y.o. male on day 14 of admission presenting with Inability to walk. Patient has past medical history of ESRD s/p DDKT (2006, subsequent graft failure 07/2021, on HD MWF at [facility] through LT groin AV graft on [address]; not currently on immunosuppression), pAfib (on Apixaban), HFpEF, HCV (s/p treatment), gout, lymphadenopathy (following with oncology, inconclusive biopsy in 01/2023), DVT, and Adrenal Insufficiency (on hydrocortisone) who presented to the ED with difficulty ambulating due to secondary LLE pain and swelling. His left leg (which is also used for his HD access) is severely edematous and exquisitely tender to palpation. He reports being compliant with his Eliquis. The edema is non dependent. He is not interested in placement at this time due to being employed.  Nephrology was consulted for HD. Vascular surgery was also consulted to evaluate left AVG.  On 10/12 patient dialysis access started oozing bright red blood, reached out to vascular surgery for urgent evaluation. Vascular surgery saw the patient and did not feel that there was any surgical interventions to offer. CTA a/p and LLE doppler were performed on 10/12/23, consulted IR for fistulogram and cardiology for right HF recommendations. IR performed fistulogram on 10/13 which showed  mild/moderate venous anastomotic stenosis, resolved s/p  7 mm POBA.  Interventional radiology did not feel stenosis would explain drainage or swelling.  Cardiology also evaluated patient on 10/13 and recommended outpatient follow-up. 10/13 Blood cultures 2/4 started growing GPC (patient was already on Vanc for  2 for 2 days prior to positive bcx), ID was consulted.  Infectious disease is concerned that patient's left AVG is infected.  Vascular surgery reconsulted.  PET CT scan obtained to further clarify if AVG infected.  PET/CT scan did indicate concern for infection of the area.  In addition vascular surgery concern for graft blowout.  They have recommended excision of AVG which was removed on 10/22.  Patient noted to have murky fluid identified around midpoint of graft.  Graft and fluid swab sent for culture.     SUBJECTIVE     Pt seen and examined today, no acute events overnight. Says leg pain better, hemorrhage improving. HH stable     OBJECTIVE      Visit Vitals  /68 (Patient Position: Sitting)   Pulse 70   Temp 36.5 °C (97.7 °F) (Temporal)   Resp 16        Intake/Output Summary (Last 24 hours) at 10/23/2023 0915  Last data filed at 10/23/2023 0629  Gross per 24 hour   Intake 900 ml   Output 10 ml   Net 890 ml       Physical Exam   General: Lying in bed in dialysis center.  Cooperative.  Skin: Superficial skin split in his left thigh near his AVG site.  HEENT: Sclera is white.  Mucous membranes moist.  Neck: Supple.  No JVD.  Cardiac: Regular rate and rhythm, S1/S2 normal.  Lungs: Clear to auscultation bilaterally, no wheezing or crackles, no accessory muscle use at rest.  Abdomen: Soft, nontender, mild to moderately obese abdomen, BS +  Extremities: No cyanosis.  Mild edema noted in upper extremity and right lower extremity, significant 3+ edema in left lower extremity.  Edema in left lower leg does not appear to have changed much.  Currently no active bleeding from left groin area.  Neurologic: Alert and oriented x3.  No focal deficits.  Psychiatric: Currently no agitation    Current Meds   amLODIPine, 10 mg, oral, Daily  [Held by provider] apixaban, 5 mg, oral, BID  [Held by provider] aspirin, 81 mg, oral, Daily  B complex-vitamin C-folic acid, 1 capsule, oral, Daily  ceFAZolin, 1 g, intravenous,  "Daily  cyanocobalamin, 1,000 mcg, oral, Daily  epoetin urmila or biosimilar, 10,000 Units, intravenous, Every Mon/Wed/Fri  hydrocortisone, 15 mg, oral, q AM  hydrocortisone, 5 mg, oral, q PM  lactobacillus acidophilus, 1 tablet, oral, Daily  melatonin, 10 mg, oral, Daily  metoprolol tartrate, 50 mg, oral, BID  multivitamin with minerals, 1 tablet, oral, Daily  pantoprazole, 40 mg, oral, Daily before breakfast  paricalcitoL, 8 mcg, intravenous, Once per day on Mon Wed Fri  psyllium, 1 packet, oral, Daily  sevelamer carbonate, 1,600 mg, oral, TID with meals       PRN medications: acetaminophen **OR** acetaminophen **OR** acetaminophen, cyclobenzaprine, diphenhydrAMINE, diphenhydrAMINE, melatonin, oxyCODONE     LABS and IMAGING     No results found for: \"WBC\", \"HGB\", \"HCT\", \"GLU\", \"CO2\", \"CREATININE\", \"CALCIUM\", \"INR\", \"PTT\", \"TROPONINI\"    IR CVC tunneled  Narrative: Interpreted By:  Eli Arceo and [place] Lesley   STUDY:  IR CVC TUNNELED;  10/20/2023 12:20 pm      INDICATION:  Signs/Symptoms:Need for tunneled dialysis cath placed into right  groin..      COMPARISON:  None.      ACCESSION NUMBER(S):  [id number]      ORDERING CLINICIAN:  EMBER OLIVAREZ      TECHNIQUE:  INTERVENTIONALIST(S):  Dr. Eli Arceo      CONSENT:  The patient/patient's POA/next of kin was informed of the nature of  the proposed procedure. The purposes, alternatives, risks, and  benefits were explained and discussed. All questions were answered  and consent was obtained.      RADIATION EXPOSURE:  Fluoroscopy time: 1.4 min.  Dose: 33.07 mGy.  Dose Area Product (DAP): 11,109 mGy*cm^2      SEDATION:  Moderate conscious IV sedation services (supervision of  administration, induction, and maintenance) were provided by the  physician performing the procedure with intravenous fentanyl 50mcg  and versed 1mg for 15 minutes. The physician was assisted by an  independent trained observer, an interventional radiology nurse, in  the " continuous monitoring of patient level of consciousness and  physiologic status.      MEDICATION/CONTRAST:  No additional      TIME OUT:  A time out was performed immediately prior to procedure start with  the interventional team, correctly identifying the patient name, date  of birth, MRN, procedure, anatomy (including marking of site and  side), patient position, procedure consent form, relevant laboratory  and imaging test results, antibiotic administration, safety  precautions, and procedure-specific equipment needs.      COMPLICATIONS:  No immediate adverse events identified.      FINDINGS:  In the recumbent position, the patient was positioned on the  angiography table. The right femoral cutaneous tissues were prepared  and draped in usual sterile manner.      The supraclavicular access site was screened with gray-scale  ultrasound with subsequent subcutaneous instillation of Lidocaine 1%  local anesthesia. Ultrasound images demonstrate a patent right  femoral vein. Under direct ultrasound guidance and  Seldinger/micropuncture technique, the right femoral vein was  accessed. An ultrasound digital spot image was acquired and stored on  the  PACS.      After confirmation of location, a 018 Edison-Mandril guidewire was  inserted to secure location. The guidewire was advanced into the  inferior vena cava utilizing intermittent fluoroscopy. The  micro-access needle was removed over the guidewire. Utilizing a  5-on-4 coaxial dilator sheath system, upsize to a 035 guidewire was  performed. Subsequent access tract dilation was performed to an  eventual 14-Prydeinig peel-away sheath dilator system.      After Lidocaine 1% local anesthesia, a subcutaneous tunnel tract was  created from the  right upper thigh to the venous access site. After  continuity of the tunnel tract and venous access site was obtained, a  14.5 Prydeinig x 31 cm  hemodialysis catheter was then placed with tract  continuity and its central catheter tip(s)  to reside at the  cavoatrial junction. A fluoroscopic spot image of the chest was  acquired in the AP projection to confirm optimal location.      The catheter ports were aspirated and flushed without resistance with  normal saline. The catheter ports were then charged with  high-concentration heparin. The venous access site was closed and  sterilely dressed. The external portions of the catheter were secured  with a purse-string 2-0 polypropylene suture and sterile dressings.      The patient tolerated the procedure without complication.      Impression: 1. Uncomplicated placement of an indwelling right femoral  infraclavicular 14.5 Fr x 31 cm hemodialysis catheter.      I was present for and/or performed the critical portions of the  procedure and immediately available throughout the entire procedure.      I personally reviewed the image(s) / study and resident  interpretation. I agree with the findings as stated.      Performed and dictated at [facility].      Signed by: Eli Arceo 10/20/2023 2:01 PM  Dictation workstation:   [id number]  PET inpatient w CMO approval call [id number]  Narrative: Interpreted By:  Rodri Michelle and Abuhamdeh Imran   STUDY:  PET SCAN INPATIENT WITH CMO APPROVAL CALL [id number];  10/20/2023  8:59 am      INDICATION:  Signs/Symptoms:Evaluation of left femoral AVG to see if infected..  58-year-old male with history of end-stage renal disease on dialysis  with failed renal transplant. Found to have lymphadenopathy on prior  CTs and PET-CT with concerns of posttreatment lymphoproliferative  disorder. Per EMR: Biopsy in 2/23 of the left iliac lymph node showed  no concerns for lymphoproliferative disorder.      COMPARISON:  PET-CT 10/06/2023      ACCESSION NUMBER(S):  [id number]      ORDERING CLINICIAN:  EMBER OLIVAREZ      TECHNIQUE:  DIVISION OF NUCLEAR MEDICINE  POSITRON EMISSION TOMOGRAPHY (PET-CT)      The patient received an  intravenous dose of 14 mCi of Fluorine-18  fluorodeoxyglucose (FDG). Positron emission tomographic (PET) images  from mid-thigh to skull base were then acquired after a one hour  delay. Also acquired was a contemporaneous low dose non-contrast CT  scan performed for attenuation correction of PET images and anatomic  localization.  The PET and CT images were digitally fused for  display.  All images were acquired on a combined PET-CT scanner unit.  Some areas of FDG accumulation may be described in standardized  uptake value (SUV) units.      CODING:  Subsequent Treatment Strategy (PS)      CALIBRATION:  Dose Injection-to-Scan Interval (mins): 65 min  Mediastinal bloodpool SUV (normal 1.5-2.5): 2.3  Blood glucose: 91 mg/dL      FINDINGS:  Limited examination due to increased activity throughout the soft  tissues. Within the limitation:      NECK:  No focal hypermetabolic soft tissue lesion is seen in the neck.  There is similar appearance of numerous bilateral predominantly  subcentimeter cervical lymph nodes which demonstrate minimal to mild  FDG uptake.      CHEST:  No focal hypermetabolic lesion is seen in the lung parenchyma.  Similar nonspecific bilateral axillary medius and mediastinal lymph  nodes which demonstrate only mild FDG activity similar to blood pool.      ABDOMEN AND PELVIS:  No hypermetabolic soft tissue lesion is present in the abdomen and  pelvis. There is similar mildly FDG avid retroperitoneal lymph nodes  including para-aortic, aortocaval, pericaval, bilateral common iliac,  bilateral external iliac, and inguinal lymph node stations. Again,  the left external iliac lymph nodes are the largest measuring up to  1.5 cm in the short axis with SUV max of 3.2, not significantly  changed. Physiologic radiotracer uptake is present in the liver and  spleen with excretion into the bowel loops and the genitourinary  tract.      MUSCULOSKELETAL:  There is no focal hypermetabolic lesion to suggest osseous  metastasis.  There has been interval development of hypermetabolic activity  associated with the left femoral arteriovenous graft within the left  anterior mid thigh (SUV max of 3.8). An additional focus of  hypermetabolic activity more superiorly and near the native left SFA  demonstrates SUV max of 4.9. Diffuse anasarca is visualized  throughout the body wall and extremities.      Impression: Limited examination due to increased activity throughout the soft  tissues. Within the limitation:      1. Similar extent of predominantly subcentimeter lymphadenopathy both  above and below the diaphragm which may represent a low-grade  lymphomatous/leukemic process versus inflammatory etiology.  2. Interval development of hypermetabolic activity within the left  femoral arteriovenous graft as well as an FDG avid focus adjacent to  the native left SFA, both of which are concerning for an  infectious/inflammatory process.  3. Diffuse anasarca throughout the body wall and extremities.          I personally reviewed the image(s) / study and agree with the  findings and interpretation as stated. This study was interpreted at  [facility].      MACRO:  None          Signed by: Rodri Michelle 10/20/2023 11:58 AM  Dictation workstation:   [id number]       ASSESSMENT / PLANS      LLE edema and pain   Ambulatory dysfunction  Left femoral access hemorrhage with superficial skin split  -Presented with worsening left leg edema and pain affecting patient ambulation. Patient does not want placement.  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  On review of some previous ultrasounds did not see mention of this but there is a large gap of time in between.  Discussed this briefly with the hematology/oncology fellow, who states that since not acute and this is chronic chronic would not call this Eliquis failure.  Unknown how long the  thrombus has been there.  Discussed with hematology/oncology fellow on 10/17 and recommending continuing Eliquis for now and outpatient follow-up with hematology.  -There appears to be a superficial skin split in his thigh where his AVG is located.  This is where he seems to be oozing blood intermittently, including outside dialysis.  Continue with pressure dressing.  Vascular surgery has recommended not to access the left AVG anymore.  Vascular surgery indicates concern for possible blowout.  Patient has been recommended for excision of AVG for tomorrow.  Currently still holding Eliquis and aspirin.  Tomorrow postop day #1 if no signs of active bleeding will discuss with patient about restarting Eliquis and aspirin.  -Tylenol, Oxycodone and flexeril for pain.  -Vascular surgery following.  -IR was consulted, performed fistulogram on 10/13  which showed mild/moderate venous anastomotic stenosis, resolved s/p  7 mm POBA.  IR also placed a tunneled dialysis catheter in the right groin on 10/20.  -Ultrasound of the liver shows a cirrhotic looking liver.  Discussed with hepatology team, they recommend at this time imaging alone not enough to confirm cirrhosis.  They recommend nothing to do inpatient and just have him follow-up with them outpatient.  -Suspect primary reason for left lower extremity swelling at this time is combination of acute infection and generalized body edema from ESRD and anuric with history of chronic diastolic congestive heart failure, acute infection, and possibly some contribution from chronic thrombus.  -Had lengthy discussion with patient that although we can improve his pain and we will continue to hopefully attempt to improve his swelling, his edema may not drastically improve by the time he is ready to leave the hospital.  Patient states understanding.     Cirrhotic looking liver on imaging  -At this time not confirmed, but suspected on imaging by CT and ultrasound.  -Discussed with  hepatology team, at this time low suspicion for true cirrhosis.  They recommend outpatient follow-up.     MSSA bacteremia  LLE cellulitis   Infection of left AVG  -10/13 blood cultures ( 2 out of 4) started growing GPC in clusters.  Repeat culture from 10/14 and 10/16 no growth to date.  Currently afebrile.  -PET/CT scan done further supports suspicion for infection of AVG.  -Transthoracic echo done on 10/9 already.  No signs of endocarditis.  -ID following.  -Initial antibiotics were vancomycin and Zosyn.  Antibiotics have been changed to renally dosed IV cefazolin on 10/17.  -Status post excision of left AVG on 10/22.  -Waiting on final antibiotic recommendations.     ESRD on HD  -MWF.  Patient makes no urine.  -Renal dosing meds   -Patient historically has refused renal diet and only wants regular diet, stating that he knows how to regulate himself.  -Electrolytes wnl   -Nephrology following    -Patient now has tunneled dialysis catheter in the right femoral for dialysis access as requested by nephrology placed on 10/20.     Chronic HFpEF  -TTE on 10/12 revealed EF 50%, with reduced RV systolic function   -Cardiology consulted, recs noted   -Daily weights  -Continue home medications.  Patient makes no urine.  For fluid removal patient is completely dialysis dependent.     History of DVT??  -On further discussion with patient he states that he does not recall being told of any thrombus in the past.  He states that he is on Eliquis for A-fib.  -US done this admission no acute DVT but question chronic thrombus of the CFV.  -Discussed with hematology fellow, they are doubting Eliquis failure, recommended to continue Eliquis and see hematology outpatient.   -For now Eliquis on hold due to concern for bleeding from groin area and plan potential surgery with vascular surgery.  Tomorrow if no further signs of bleeding will discuss with patient about restarting Eliquis.     Paroxysmal afib  -Continue home medications  -No  acute issues     Adrenal insufficiency  -Continue hydrocortisone     Lymphadenopathy  -Patient followed by oncology.  Discussed with patient's oncologist Dr. Castellon, she states that at this time his previous biopsy has been negative and she has no good evidence of  lymphoma.  No acute evaluations needed inpatient.  Can follow-up outpatient.     Disposition: Waiting for cultures and final antibiotic recommendations from infectious disease.  Monitor left leg for bleeding.  Cont pain control    Jermaine Garcia MD

## 2023-10-23 NOTE — NURSING NOTE
Report from Sending RN:    Report From: Margie ( WILLARD)  Recent Surgery of Procedure: left AVF fistula creation 10/22/23  Baseline Level of Consciousness (LOC): A/O x 4  Oxygen Use: No  Type: none  Diabetic: Yes, 171 12: 00 am  Last BP Med Given Day of Dialysis: none  Last Pain Med Given: oxycodone 5 mg 1208 am  Lab Tests to be Obtained with Dialysis: No  Blood Transfusion to be Given During Dialysis: No  Available IV Access: Yes  Medications to be Administered During Dialysis: No  Continuous IV Infusion Running: No  Restraints on Currently or in the Last 24 Hours: No  Hand-Off Communication: No acute overnight or morning events; vss; Pt did take morning medications; No labs needed this morning; Pt is a full code; Celeste Apple RN.

## 2023-10-23 NOTE — CARE PLAN
Problem: Pain  Goal: Performs ADL's with improved pain control throughout shift  Outcome: Progressing     Problem: Chronic Conditions and Co-morbidities  Goal: Patient's chronic conditions and co-morbidity symptoms are monitored and maintained or improved  Outcome: Progressing     Problem: Dialysis  Goal: I will slow progression of end-stage renal disease through participating in dialysis 3 times a week  Outcome: Progressing

## 2023-10-23 NOTE — NURSING NOTE
Report to Receiving RN:    Report From: Given to Akshat from BHUPINDER Nur RN  Time Report Called: 1035  Hand-Off Communication: Pt requested 4L to be removed but the orer was for 2L. We did message nephrology but never received new order. Pt is unhappy. /74, P71  Complications During Treatment: No  Ultrafiltration Treatment: Yes, 2L  Medications Administered During Dialysis: Yes, EPO and Zemplar were given with dialysis  Blood Products Administered During Dialysis: No  Labs Sent During Dialysis: No  Heparin Drip Rate Changes: No        Last Updated: 10:05 AM by THEO NUR

## 2023-10-23 NOTE — PROGRESS NOTES
Subjective     Interval History: Nghia Hall seen on dialysis, tolerating well.  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    Renal transplant rejection    Kidney transplant status 12/08/2022    Kidney replaced by transplant    Personal history of immunosuppression therapy 07/04/2021    H/O immunosuppressive therapy    Personal history of other diseases of the nervous system and sense organs     History of cataract    Personal history of other diseases of urinary system 11/02/2021    Personal history of renal failure    Personal history of other endocrine, nutritional and metabolic disease 07/22/2013    History of hyperlipidemia    Type 2 diabetes mellitus without complications (CMS/[facility]) 12/08/2022    Diabetes mellitus    Urinary tract infection, site not specified 05/02/2018    Acute UTI            Current Facility-Administered Medications:     acetaminophen (Tylenol) tablet 650 mg, 650 mg, oral, q4h PRN, 650 mg at 10/17/23 1044 **OR** acetaminophen (Tylenol) oral liquid 650 mg, 650 mg, oral, q4h PRN, 650 mg at 10/13/23 1120 **OR** acetaminophen (Tylenol) suppository 650 mg, 650 mg, rectal, q4h PRN, Ben Mcbride APRN-CNP    amLODIPine (Norvasc) tablet 10 mg, 10 mg, oral, Daily, Ben Mcbride APRN-CNP, 10 mg at 10/22/23 1214    [Held by provider] apixaban (Eliquis) tablet 5 mg, 5 mg, oral, BID, Ben Mcbride APRN-CNP, 5 mg at 10/11/23 2115    [Held by provider] aspirin EC tablet 81 mg, 81 mg, oral, Daily, Ben Mcbride APRN-CNP, 81 mg at 10/17/23 1044    B complex-vitamin C-folic acid (Nephrocaps) capsule 1 capsule, 1 capsule, oral, Daily, Ben Mcbride APRN-CNP, 1 capsule at 10/22/23 1213    ceFAZolin in dextrose (iso-os) (Ancef) IVPB 1 g, 1 g, intravenous, Daily, Ramin Fonseca MD, Last Rate: 0 mL/hr at 10/21/23  0913, 3 g at 10/22/23 0849    cyanocobalamin (Vitamin B-12) tablet 1,000 mcg, 1,000 mcg, oral, Daily, CHRISTI Atkinson, 1,000 mcg at 10/22/23 1213    cyclobenzaprine (Flexeril) tablet 5 mg, 5 mg, oral, TID PRN, Ferny Grady MD, 5 mg at 10/22/23 2020    diphenhydrAMINE (BENADryl) capsule 25 mg, 25 mg, oral, q6h PRN, Ferny Grady MD, 25 mg at 10/19/23 0910    diphenhydrAMINE (BENADryl) capsule 25 mg, 25 mg, oral, q6h PRN, Jermaine Garcia MD, 25 mg at 10/22/23 2020    epoetin urmila (Epogen,Procrit) injection 10,000 Units, 10,000 Units, intravenous, Every Mon/Wed/Fri, Josette Alexander MD MPH, 10,000 Units at 10/23/23 0648    hydrocortisone (Cortef) tablet 15 mg, 15 mg, oral, q AM, AMARIS Atkinson-CNP, 15 mg at 10/22/23 1213    hydrocortisone (Cortef) tablet 5 mg, 5 mg, oral, q PM, AMARIS Atkinson-CNP, 5 mg at 10/22/23 2020    lactobacillus acidophilus tablet 1 tablet, 1 tablet, oral, Daily, AMARIS Atkinson-CNP, 1 tablet at 10/22/23 1214    melatonin tablet 10 mg, 10 mg, oral, Daily, AMARIS Atkinson-CNP, 10 mg at 10/22/23 2021    melatonin tablet 3 mg, 3 mg, oral, Nightly PRN, AMARIS Atkinson-CNP, 3 mg at 10/22/23 2020    metoprolol tartrate (Lopressor) tablet 50 mg, 50 mg, oral, BID, Jermaine Garcia MD, 50 mg at 10/22/23 2031    multivitamin with minerals 1 tablet, 1 tablet, oral, Daily, CHRISTI Atkinson, 1 tablet at 10/22/23 1215    oxyCODONE (Roxicodone) immediate release tablet 10 mg, 10 mg, oral, q6h PRN, Jermaine Garcia MD, 10 mg at 10/23/23 0008    pantoprazole (ProtoNix) EC tablet 40 mg, 40 mg, oral, Daily before breakfast, AMARIS Atkinson-CNP, 40 mg at 10/22/23 0643    paricalcitoL (Zemplar) injection 8 mcg, 8 mcg, intravenous, Once per day on Mon Wed Fri, Josette Alexander MD MPH, 8 mcg at 10/23/23 0652    psyllium (Metamucil) 3.4 gram packet 1 packet, 1 packet, oral, Daily, CHRISTI Atkinson, 1 packet at  10/22/23 1215    sevelamer carbonate (Renvela) tablet 1,600 mg, 1,600 mg, oral, TID with meals, AMARIS Atkinson-CNP, 1,600 mg at 10/22/23 1647    Physical Exam  Physical Exam  Comfortable on HD, + edema      Vital signs in last 24 hours:  Temp:  [36 °C (96.8 °F)-36.6 °C (97.9 °F)] 36.4 °C (97.5 °F)  Heart Rate:  [51-70] 65  Resp:  [16] 16  BP: (109-125)/(62-69) 125/68         Labs:      Results for orders placed or performed during the hospital encounter of 10/09/23 (from the past 24 hour(s))   POCT GLUCOSE   Result Value Ref Range    POCT Glucose 77 74 - 99 mg/dL   POCT GLUCOSE   Result Value Ref Range    POCT Glucose 171 (H) 74 - 99 mg/dL                   Assessment/Plan   Patient seen and examined while on dialysis, recent events, labs, medications reviewed. Will follow overall management per primary team, and continue regular dialysis.  Has tunelled femoral dialysis catheter, s/p AVG removal, on antibiotics    Principal Problem:    Inability to walk  Active Problems:    ESRD (end stage renal disease) (CMS/[facility])    Anticoagulant long-term use        Cortez Ngo MD  10/23/2023  11:03 AM

## 2023-10-24 LAB
ALBUMIN SERPL BCP-MCNC: 2.5 G/DL (ref 3.4–5)
ALP SERPL-CCNC: 245 U/L (ref 33–120)
ALT SERPL W P-5'-P-CCNC: <3 U/L (ref 10–52)
ANION GAP SERPL CALC-SCNC: 18 MMOL/L (ref 10–20)
AST SERPL W P-5'-P-CCNC: 22 U/L (ref 9–39)
BACTERIA SPEC CULT: NORMAL
BILIRUB SERPL-MCNC: 0.7 MG/DL (ref 0–1.2)
BUN SERPL-MCNC: 18 MG/DL (ref 6–23)
CALCIUM SERPL-MCNC: 8.3 MG/DL (ref 8.6–10.6)
CHLORIDE SERPL-SCNC: 99 MMOL/L (ref 98–107)
CO2 SERPL-SCNC: 23 MMOL/L (ref 21–32)
CREAT SERPL-MCNC: 5.04 MG/DL (ref 0.5–1.3)
ERYTHROCYTE [DISTWIDTH] IN BLOOD BY AUTOMATED COUNT: 18 % (ref 11.5–14.5)
GFR SERPL CREATININE-BSD FRML MDRD: 12 ML/MIN/1.73M*2
GLUCOSE SERPL-MCNC: 69 MG/DL (ref 74–99)
GRAM STN SPEC: NORMAL
GRAM STN SPEC: NORMAL
HCT VFR BLD AUTO: 34 % (ref 41–52)
HGB BLD-MCNC: 10.2 G/DL (ref 13.5–17.5)
MAGNESIUM SERPL-MCNC: 1.85 MG/DL (ref 1.6–2.4)
MCH RBC QN AUTO: 29.5 PG (ref 26–34)
MCHC RBC AUTO-ENTMCNC: 30 G/DL (ref 32–36)
MCV RBC AUTO: 98 FL (ref 80–100)
NRBC BLD-RTO: 0 /100 WBCS (ref 0–0)
PHOSPHATE SERPL-MCNC: 4.2 MG/DL (ref 2.5–4.9)
PLATELET # BLD AUTO: 270 X10*3/UL (ref 150–450)
PMV BLD AUTO: 9.4 FL (ref 7.5–11.5)
POTASSIUM SERPL-SCNC: 4.4 MMOL/L (ref 3.5–5.3)
PROT SERPL-MCNC: 5.8 G/DL (ref 6.4–8.2)
RBC # BLD AUTO: 3.46 X10*6/UL (ref 4.5–5.9)
SODIUM SERPL-SCNC: 136 MMOL/L (ref 136–145)
WBC # BLD AUTO: 7.7 X10*3/UL (ref 4.4–11.3)

## 2023-10-24 PROCEDURE — 2500000001 HC RX 250 WO HCPCS SELF ADMINISTERED DRUGS (ALT 637 FOR MEDICARE OP): Performed by: STUDENT IN AN ORGANIZED HEALTH CARE EDUCATION/TRAINING PROGRAM

## 2023-10-24 PROCEDURE — 99233 SBSQ HOSP IP/OBS HIGH 50: CPT | Performed by: STUDENT IN AN ORGANIZED HEALTH CARE EDUCATION/TRAINING PROGRAM

## 2023-10-24 PROCEDURE — 36415 COLL VENOUS BLD VENIPUNCTURE: CPT | Performed by: STUDENT IN AN ORGANIZED HEALTH CARE EDUCATION/TRAINING PROGRAM

## 2023-10-24 PROCEDURE — 80053 COMPREHEN METABOLIC PANEL: CPT | Performed by: STUDENT IN AN ORGANIZED HEALTH CARE EDUCATION/TRAINING PROGRAM

## 2023-10-24 PROCEDURE — 2500000001 HC RX 250 WO HCPCS SELF ADMINISTERED DRUGS (ALT 637 FOR MEDICARE OP): Performed by: NURSE PRACTITIONER

## 2023-10-24 PROCEDURE — 2500000004 HC RX 250 GENERAL PHARMACY W/ HCPCS (ALT 636 FOR OP/ED): Performed by: NURSE PRACTITIONER

## 2023-10-24 PROCEDURE — 1100000001 HC PRIVATE ROOM DAILY

## 2023-10-24 PROCEDURE — 85027 COMPLETE CBC AUTOMATED: CPT | Mod: CMCLAB | Performed by: STUDENT IN AN ORGANIZED HEALTH CARE EDUCATION/TRAINING PROGRAM

## 2023-10-24 PROCEDURE — 2500000001 HC RX 250 WO HCPCS SELF ADMINISTERED DRUGS (ALT 637 FOR MEDICARE OP): Performed by: FAMILY MEDICINE

## 2023-10-24 PROCEDURE — 94660 CPAP INITIATION&MGMT: CPT

## 2023-10-24 PROCEDURE — 84100 ASSAY OF PHOSPHORUS: CPT | Performed by: STUDENT IN AN ORGANIZED HEALTH CARE EDUCATION/TRAINING PROGRAM

## 2023-10-24 PROCEDURE — 2500000002 HC RX 250 W HCPCS SELF ADMINISTERED DRUGS (ALT 637 FOR MEDICARE OP, ALT 636 FOR OP/ED): Performed by: NURSE PRACTITIONER

## 2023-10-24 PROCEDURE — 83735 ASSAY OF MAGNESIUM: CPT | Performed by: STUDENT IN AN ORGANIZED HEALTH CARE EDUCATION/TRAINING PROGRAM

## 2023-10-24 RX ADMIN — SEVELAMER CARBONATE 1600 MG: 800 TABLET, FILM COATED ORAL at 11:33

## 2023-10-24 RX ADMIN — OXYCODONE HYDROCHLORIDE 10 MG: 5 TABLET ORAL at 15:27

## 2023-10-24 RX ADMIN — HYDROCORTISONE 15 MG: 5 TABLET ORAL at 08:17

## 2023-10-24 RX ADMIN — OXYCODONE HYDROCHLORIDE 10 MG: 5 TABLET ORAL at 08:17

## 2023-10-24 RX ADMIN — DIPHENHYDRAMINE HYDROCHLORIDE 25 MG: 25 CAPSULE ORAL at 21:26

## 2023-10-24 RX ADMIN — CYCLOBENZAPRINE 5 MG: 10 TABLET, FILM COATED ORAL at 08:23

## 2023-10-24 RX ADMIN — CYCLOBENZAPRINE 5 MG: 10 TABLET, FILM COATED ORAL at 21:26

## 2023-10-24 RX ADMIN — AMLODIPINE BESYLATE 10 MG: 10 TABLET ORAL at 08:23

## 2023-10-24 RX ADMIN — SEVELAMER CARBONATE 1600 MG: 800 TABLET, FILM COATED ORAL at 18:02

## 2023-10-24 RX ADMIN — MELATONIN 10 MG: 3 TAB ORAL at 21:00

## 2023-10-24 RX ADMIN — CYCLOBENZAPRINE 5 MG: 10 TABLET, FILM COATED ORAL at 15:27

## 2023-10-24 RX ADMIN — PANTOPRAZOLE SODIUM 40 MG: 40 TABLET, DELAYED RELEASE ORAL at 08:17

## 2023-10-24 RX ADMIN — METOPROLOL TARTRATE 50 MG: 50 TABLET, FILM COATED ORAL at 21:26

## 2023-10-24 RX ADMIN — METOPROLOL TARTRATE 50 MG: 50 TABLET, FILM COATED ORAL at 08:22

## 2023-10-24 RX ADMIN — Medication 1 TABLET: at 08:23

## 2023-10-24 RX ADMIN — PSYLLIUM HUSK 1 PACKET: 3.4 POWDER ORAL at 08:23

## 2023-10-24 RX ADMIN — HYDROCORTISONE 5 MG: 5 TABLET ORAL at 21:26

## 2023-10-24 RX ADMIN — OXYCODONE HYDROCHLORIDE 10 MG: 5 TABLET ORAL at 21:27

## 2023-10-24 RX ADMIN — SEVELAMER CARBONATE 1600 MG: 800 TABLET, FILM COATED ORAL at 08:23

## 2023-10-24 RX ADMIN — NEPHROCAP 1 CAPSULE: 1 CAP ORAL at 08:29

## 2023-10-24 RX ADMIN — CYANOCOBALAMIN TAB 1000 MCG 1000 MCG: 1000 TAB at 08:23

## 2023-10-24 ASSESSMENT — PAIN SCALES - GENERAL
PAINLEVEL_OUTOF10: 6
PAINLEVEL_OUTOF10: 8
PAINLEVEL_OUTOF10: 7
PAINLEVEL_OUTOF10: 8
PAINLEVEL_OUTOF10: 6
PAINLEVEL_OUTOF10: 8

## 2023-10-24 ASSESSMENT — PAIN - FUNCTIONAL ASSESSMENT
PAIN_FUNCTIONAL_ASSESSMENT: 0-10

## 2023-10-24 ASSESSMENT — ENCOUNTER SYMPTOMS: FEVER: 1

## 2023-10-24 NOTE — PROGRESS NOTES
VASCULAR SURGERY PROGRESS NOTE  Subjective   Seen this morning at bedside.  Overnight, L upper thigh dressing was reinforced by nursing because it was saturated.  His dressing was changed at bedside, which was tolerated well.  No other concerns.    Objective   Vitals:    10/24/23 0030   BP:    Pulse:    Resp: 22   Temp:    SpO2:       Exam:  Constitutional: No acute distress, resting comfortably  Neuro:  AOx3, grossly intact  ENMT: moist mucous membranes  CV: no tachycardia  Pulm: non-labored on room air  GI: soft, non-tender, non-distended  Skin: warm and dry  Musculoskeletal: moving all extremities  Extremities: L groin incision repacked with saline soaked Kerlix, 4x4s, ABD pad. 2 additional incisions with sutures intact, c/d/I.    Labs:            Results from last 7 days   Lab Units 10/19/23  1440   INR  1.2*   PROTIME seconds 13.7*       Assessment/Plan   Nghia Hall is 58 y.o. male with history of ESRD s/p DDKT (2006, subsequent graft failure 2021) on HD MWF, pAfib (on Eliquis), HFpEF, HCV, gout, lymphadenopthy, adrenal insufficiency (on steroids) now POD 2L femoral AVG explant.    -10/14: Dressing changed at bedside; tolerated well; now will do saline-soaked Kerlix instead of Betadine    Plan:  - Daily dressing changes with wet-to-dry saline-soaked Kerlix; can reinforce with ABD pads if there is increased drainage (nursing order placed)  - Continue antibiotics per primary team.    D/w attending, Dr. Henry Herring MD  Vascular Surgery, PGY3  Team Pager: [phone]    Placed wound vac today with 18 cm x 2 cm x 1 cm black foam and -25 suction.  Please consult wound care for additional wound vac changes and homegoing wound vac orders.  Follow up with vascular surgery has been scheduled.    China Kang MD  General Surgery PGY-1  [id number]

## 2023-10-24 NOTE — CARE PLAN
The patient's goals for the shift include      The clinical goals for the shift include incision site CDI      Problem: Pain  Goal: Takes deep breaths with improved pain control throughout the shift  Outcome: Progressing  Goal: Turns in bed with improved pain control throughout the shift  Outcome: Progressing  Goal: Walks with improved pain control throughout the shift  Outcome: Progressing  Goal: Performs ADL's with improved pain control throughout shift  Outcome: Progressing  Goal: Participates in PT with improved pain control throughout the shift  Outcome: Progressing     Problem: Discharge Planning  Goal: Discharge to home or other facility with appropriate resources  Outcome: Progressing     Problem: Chronic Conditions and Co-morbidities  Goal: Patient's chronic conditions and co-morbidity symptoms are monitored and maintained or improved  Outcome: Progressing     Problem: Dialysis  Goal: I will slow progression of end-stage renal disease through participating in dialysis 3 times a week  Outcome: Progressing

## 2023-10-24 NOTE — PROGRESS NOTES
.                                                                                                                                                                                                                                                                                             INTERNAL MEDICINE PROGRESS NOTE     BRIEF NARRATIVE      Nghia Hall is a 58 y.o. male on day 15 of admission presenting with Inability to walk. Patient has past medical history of ESRD s/p DDKT (2006, subsequent graft failure 07/2021, on HD MWF at [facility] through LT groin AV graft on [address]; not currently on immunosuppression), pAfib (on Apixaban), HFpEF, HCV (s/p treatment), gout, lymphadenopathy (following with oncology, inconclusive biopsy in 01/2023), DVT, and Adrenal Insufficiency (on hydrocortisone) who presented to the ED with difficulty ambulating due to secondary LLE pain and swelling. His left leg (which is also used for his HD access) is severely edematous and exquisitely tender to palpation. He reports being compliant with his Eliquis. The edema is non dependent. He is not interested in placement at this time due to being employed.  Nephrology was consulted for HD. Vascular surgery was also consulted to evaluate left AVG.  On 10/12 patient dialysis access started oozing bright red blood, reached out to vascular surgery for urgent evaluation. Vascular surgery saw the patient and did not feel that there was any surgical interventions to offer. CTA a/p and LLE doppler were performed on 10/12/23, consulted IR for fistulogram and cardiology for right HF recommendations. IR performed fistulogram on 10/13 which showed  mild/moderate venous anastomotic stenosis, resolved s/p  7 mm POBA.  Interventional radiology did not feel stenosis would explain drainage or swelling.  Cardiology also evaluated patient on 10/13 and recommended outpatient follow-up. 10/13 Blood cultures 2/4 started growing GPC (patient was already on Vanc  for 2 for 2 days prior to positive bcx), ID was consulted.  Infectious disease is concerned that patient's left AVG is infected.  Vascular surgery reconsulted.  PET CT scan obtained to further clarify if AVG infected.  PET/CT scan did indicate concern for infection of the area.  In addition vascular surgery concern for graft blowout.  They have recommended excision of AVG which was removed on 10/22.  Patient noted to have murky fluid identified around midpoint of graft.  Graft and fluid swab sent for culture. New right femoral temporary HD catheter was placed on 10/20/23     SUBJECTIVE     Pt seen and examined today, patient dressing was bleeding again last night.  Vascular was informed and the dressing was reinforced     OBJECTIVE      Visit Vitals  /71   Pulse 68   Temp 36.8 °C (98.2 °F)   Resp 20        Intake/Output Summary (Last 24 hours) at 10/24/2023 0930  Last data filed at 10/23/2023 1045  Gross per 24 hour   Intake 1200 ml   Output 4800 ml   Net -3600 ml       Physical Exam   General: Lying in bed in dialysis center.  Cooperative.  Skin: Superficial skin split in his left thigh near his AVG site.  HEENT: Sclera is white.  Mucous membranes moist.  Neck: Supple.  No JVD.  Cardiac: Regular rate and rhythm, S1/S2 normal.  Lungs: Clear to auscultation bilaterally, no wheezing or crackles, no accessory muscle use at rest.  Abdomen: Soft, nontender, mild to moderately obese abdomen, BS +  Extremities: No cyanosis.  Mild edema noted in upper extremity and right lower extremity, significant 3+ edema in left lower extremity.  Edema in left lower leg does not appear to have changed much.  Currently no active bleeding from left groin area.  Neurologic: Alert and oriented x3.  No focal deficits.  Psychiatric: Currently no agitation    Current Meds   amLODIPine, 10 mg, oral, Daily  [Held by provider] apixaban, 5 mg, oral, BID  [Held by provider] aspirin, 81 mg, oral, Daily  B complex-vitamin C-folic acid, 1  "capsule, oral, Daily  ceFAZolin, 1 g, intravenous, Daily  cyanocobalamin, 1,000 mcg, oral, Daily  epoetin urmila or biosimilar, 10,000 Units, intravenous, Every Mon/Wed/Fri  hydrocortisone, 15 mg, oral, q AM  hydrocortisone, 5 mg, oral, q PM  lactobacillus acidophilus, 1 tablet, oral, Daily  melatonin, 10 mg, oral, Daily  metoprolol tartrate, 50 mg, oral, BID  multivitamin with minerals, 1 tablet, oral, Daily  pantoprazole, 40 mg, oral, Daily before breakfast  paricalcitoL, 8 mcg, intravenous, Once per day on Mon Wed Fri  psyllium, 1 packet, oral, Daily  sevelamer carbonate, 1,600 mg, oral, TID with meals       PRN medications: acetaminophen **OR** acetaminophen **OR** acetaminophen, cyclobenzaprine, diphenhydrAMINE, diphenhydrAMINE, lubricating eye drops, melatonin, oxyCODONE     LABS and IMAGING     No results found for: \"WBC\", \"HGB\", \"HCT\", \"GLU\", \"CO2\", \"CREATININE\", \"CALCIUM\", \"INR\", \"PTT\", \"TROPONINI\"    IR CVC tunneled  Narrative: Interpreted By:  Eli Arceo and [place] Lesley   STUDY:  IR CVC TUNNELED;  10/20/2023 12:20 pm      INDICATION:  Signs/Symptoms:Need for tunneled dialysis cath placed into right  groin..      COMPARISON:  None.      ACCESSION NUMBER(S):  [id number]      ORDERING CLINICIAN:  EMBER OLIVAREZ      TECHNIQUE:  INTERVENTIONALIST(S):  Dr. Eli Arceo      CONSENT:  The patient/patient's POA/next of kin was informed of the nature of  the proposed procedure. The purposes, alternatives, risks, and  benefits were explained and discussed. All questions were answered  and consent was obtained.      RADIATION EXPOSURE:  Fluoroscopy time: 1.4 min.  Dose: 33.07 mGy.  Dose Area Product (DAP): 11,109 mGy*cm^2      SEDATION:  Moderate conscious IV sedation services (supervision of  administration, induction, and maintenance) were provided by the  physician performing the procedure with intravenous fentanyl 50mcg  and versed 1mg for 15 minutes. The physician was assisted by " an  independent trained observer, an interventional radiology nurse, in  the continuous monitoring of patient level of consciousness and  physiologic status.      MEDICATION/CONTRAST:  No additional      TIME OUT:  A time out was performed immediately prior to procedure start with  the interventional team, correctly identifying the patient name, date  of birth, MRN, procedure, anatomy (including marking of site and  side), patient position, procedure consent form, relevant laboratory  and imaging test results, antibiotic administration, safety  precautions, and procedure-specific equipment needs.      COMPLICATIONS:  No immediate adverse events identified.      FINDINGS:  In the recumbent position, the patient was positioned on the  angiography table. The right femoral cutaneous tissues were prepared  and draped in usual sterile manner.      The supraclavicular access site was screened with gray-scale  ultrasound with subsequent subcutaneous instillation of Lidocaine 1%  local anesthesia. Ultrasound images demonstrate a patent right  femoral vein. Under direct ultrasound guidance and  Seldinger/micropuncture technique, the right femoral vein was  accessed. An ultrasound digital spot image was acquired and stored on  the  PACS.      After confirmation of location, a 018 Doddridge-Mandril guidewire was  inserted to secure location. The guidewire was advanced into the  inferior vena cava utilizing intermittent fluoroscopy. The  micro-access needle was removed over the guidewire. Utilizing a  5-on-4 coaxial dilator sheath system, upsize to a 035 guidewire was  performed. Subsequent access tract dilation was performed to an  eventual 14-Finnish peel-away sheath dilator system.      After Lidocaine 1% local anesthesia, a subcutaneous tunnel tract was  created from the  right upper thigh to the venous access site. After  continuity of the tunnel tract and venous access site was obtained, a  14.5 Finnish x 31 cm  hemodialysis  catheter was then placed with tract  continuity and its central catheter tip(s) to reside at the  cavoatrial junction. A fluoroscopic spot image of the chest was  acquired in the AP projection to confirm optimal location.      The catheter ports were aspirated and flushed without resistance with  normal saline. The catheter ports were then charged with  high-concentration heparin. The venous access site was closed and  sterilely dressed. The external portions of the catheter were secured  with a purse-string 2-0 polypropylene suture and sterile dressings.      The patient tolerated the procedure without complication.      Impression: 1. Uncomplicated placement of an indwelling right femoral  infraclavicular 14.5 Fr x 31 cm hemodialysis catheter.      I was present for and/or performed the critical portions of the  procedure and immediately available throughout the entire procedure.      I personally reviewed the image(s) / study and resident  interpretation. I agree with the findings as stated.      Performed and dictated at [facility].      Signed by: Eli Arceo 10/20/2023 2:01 PM  Dictation workstation:   [id number]  PET inpatient w CMO approval call [id number]  Narrative: Interpreted By:  Rodri Michelle and Abuhamdeh Imran   STUDY:  PET SCAN INPATIENT WITH CMO APPROVAL CALL [id number];  10/20/2023  8:59 am      INDICATION:  Signs/Symptoms:Evaluation of left femoral AVG to see if infected..  58-year-old male with history of end-stage renal disease on dialysis  with failed renal transplant. Found to have lymphadenopathy on prior  CTs and PET-CT with concerns of posttreatment lymphoproliferative  disorder. Per EMR: Biopsy in 2/23 of the left iliac lymph node showed  no concerns for lymphoproliferative disorder.      COMPARISON:  PET-CT 10/06/2023      ACCESSION NUMBER(S):  [id number]      ORDERING CLINICIAN:  EMBER OLIVAREZ      TECHNIQUE:  DIVISION OF NUCLEAR  MEDICINE  POSITRON EMISSION TOMOGRAPHY (PET-CT)      The patient received an intravenous dose of 14 mCi of Fluorine-18  fluorodeoxyglucose (FDG). Positron emission tomographic (PET) images  from mid-thigh to skull base were then acquired after a one hour  delay. Also acquired was a contemporaneous low dose non-contrast CT  scan performed for attenuation correction of PET images and anatomic  localization.  The PET and CT images were digitally fused for  display.  All images were acquired on a combined PET-CT scanner unit.  Some areas of FDG accumulation may be described in standardized  uptake value (SUV) units.      CODING:  Subsequent Treatment Strategy (PS)      CALIBRATION:  Dose Injection-to-Scan Interval (mins): 65 min  Mediastinal bloodpool SUV (normal 1.5-2.5): 2.3  Blood glucose: 91 mg/dL      FINDINGS:  Limited examination due to increased activity throughout the soft  tissues. Within the limitation:      NECK:  No focal hypermetabolic soft tissue lesion is seen in the neck.  There is similar appearance of numerous bilateral predominantly  subcentimeter cervical lymph nodes which demonstrate minimal to mild  FDG uptake.      CHEST:  No focal hypermetabolic lesion is seen in the lung parenchyma.  Similar nonspecific bilateral axillary medius and mediastinal lymph  nodes which demonstrate only mild FDG activity similar to blood pool.      ABDOMEN AND PELVIS:  No hypermetabolic soft tissue lesion is present in the abdomen and  pelvis. There is similar mildly FDG avid retroperitoneal lymph nodes  including para-aortic, aortocaval, pericaval, bilateral common iliac,  bilateral external iliac, and inguinal lymph node stations. Again,  the left external iliac lymph nodes are the largest measuring up to  1.5 cm in the short axis with SUV max of 3.2, not significantly  changed. Physiologic radiotracer uptake is present in the liver and  spleen with excretion into the bowel loops and the genitourinary  tract.       MUSCULOSKELETAL:  There is no focal hypermetabolic lesion to suggest osseous metastasis.  There has been interval development of hypermetabolic activity  associated with the left femoral arteriovenous graft within the left  anterior mid thigh (SUV max of 3.8). An additional focus of  hypermetabolic activity more superiorly and near the native left SFA  demonstrates SUV max of 4.9. Diffuse anasarca is visualized  throughout the body wall and extremities.      Impression: Limited examination due to increased activity throughout the soft  tissues. Within the limitation:      1. Similar extent of predominantly subcentimeter lymphadenopathy both  above and below the diaphragm which may represent a low-grade  lymphomatous/leukemic process versus inflammatory etiology.  2. Interval development of hypermetabolic activity within the left  femoral arteriovenous graft as well as an FDG avid focus adjacent to  the native left SFA, both of which are concerning for an  infectious/inflammatory process.  3. Diffuse anasarca throughout the body wall and extremities.          I personally reviewed the image(s) / study and agree with the  findings and interpretation as stated. This study was interpreted at  [facility].      MACRO:  None          Signed by: Rodri Michelle 10/20/2023 11:58 AM  Dictation workstation:   [id number]       ASSESSMENT / PLANS      LLE edema and pain   Ambulatory dysfunction  Left femoral access hemorrhage with superficial skin split  Presented with worsening left leg edema and pain affecting patient ambulation. Patient does not want placement.  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  On review of some previous ultrasounds did not see mention of this but there is a large gap of time in between.  Discussed this briefly with the hematology/oncology fellow, who states that since not acute and this is  chronic chronic would not call this Eliquis failure.  Unknown how long the thrombus has been there.  Discussed with hematology/oncology fellow on 10/17 and recommending continuing Eliquis for now and outpatient follow-up with hematology.  -There appears to be a superficial skin split in his thigh where his AVG is located.  This is where he seems to be oozing blood intermittently, including outside dialysis.  Continue with pressure dressing.  Vascular surgery has recommended not to access the left AVG anymore.  Vascular surgery indicates concern for possible blowout.  Patient has been recommended for excision of AVG 10/22/23.  Currently still holding Eliquis and aspirin.    -Tylenol, Oxycodone and flexeril for pain.  -Vascular surgery following.  -IR was consulted, performed fistulogram on 10/13  which showed mild/moderate venous anastomotic stenosis, resolved s/p  7 mm POBA.  IR also placed a tunneled dialysis catheter in the right groin on 10/20.  -Ultrasound of the liver shows a cirrhotic looking liver.  Discussed with hepatology team, they recommend at this time imaging alone not enough to confirm cirrhosis.  They recommend nothing to do inpatient and just have him follow-up with them outpatient.  -Suspect primary reason for left lower extremity swelling at this time is combination of acute infection and generalized body edema from ESRD and anuric with history of chronic diastolic congestive heart failure, acute infection, and possibly some contribution from chronic thrombus.    Cirrhotic looking liver on imaging  -At this time not confirmed, but suspected on imaging by CT and ultrasound.  -Discussed with hepatology team, at this time low suspicion for true cirrhosis.  They recommend outpatient follow-up.     MSSA bacteremia  LLE cellulitis   Infection of left AVG  -10/13 blood cultures ( 2 out of 4) started growing GPC in clusters.  Repeat culture from 10/14 and 10/16 no growth to date.  Currently  afebrile.  -PET/CT scan done further supports suspicion for infection of AVG.  -Transthoracic echo done on 10/9 already.  No signs of endocarditis.  -ID following.  -Initial antibiotics were vancomycin and Zosyn.  Antibiotics have been changed to renally dosed IV cefazolin on 10/17. ID recommended duration of therapy 4 weeks through 11/20   -Status post excision of left AVG on 10/22.     ESRD on HD  -MWF.  Patient makes no urine.  -Renal dosing meds   -Patient historically has refused renal diet and only wants regular diet, stating that he knows how to regulate himself.  -Electrolytes wnl   -Nephrology following    -Patient now has tunneled dialysis catheter in the right femoral for dialysis access as requested by nephrology placed on 10/20.     Chronic HFpEF  -TTE on 10/12 revealed EF 50%, with reduced RV systolic function   -Cardiology consulted, recs noted   -Daily weights  -Continue home medications.  Patient makes no urine.  For fluid removal patient is completely dialysis dependent.     History of DVT??  -On further discussion with patient he states that he does not recall being told of any thrombus in the past.  He states that he is on Eliquis for A-fib.  -US done this admission no acute DVT but question chronic thrombus of the CFV.  -Discussed with hematology fellow, they are doubting Eliquis failure, recommended to continue Eliquis and see hematology outpatient.   -For now Eliquis on hold due to concern for bleeding from groin area and plan potential surgery with vascular surgery.  Tomorrow if no further signs of bleeding will discuss with patient about restarting Eliquis.     Paroxysmal afib  -Continue home medications  -No acute issues     Adrenal insufficiency  -Continue hydrocortisone     Lymphadenopathy  -Patient followed by oncology.  Discussed with patient's oncologist Dr. Castellon, she states that at this time his previous biopsy has been negative and she has no good evidence of  lymphoma.  No acute  evaluations needed inpatient.  Can follow-up outpatient.     Disposition: Waiting for cultures and final antibiotic recommendations from infectious disease.  Monitor left leg for bleeding.  Cont pain control    Jermaine Garcia MD

## 2023-10-24 NOTE — PROGRESS NOTES
Nghia Hall is a 58 y.o. male on day 14 of admission presenting with Inability to walk.    Subjective  and Interval History:          Patient seen during late afternoon rounds.       Patient complains of ongoing swelling and pain.         Left femoral HD catheter removed 10/22/2023    Objective   Range of Vitals (last 24 hours)  Heart Rate:  [57-70]   Temp:  [36.3 °C (97.3 °F)-36.6 °C (97.9 °F)]   Resp:  [18]   BP: (125-159)/(68-83)   SpO2:  [98 %-100 %]   Daily Weight  10/09/23 : 143 kg (315 lb)    Body mass index is 41.56 kg/m².    Physical Exam  No acute distress  Left leg: (Limited exam)       Slightly tense and more swollen left leg from the inguinal canal compared to 10/21       Anterior thigh thick dressing with in place over previous AVG      Microbiology  Susceptibility data from last 14 days.  Collected Specimen Info Organism Clindamycin Erythromycin Oxacillin Tetracycline Trimethoprim/Sulfamethoxazole Vancomycin   10/13/23 Blood culture from Dialysis Staphylococcus aureus         10/13/23 Blood culture from Arterial Line Methicillin Susceptible Staphylococcus aureus (MSSA) S S S S S S     10/14 Bcx neg  10/16 Bcx neg  10/22 AV graft no growth     Imaging  10/09/2023 Vasc study:       Left Lower Venous: Negative for acute DVT of the visualized vessels. Possible chronic thrombus in the CFV. Patient cannot tolerate compressions of the mid distal thigh.     10/11/2023 TTE:    CONCLUSIONS:       1. Left ventricular systolic function is low normal with a 50-55% estimated ejection fraction.       2. There is reduced right ventricular systolic function.       3. The left atrium is moderate to severely dilated.       4. The right atrium is moderately dilated.       5. Moderate tricuspid regurgitation visualized.       6. Moderately elevated right ventricular systolic pressure.    10/20/2023 PET:       Limited examination due to increased activity throughout the soft   tissues. Within the limitation:        1.  Similar extent of predominantly subcentimeter lymphadenopathy both \above and below the diaphragm which may represent a low-grade  lymphomatous/leukemic process versus inflammatory etiology.        2. Interval development of hypermetabolic activity within the left  femoral arteriovenous graft as well as an FDG avid focus adjacent to  the native left SFA, both of which are concerning for an   infectious/inflammatory process.        3. Diffuse anasarca throughout the body wall and extremities.     Assessment/Plan      #  Acute, severe and painful LLE swelling in the setting of chronic swelling (probable chronic left CFV thrombus) and MSSA left femoral AVG infection    #  L femoral AV graft MSSA infection> removed on 10/22/23    #7/13/2023 MSSA bacteremia under vancomycin treatment       10/14 and 10/16 blood cultures negative    #10/20/23 NEW right femoral HD catheter placement      10/21/2023 update:       Patient reports probable left femoral AV graft excision on 10/22/2023     10/23/2023 update:       Left femoral AV graft removed (10/22) culture remained negative        Would consider a 4 week therapy from the graft removed in setting of left CFV thrombus with MSSA bacteremia    Recommendations  -Please continue IV cefazolin 2g-2g-3g after HD  -Duration of therapy (4 weeks); end date 11/20    -Will follow on the left femoral AV graft culture    Discussed with Dr. Lam. Please text me via Epic chat if you have any questions or concerns regarding this patient. ID will continue to follow up this patient.     Alexei Bullock MD  ID fellow [id number]  Team B Pager [phone]

## 2023-10-24 NOTE — CARE PLAN
Problem: Pain  Goal: Performs ADL's with improved pain control throughout shift  Outcome: Progressing     Problem: Discharge Planning  Goal: Discharge to home or other facility with appropriate resources  Outcome: Progressing     Problem: Chronic Conditions and Co-morbidities  Goal: Patient's chronic conditions and co-morbidity symptoms are monitored and maintained or improved  Outcome: Progressing

## 2023-10-25 ENCOUNTER — APPOINTMENT (OUTPATIENT)
Dept: DIALYSIS | Facility: HOSPITAL | Age: 58
DRG: 252 | End: 2023-10-25
Payer: COMMERCIAL

## 2023-10-25 LAB
GLUCOSE BLD MANUAL STRIP-MCNC: 119 MG/DL (ref 74–99)
GLUCOSE BLD MANUAL STRIP-MCNC: 64 MG/DL (ref 74–99)

## 2023-10-25 PROCEDURE — 8010000001 HC DIALYSIS - HEMODIALYSIS PER DAY

## 2023-10-25 PROCEDURE — 2500000004 HC RX 250 GENERAL PHARMACY W/ HCPCS (ALT 636 FOR OP/ED): Performed by: INTERNAL MEDICINE

## 2023-10-25 PROCEDURE — 94660 CPAP INITIATION&MGMT: CPT

## 2023-10-25 PROCEDURE — 2500000001 HC RX 250 WO HCPCS SELF ADMINISTERED DRUGS (ALT 637 FOR MEDICARE OP): Performed by: STUDENT IN AN ORGANIZED HEALTH CARE EDUCATION/TRAINING PROGRAM

## 2023-10-25 PROCEDURE — 82947 ASSAY GLUCOSE BLOOD QUANT: CPT

## 2023-10-25 PROCEDURE — 6350000001 HC RX 635 EPOETIN >10,000 UNITS: Mod: JZ | Performed by: INTERNAL MEDICINE

## 2023-10-25 PROCEDURE — 99233 SBSQ HOSP IP/OBS HIGH 50: CPT | Performed by: STUDENT IN AN ORGANIZED HEALTH CARE EDUCATION/TRAINING PROGRAM

## 2023-10-25 PROCEDURE — 2500000004 HC RX 250 GENERAL PHARMACY W/ HCPCS (ALT 636 FOR OP/ED): Performed by: NURSE PRACTITIONER

## 2023-10-25 PROCEDURE — 2500000001 HC RX 250 WO HCPCS SELF ADMINISTERED DRUGS (ALT 637 FOR MEDICARE OP): Performed by: NURSE PRACTITIONER

## 2023-10-25 PROCEDURE — 1100000001 HC PRIVATE ROOM DAILY

## 2023-10-25 PROCEDURE — 90935 HEMODIALYSIS ONE EVALUATION: CPT | Performed by: INTERNAL MEDICINE

## 2023-10-25 PROCEDURE — 2500000001 HC RX 250 WO HCPCS SELF ADMINISTERED DRUGS (ALT 637 FOR MEDICARE OP): Performed by: FAMILY MEDICINE

## 2023-10-25 RX ADMIN — AMLODIPINE BESYLATE 10 MG: 10 TABLET ORAL at 13:04

## 2023-10-25 RX ADMIN — CEFAZOLIN SODIUM 1 G: 1 INJECTION, SOLUTION INTRAVENOUS at 13:04

## 2023-10-25 RX ADMIN — CYCLOBENZAPRINE 5 MG: 10 TABLET, FILM COATED ORAL at 21:46

## 2023-10-25 RX ADMIN — MELATONIN 10 MG: 3 TAB ORAL at 21:46

## 2023-10-25 RX ADMIN — METOPROLOL TARTRATE 50 MG: 50 TABLET, FILM COATED ORAL at 21:45

## 2023-10-25 RX ADMIN — DIPHENHYDRAMINE HYDROCHLORIDE 25 MG: 25 CAPSULE ORAL at 21:46

## 2023-10-25 RX ADMIN — SEVELAMER CARBONATE 1600 MG: 800 TABLET, FILM COATED ORAL at 13:04

## 2023-10-25 RX ADMIN — HYDROCORTISONE 5 MG: 5 TABLET ORAL at 21:45

## 2023-10-25 RX ADMIN — HYDROCORTISONE 15 MG: 5 TABLET ORAL at 13:03

## 2023-10-25 RX ADMIN — NEPHROCAP 1 CAPSULE: 1 CAP ORAL at 13:03

## 2023-10-25 RX ADMIN — PANTOPRAZOLE SODIUM 40 MG: 40 TABLET, DELAYED RELEASE ORAL at 13:03

## 2023-10-25 RX ADMIN — CYANOCOBALAMIN TAB 1000 MCG 1000 MCG: 1000 TAB at 13:04

## 2023-10-25 RX ADMIN — PSYLLIUM HUSK 1 PACKET: 3.4 POWDER ORAL at 13:04

## 2023-10-25 RX ADMIN — OXYCODONE HYDROCHLORIDE 10 MG: 5 TABLET ORAL at 07:57

## 2023-10-25 RX ADMIN — CYCLOBENZAPRINE 5 MG: 10 TABLET, FILM COATED ORAL at 13:18

## 2023-10-25 RX ADMIN — METOPROLOL TARTRATE 50 MG: 50 TABLET, FILM COATED ORAL at 13:04

## 2023-10-25 RX ADMIN — Medication 1 TABLET: at 13:04

## 2023-10-25 RX ADMIN — Medication 1 TABLET: at 09:00

## 2023-10-25 RX ADMIN — EPOETIN ALFA 10000 UNITS: 10000 SOLUTION INTRAVENOUS; SUBCUTANEOUS at 21:45

## 2023-10-25 RX ADMIN — OXYCODONE HYDROCHLORIDE 10 MG: 5 TABLET ORAL at 15:34

## 2023-10-25 RX ADMIN — OXYCODONE HYDROCHLORIDE 10 MG: 5 TABLET ORAL at 21:46

## 2023-10-25 RX ADMIN — PARICALCITOL 8 MCG: 5 INJECTION, SOLUTION INTRAVENOUS at 15:34

## 2023-10-25 ASSESSMENT — PAIN SCALES - GENERAL
PAINLEVEL_OUTOF10: 0 - NO PAIN
PAINLEVEL_OUTOF10: 8
PAINLEVEL_OUTOF10: 6

## 2023-10-25 NOTE — PROGRESS NOTES
VASCULAR SURGERY PROGRESS NOTE  Subjective   No acute events overnight.    Objective   Vitals:    10/25/23 0707   BP:    Pulse: 58   Resp:    Temp: 36 °C (96.8 °F)   SpO2:       Exam:  Constitutional: No acute distress, resting comfortably  Neuro:  AOx3, grossly intact  ENMT: moist mucous membranes  CV: no tachycardia  Pulm: non-labored on room air  GI: soft, non-tender, non-distended  Skin: warm and dry  Musculoskeletal: moving all extremities  Extremities: L groin wound vac in place with adequate seal. 2 additional incisions with sutures intact, c/d/I.    Labs:  No results found for this or any previous visit (from the past 24 hour(s)).           Assessment/Plan   Nghia Hall is 58 y.o. male with history of ESRD s/p DDKT (2006, subsequent graft failure 2021) on HD MWF, pAfib (on Eliquis), HFpEF, HCV, gout, lymphadenopthy, adrenal insufficiency (on steroids) now POD 2L femoral AVG explant.    -10/14: Dressing changed at bedside; tolerated well; now will do saline-soaked Kerlix instead of Betadine  10/24: Wound vac placed to left groin wound; wound measures 18 cm x 2 cm x 1 cm    Plan:  - Twice weekly wound vac changes with wound care. Please consult wound care team for subsequent vac changes and homegoing wound vac orders  - Continue antibiotics per primary team.  -Outpatient follow up with vascular surgery for wound check has been scheduled      AMARIS Hartley-CNP  Vascular Surgery  Team Pager [phone]

## 2023-10-25 NOTE — CARE PLAN
The patient's goals for the shift include      The clinical goals for the shift include LLE dressing to remain CDI    Problem: Pain  Goal: Takes deep breaths with improved pain control throughout the shift  Outcome: Not Progressing  Goal: Turns in bed with improved pain control throughout the shift  Outcome: Not Progressing  Goal: Walks with improved pain control throughout the shift  Outcome: Not Progressing  Goal: Performs ADL's with improved pain control throughout shift  Outcome: Not Progressing  Goal: Participates in PT with improved pain control throughout the shift  Outcome: Not Progressing     Problem: Discharge Planning  Goal: Discharge to home or other facility with appropriate resources  Outcome: Not Progressing     Problem: Chronic Conditions and Co-morbidities  Goal: Patient's chronic conditions and co-morbidity symptoms are monitored and maintained or improved  Outcome: Not Progressing     Problem: Dialysis  Goal: I will slow progression of end-stage renal disease through participating in dialysis 3 times a week  Outcome: Not Progressing

## 2023-10-25 NOTE — PROGRESS NOTES
..                                                                                                                                                                                                                                                                                             INTERNAL MEDICINE PROGRESS NOTE     BRIEF NARRATIVE      Nghia Hall is a 58 y.o. male on day 16 of admission presenting with Inability to walk. Patient has past medical history of ESRD s/p DDKT (2006, subsequent graft failure 07/2021, on HD MWF at [facility] through LT groin AV graft on [address]; not currently on immunosuppression), pAfib (on Apixaban), HFpEF, HCV (s/p treatment), gout, lymphadenopathy (following with oncology, inconclusive biopsy in 01/2023), DVT, and Adrenal Insufficiency (on hydrocortisone) who presented to the ED with difficulty ambulating due to secondary LLE pain and swelling. His left leg (which is also used for his HD access) is severely edematous and exquisitely tender to palpation. He reports being compliant with his Eliquis. The edema is non dependent. He is not interested in placement at this time due to being employed.  Nephrology was consulted for HD. Vascular surgery was also consulted to evaluate left AVG.  On 10/12 patient dialysis access started oozing bright red blood, reached out to vascular surgery for urgent evaluation. Vascular surgery saw the patient and did not feel that there was any surgical interventions to offer. CTA a/p and LLE doppler were performed on 10/12/23, consulted IR for fistulogram and cardiology for right HF recommendations. IR performed fistulogram on 10/13 which showed  mild/moderate venous anastomotic stenosis, resolved s/p  7 mm POBA.  Interventional radiology did not feel stenosis would explain drainage or swelling.  Cardiology also evaluated patient on 10/13 and recommended outpatient follow-up. 10/13 Blood cultures 2/4 started growing GPC (patient was already on Vanc  for 2 for 2 days prior to positive bcx), ID was consulted.  Infectious disease is concerned that patient's left AVG is infected.  Vascular surgery reconsulted.  PET CT scan obtained to further clarify if AVG infected.  PET/CT scan did indicate concern for infection of the area.  In addition vascular surgery concern for graft blowout.  They have recommended excision of AVG which was removed on 10/22.  Patient noted to have murky fluid identified around midpoint of graft.  Graft and fluid swab sent for culture. New right femoral temporary HD catheter was placed on 10/20/23     SUBJECTIVE     Pt seen and examined today, patient dressing was bleeding again last night.  Patient scheduled for hemodialysis today.  Denies fever, chill no other complaints    OBJECTIVE      Visit Vitals  /84   Pulse 58   Temp 36 °C (96.8 °F)   Resp 11        Intake/Output Summary (Last 24 hours) at 10/25/2023 1138  Last data filed at 10/25/2023 0707  Gross per 24 hour   Intake 4700 ml   Output --   Net 4700 ml       Physical Exam   General: Lying in bed in dialysis center.  Cooperative.  Skin: Superficial skin split in his left thigh near his AVG site.  HEENT: Sclera is white.  Mucous membranes moist.  Neck: Supple.  No JVD.  Cardiac: Regular rate and rhythm, S1/S2 normal.  Lungs: Clear to auscultation bilaterally, no wheezing or crackles, no accessory muscle use at rest.  Abdomen: Soft, nontender, mild to moderately obese abdomen, BS +  Extremities: No cyanosis.  Mild edema noted in upper extremity and right lower extremity, significant 3+ edema in left lower extremity.  Edema in left lower leg does not appear to have changed much.  Currently no active bleeding from left groin area.  Neurologic: Alert and oriented x3.  No focal deficits.  Psychiatric: Currently no agitation    Current Meds   amLODIPine, 10 mg, oral, Daily  [Held by provider] apixaban, 5 mg, oral, BID  [Held by provider] aspirin, 81 mg, oral, Daily  B complex-vitamin  C-folic acid, 1 capsule, oral, Daily  ceFAZolin, 1 g, intravenous, Daily  cyanocobalamin, 1,000 mcg, oral, Daily  epoetin urmila or biosimilar, 10,000 Units, intravenous, Every Mon/Wed/Fri  hydrocortisone, 15 mg, oral, q AM  hydrocortisone, 5 mg, oral, q PM  lactobacillus acidophilus, 1 tablet, oral, Daily  melatonin, 10 mg, oral, Daily  metoprolol tartrate, 50 mg, oral, BID  multivitamin with minerals, 1 tablet, oral, Daily  pantoprazole, 40 mg, oral, Daily before breakfast  paricalcitoL, 8 mcg, intravenous, Once per day on Mon Wed Fri  psyllium, 1 packet, oral, Daily  sevelamer carbonate, 1,600 mg, oral, TID with meals       PRN medications: acetaminophen **OR** acetaminophen **OR** acetaminophen, cyclobenzaprine, diphenhydrAMINE, diphenhydrAMINE, lubricating eye drops, melatonin, oxyCODONE     LABS and IMAGING     WBC   Date Value Ref Range Status   10/24/2023 7.7 4.4 - 11.3 x10*3/uL Final     Hemoglobin   Date Value Ref Range Status   10/24/2023 10.2 (L) 13.5 - 17.5 g/dL Final     Hematocrit   Date Value Ref Range Status   10/24/2023 34.0 (L) 41.0 - 52.0 % Final     Bicarbonate   Date Value Ref Range Status   10/24/2023 23 21 - 32 mmol/L Final     Creatinine   Date Value Ref Range Status   10/24/2023 5.04 (H) 0.50 - 1.30 mg/dL Final     Calcium   Date Value Ref Range Status   10/24/2023 8.3 (L) 8.6 - 10.6 mg/dL Final       ASSESSMENT / PLANS      LLE edema and pain   Ambulatory dysfunction  Left femoral access hemorrhage with superficial skin split  Presented with worsening left leg edema and pain affecting patient ambulation. Patient does not want placement.  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  On review of some previous ultrasounds did not see mention of this but there is a large gap of time in between.  Discussed this briefly with the hematology/oncology fellow, who states that since not acute and this is chronic chronic would not  call this Eliquis failure.  Unknown how long the thrombus has been there.  Discussed with hematology/oncology fellow on 10/17 and recommending continuing Eliquis for now and outpatient follow-up with hematology.  -There appears to be a superficial skin split in his thigh where his AVG is located.  This is where he seems to be oozing blood intermittently, including outside dialysis.  Continue with pressure dressing.  Vascular surgery has recommended not to access the left AVG anymore.  Vascular surgery indicates concern for possible blowout.  Patient has been recommended for excision of AVG 10/22/23.  Currently still holding Eliquis and aspirin.    -Tylenol, Oxycodone and flexeril for pain.  -Vascular surgery following.  -IR was consulted, performed fistulogram on 10/13  which showed mild/moderate venous anastomotic stenosis, resolved s/p  7 mm POBA.  IR also placed a tunneled dialysis catheter in the right groin on 10/20.  -Ultrasound of the liver shows a cirrhotic looking liver.  Discussed with hepatology team, they recommend at this time imaging alone not enough to confirm cirrhosis.  They recommend nothing to do inpatient and just have him follow-up with them outpatient.  -Suspect primary reason for left lower extremity swelling at this time is combination of acute infection and generalized body edema from ESRD and anuric with history of chronic diastolic congestive heart failure, acute infection, and possibly some contribution from chronic thrombus.  -Inpatient and outpatient wound care consulted.  Appreciate vascular surgery recommendation    Cirrhotic looking liver on imaging  -At this time not confirmed, but suspected on imaging by CT and ultrasound.  -Discussed with hepatology team, at this time low suspicion for true cirrhosis.  They recommend outpatient follow-up.     MSSA bacteremia  LLE cellulitis   Infection of left AVG  -10/13 blood cultures ( 2 out of 4) started growing GPC in clusters.  Repeat  culture from 10/14 and 10/16 no growth to date.  Currently afebrile.  -PET/CT scan done further supports suspicion for infection of AVG.  -Transthoracic echo done on 10/9 already.  No signs of endocarditis.  -ID following.  -Initial antibiotics were vancomycin and Zosyn.  Antibiotics have been changed to renally dosed IV cefazolin on 10/17. ID recommended duration of therapy 4 weeks through 11/20   -Status post excision of left AVG on 10/22.     ESRD on HD  -MWF.  Patient makes no urine.  -Renal dosing meds   -Patient historically has refused renal diet and only wants regular diet, stating that he knows how to regulate himself.  -Electrolytes wnl   -Nephrology following    -Patient now has tunneled dialysis catheter in the right femoral for dialysis access as requested by nephrology placed on 10/20.     Chronic HFpEF  -TTE on 10/12 revealed EF 50%, with reduced RV systolic function   -Cardiology consulted, recs noted   -Daily weights  -Continue home medications.  Patient makes no urine.  For fluid removal patient is completely dialysis dependent.     History of DVT??  -On further discussion with patient he states that he does not recall being told of any thrombus in the past.  He states that he is on Eliquis for A-fib.  -US done this admission no acute DVT but question chronic thrombus of the CFV.  -Discussed with hematology fellow, they are doubting Eliquis failure, recommended to continue Eliquis and see hematology outpatient.   -For now Eliquis on hold due to concern for bleeding from groin area and plan potential surgery with vascular surgery.  Tomorrow if no further signs of bleeding will discuss with patient about restarting Eliquis.     Paroxysmal afib  -Continue home medications  -No acute issues     Adrenal insufficiency  -Continue hydrocortisone     Lymphadenopathy  -Patient followed by oncology.  Discussed with patient's oncologist Dr. Castellon, she states that at this time his previous biopsy has been  negative and she has no good evidence of  lymphoma.  No acute evaluations needed inpatient.  Can follow-up outpatient.     Disposition: Waiting for cultures and final antibiotic recommendations from infectious disease.  Monitor left leg for bleeding.  Cont pain control.  Continue wound care inpatient and outpatient    Jermaine Garcia MD

## 2023-10-25 NOTE — NURSING NOTE
Report from Sending RN:    Report From: Saray  Recent Surgery of Procedure: Yes, right tunnel cath 10/20  Baseline Level of Consciousness (LOC): A/O x 4  Oxygen Use: No  Type: none  Diabetic: No  Last BP Med Given Day of Dialysis: none  Last Pain Med Given: none  Lab Tests to be Obtained with Dialysis: No  Blood Transfusion to be Given During Dialysis: No  Available IV Access: Yes  Medications to be Administered During Dialysis: No  Continuous IV Infusion Running: No  Restraints on Currently or in the Last 24 Hours: No  Hand-Off Communication: No acute overnight or morning events; vss; Pt did take morning medications; Pt will not need labs; Celeste Apple RN.

## 2023-10-25 NOTE — PROGRESS NOTES
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    Renal transplant rejection    Kidney transplant status 12/08/2022    Kidney replaced by transplant    Personal history of immunosuppression therapy 07/04/2021    H/O immunosuppressive therapy    Personal history of other diseases of the nervous system and sense organs     History of cataract    Personal history of other diseases of urinary system 11/02/2021    Personal history of renal failure    Personal history of other endocrine, nutritional and metabolic disease 07/22/2013    History of hyperlipidemia    Type 2 diabetes mellitus without complications (CMS/[facility]) 12/08/2022    Diabetes mellitus    Urinary tract infection, site not specified 05/02/2018    Acute UTI            Current Facility-Administered Medications:     acetaminophen (Tylenol) tablet 650 mg, 650 mg, oral, q4h PRN, 650 mg at 10/17/23 1044 **OR** acetaminophen (Tylenol) oral liquid 650 mg, 650 mg, oral, q4h PRN, 650 mg at 10/13/23 1120 **OR** acetaminophen (Tylenol) suppository 650 mg, 650 mg, rectal, q4h PRN, Ben Mcbride APRN-CNP    amLODIPine (Norvasc) tablet 10 mg, 10 mg, oral, Daily, Ben Mcbride APRN-CNP, 10 mg at 10/25/23 1304    [Held by provider] apixaban (Eliquis) tablet 5 mg, 5 mg, oral, BID, AMARIS Atkinson-CNP, 5 mg at 10/11/23 2115    [Held by provider] aspirin EC tablet 81 mg, 81 mg, oral, Daily, Ben Mcbride APRN-CNP, 81 mg at 10/17/23 1044    B complex-vitamin C-folic acid (Nephrocaps) capsule 1 capsule, 1 capsule, oral, Daily, AMARIS Atkinson-CNP, 1 capsule at 10/25/23 1303    ceFAZolin in dextrose (iso-os) (Ancef) IVPB 1 g, 1 g, intravenous, Daily, Ramin Fonseca MD, Stopped at 10/25/23 1334    cyanocobalamin (Vitamin B-12) tablet 1,000  mcg, 1,000 mcg, oral, Daily, AMARIS Atkinson-CNP, 1,000 mcg at 10/25/23 1304    cyclobenzaprine (Flexeril) tablet 5 mg, 5 mg, oral, TID PRN, Ferny Grady MD, 5 mg at 10/25/23 1318    diphenhydrAMINE (BENADryl) capsule 25 mg, 25 mg, oral, q6h PRN, Ferny Grady MD, 25 mg at 10/24/23 2126    diphenhydrAMINE (BENADryl) capsule 25 mg, 25 mg, oral, q6h PRN, Jermaine Garcia MD, 25 mg at 10/22/23 2020    epoetin urmila (Epogen,Procrit) injection 10,000 Units, 10,000 Units, intravenous, Every Mon/Wed/Fri, Josette Alexander MD MPH, 10,000 Units at 10/23/23 2229    hydrocortisone (Cortef) tablet 15 mg, 15 mg, oral, q AM, AMARIS Atkinson-CNP, 15 mg at 10/25/23 1303    hydrocortisone (Cortef) tablet 5 mg, 5 mg, oral, q PM, AMARIS Atkinson-CNP, 5 mg at 10/24/23 2126    lactobacillus acidophilus tablet 1 tablet, 1 tablet, oral, Daily, AMARIS Atkinson-CNP, 1 tablet at 10/25/23 1304    lubricating eye drops ophthalmic solution 1 drop, 1 drop, Both Eyes, PRN, Jermaine Garcia MD    melatonin tablet 10 mg, 10 mg, oral, Daily, AMARIS Atkinson-CNP, 10 mg at 10/24/23 2100    melatonin tablet 3 mg, 3 mg, oral, Nightly PRN, AMARIS Atkinson-CNP, 3 mg at 10/22/23 2020    metoprolol tartrate (Lopressor) tablet 50 mg, 50 mg, oral, BID, Jermaine Garcia MD, 50 mg at 10/25/23 1304    multivitamin with minerals 1 tablet, 1 tablet, oral, Daily, CHRISTI Atkinson, 1 tablet at 10/25/23 0900    oxyCODONE (Roxicodone) immediate release tablet 10 mg, 10 mg, oral, q6h PRN, Jermaine Garcia MD, 10 mg at 10/25/23 0757    pantoprazole (ProtoNix) EC tablet 40 mg, 40 mg, oral, Daily before breakfast, AMARIS Atkinson-CNP, 40 mg at 10/25/23 1303    paricalcitoL (Zemplar) injection 8 mcg, 8 mcg, intravenous, Once per day on Mon Wed Fri, Josette Alexander MD MPH, 8 mcg at 10/23/23 0652    psyllium (Metamucil) 3.4 gram packet 1 packet, 1 packet, oral, Daily, Ben BOBO  Mcbride, APRN-CNP, 1 packet at 10/25/23 1304    sevelamer carbonate (Renvela) tablet 1,600 mg, 1,600 mg, oral, TID with meals, Ben BOBO CHRISTI Mcbride, 1,600 mg at 10/25/23 1304    Physical Exam  Physical Exam  Comfortable on HD, breathing stable      Vital signs in last 24 hours:  Temp:  [36 °C (96.8 °F)-36.6 °C (97.9 °F)] 36.6 °C (97.9 °F)  Heart Rate:  [58-72] 72  Resp:  [11-17] 11  BP: (124-127)/(84-87) 124/84         Labs:  Results from last 7 days   Lab Units 10/24/23  1002   WBC AUTO x10*3/uL 7.7   RBC AUTO x10*6/uL 3.46*   HEMOGLOBIN g/dL 10.2*   HEMATOCRIT % 34.0*     Results from last 7 days   Lab Units 10/24/23  1002   SODIUM mmol/L 136   POTASSIUM mmol/L 4.4   CHLORIDE mmol/L 99   CO2 mmol/L 23   BUN mg/dL 18   CREATININE mg/dL 5.04*   CALCIUM mg/dL 8.3*   PHOSPHORUS mg/dL 4.2   MAGNESIUM mg/dL 1.85   BILIRUBIN TOTAL mg/dL 0.7   ALT U/L <3*   AST U/L 22              Assessment/Plan   Patient seen and examined while on dialysis, recent events, labs, medications reviewed. Will follow overall management per primary team, and continue regular dialysis.    Principal Problem:    Inability to walk  Active Problems:    ESRD (end stage renal disease) (CMS/[facility])    Anticoagulant long-term use        Cortez Ngo MD  10/25/2023  2:28 PM

## 2023-10-25 NOTE — NURSING NOTE
Report to Receiving RN:    Report to: WILLARD Cintron   Time Report Called: [id number]  Hand-Off Communication: HD completed and tolerated well. No issues noted during treatment. VSS. UF removed: 2L NET. Medicated for pain with PRN, see MAR.   Complications During Treatment: No  Ultrafiltration Treatment: Yes  Medications Administered During Dialysis: Yes  Blood Products Administered During Dialysis: No  Labs Sent During Dialysis: No  Heparin Drip Rate Changes: N/A

## 2023-10-25 NOTE — CARE PLAN
Problem: Pain  Goal: Takes deep breaths with improved pain control throughout the shift  Outcome: Progressing  Goal: Turns in bed with improved pain control throughout the shift  Outcome: Progressing  Goal: Walks with improved pain control throughout the shift  Outcome: Progressing  Goal: Performs ADL's with improved pain control throughout shift  Outcome: Progressing  Goal: Participates in PT with improved pain control throughout the shift  Outcome: Progressing     Problem: Discharge Planning  Goal: Discharge to home or other facility with appropriate resources  Outcome: Progressing     Problem: Chronic Conditions and Co-morbidities  Goal: Patient's chronic conditions and co-morbidity symptoms are monitored and maintained or improved  Outcome: Progressing     Problem: Dialysis  Goal: I will slow progression of end-stage renal disease through participating in dialysis 3 times a week  Outcome: Progressing     Problem: Fall/Injury  Goal: Not fall by end of shift  Outcome: Progressing  Goal: Be free from injury by end of the shift  Outcome: Progressing  Goal: Verbalize understanding of personal risk factors for fall in the hospital  Outcome: Progressing  Goal: Verbalize understanding of risk factor reduction measures to prevent injury from fall in the home  Outcome: Progressing  Goal: Use assistive devices by end of the shift  Outcome: Progressing  Goal: Pace activities to prevent fatigue by end of the shift  Outcome: Progressing     Problem: Diabetes  Goal: Achieve decreasing blood glucose levels by end of shift  Outcome: Progressing  Goal: Increase stability of blood glucose readings by end of shift  Outcome: Progressing  Goal: Decrease in ketones present in urine by end of shift  Outcome: Progressing  Goal: Maintain electrolyte levels within acceptable range throughout shift  Outcome: Progressing  Goal: Maintain glucose levels >70mg/dl to <250mg/dl throughout shift  Outcome: Progressing  Goal: No changes in  neurological exam by end of shift  Outcome: Progressing  Goal: Learn about and adhere to nutrition recommendations by end of shift  Outcome: Progressing  Goal: Vital signs within normal range for age by end of shift  Outcome: Progressing  Goal: Increase self care and/or family involovement by end of shift  Outcome: Progressing  Goal: Receive DSME education by end of shift  Outcome: Progressing   The patient's goals for the shift include      The clinical goals for the shift include pt will tolerate wound vac this shift

## 2023-10-26 LAB
ALBUMIN SERPL BCP-MCNC: 2.5 G/DL (ref 3.4–5)
ALP SERPL-CCNC: 237 U/L (ref 33–120)
ALT SERPL W P-5'-P-CCNC: <3 U/L (ref 10–52)
ANION GAP SERPL CALC-SCNC: 16 MMOL/L (ref 10–20)
AST SERPL W P-5'-P-CCNC: 25 U/L (ref 9–39)
BILIRUB SERPL-MCNC: 0.7 MG/DL (ref 0–1.2)
BUN SERPL-MCNC: 20 MG/DL (ref 6–23)
CALCIUM SERPL-MCNC: 8 MG/DL (ref 8.6–10.6)
CHLORIDE SERPL-SCNC: 99 MMOL/L (ref 98–107)
CO2 SERPL-SCNC: 25 MMOL/L (ref 21–32)
CREAT SERPL-MCNC: 4.14 MG/DL (ref 0.5–1.3)
GFR SERPL CREATININE-BSD FRML MDRD: 16 ML/MIN/1.73M*2
GLUCOSE SERPL-MCNC: 83 MG/DL (ref 74–99)
POTASSIUM SERPL-SCNC: 5 MMOL/L (ref 3.5–5.3)
PROT SERPL-MCNC: 5.3 G/DL (ref 6.4–8.2)
SODIUM SERPL-SCNC: 135 MMOL/L (ref 136–145)

## 2023-10-26 PROCEDURE — 2500000001 HC RX 250 WO HCPCS SELF ADMINISTERED DRUGS (ALT 637 FOR MEDICARE OP): Performed by: STUDENT IN AN ORGANIZED HEALTH CARE EDUCATION/TRAINING PROGRAM

## 2023-10-26 PROCEDURE — 36415 COLL VENOUS BLD VENIPUNCTURE: CPT | Performed by: STUDENT IN AN ORGANIZED HEALTH CARE EDUCATION/TRAINING PROGRAM

## 2023-10-26 PROCEDURE — 99232 SBSQ HOSP IP/OBS MODERATE 35: CPT | Performed by: STUDENT IN AN ORGANIZED HEALTH CARE EDUCATION/TRAINING PROGRAM

## 2023-10-26 PROCEDURE — 2500000005 HC RX 250 GENERAL PHARMACY W/O HCPCS: Performed by: STUDENT IN AN ORGANIZED HEALTH CARE EDUCATION/TRAINING PROGRAM

## 2023-10-26 PROCEDURE — 2500000004 HC RX 250 GENERAL PHARMACY W/ HCPCS (ALT 636 FOR OP/ED): Performed by: INTERNAL MEDICINE

## 2023-10-26 PROCEDURE — 2500000001 HC RX 250 WO HCPCS SELF ADMINISTERED DRUGS (ALT 637 FOR MEDICARE OP): Performed by: NURSE PRACTITIONER

## 2023-10-26 PROCEDURE — 94660 CPAP INITIATION&MGMT: CPT

## 2023-10-26 PROCEDURE — 1100000001 HC PRIVATE ROOM DAILY

## 2023-10-26 PROCEDURE — 2500000004 HC RX 250 GENERAL PHARMACY W/ HCPCS (ALT 636 FOR OP/ED): Performed by: NURSE PRACTITIONER

## 2023-10-26 PROCEDURE — 2500000001 HC RX 250 WO HCPCS SELF ADMINISTERED DRUGS (ALT 637 FOR MEDICARE OP): Performed by: FAMILY MEDICINE

## 2023-10-26 PROCEDURE — 80053 COMPREHEN METABOLIC PANEL: CPT | Performed by: STUDENT IN AN ORGANIZED HEALTH CARE EDUCATION/TRAINING PROGRAM

## 2023-10-26 PROCEDURE — 2500000002 HC RX 250 W HCPCS SELF ADMINISTERED DRUGS (ALT 637 FOR MEDICARE OP, ALT 636 FOR OP/ED): Performed by: NURSE PRACTITIONER

## 2023-10-26 PROCEDURE — 99232 SBSQ HOSP IP/OBS MODERATE 35: CPT | Performed by: NURSE PRACTITIONER

## 2023-10-26 RX ORDER — HEPARIN SODIUM 1000 [USP'U]/ML
2000 INJECTION, SOLUTION INTRAVENOUS; SUBCUTANEOUS 3 TIMES WEEKLY
Status: DISCONTINUED | OUTPATIENT
Start: 2023-10-27 | End: 2023-10-30

## 2023-10-26 RX ORDER — HYDROXYZINE HYDROCHLORIDE 25 MG/1
25 TABLET, FILM COATED ORAL 2 TIMES DAILY PRN
Status: DISCONTINUED | OUTPATIENT
Start: 2023-10-26 | End: 2023-10-30

## 2023-10-26 RX ORDER — GABAPENTIN 100 MG/1
100 CAPSULE ORAL NIGHTLY
Status: DISCONTINUED | OUTPATIENT
Start: 2023-10-26 | End: 2023-10-30

## 2023-10-26 RX ORDER — LIDOCAINE 560 MG/1
1 PATCH PERCUTANEOUS; TOPICAL; TRANSDERMAL DAILY
Status: DISCONTINUED | OUTPATIENT
Start: 2023-10-26 | End: 2023-11-01

## 2023-10-26 RX ADMIN — CYCLOBENZAPRINE 5 MG: 10 TABLET, FILM COATED ORAL at 20:16

## 2023-10-26 RX ADMIN — OXYCODONE HYDROCHLORIDE 10 MG: 5 TABLET ORAL at 06:48

## 2023-10-26 RX ADMIN — GABAPENTIN 100 MG: 100 CAPSULE ORAL at 21:14

## 2023-10-26 RX ADMIN — SEVELAMER CARBONATE 1600 MG: 800 TABLET, FILM COATED ORAL at 16:34

## 2023-10-26 RX ADMIN — AMLODIPINE BESYLATE 10 MG: 10 TABLET ORAL at 09:25

## 2023-10-26 RX ADMIN — MELATONIN 3 MG: 3 TAB ORAL at 20:16

## 2023-10-26 RX ADMIN — HYDROCORTISONE 15 MG: 5 TABLET ORAL at 09:33

## 2023-10-26 RX ADMIN — METOPROLOL TARTRATE 50 MG: 50 TABLET, FILM COATED ORAL at 09:26

## 2023-10-26 RX ADMIN — NEPHROCAP 1 CAPSULE: 1 CAP ORAL at 09:33

## 2023-10-26 RX ADMIN — PSYLLIUM HUSK 1 PACKET: 3.4 POWDER ORAL at 09:26

## 2023-10-26 RX ADMIN — Medication 1 TABLET: at 09:00

## 2023-10-26 RX ADMIN — CYCLOBENZAPRINE 5 MG: 10 TABLET, FILM COATED ORAL at 16:41

## 2023-10-26 RX ADMIN — LIDOCAINE 1 PATCH: 4 PATCH TOPICAL at 21:14

## 2023-10-26 RX ADMIN — DIPHENHYDRAMINE HYDROCHLORIDE 25 MG: 25 CAPSULE ORAL at 20:16

## 2023-10-26 RX ADMIN — SEVELAMER CARBONATE 1600 MG: 800 TABLET, FILM COATED ORAL at 09:26

## 2023-10-26 RX ADMIN — Medication 1 TABLET: at 09:25

## 2023-10-26 RX ADMIN — MELATONIN 10 MG: 3 TAB ORAL at 21:00

## 2023-10-26 RX ADMIN — METOPROLOL TARTRATE 50 MG: 50 TABLET, FILM COATED ORAL at 20:16

## 2023-10-26 RX ADMIN — CYCLOBENZAPRINE 5 MG: 10 TABLET, FILM COATED ORAL at 06:48

## 2023-10-26 RX ADMIN — PANTOPRAZOLE SODIUM 40 MG: 40 TABLET, DELAYED RELEASE ORAL at 06:48

## 2023-10-26 RX ADMIN — HYDROCORTISONE 5 MG: 5 TABLET ORAL at 20:16

## 2023-10-26 RX ADMIN — CYANOCOBALAMIN TAB 1000 MCG 1000 MCG: 1000 TAB at 09:25

## 2023-10-26 RX ADMIN — OXYCODONE HYDROCHLORIDE 10 MG: 5 TABLET ORAL at 16:34

## 2023-10-26 ASSESSMENT — PAIN SCALES - GENERAL
PAINLEVEL_OUTOF10: 8
PAINLEVEL_OUTOF10: 9
PAINLEVEL_OUTOF10: 9
PAINLEVEL_OUTOF10: 8

## 2023-10-26 NOTE — PROGRESS NOTES
"Nghia Hall is a 58 y.o. male on day 17 of admission presenting with Inability to walk.    Subjective   Pt resting in bed. He denies sob, n/v/d, fever, cough, chills, chest pains, soreness to lt leg graft area       Objective     Physical Exam  Vitals and nursing note reviewed.   Constitutional:       Appearance: Normal appearance.   Cardiovascular:      Rate and Rhythm: Regular rhythm.   Pulmonary:      Effort: Pulmonary effort is normal.      Breath sounds: Normal breath sounds.   Abdominal:      General: There is distension.      Comments: Non-tender to palpation   Genitourinary:     Comments: anuric  Musculoskeletal:      Right lower leg: Edema present.      Left lower leg: Edema present.      Comments: Rt leg pitting +1-2  Lt leg with 2-3 pitting   Skin:     General: Skin is warm and dry.   Neurological:      Mental Status: He is alert and oriented to person, place, and time.   Psychiatric:         Mood and Affect: Mood normal.         Behavior: Behavior normal.         Last Recorded Vitals  Blood pressure 126/71, pulse 64, temperature 36.8 °C (98.3 °F), temperature source Tympanic, resp. rate 17, height 1.854 m (6' 1\"), weight 143 kg (315 lb), SpO2 100 %.  Intake/Output last 3 Shifts:  I/O last 3 completed shifts:  In: 5300 [I.V.:4900; Other:400]  Out: 2900 [Drains:500; Other:2400]  Scheduled medications  amLODIPine, 10 mg, oral, Daily  [Held by provider] apixaban, 5 mg, oral, BID  [Held by provider] aspirin, 81 mg, oral, Daily  B complex-vitamin C-folic acid, 1 capsule, oral, Daily  ceFAZolin, 1 g, intravenous, Daily  cyanocobalamin, 1,000 mcg, oral, Daily  epoetin urmila or biosimilar, 10,000 Units, intravenous, Every Mon/Wed/Fri  hydrocortisone, 15 mg, oral, q AM  hydrocortisone, 5 mg, oral, q PM  lactobacillus acidophilus, 1 tablet, oral, Daily  melatonin, 10 mg, oral, Daily  metoprolol tartrate, 50 mg, oral, BID  multivitamin with minerals, 1 tablet, oral, Daily  pantoprazole, 40 mg, oral, Daily before " breakfast  paricalcitoL, 8 mcg, intravenous, Once per day on Mon Wed Fri  psyllium, 1 packet, oral, Daily  sevelamer carbonate, 1,600 mg, oral, TID with meals      Continuous medications     PRN medications  PRN medications: acetaminophen **OR** acetaminophen **OR** acetaminophen, cyclobenzaprine, diphenhydrAMINE, diphenhydrAMINE, lubricating eye drops, melatonin, oxyCODONE     Results for orders placed or performed during the hospital encounter of 10/09/23 (from the past 96 hour(s))   POCT GLUCOSE   Result Value Ref Range    POCT Glucose 171 (H) 74 - 99 mg/dL   POCT GLUCOSE   Result Value Ref Range    POCT Glucose 63 (L) 74 - 99 mg/dL   POCT GLUCOSE   Result Value Ref Range    POCT Glucose 100 (H) 74 - 99 mg/dL   CBC   Result Value Ref Range    WBC 7.7 4.4 - 11.3 x10*3/uL    nRBC 0.0 0.0 - 0.0 /100 WBCs    RBC 3.46 (L) 4.50 - 5.90 x10*6/uL    Hemoglobin 10.2 (L) 13.5 - 17.5 g/dL    Hematocrit 34.0 (L) 41.0 - 52.0 %    MCV 98 80 - 100 fL    MCH 29.5 26.0 - 34.0 pg    MCHC 30.0 (L) 32.0 - 36.0 g/dL    RDW 18.0 (H) 11.5 - 14.5 %    Platelets 270 150 - 450 x10*3/uL    MPV 9.4 7.5 - 11.5 fL   Comprehensive metabolic panel   Result Value Ref Range    Glucose 69 (L) 74 - 99 mg/dL    Sodium 136 136 - 145 mmol/L    Potassium 4.4 3.5 - 5.3 mmol/L    Chloride 99 98 - 107 mmol/L    Bicarbonate 23 21 - 32 mmol/L    Anion Gap 18 10 - 20 mmol/L    Urea Nitrogen 18 6 - 23 mg/dL    Creatinine 5.04 (H) 0.50 - 1.30 mg/dL    eGFR 12 (L) >60 mL/min/1.73m*2    Calcium 8.3 (L) 8.6 - 10.6 mg/dL    Albumin 2.5 (L) 3.4 - 5.0 g/dL    Alkaline Phosphatase 245 (H) 33 - 120 U/L    Total Protein 5.8 (L) 6.4 - 8.2 g/dL    AST 22 9 - 39 U/L    Bilirubin, Total 0.7 0.0 - 1.2 mg/dL    ALT <3 (L) 10 - 52 U/L   Magnesium   Result Value Ref Range    Magnesium 1.85 1.60 - 2.40 mg/dL   Phosphorus   Result Value Ref Range    Phosphorus 4.2 2.5 - 4.9 mg/dL   POCT GLUCOSE   Result Value Ref Range    POCT Glucose 64 (L) 74 - 99 mg/dL   POCT GLUCOSE   Result  Value Ref Range    POCT Glucose 119 (H) 74 - 99 mg/dL   Comprehensive metabolic panel   Result Value Ref Range    Glucose 83 74 - 99 mg/dL    Sodium 135 (L) 136 - 145 mmol/L    Potassium 5.0 3.5 - 5.3 mmol/L    Chloride 99 98 - 107 mmol/L    Bicarbonate 25 21 - 32 mmol/L    Anion Gap 16 10 - 20 mmol/L    Urea Nitrogen 20 6 - 23 mg/dL    Creatinine 4.14 (H) 0.50 - 1.30 mg/dL    eGFR 16 (L) >60 mL/min/1.73m*2    Calcium 8.0 (L) 8.6 - 10.6 mg/dL    Albumin 2.5 (L) 3.4 - 5.0 g/dL    Alkaline Phosphatase 237 (H) 33 - 120 U/L    Total Protein 5.3 (L) 6.4 - 8.2 g/dL    AST 25 9 - 39 U/L    Bilirubin, Total 0.7 0.0 - 1.2 mg/dL    ALT <3 (L) 10 - 52 U/L                      Assessment/Plan   Principal Problem:    Inability to walk  Active Problems:    ESRD (end stage renal disease) (CMS/[facility])    Anticoagulant long-term use    Tolerated hemodialysis yesterday with net fluid loss 2000cc    Is hemodynamically stable, hypervolemic on exam and has stable electrolytes . K+=5.0     Outpatient Dialysis schedule:  [facility] [place] [facility] /Dr. Mccormick     Access: rt fem tunnel cath- no issues - able to achieve   Vasc recommend not to use graft r/t concern for bleeding.      Anemia of ESRD:  epogen 10,000 units on dialysis..current hgb 10.2..will cont to monitor         CKD-MBD Phosphate Binder:B complex-vitamin C-folic acid (Nephrocaps) capsule 1 capsule daily, paricalcitoL (Zemplar) injection 8 mcg daily, sevelamer carbonate (Renvela) tablet 1,600 mg tid ac    Plan HD tomorrow with UF as tolerated     Renal diet      Please obtain daily standing wt (if possible)     Medication to be adjusted for ESRD      Patient to continue regular HD schedule while inpatient and to follow with the outpatient nephrologist at discharge              AMARIS Munoz-CNP

## 2023-10-26 NOTE — CARE PLAN
The patient's goals for the shift include      The clinical goals for the shift include patient will remain absent of falls throughout the shift    Problem: Pain  Goal: Takes deep breaths with improved pain control throughout the shift  Outcome: Not Progressing  Goal: Turns in bed with improved pain control throughout the shift  Outcome: Not Progressing  Goal: Walks with improved pain control throughout the shift  Outcome: Not Progressing  Goal: Performs ADL's with improved pain control throughout shift  Outcome: Not Progressing  Goal: Participates in PT with improved pain control throughout the shift  Outcome: Not Progressing     Problem: Discharge Planning  Goal: Discharge to home or other facility with appropriate resources  Outcome: Not Progressing     Problem: Chronic Conditions and Co-morbidities  Goal: Patient's chronic conditions and co-morbidity symptoms are monitored and maintained or improved  Outcome: Not Progressing     Problem: Dialysis  Goal: I will slow progression of end-stage renal disease through participating in dialysis 3 times a week  Outcome: Not Progressing     Problem: Fall/Injury  Goal: Not fall by end of shift  Outcome: Not Progressing  Goal: Be free from injury by end of the shift  Outcome: Not Progressing  Goal: Verbalize understanding of personal risk factors for fall in the hospital  Outcome: Not Progressing  Goal: Verbalize understanding of risk factor reduction measures to prevent injury from fall in the home  Outcome: Not Progressing  Goal: Use assistive devices by end of the shift  Outcome: Not Progressing  Goal: Pace activities to prevent fatigue by end of the shift  Outcome: Not Progressing     Problem: Diabetes  Goal: Achieve decreasing blood glucose levels by end of shift  Outcome: Not Progressing  Goal: Increase stability of blood glucose readings by end of shift  Outcome: Not Progressing  Goal: Decrease in ketones present in urine by end of shift  Outcome: Not  Progressing  Goal: Maintain electrolyte levels within acceptable range throughout shift  Outcome: Not Progressing  Goal: Maintain glucose levels >70mg/dl to <250mg/dl throughout shift  Outcome: Not Progressing  Goal: No changes in neurological exam by end of shift  Outcome: Not Progressing  Goal: Learn about and adhere to nutrition recommendations by end of shift  Outcome: Not Progressing  Goal: Vital signs within normal range for age by end of shift  Outcome: Not Progressing  Goal: Increase self care and/or family involovement by end of shift  Outcome: Not Progressing  Goal: Receive DSME education by end of shift  Outcome: Not Progressing

## 2023-10-26 NOTE — CARE PLAN
Problem: Pain  Goal: Takes deep breaths with improved pain control throughout the shift  Outcome: Progressing  Goal: Turns in bed with improved pain control throughout the shift  Outcome: Progressing  Goal: Walks with improved pain control throughout the shift  Outcome: Progressing  Goal: Performs ADL's with improved pain control throughout shift  Outcome: Progressing  Goal: Participates in PT with improved pain control throughout the shift  Outcome: Progressing     Problem: Discharge Planning  Goal: Discharge to home or other facility with appropriate resources  Outcome: Progressing     Problem: Chronic Conditions and Co-morbidities  Goal: Patient's chronic conditions and co-morbidity symptoms are monitored and maintained or improved  Outcome: Progressing     Problem: Dialysis  Goal: I will slow progression of end-stage renal disease through participating in dialysis 3 times a week  Outcome: Progressing     Problem: Fall/Injury  Goal: Not fall by end of shift  Outcome: Progressing  Goal: Be free from injury by end of the shift  Outcome: Progressing  Goal: Verbalize understanding of personal risk factors for fall in the hospital  Outcome: Progressing  Goal: Verbalize understanding of risk factor reduction measures to prevent injury from fall in the home  Outcome: Progressing  Goal: Use assistive devices by end of the shift  Outcome: Progressing  Goal: Pace activities to prevent fatigue by end of the shift  Outcome: Progressing     Problem: Diabetes  Goal: Achieve decreasing blood glucose levels by end of shift  Outcome: Progressing  Goal: Increase stability of blood glucose readings by end of shift  Outcome: Progressing  Goal: Decrease in ketones present in urine by end of shift  Outcome: Progressing  Goal: Maintain electrolyte levels within acceptable range throughout shift  Outcome: Progressing  Goal: Maintain glucose levels >70mg/dl to <250mg/dl throughout shift  Outcome: Progressing  Goal: No changes in  neurological exam by end of shift  Outcome: Progressing  Goal: Learn about and adhere to nutrition recommendations by end of shift  Outcome: Progressing  Goal: Vital signs within normal range for age by end of shift  Outcome: Progressing  Goal: Increase self care and/or family involovement by end of shift  Outcome: Progressing  Goal: Receive DSME education by end of shift  Outcome: Progressing   The patient's goals for the shift include      The clinical goals for the shift include patient will be free on new injury by end of shift

## 2023-10-26 NOTE — PROGRESS NOTES
...                                                                                                                                                                                                                                                                                             INTERNAL MEDICINE PROGRESS NOTE     BRIEF NARRATIVE      Nghia Hall is a 58 y.o. male on day 17 of admission presenting with Inability to walk. Patient has past medical history of ESRD s/p DDKT (2006, subsequent graft failure 07/2021, on HD MWF at [facility] through LT groin AV graft on [address]; not currently on immunosuppression), pAfib (on Apixaban), HFpEF, HCV (s/p treatment), gout, lymphadenopathy (following with oncology, inconclusive biopsy in 01/2023), DVT, and Adrenal Insufficiency (on hydrocortisone) who presented to the ED with difficulty ambulating due to secondary LLE pain and swelling. His left leg (which is also used for his HD access) is severely edematous and exquisitely tender to palpation. He reports being compliant with his Eliquis. The edema is non dependent. He is not interested in placement at this time due to being employed.  Nephrology was consulted for HD. Vascular surgery was also consulted to evaluate left AVG.  On 10/12 patient dialysis access started oozing bright red blood, reached out to vascular surgery for urgent evaluation. Vascular surgery saw the patient and did not feel that there was any surgical interventions to offer. CTA a/p and LLE doppler were performed on 10/12/23, consulted IR for fistulogram and cardiology for right HF recommendations. IR performed fistulogram on 10/13 which showed  mild/moderate venous anastomotic stenosis, resolved s/p  7 mm POBA.  Interventional radiology did not feel stenosis would explain drainage or swelling.  Cardiology also evaluated patient on 10/13 and recommended outpatient follow-up. 10/13 Blood cultures 2/4 started growing GPC (patient was already on Vanc  for 2 for 2 days prior to positive bcx), ID was consulted.  Infectious disease is concerned that patient's left AVG is infected.  Vascular surgery reconsulted.  PET CT scan obtained to further clarify if AVG infected.  PET/CT scan did indicate concern for infection of the area.  In addition vascular surgery concern for graft blowout.  They have recommended excision of AVG which was removed on 10/22.  Patient noted to have murky fluid identified around midpoint of graft.  Graft and fluid swab sent for culture. New right femoral temporary HD catheter was placed on 10/20/23     SUBJECTIVE     Pt seen and examined today, patient dressing was bleeding again last night.  Patient scheduled for hemodialysis today.  Denies fever, chill no other complaints    OBJECTIVE      Visit Vitals  /71 (BP Location: Left arm, Patient Position: Sitting)   Pulse 64   Temp 36.8 °C (98.3 °F) (Tympanic)   Resp 17        Intake/Output Summary (Last 24 hours) at 10/26/2023 1454  Last data filed at 10/26/2023 0900  Gross per 24 hour   Intake --   Output 450 ml   Net -450 ml       Physical Exam   General: Lying in bed in dialysis center.  Cooperative.  Skin: Superficial skin split in his left thigh near his AVG site.  HEENT: Sclera is white.  Mucous membranes moist.  Neck: Supple.  No JVD.  Cardiac: Regular rate and rhythm, S1/S2 normal.  Lungs: Clear to auscultation bilaterally, no wheezing or crackles, no accessory muscle use at rest.  Abdomen: Soft, nontender, mild to moderately obese abdomen, BS +  Extremities: No cyanosis.  Mild edema noted in upper extremity and right lower extremity, significant 3+ edema in left lower extremity.  Edema in left lower leg does not appear to have changed much.  Currently no active bleeding from left groin area.  Neurologic: Alert and oriented x3.  No focal deficits.  Psychiatric: Currently no agitation    Current Meds   amLODIPine, 10 mg, oral, Daily  [Held by provider] apixaban, 5 mg, oral, BID  [Held  by provider] aspirin, 81 mg, oral, Daily  B complex-vitamin C-folic acid, 1 capsule, oral, Daily  ceFAZolin, 1 g, intravenous, Daily  cyanocobalamin, 1,000 mcg, oral, Daily  epoetin urmila or biosimilar, 10,000 Units, intravenous, Every Mon/Wed/Fri  [START ON 10/27/2023] heparin, 2,000 Units, intra-catheter, Once per day on Mon Wed Fri  [START ON 10/27/2023] heparin, 2,000 Units, intra-catheter, Once per day on Mon Wed Fri  hydrocortisone, 15 mg, oral, q AM  hydrocortisone, 5 mg, oral, q PM  lactobacillus acidophilus, 1 tablet, oral, Daily  melatonin, 10 mg, oral, Daily  metoprolol tartrate, 50 mg, oral, BID  multivitamin with minerals, 1 tablet, oral, Daily  pantoprazole, 40 mg, oral, Daily before breakfast  paricalcitoL, 8 mcg, intravenous, Once per day on Mon Wed Fri  psyllium, 1 packet, oral, Daily  sevelamer carbonate, 1,600 mg, oral, TID with meals       PRN medications: acetaminophen **OR** acetaminophen **OR** acetaminophen, cyclobenzaprine, diphenhydrAMINE, diphenhydrAMINE, lubricating eye drops, melatonin, oxyCODONE     LABS and IMAGING     WBC   Date Value Ref Range Status   10/24/2023 7.7 4.4 - 11.3 x10*3/uL Final     Hemoglobin   Date Value Ref Range Status   10/24/2023 10.2 (L) 13.5 - 17.5 g/dL Final     Hematocrit   Date Value Ref Range Status   10/24/2023 34.0 (L) 41.0 - 52.0 % Final     Bicarbonate   Date Value Ref Range Status   10/26/2023 25 21 - 32 mmol/L Final   10/24/2023 23 21 - 32 mmol/L Final     Creatinine   Date Value Ref Range Status   10/26/2023 4.14 (H) 0.50 - 1.30 mg/dL Final   10/24/2023 5.04 (H) 0.50 - 1.30 mg/dL Final     Calcium   Date Value Ref Range Status   10/26/2023 8.0 (L) 8.6 - 10.6 mg/dL Final   10/24/2023 8.3 (L) 8.6 - 10.6 mg/dL Final       ASSESSMENT / PLANS      LLE edema and pain   Ambulatory dysfunction  Left femoral access hemorrhage with superficial skin split  Presented with worsening left leg edema and pain affecting patient ambulation. Patient does not want  placement.  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  On review of some previous ultrasounds did not see mention of this but there is a large gap of time in between.  Discussed this briefly with the hematology/oncology fellow, who states that since not acute and this is chronic chronic would not call this Eliquis failure.  Unknown how long the thrombus has been there.  Discussed with hematology/oncology fellow on 10/17 and recommending continuing Eliquis for now and outpatient follow-up with hematology.  -There appears to be a superficial skin split in his thigh where his AVG is located.  This is where he seems to be oozing blood intermittently, including outside dialysis.  Continue with pressure dressing.  Vascular surgery has recommended not to access the left AVG anymore.  Vascular surgery indicates concern for possible blowout.  Patient has been recommended for excision of AVG 10/22/23.   - Currently still holding Eliquis and aspirin.  Will resume when appropriate  -Tylenol, Oxycodone and flexeril for pain.  -Vascular surgery following.  -IR was consulted, performed fistulogram on 10/13  which showed mild/moderate venous anastomotic stenosis, resolved s/p  7 mm POBA.  IR also placed a tunneled dialysis catheter in the right groin on 10/20.  -Ultrasound of the liver shows a cirrhotic looking liver.  Discussed with hepatology team, they recommend at this time imaging alone not enough to confirm cirrhosis.  They recommend nothing to do inpatient and just have him follow-up with them outpatient.  -Suspect primary reason for left lower extremity swelling at this time is combination of acute infection and generalized body edema from ESRD and anuric with history of chronic diastolic congestive heart failure, acute infection, and possibly some contribution from chronic thrombus.  -Inpatient and outpatient wound care consulted.  Appreciate  vascular surgery recommendation    Cirrhotic looking liver on imaging  -At this time not confirmed, but suspected on imaging by CT and ultrasound.  -Discussed with hepatology team, at this time low suspicion for true cirrhosis.  They recommend outpatient follow-up.     MSSA bacteremia  LLE cellulitis   Infection of left AVG  -10/13 blood cultures ( 2 out of 4) started growing GPC in clusters.  Repeat culture from 10/14 and 10/16 no growth to date.  Currently afebrile.  -PET/CT scan done further supports suspicion for infection of AVG.  -Transthoracic echo done on 10/9 already.  No signs of endocarditis.  -ID following.  -Initial antibiotics were vancomycin and Zosyn.  Antibiotics have been changed to renally dosed IV cefazolin on 10/17. ID recommended duration of therapy 4 weeks through 11/20   -Status post excision of left AVG on 10/22.     ESRD on HD  -MWF.  Patient makes no urine.  -Renal dosing meds   -Patient historically has refused renal diet and only wants regular diet, stating that he knows how to regulate himself.  -Electrolytes wnl   -Nephrology following    -Patient now has tunneled dialysis catheter in the right femoral for dialysis access as requested by nephrology placed on 10/20.     Chronic HFpEF  -TTE on 10/12 revealed EF 50%, with reduced RV systolic function   -Cardiology consulted, recs noted   -Daily weights  -Continue home medications.  Patient makes no urine.  For fluid removal patient is completely dialysis dependent.     History of DVT??  -On further discussion with patient he states that he does not recall being told of any thrombus in the past.  He states that he is on Eliquis for A-fib.  -US done this admission no acute DVT but question chronic thrombus of the CFV.  -Discussed with hematology fellow, they are doubting Eliquis failure, recommended to continue Eliquis and see hematology outpatient.   -For now Eliquis on hold due to concern for bleeding from groin area and plan potential  surgery with vascular surgery.  Tomorrow if no further signs of bleeding will discuss with patient about restarting Eliquis.     Paroxysmal afib  -Continue home medications  -No acute issues     Adrenal insufficiency  -Continue hydrocortisone     Lymphadenopathy  -Patient followed by oncology.  Discussed with patient's oncologist Dr. Castellon, she states that at this time his previous biopsy has been negative and she has no good evidence of  lymphoma.  No acute evaluations needed inpatient.  Can follow-up outpatient.     Disposition: Waiting for cultures and final antibiotic recommendations from infectious disease.  Monitor left leg for bleeding.  Cont pain control.  Continue wound care inpatient and outpatient    Jermaine Garcia MD

## 2023-10-26 NOTE — PROGRESS NOTES
CHART UPDATE  Patient not seen today      10/22/2023 graft material culture negative  Continue treatment summarized in last note on 10/23/23  Last note recommendations shown below      #  Acute, severe and painful LLE swelling in the setting of chronic swelling (probable chronic left CFV thrombus) and MSSA left femoral AVG infection     #  L femoral AV graft MSSA infection> removed on 10/22/23     #7/13/2023 MSSA bacteremia under vancomycin treatment       10/14 and 10/16 blood cultures negative     #10/20/23 NEW right femoral HD catheter placement      10/21/2023 update:       Patient reports probable left femoral AV graft excision on 10/22/2023      10/23/2023 update:       Left femoral AV graft removed (10/22) culture remained negative        Would consider a 4 week therapy from the graft removed in setting of left CFV thrombus with MSSA bacteremia    10/25/2023 update:       ID follow-up appointment has been scheduled with Dr. Herrmann on 11/8/2023 at 2:20 PM    Recommendations  - Please continue IV cefazolin 2g-2g-3g after HD session  - Duration of therapy (4 weeks); end date 11/20    - ID follow-up appointment has been scheduled with Dr. Herrmann on 11/8/2023 at 2:20 PM  - After discharge obtain weekly CBC plus differential and CRP and fax results to Dr. Herrmann at [phone].  Dr. Lam available during transition for questions regarding ongoing treatment    Arnold Lam MD  ID attending  ID Team B, pager [phone]  NO BILLING TO BE APPLIED

## 2023-10-27 ENCOUNTER — APPOINTMENT (OUTPATIENT)
Dept: DIALYSIS | Facility: HOSPITAL | Age: 58
DRG: 252 | End: 2023-10-27
Payer: COMMERCIAL

## 2023-10-27 ENCOUNTER — APPOINTMENT (OUTPATIENT)
Dept: RADIOLOGY | Facility: HOSPITAL | Age: 58
DRG: 252 | End: 2023-10-27
Payer: COMMERCIAL

## 2023-10-27 PROCEDURE — 8010000001 HC DIALYSIS - HEMODIALYSIS PER DAY

## 2023-10-27 PROCEDURE — 2500000004 HC RX 250 GENERAL PHARMACY W/ HCPCS (ALT 636 FOR OP/ED): Performed by: INTERNAL MEDICINE

## 2023-10-27 PROCEDURE — 2500000001 HC RX 250 WO HCPCS SELF ADMINISTERED DRUGS (ALT 637 FOR MEDICARE OP): Performed by: FAMILY MEDICINE

## 2023-10-27 PROCEDURE — 2500000004 HC RX 250 GENERAL PHARMACY W/ HCPCS (ALT 636 FOR OP/ED)

## 2023-10-27 PROCEDURE — 90935 HEMODIALYSIS ONE EVALUATION: CPT | Performed by: INTERNAL MEDICINE

## 2023-10-27 PROCEDURE — 99232 SBSQ HOSP IP/OBS MODERATE 35: CPT | Performed by: STUDENT IN AN ORGANIZED HEALTH CARE EDUCATION/TRAINING PROGRAM

## 2023-10-27 PROCEDURE — 2500000001 HC RX 250 WO HCPCS SELF ADMINISTERED DRUGS (ALT 637 FOR MEDICARE OP): Performed by: NURSE PRACTITIONER

## 2023-10-27 PROCEDURE — 93970 EXTREMITY STUDY: CPT

## 2023-10-27 PROCEDURE — 1100000001 HC PRIVATE ROOM DAILY

## 2023-10-27 PROCEDURE — 2500000004 HC RX 250 GENERAL PHARMACY W/ HCPCS (ALT 636 FOR OP/ED): Performed by: NURSE PRACTITIONER

## 2023-10-27 PROCEDURE — 2500000001 HC RX 250 WO HCPCS SELF ADMINISTERED DRUGS (ALT 637 FOR MEDICARE OP): Performed by: STUDENT IN AN ORGANIZED HEALTH CARE EDUCATION/TRAINING PROGRAM

## 2023-10-27 PROCEDURE — 6350000001 HC RX 635 EPOETIN >10,000 UNITS: Mod: JZ | Performed by: INTERNAL MEDICINE

## 2023-10-27 PROCEDURE — 93971 EXTREMITY STUDY: CPT | Performed by: RADIOLOGY

## 2023-10-27 PROCEDURE — 2500000002 HC RX 250 W HCPCS SELF ADMINISTERED DRUGS (ALT 637 FOR MEDICARE OP, ALT 636 FOR OP/ED): Performed by: NURSE PRACTITIONER

## 2023-10-27 RX ORDER — HYDROMORPHONE HYDROCHLORIDE 2 MG/1
1 TABLET ORAL EVERY 4 HOURS PRN
Status: DISCONTINUED | OUTPATIENT
Start: 2023-10-27 | End: 2023-10-30

## 2023-10-27 RX ORDER — PARICALCITOL 5 UG/ML
INJECTION, SOLUTION INTRAVENOUS
Status: COMPLETED
Start: 2023-10-27 | End: 2023-10-27

## 2023-10-27 RX ADMIN — MELATONIN 3 MG: 3 TAB ORAL at 21:24

## 2023-10-27 RX ADMIN — HEPARIN SODIUM 2000 UNITS: 1000 INJECTION, SOLUTION INTRAVENOUS; SUBCUTANEOUS at 12:00

## 2023-10-27 RX ADMIN — MELATONIN 10 MG: 3 TAB ORAL at 21:26

## 2023-10-27 RX ADMIN — PARICALCITOL 8 MCG: 5 INJECTION, SOLUTION INTRAVENOUS at 09:45

## 2023-10-27 RX ADMIN — HYDROCORTISONE 5 MG: 5 TABLET ORAL at 21:23

## 2023-10-27 RX ADMIN — METOPROLOL TARTRATE 50 MG: 50 TABLET, FILM COATED ORAL at 11:40

## 2023-10-27 RX ADMIN — CYCLOBENZAPRINE 5 MG: 10 TABLET, FILM COATED ORAL at 11:38

## 2023-10-27 RX ADMIN — PANTOPRAZOLE SODIUM 40 MG: 40 TABLET, DELAYED RELEASE ORAL at 11:39

## 2023-10-27 RX ADMIN — CYCLOBENZAPRINE 5 MG: 10 TABLET, FILM COATED ORAL at 21:23

## 2023-10-27 RX ADMIN — HYDROMORPHONE HYDROCHLORIDE 1 MG: 2 TABLET ORAL at 21:23

## 2023-10-27 RX ADMIN — Medication 1 TABLET: at 11:41

## 2023-10-27 RX ADMIN — DIPHENHYDRAMINE HYDROCHLORIDE 25 MG: 25 CAPSULE ORAL at 21:23

## 2023-10-27 RX ADMIN — CEFAZOLIN SODIUM 1 G: 1 INJECTION, SOLUTION INTRAVENOUS at 11:40

## 2023-10-27 RX ADMIN — PARICALCITOL 8 MCG: 5 INJECTION, SOLUTION INTRAVENOUS at 16:00

## 2023-10-27 RX ADMIN — SEVELAMER CARBONATE 1600 MG: 800 TABLET, FILM COATED ORAL at 11:40

## 2023-10-27 RX ADMIN — OXYCODONE HYDROCHLORIDE 10 MG: 5 TABLET ORAL at 11:38

## 2023-10-27 RX ADMIN — METOPROLOL TARTRATE 50 MG: 50 TABLET, FILM COATED ORAL at 21:23

## 2023-10-27 RX ADMIN — EPOETIN ALFA 10000 UNITS: 10000 SOLUTION INTRAVENOUS; SUBCUTANEOUS at 09:45

## 2023-10-27 RX ADMIN — CYANOCOBALAMIN TAB 1000 MCG 1000 MCG: 1000 TAB at 11:38

## 2023-10-27 RX ADMIN — SEVELAMER CARBONATE 1600 MG: 800 TABLET, FILM COATED ORAL at 17:03

## 2023-10-27 RX ADMIN — Medication 1 TABLET: at 11:39

## 2023-10-27 RX ADMIN — AMLODIPINE BESYLATE 10 MG: 10 TABLET ORAL at 11:38

## 2023-10-27 RX ADMIN — NEPHROCAP 1 CAPSULE: 1 CAP ORAL at 11:40

## 2023-10-27 RX ADMIN — ACETAMINOPHEN 650 MG: 325 TABLET ORAL at 11:40

## 2023-10-27 RX ADMIN — HYDROMORPHONE HYDROCHLORIDE 1 MG: 2 TABLET ORAL at 17:03

## 2023-10-27 RX ADMIN — GABAPENTIN 100 MG: 100 CAPSULE ORAL at 21:23

## 2023-10-27 RX ADMIN — PSYLLIUM HUSK 1 PACKET: 3.4 POWDER ORAL at 09:00

## 2023-10-27 ASSESSMENT — PAIN - FUNCTIONAL ASSESSMENT
PAIN_FUNCTIONAL_ASSESSMENT: NO/DENIES PAIN
PAIN_FUNCTIONAL_ASSESSMENT: 0-10

## 2023-10-27 ASSESSMENT — PAIN SCALES - GENERAL
PAINLEVEL_OUTOF10: 0 - NO PAIN
PAINLEVEL_OUTOF10: 9
PAINLEVEL_OUTOF10: 9
PAINLEVEL_OUTOF10: 8
PAINLEVEL_OUTOF10: 7
PAINLEVEL_OUTOF10: 9
PAINLEVEL_OUTOF10: 0 - NO PAIN

## 2023-10-27 ASSESSMENT — PAIN DESCRIPTION - DESCRIPTORS: DESCRIPTORS: ACHING

## 2023-10-27 NOTE — NURSING NOTE
Report from Sending RN:    Report From: WILLARD Myers  Recent Surgery of Procedure: Yes, graft removal  Baseline Level of Consciousness (LOC): x4  Oxygen Use: No  Type:   Diabetic: No  Last BP Med Given Day of Dialysis:   Last Pain Med Given:   Lab Tests to be Obtained with Dialysis: No  Blood Transfusion to be Given During Dialysis: No  Available IV Access: Yes  Medications to be Administered During Dialysis: No  Continuous IV Infusion Running: No  Restraints on Currently or in the Last 24 Hours: No  Hand-Off Communication:   Patient complaint pain at graft removal site.  Patient will come in bed.

## 2023-10-27 NOTE — NURSING NOTE
.Report to Receiving RN:    Report To: WILLARD Alvarez  Time Report Called: 1050  Hand-Off Communication: Pt tolerated HD well. Fluid removal 3.5L.  Complications During Treatment: No  Ultrafiltration Treatment: No  Medications Administered During Dialysis: Yes, Epogen 10,000 unit, Zemplar 8mcg  Blood Products Administered During Dialysis: No  Labs Sent During Dialysis: No  Heparin Drip Rate Changes: No      Last Updated: 10:48 AM by SANJAY GALLO

## 2023-10-27 NOTE — PROGRESS NOTES
10/27/23 1250   Wound 10/22/23 Leg Left;Proximal;Upper;Anterior   Date First Assessed/Time First Assessed: 10/22/23 0912   Location: Leg  Wound Location Orientation: Left;Proximal;Upper;Anterior   Wound Image Images linked

## 2023-10-27 NOTE — PROGRESS NOTES
VASCULAR SURGERY PROGRESS NOTE  Subjective   No acute events overnight.    Objective   Vitals:    10/27/23 0624   BP:    Pulse: 64   Resp:    Temp: 36.4 °C (97.5 °F)   SpO2:       Exam:  Constitutional: No acute distress, resting comfortably in bed  Neuro:  AOx3, grossly intact  ENMT: moist mucous membranes  CV: no tachycardia  Pulm: non-labored on room air  GI: soft, non-tender, non-distended  Skin: warm and dry  Musculoskeletal: moving all extremities  Extremities: L groin wound vac in place maintaining  adequate seal. 2 additional incisions with sutures intact, c/d/I.    Labs:  No results found for this or any previous visit (from the past 24 hour(s)).          Assessment/Plan   Nghia Hall is 58 y.o. male with history of ESRD s/p DDKT (2006, subsequent graft failure 2021) on HD MWF, pAfib (on Eliquis), HFpEF, HCV, gout, lymphadenopthy, adrenal insufficiency (on steroids) now POD 2L femoral AVG explant.    -10/14: Dressing changed at bedside; tolerated well; now will do saline-soaked Kerlix instead of Betadine  10/24: Wound vac placed to left groin wound; wound measures 18 cm x 2 cm x 1 cm    Plan:  - Twice weekly wound vac changes with wound care (plan for wound vac change today)  - Continue antibiotics per primary team.  -Outpatient follow up with vascular surgery for wound check has been scheduled      AMARIS Hartley-CNP  Vascular Surgery  Team Pager [phone]

## 2023-10-27 NOTE — CARE PLAN
Problem: Pain  Goal: Performs ADL's with improved pain control throughout shift  Outcome: Progressing     Problem: Chronic Conditions and Co-morbidities  Goal: Patient's chronic conditions and co-morbidity symptoms are monitored and maintained or improved  Outcome: Progressing     Problem: Fall/Injury  Goal: Verbalize understanding of risk factor reduction measures to prevent injury from fall in the home  Outcome: Progressing

## 2023-10-27 NOTE — PROGRESS NOTES
....                                                                                                                                                                                                                                                                                             INTERNAL MEDICINE PROGRESS NOTE     BRIEF NARRATIVE      Nghia Hall is a 58 y.o. male on day 18 of admission presenting with Inability to walk. Patient has past medical history of ESRD s/p DDKT (2006, subsequent graft failure 07/2021, on HD MWF at [facility] through LT groin AV graft on [address]; not currently on immunosuppression), pAfib (on Apixaban), HFpEF, HCV (s/p treatment), gout, lymphadenopathy (following with oncology, inconclusive biopsy in 01/2023), DVT, and Adrenal Insufficiency (on hydrocortisone) who presented to the ED with difficulty ambulating due to secondary LLE pain and swelling. His left leg (which is also used for his HD access) is severely edematous and exquisitely tender to palpation. He reports being compliant with his Eliquis. The edema is non dependent. He is not interested in placement at this time due to being employed.  Nephrology was consulted for HD. Vascular surgery was also consulted to evaluate left AVG.  On 10/12 patient dialysis access started oozing bright red blood, reached out to vascular surgery for urgent evaluation. Vascular surgery saw the patient and did not feel that there was any surgical interventions to offer. CTA a/p and LLE doppler were performed on 10/12/23, consulted IR for fistulogram and cardiology for right HF recommendations. IR performed fistulogram on 10/13 which showed  mild/moderate venous anastomotic stenosis, resolved s/p  7 mm POBA.  Interventional radiology did not feel stenosis would explain drainage or swelling.  Cardiology also evaluated patient on 10/13 and recommended outpatient follow-up. 10/13 Blood cultures 2/4 started growing GPC (patient was already on Vanc  for 2 for 2 days prior to positive bcx), ID was consulted.  Infectious disease is concerned that patient's left AVG is infected.  Vascular surgery reconsulted.  PET CT scan obtained to further clarify if AVG infected.  PET/CT scan did indicate concern for infection of the area.  In addition vascular surgery concern for graft blowout.  They have recommended excision of AVG which was removed on 10/22.  Patient noted to have murky fluid identified around midpoint of graft.  Graft and fluid swab sent for culture. New right femoral temporary HD catheter was placed on 10/20/23     SUBJECTIVE     Pt seen and examined today, patient complained of the left eye burning and squeezing overnight.  Neurovascular CK and gabapentin and lidocaine patch were added with some coolness of the left limb.  OBJECTIVE      Visit Vitals  /85   Pulse 64   Temp 36.4 °C (97.5 °F)   Resp 15        Intake/Output Summary (Last 24 hours) at 10/27/2023 1005  Last data filed at 10/27/2023 0624  Gross per 24 hour   Intake 400 ml   Output 500 ml   Net -100 ml       Physical Exam   General: Lying in bed in dialysis center.  Cooperative.  Skin: Superficial skin split in his left thigh near his AVG site.  HEENT: Sclera is white.  Mucous membranes moist.  Neck: Supple.  No JVD.  Cardiac: Regular rate and rhythm, S1/S2 normal.  Lungs: Clear to auscultation bilaterally, no wheezing or crackles, no accessory muscle use at rest.  Abdomen: Soft, nontender, mild to moderately obese abdomen, BS +  Extremities: No cyanosis.  Mild edema noted in upper extremity and right lower extremity, significant 3+ edema in left lower extremity.  Edema in left lower leg does not appear to have changed much.  Currently no active bleeding from left groin area.  Neurologic: Alert and oriented x3.  No focal deficits.  Psychiatric: Currently no agitation    Current Meds   amLODIPine, 10 mg, oral, Daily  [Held by provider] apixaban, 5 mg, oral, BID  [Held by provider] aspirin,  81 mg, oral, Daily  B complex-vitamin C-folic acid, 1 capsule, oral, Daily  ceFAZolin, 1 g, intravenous, Daily  cyanocobalamin, 1,000 mcg, oral, Daily  epoetin urmila or biosimilar, 10,000 Units, intravenous, Every Mon/Wed/Fri  gabapentin, 100 mg, oral, Nightly  heparin, 2,000 Units, intra-catheter, Once per day on Mon Wed Fri  heparin, 2,000 Units, intra-catheter, Once per day on Mon Wed Fri  hydrocortisone, 15 mg, oral, q AM  hydrocortisone, 5 mg, oral, q PM  lactobacillus acidophilus, 1 tablet, oral, Daily  lidocaine, 1 patch, transdermal, Daily  melatonin, 10 mg, oral, Daily  metoprolol tartrate, 50 mg, oral, BID  multivitamin with minerals, 1 tablet, oral, Daily  pantoprazole, 40 mg, oral, Daily before breakfast  paricalcitoL, 8 mcg, intravenous, Once per day on Mon Wed Fri  psyllium, 1 packet, oral, Daily  sevelamer carbonate, 1,600 mg, oral, TID with meals       PRN medications: acetaminophen **OR** acetaminophen **OR** acetaminophen, cyclobenzaprine, diphenhydrAMINE, diphenhydrAMINE, hydrOXYzine HCL, lubricating eye drops, melatonin, oxyCODONE     LABS and IMAGING     Bicarbonate   Date Value Ref Range Status   10/26/2023 25 21 - 32 mmol/L Final     Creatinine   Date Value Ref Range Status   10/26/2023 4.14 (H) 0.50 - 1.30 mg/dL Final     Calcium   Date Value Ref Range Status   10/26/2023 8.0 (L) 8.6 - 10.6 mg/dL Final       ASSESSMENT / PLANS      LLE edema and pain   Ambulatory dysfunction  Left femoral access hemorrhage with superficial skin split  Presented with worsening left leg edema and pain affecting patient ambulation. Patient does not want placement.  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  On review of some previous ultrasounds did not see mention of this but there is a large gap of time in between.  Discussed this briefly with the hematology/oncology fellow, who states that since not acute and this is chronic chronic  would not call this Eliquis failure.  Unknown how long the thrombus has been there.  Discussed with hematology/oncology fellow on 10/17 and recommending continuing Eliquis for now and outpatient follow-up with hematology.  -There appears to be a superficial skin split in his thigh where his AVG is located.  This is where he seems to be oozing blood intermittently, including outside dialysis.  Continue with pressure dressing.  Vascular surgery has recommended not to access the left AVG anymore.  Vascular surgery indicates concern for possible blowout.  Patient has been recommended for excision of AVG 10/22/23.   - Currently still holding Eliquis and aspirin.  Will resume when appropriate  -Tylenol, Oxycodone and flexeril for pain.  -Vascular surgery following.  -IR was consulted, performed fistulogram on 10/13  which showed mild/moderate venous anastomotic stenosis, resolved s/p  7 mm POBA.  IR also placed a tunneled dialysis catheter in the right groin on 10/20.  -Vascular surgery placed a wound VAC to the left groin on 10/24.  Wound care team consulted for VAc management  -On 10/27, patient started complaining of worsening burning sensation of the left eye and squeezing.  Gabapentin was added for nerve pain control  Physical exam there is some coolness of the left limb.  Vascular surgery was informed  -Ultrasound of the liver shows a cirrhotic looking liver.  Discussed with hepatology team, they recommend at this time imaging alone not enough to confirm cirrhosis.  They recommend nothing to do inpatient and just have him follow-up with them outpatient.  -Inpatient and outpatient wound care consulted.  Appreciate vascular surgery recommendation    Cirrhotic looking liver on imaging  -At this time not confirmed, but suspected on imaging by CT and ultrasound.  -Discussed with hepatology team, at this time low suspicion for true cirrhosis.  They recommend outpatient follow-up.     MSSA bacteremia  LLE cellulitis    Infection of left AVG  -10/13 blood cultures ( 2 out of 4) started growing GPC in clusters.  Repeat culture from 10/14 and 10/16 no growth to date.  Currently afebrile.  -PET/CT scan done further supports suspicion for infection of AVG.  -Transthoracic echo done on 10/9 already.  No signs of endocarditis.  -ID following.  -Initial antibiotics were vancomycin and Zosyn.  Antibiotics have been changed to renally dosed IV cefazolin on 10/17. ID recommended duration of therapy 4 weeks through 11/20   -Status post excision of left AVG on 10/22.     ESRD on HD  -MWF.  Patient makes no urine.  -Renal dosing meds   -Patient historically has refused renal diet and only wants regular diet, stating that he knows how to regulate himself.  -Electrolytes wnl   -Nephrology following    -Patient now has tunneled dialysis catheter in the right femoral for dialysis access as requested by nephrology placed on 10/20.     Chronic HFpEF  -TTE on 10/12 revealed EF 50%, with reduced RV systolic function   -Cardiology consulted, recs noted   -Daily weights  -Continue home medications.  Patient makes no urine.  For fluid removal patient is completely dialysis dependent.     History of DVT??  -On further discussion with patient he states that he does not recall being told of any thrombus in the past.  He states that he is on Eliquis for A-fib.  -US done this admission no acute DVT but question chronic thrombus of the CFV.  -Discussed with hematology fellow, they are doubting Eliquis failure, recommended to continue Eliquis and see hematology outpatient.   -For now Eliquis on hold due to concern for bleeding from groin area and plan potential surgery with vascular surgery.  Tomorrow if no further signs of bleeding will discuss with patient about restarting Eliquis.     Paroxysmal afib  -Continue home medications  -No acute issues     Adrenal insufficiency  -Continue hydrocortisone     Lymphadenopathy  -Patient followed by oncology.   Discussed with patient's oncologist Dr. Castellon, she states that at this time his previous biopsy has been negative and she has no good evidence of  lymphoma.  No acute evaluations needed inpatient.  Can follow-up outpatient.     Disposition: Waiting for cultures and final antibiotic recommendations from infectious disease.  Monitor left leg for bleeding.  Cont pain control.  Continue wound care inpatient and outpatient    Jermaine Garcia MD

## 2023-10-27 NOTE — CONSULTS
Wound Care Consult     Visit Date: 10/27/2023      Patient Name: Nghia Hall         MRN: [id number]           YOB: 1965     Reason for Consult: wound vac dressing change     Wound Assessment:  Wound 10/17/23 Other (comment) Leg Left;Proximal;Upper;Anterior (Active)   Site Assessment Clean;Intact 10/22/23 1125   Nereida-Wound Assessment Intact 10/22/23 1125   Non-staged Wound Description Not applicable 10/17/23 1709   Drainage Description Serosanguineous 10/26/23 2100   Drainage Amount None 10/24/23 0805   Dressing Vacuum dressing 10/26/23 2100   Dressing Status Clean;Dry 10/26/23 2100       Wound 10/22/23 Leg Left;Proximal;Upper;Anterior (Active)   Wound Image   10/27/23 1250   Wound Length (cm) 2 cm 10/27/23 1357   Wound Width (cm) 11.5 cm 10/27/23 1357   Wound Surface Area (cm^2) 23 cm^2 10/27/23 1357   Wound Depth (cm) 1.5 cm 10/27/23 1357   Wound Volume (cm^3) 34.5 cm^3 10/27/23 1357   State of Healing Early/partial granulation 10/27/23 1357       Wound Team Summary Assessment:  wound vac dressing changed today without issue.     Wound Team Plan: Change vac dressing twice a week.  If patient is discharged prior to next dressing change, please disconnect VAC machine, remove foam dressing, and place machine in the soiled utility room on the unit. Call [phone] for pickup immediately upon removal.   Pack wound with wet-to moist NS kerlix and cover with a dry sterile dressing. Patient cannot leave hospital with VAC in place.      Cassandra Olivaers RN  10/27/2023  1:58 PM

## 2023-10-27 NOTE — PROGRESS NOTES
Subjective   Patient ID: Nghia Hall is a 58 y.o. male.     Evaluation of patient on dialysis at [facility] [address] [address] on 10/9/2023     Chief Complaint   Patient presents with    Leg Swelling      HPI  Seen and examined during hemodialysis. Labs and events reviewed.   Asking for 4 L UF      Subjective:   Feels OK, no c/o CP/SOB/F/C/Abd pain  No c/o related to HD     Scheduled medications  amLODIPine, 10 mg, oral, Daily  [Held by provider] apixaban, 5 mg, oral, BID  [Held by provider] aspirin, 81 mg, oral, Daily  B complex-vitamin C-folic acid, 1 capsule, oral, Daily  ceFAZolin, 1 g, intravenous, Daily  cyanocobalamin, 1,000 mcg, oral, Daily  epoetin urmila or biosimilar, 10,000 Units, intravenous, Every Mon/Wed/Fri  gabapentin, 100 mg, oral, Nightly  heparin, 2,000 Units, intra-catheter, Once per day on Mon Wed Fri  heparin, 2,000 Units, intra-catheter, Once per day on Mon Wed Fri  hydrocortisone, 15 mg, oral, q AM  hydrocortisone, 5 mg, oral, q PM  lactobacillus acidophilus, 1 tablet, oral, Daily  lidocaine, 1 patch, transdermal, Daily  melatonin, 10 mg, oral, Daily  metoprolol tartrate, 50 mg, oral, BID  multivitamin with minerals, 1 tablet, oral, Daily  pantoprazole, 40 mg, oral, Daily before breakfast  paricalcitoL, 8 mcg, intravenous, Once per day on Mon Wed Fri  psyllium, 1 packet, oral, Daily  sevelamer carbonate, 1,600 mg, oral, TID with meals      Continuous medications     PRN medications  PRN medications: acetaminophen **OR** acetaminophen **OR** acetaminophen, cyclobenzaprine, diphenhydrAMINE, diphenhydrAMINE, hydrOXYzine HCL, lubricating eye drops, melatonin, oxyCODONE     Heart Rate:  [63-68]   Temp:  [36 °C (96.8 °F)-36.7 °C (98.1 °F)]   Resp:  [15-20]   BP: (121-131)/(69-85)   SpO2:  [92 %-99 %]    Weight: 143 kg (315 lb)   Gen: alert, NAD  HEENT: NC/AT  Neck: supple, no JVD   Pulm: clear ant b/l   CVS: RRR, no rub  Abd: S/NT/ND  LE: no edema , no cyanosis    Neuro: no asterixis   Dialysis access:  RT fem tunneled cath         Results from last 7 days   Lab Units 10/26/23  0705 10/24/23  1002   SODIUM mmol/L 135* 136   POTASSIUM mmol/L 5.0 4.4   PHOSPHORUS mg/dL  --  4.2   CALCIUM mg/dL 8.0* 8.3*   CO2 mmol/L 25 23   HEMOGLOBIN g/dL  --  10.2*     Results from last 72 hours   Lab Units 10/26/23  0705   ALBUMIN g/dL 2.5*   GLUCOSE mg/dL 83          Results from last 72 hours   Lab Units 10/26/23  0705   SODIUM mmol/L 135*   POTASSIUM mmol/L 5.0   CO2 mmol/L 25   BUN mg/dL 20   CREATININE mg/dL 4.14*   CALCIUM mg/dL 8.0*        A/P  ESRD-HD admitted with infected fem AVG s/p explant and leg swelling     Plan HD per submitted orders, UF 3.5L  as tolerated  MBD - Phosphorus binder: continue with Sevelamer q AC  Anemia of CKD: EPO x 3 per week   Access: no issues // will need plans for permamnet access as o/p  BP: acceptable during treatment   Renal Diet   Daily renal MVI   Continue 3 x per week hemodialysis; SW to ensure availability of o/p HD chair at discharge

## 2023-10-28 LAB
ERYTHROCYTE [DISTWIDTH] IN BLOOD BY AUTOMATED COUNT: 18.4 % (ref 11.5–14.5)
GLUCOSE BLD MANUAL STRIP-MCNC: 111 MG/DL (ref 74–99)
GLUCOSE BLD MANUAL STRIP-MCNC: 117 MG/DL (ref 74–99)
GLUCOSE BLD MANUAL STRIP-MCNC: 62 MG/DL (ref 74–99)
HCT VFR BLD AUTO: 27.9 % (ref 41–52)
HGB BLD-MCNC: 8.5 G/DL (ref 13.5–17.5)
MCH RBC QN AUTO: 29.2 PG (ref 26–34)
MCHC RBC AUTO-ENTMCNC: 30.5 G/DL (ref 32–36)
MCV RBC AUTO: 96 FL (ref 80–100)
NRBC BLD-RTO: 0 /100 WBCS (ref 0–0)
PLATELET # BLD AUTO: 214 X10*3/UL (ref 150–450)
PMV BLD AUTO: 9.5 FL (ref 7.5–11.5)
RBC # BLD AUTO: 2.91 X10*6/UL (ref 4.5–5.9)
WBC # BLD AUTO: 9.9 X10*3/UL (ref 4.4–11.3)

## 2023-10-28 PROCEDURE — 2500000001 HC RX 250 WO HCPCS SELF ADMINISTERED DRUGS (ALT 637 FOR MEDICARE OP): Performed by: STUDENT IN AN ORGANIZED HEALTH CARE EDUCATION/TRAINING PROGRAM

## 2023-10-28 PROCEDURE — 2500000002 HC RX 250 W HCPCS SELF ADMINISTERED DRUGS (ALT 637 FOR MEDICARE OP, ALT 636 FOR OP/ED): Performed by: NURSE PRACTITIONER

## 2023-10-28 PROCEDURE — 36415 COLL VENOUS BLD VENIPUNCTURE: CPT

## 2023-10-28 PROCEDURE — 99232 SBSQ HOSP IP/OBS MODERATE 35: CPT | Performed by: STUDENT IN AN ORGANIZED HEALTH CARE EDUCATION/TRAINING PROGRAM

## 2023-10-28 PROCEDURE — 2500000001 HC RX 250 WO HCPCS SELF ADMINISTERED DRUGS (ALT 637 FOR MEDICARE OP): Performed by: FAMILY MEDICINE

## 2023-10-28 PROCEDURE — 85027 COMPLETE CBC AUTOMATED: CPT

## 2023-10-28 PROCEDURE — 94660 CPAP INITIATION&MGMT: CPT

## 2023-10-28 PROCEDURE — 1100000001 HC PRIVATE ROOM DAILY

## 2023-10-28 PROCEDURE — 2500000004 HC RX 250 GENERAL PHARMACY W/ HCPCS (ALT 636 FOR OP/ED): Performed by: NURSE PRACTITIONER

## 2023-10-28 PROCEDURE — 2500000001 HC RX 250 WO HCPCS SELF ADMINISTERED DRUGS (ALT 637 FOR MEDICARE OP): Performed by: NURSE PRACTITIONER

## 2023-10-28 PROCEDURE — 82947 ASSAY GLUCOSE BLOOD QUANT: CPT

## 2023-10-28 RX ADMIN — ASPIRIN 81 MG: 81 TABLET, COATED ORAL at 09:21

## 2023-10-28 RX ADMIN — GABAPENTIN 100 MG: 100 CAPSULE ORAL at 20:21

## 2023-10-28 RX ADMIN — HYDROCORTISONE 5 MG: 5 TABLET ORAL at 20:20

## 2023-10-28 RX ADMIN — NEPHROCAP 1 CAPSULE: 1 CAP ORAL at 09:20

## 2023-10-28 RX ADMIN — Medication 1 TABLET: at 09:26

## 2023-10-28 RX ADMIN — HYDROMORPHONE HYDROCHLORIDE 1 MG: 2 TABLET ORAL at 06:43

## 2023-10-28 RX ADMIN — PANTOPRAZOLE SODIUM 40 MG: 40 TABLET, DELAYED RELEASE ORAL at 06:37

## 2023-10-28 RX ADMIN — SEVELAMER CARBONATE 1600 MG: 800 TABLET, FILM COATED ORAL at 16:54

## 2023-10-28 RX ADMIN — HYDROCORTISONE 15 MG: 5 TABLET ORAL at 09:22

## 2023-10-28 RX ADMIN — Medication 1 TABLET: at 09:22

## 2023-10-28 RX ADMIN — CYCLOBENZAPRINE 5 MG: 10 TABLET, FILM COATED ORAL at 20:22

## 2023-10-28 RX ADMIN — PSYLLIUM HUSK 1 PACKET: 3.4 POWDER ORAL at 09:20

## 2023-10-28 RX ADMIN — CYANOCOBALAMIN TAB 1000 MCG 1000 MCG: 1000 TAB at 09:22

## 2023-10-28 RX ADMIN — OXYCODONE HYDROCHLORIDE 10 MG: 5 TABLET ORAL at 15:42

## 2023-10-28 RX ADMIN — APIXABAN 5 MG: 5 TABLET, FILM COATED ORAL at 09:22

## 2023-10-28 RX ADMIN — APIXABAN 5 MG: 5 TABLET, FILM COATED ORAL at 20:20

## 2023-10-28 RX ADMIN — OXYCODONE HYDROCHLORIDE 10 MG: 5 TABLET ORAL at 02:36

## 2023-10-28 RX ADMIN — AMLODIPINE BESYLATE 10 MG: 10 TABLET ORAL at 09:21

## 2023-10-28 RX ADMIN — HYDROMORPHONE HYDROCHLORIDE 1 MG: 2 TABLET ORAL at 20:22

## 2023-10-28 RX ADMIN — MELATONIN 10 MG: 3 TAB ORAL at 20:21

## 2023-10-28 ASSESSMENT — PAIN - FUNCTIONAL ASSESSMENT: PAIN_FUNCTIONAL_ASSESSMENT: 0-10

## 2023-10-28 ASSESSMENT — PAIN SCALES - GENERAL
PAINLEVEL_OUTOF10: 8
PAINLEVEL_OUTOF10: 8
PAINLEVEL_OUTOF10: 9
PAINLEVEL_OUTOF10: 8
PAINLEVEL_OUTOF10: 9
PAINLEVEL_OUTOF10: 9

## 2023-10-28 NOTE — PROGRESS NOTES
.....                                                                                                                                                                                                                                                                                             INTERNAL MEDICINE PROGRESS NOTE     BRIEF NARRATIVE      Nghia Hall is a 58 y.o. male on day 19 of admission presenting with Inability to walk. Patient has past medical history of ESRD s/p DDKT (2006, subsequent graft failure 07/2021, on HD MWF at [facility] through LT groin AV graft on [address]; not currently on immunosuppression), pAfib (on Apixaban), HFpEF, HCV (s/p treatment), gout, lymphadenopathy (following with oncology, inconclusive biopsy in 01/2023), DVT, and Adrenal Insufficiency (on hydrocortisone) who presented to the ED with difficulty ambulating due to secondary LLE pain and swelling. His left leg (which is also used for his HD access) is severely edematous and exquisitely tender to palpation. He reports being compliant with his Eliquis. The edema is non dependent. He is not interested in placement at this time due to being employed.  Nephrology was consulted for HD. Vascular surgery was also consulted to evaluate left AVG.  On 10/12 patient dialysis access started oozing bright red blood, reached out to vascular surgery for urgent evaluation. Vascular surgery saw the patient and did not feel that there was any surgical interventions to offer. CTA a/p and LLE doppler were performed on 10/12/23, consulted IR for fistulogram and cardiology for right HF recommendations. IR performed fistulogram on 10/13 which showed  mild/moderate venous anastomotic stenosis, resolved s/p  7 mm POBA.  Interventional radiology did not feel stenosis would explain drainage or swelling.  Cardiology also evaluated patient on 10/13 and recommended outpatient follow-up. 10/13 Blood cultures 2/4 started growing GPC (patient was already on  "Vanc for 2 for 2 days prior to positive bcx), ID was consulted.  Infectious disease is concerned that patient's left AVG is infected.  Vascular surgery reconsulted.  PET CT scan obtained to further clarify if AVG infected.  PET/CT scan did indicate concern for infection of the area.  In addition vascular surgery concern for graft blowout.  They have recommended excision of AVG which was removed on 10/22.  Patient noted to have murky fluid identified around midpoint of graft.  Graft and fluid swab sent for culture. New right femoral temporary HD catheter was placed on 10/20/23.  Wound VAC was placed to the left groin wound on 10/24.  Patient leg pain and edema had not improved since admission.  On 10/27, leg pain was worse along with significant output into wound VAC, vascular surgery recommended DVT ultrasound of the left leg.  Ultrasound was obtained and \"of the left femoral vein.  Occlusive DVT of the left distal femoral vein.  Patient is on Eliquis 5 mg twice daily      SUBJECTIVE     Pt seen and examined today, patient complained of the left eye burning and squeezing overnight, repeat DVT ultrasound of the left leg showed nonocclusive DVT of the left femoral vein.  H&H is stable  OBJECTIVE      Visit Vitals  /61 (BP Location: Right arm, Patient Position: Sitting)   Pulse 66   Temp 37.1 °C (98.8 °F) (Temporal)   Resp 18        Intake/Output Summary (Last 24 hours) at 10/28/2023 1029  Last data filed at 10/27/2023 1035  Gross per 24 hour   Intake 800 ml   Output --   Net 800 ml       Physical Exam   General: Lying in bed in dialysis center.  Cooperative.  Skin: Superficial skin split in his left thigh near his AVG site.  HEENT: Sclera is white.  Mucous membranes moist.  Neck: Supple.  No JVD.  Cardiac: Regular rate and rhythm, S1/S2 normal.  Lungs: Clear to auscultation bilaterally, no wheezing or crackles, no accessory muscle use at rest.  Abdomen: Soft, nontender, mild to moderately obese abdomen, BS " +  Extremities: No cyanosis.  Mild edema noted in upper extremity and right lower extremity, significant 3+ edema in left lower extremity.  Edema in left lower leg does not appear to have changed much.  Currently no active bleeding from left groin area.  Neurologic: Alert and oriented x3.  No focal deficits.  Psychiatric: Currently no agitation    Current Meds   amLODIPine, 10 mg, oral, Daily  apixaban, 5 mg, oral, BID  aspirin, 81 mg, oral, Daily  B complex-vitamin C-folic acid, 1 capsule, oral, Daily  ceFAZolin, 1 g, intravenous, Daily  cyanocobalamin, 1,000 mcg, oral, Daily  epoetin urmila or biosimilar, 10,000 Units, intravenous, Every Mon/Wed/Fri  gabapentin, 100 mg, oral, Nightly  heparin, 2,000 Units, intra-catheter, Once per day on Mon Wed Fri  heparin, 2,000 Units, intra-catheter, Once per day on Mon Wed Fri  hydrocortisone, 15 mg, oral, q AM  hydrocortisone, 5 mg, oral, q PM  lactobacillus acidophilus, 1 tablet, oral, Daily  lidocaine, 1 patch, transdermal, Daily  melatonin, 10 mg, oral, Daily  metoprolol tartrate, 50 mg, oral, BID  multivitamin with minerals, 1 tablet, oral, Daily  pantoprazole, 40 mg, oral, Daily before breakfast  paricalcitoL, 8 mcg, intravenous, Once per day on Mon Wed Fri  psyllium, 1 packet, oral, Daily  sevelamer carbonate, 1,600 mg, oral, TID with meals       PRN medications: acetaminophen **OR** acetaminophen **OR** acetaminophen, cyclobenzaprine, diphenhydrAMINE, diphenhydrAMINE, HYDROmorphone, hydrOXYzine HCL, lubricating eye drops, melatonin, oxyCODONE     LABS and IMAGING     WBC   Date Value Ref Range Status   10/28/2023 9.9 4.4 - 11.3 x10*3/uL Final     Hemoglobin   Date Value Ref Range Status   10/28/2023 8.5 (L) 13.5 - 17.5 g/dL Final     Hematocrit   Date Value Ref Range Status   10/28/2023 27.9 (L) 41.0 - 52.0 % Final     Bicarbonate   Date Value Ref Range Status   10/26/2023 25 21 - 32 mmol/L Final     Creatinine   Date Value Ref Range Status   10/26/2023 4.14 (H) 0.50 -  1.30 mg/dL Final     Calcium   Date Value Ref Range Status   10/26/2023 8.0 (L) 8.6 - 10.6 mg/dL Final       ASSESSMENT / PLANS      LLE edema and pain   Ambulatory dysfunction  Left femoral access hemorrhage with superficial skin split  New left femoral vein DVT  Presented with worsening left leg edema and pain affecting patient ambulation. Patient does not want placement.  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  On review of some previous ultrasounds did not see mention of this but there is a large gap of time in between.  Discussed this briefly with the hematology/oncology fellow, who states that since not acute and this is chronic chronic would not call this Eliquis failure.  Unknown how long the thrombus has been there.  Discussed with hematology/oncology fellow on 10/17 and recommending continuing Eliquis for now and outpatient follow-up with hematology.  -There appears to be a superficial skin split in his thigh where his AVG is located.  This is where he seems to be oozing blood intermittently, including outside dialysis.  Continue with pressure dressing.  Vascular surgery has recommended not to access the left AVG anymore.  Vascular surgery indicates concern for possible blowout.  Patient has been recommended for excision of AVG 10/22/23.   -Eliquis and aspirin resumed  -Tylenol, Oxycodone and flexeril for pain.  -Vascular surgery following.  -IR was consulted, performed fistulogram on 10/13  which showed mild/moderate venous anastomotic stenosis, resolved s/p  7 mm POBA.  IR also placed a tunneled dialysis catheter in the right groin on 10/20.  -Vascular surgery placed a wound VAC to the left groin on 10/24.  Wound care team consulted for VAc management  -On 10/27, patient started complaining of worsening burning sensation of the left eye and squeezing.  Gabapentin was added for nerve pain control.  Left leg ultrasound was  obtained and revealed occlusive DVT in the left distal femoral which appeared to be due.  Patient is still on Eliquis 5 mg twice daily.  Vascular surgery aware  -Ultrasound of the liver shows a cirrhotic looking liver.  Discussed with hepatology team, they recommend at this time imaging alone not enough to confirm cirrhosis.  They recommend nothing to do inpatient and just have him follow-up with them outpatient.  -Inpatient and outpatient wound care consulted.  Appreciate vascular surgery recommendation    Cirrhotic looking liver on imaging  -At this time not confirmed, but suspected on imaging by CT and ultrasound.  -Discussed with hepatology team, at this time low suspicion for true cirrhosis.  They recommend outpatient follow-up.     MSSA bacteremia  LLE cellulitis   Infection of left AVG  -10/13 blood cultures ( 2 out of 4) started growing GPC in clusters.  Repeat culture from 10/14 and 10/16 no growth to date.  Currently afebrile.  -PET/CT scan done further supports suspicion for infection of AVG.  -Transthoracic echo done on 10/9 already.  No signs of endocarditis.  -ID following.  -Initial antibiotics were vancomycin and Zosyn.  Antibiotics have been changed to renally dosed IV cefazolin on 10/17. ID recommended duration of therapy 4 weeks through 11/20   -Status post excision of left AVG on 10/22.     ESRD on HD  -MWF.  Patient makes no urine.  -Renal dosing meds   -Patient historically has refused renal diet and only wants regular diet, stating that he knows how to regulate himself.  -Electrolytes wnl   -Nephrology following    -Patient now has tunneled dialysis catheter in the right femoral for dialysis access as requested by nephrology placed on 10/20.     Chronic HFpEF  -TTE on 10/12 revealed EF 50%, with reduced RV systolic function   -Cardiology consulted, recs noted   -Daily weights  -Continue home medications.  Patient makes no urine.  For fluid removal patient is completely dialysis dependent.      History of DVT??  -On further discussion with patient he states that he does not recall being told of any thrombus in the past.  He states that he is on Eliquis for A-fib.  -US done this admission no acute DVT but question chronic thrombus of the CFV.  -Discussed with hematology fellow, they are doubting Eliquis failure, recommended to continue Eliquis and see hematology outpatient.   -For now Eliquis on hold due to concern for bleeding from groin area and plan potential surgery with vascular surgery.  Tomorrow if no further signs of bleeding will discuss with patient about restarting Eliquis.     Paroxysmal afib  -Continue home medications  -No acute issues     Adrenal insufficiency  -Continue hydrocortisone     Lymphadenopathy  -Patient followed by oncology.  Discussed with patient's oncologist Dr. Castellon, she states that at this time his previous biopsy has been negative and she has no good evidence of  lymphoma.  No acute evaluations needed inpatient.  Can follow-up outpatient.     Disposition: Left eye pain and swelling and hemorrhage still not controlled.  Repeat left leg ultrasound showed distal femoral vein DVT.  Eliquis resumed, patient currently on IV antibiotic.  Overall leg condition not improved    Jermaine Garcia MD

## 2023-10-28 NOTE — CARE PLAN
The patient's goals for the shift include      The clinical goals for the shift include pain control; dressing CDI      Problem: Pain  Goal: Performs ADL's with improved pain control throughout shift  Outcome: Progressing  Goal: Participates in PT with improved pain control throughout the shift  Outcome: Progressing     Problem: Discharge Planning  Goal: Discharge to home or other facility with appropriate resources  Outcome: Progressing     Problem: Chronic Conditions and Co-morbidities  Goal: Patient's chronic conditions and co-morbidity symptoms are monitored and maintained or improved  Outcome: Progressing     Problem: Dialysis  Goal: I will slow progression of end-stage renal disease through participating in dialysis 3 times a week  Outcome: Progressing     Problem: Fall/Injury  Goal: Verbalize understanding of personal risk factors for fall in the hospital  Outcome: Progressing  Goal: Verbalize understanding of risk factor reduction measures to prevent injury from fall in the home  Outcome: Progressing  Goal: Pace activities to prevent fatigue by end of the shift  Outcome: Progressing     Problem: Diabetes  Goal: Achieve decreasing blood glucose levels by end of shift  Outcome: Progressing  Goal: Increase stability of blood glucose readings by end of shift  Outcome: Progressing  Goal: Decrease in ketones present in urine by end of shift  Outcome: Progressing  Goal: Maintain electrolyte levels within acceptable range throughout shift  Outcome: Progressing  Goal: Maintain glucose levels >70mg/dl to <250mg/dl throughout shift  Outcome: Progressing  Goal: No changes in neurological exam by end of shift  Outcome: Progressing  Goal: Learn about and adhere to nutrition recommendations by end of shift  Outcome: Progressing  Goal: Vital signs within normal range for age by end of shift  Outcome: Progressing  Goal: Increase self care and/or family involovement by end of shift  Outcome: Progressing  Goal: Receive DSME  education by end of shift  Outcome: Progressing

## 2023-10-28 NOTE — PROGRESS NOTES
VASCULAR SURGERY PROGRESS NOTE  Subjective   DVT US showed occlusive thrombus of femoral vein. Swelling of LLE improved after ACE wrapping yesterday.     Objective   Vitals:    10/28/23 0839   BP: 104/61   Pulse: 66   Resp: 18   Temp: 37.1 °C (98.8 °F)   SpO2: 93%      Exam:  Constitutional: No acute distress, resting comfortably in bed  Neuro:  AOx3, grossly intact  ENMT: moist mucous membranes  CV: no tachycardia  Pulm: non-labored on room air  GI: soft, non-tender, non-distended  Skin: warm and dry  Musculoskeletal: moving all extremities  Extremities: L groin wound vac in place maintaining adequate seal. 2 additional incisions with sutures intact, c/d/I. LLE swollen, wrapped in ACE, improved from prior    Labs:            8.5  9.9>-----<214              27.9   135  99  20                  ----------------<83     5.0  25  4.14  Mg 1.85 Ca 8.0                   Assessment/Plan   Nghia Hall is 58 y.o. male with history of ESRD s/p DDKT (2006, subsequent graft failure 2021) on HD MWF, pAfib (on Eliquis), HFpEF, HCV, gout, lymphadenopthy, adrenal insufficiency (on steroids) now POD 6 L femoral AVG explant with L femoral DVT.    Plan:  - Twice weekly wound vac changes with wound care   - Continue antibiotics per primary team.  - Continue ACE bandage and LLE elevation  - Continue Eliquis and ASA    China Kang MD  General Surgery PGY-1  Team Pager [phone]

## 2023-10-29 LAB
GLUCOSE BLD MANUAL STRIP-MCNC: 117 MG/DL (ref 74–99)
GLUCOSE BLD MANUAL STRIP-MCNC: 41 MG/DL (ref 74–99)
GLUCOSE BLD MANUAL STRIP-MCNC: 75 MG/DL (ref 74–99)

## 2023-10-29 PROCEDURE — 2500000001 HC RX 250 WO HCPCS SELF ADMINISTERED DRUGS (ALT 637 FOR MEDICARE OP): Performed by: STUDENT IN AN ORGANIZED HEALTH CARE EDUCATION/TRAINING PROGRAM

## 2023-10-29 PROCEDURE — 2500000001 HC RX 250 WO HCPCS SELF ADMINISTERED DRUGS (ALT 637 FOR MEDICARE OP): Performed by: FAMILY MEDICINE

## 2023-10-29 PROCEDURE — 2500000004 HC RX 250 GENERAL PHARMACY W/ HCPCS (ALT 636 FOR OP/ED): Performed by: NURSE PRACTITIONER

## 2023-10-29 PROCEDURE — 99232 SBSQ HOSP IP/OBS MODERATE 35: CPT | Performed by: STUDENT IN AN ORGANIZED HEALTH CARE EDUCATION/TRAINING PROGRAM

## 2023-10-29 PROCEDURE — 2500000001 HC RX 250 WO HCPCS SELF ADMINISTERED DRUGS (ALT 637 FOR MEDICARE OP): Performed by: NURSE PRACTITIONER

## 2023-10-29 PROCEDURE — 82947 ASSAY GLUCOSE BLOOD QUANT: CPT

## 2023-10-29 PROCEDURE — 1100000001 HC PRIVATE ROOM DAILY

## 2023-10-29 PROCEDURE — 94660 CPAP INITIATION&MGMT: CPT

## 2023-10-29 PROCEDURE — 2500000002 HC RX 250 W HCPCS SELF ADMINISTERED DRUGS (ALT 637 FOR MEDICARE OP, ALT 636 FOR OP/ED): Performed by: NURSE PRACTITIONER

## 2023-10-29 RX ADMIN — SEVELAMER CARBONATE 1600 MG: 800 TABLET, FILM COATED ORAL at 12:10

## 2023-10-29 RX ADMIN — APIXABAN 5 MG: 5 TABLET, FILM COATED ORAL at 20:59

## 2023-10-29 RX ADMIN — GABAPENTIN 100 MG: 100 CAPSULE ORAL at 20:59

## 2023-10-29 RX ADMIN — HYDROCORTISONE 5 MG: 5 TABLET ORAL at 20:59

## 2023-10-29 RX ADMIN — SEVELAMER CARBONATE 1600 MG: 800 TABLET, FILM COATED ORAL at 09:37

## 2023-10-29 RX ADMIN — Medication 1 TABLET: at 09:00

## 2023-10-29 RX ADMIN — OXYCODONE HYDROCHLORIDE 10 MG: 5 TABLET ORAL at 06:23

## 2023-10-29 RX ADMIN — METOPROLOL TARTRATE 50 MG: 50 TABLET, FILM COATED ORAL at 09:37

## 2023-10-29 RX ADMIN — ASPIRIN 81 MG: 81 TABLET, COATED ORAL at 09:37

## 2023-10-29 RX ADMIN — PANTOPRAZOLE SODIUM 40 MG: 40 TABLET, DELAYED RELEASE ORAL at 06:23

## 2023-10-29 RX ADMIN — OXYCODONE HYDROCHLORIDE 10 MG: 5 TABLET ORAL at 12:11

## 2023-10-29 RX ADMIN — CYCLOBENZAPRINE 5 MG: 10 TABLET, FILM COATED ORAL at 20:59

## 2023-10-29 RX ADMIN — MELATONIN 3 MG: 3 TAB ORAL at 21:04

## 2023-10-29 RX ADMIN — CYANOCOBALAMIN TAB 1000 MCG 1000 MCG: 1000 TAB at 09:37

## 2023-10-29 RX ADMIN — HYDROCORTISONE 15 MG: 5 TABLET ORAL at 09:40

## 2023-10-29 RX ADMIN — MELATONIN 10 MG: 3 TAB ORAL at 20:59

## 2023-10-29 RX ADMIN — Medication 1 TABLET: at 09:42

## 2023-10-29 RX ADMIN — CYCLOBENZAPRINE 5 MG: 10 TABLET, FILM COATED ORAL at 09:48

## 2023-10-29 RX ADMIN — AMLODIPINE BESYLATE 10 MG: 10 TABLET ORAL at 09:37

## 2023-10-29 RX ADMIN — NEPHROCAP 1 CAPSULE: 1 CAP ORAL at 09:39

## 2023-10-29 RX ADMIN — PSYLLIUM HUSK 1 PACKET: 3.4 POWDER ORAL at 09:00

## 2023-10-29 RX ADMIN — SEVELAMER CARBONATE 1600 MG: 800 TABLET, FILM COATED ORAL at 17:02

## 2023-10-29 RX ADMIN — APIXABAN 5 MG: 5 TABLET, FILM COATED ORAL at 09:37

## 2023-10-29 ASSESSMENT — PAIN SCALES - WONG BAKER: WONGBAKER_NUMERICALRESPONSE: NO HURT

## 2023-10-29 ASSESSMENT — PAIN SCALES - GENERAL
PAINLEVEL_OUTOF10: 9
PAINLEVEL_OUTOF10: 8
PAINLEVEL_OUTOF10: 0 - NO PAIN
PAINLEVEL_OUTOF10: 0 - NO PAIN

## 2023-10-29 ASSESSMENT — PAIN - FUNCTIONAL ASSESSMENT: PAIN_FUNCTIONAL_ASSESSMENT: WONG-BAKER FACES

## 2023-10-29 NOTE — PROGRESS NOTES
......                                                                                                                                                                                                                                                                                             INTERNAL MEDICINE PROGRESS NOTE     BRIEF NARRATIVE      Nghia Hall is a 58 y.o. male on day 20 of admission presenting with Inability to walk. Patient has past medical history of ESRD s/p DDKT (2006, subsequent graft failure 07/2021, on HD MWF at [facility] through LT groin AV graft on [address]; not currently on immunosuppression), pAfib (on Apixaban), HFpEF, HCV (s/p treatment), gout, lymphadenopathy (following with oncology, inconclusive biopsy in 01/2023), DVT, and Adrenal Insufficiency (on hydrocortisone) who presented to the ED with difficulty ambulating due to secondary LLE pain and swelling. His left leg (which is also used for his HD access) is severely edematous and exquisitely tender to palpation. He reports being compliant with his Eliquis. The edema is non dependent. He is not interested in placement at this time due to being employed.  Nephrology was consulted for HD. Vascular surgery was also consulted to evaluate left AVG.  On 10/12 patient dialysis access started oozing bright red blood, reached out to vascular surgery for urgent evaluation. Vascular surgery saw the patient and did not feel that there was any surgical interventions to offer. CTA a/p and LLE doppler were performed on 10/12/23, consulted IR for fistulogram and cardiology for right HF recommendations. IR performed fistulogram on 10/13 which showed  mild/moderate venous anastomotic stenosis, resolved s/p  7 mm POBA.  Interventional radiology did not feel stenosis would explain drainage or swelling.  Cardiology also evaluated patient on 10/13 and recommended outpatient follow-up. 10/13 Blood cultures 2/4 started growing GPC (patient was already on  Vanc for 2 for 2 days prior to positive bcx), ID was consulted.  Infectious disease is concerned that patient's left AVG is infected.  Vascular surgery reconsulted.  PET CT scan obtained to further clarify if AVG infected.  PET/CT scan did indicate concern for infection of the area.  In addition vascular surgery concern for graft blowout.  They have recommended excision of AVG which was removed on 10/22.  Patient noted to have murky fluid identified around midpoint of graft.  Graft and fluid swab sent for culture. New right femoral temporary HD catheter was placed on 10/20/23.  Wound VAC was placed to the left groin wound on 10/24.  Patient leg pain and edema had not improved since admission.  On 10/27, leg pain was worse along with significant output into wound VAC, vascular surgery recommended DVT ultrasound of the left leg.  Ultrasound was obtained and showed occlusive DVT of the left distal femoral vein.  Patient is on Eliquis 5 mg twice daily      SUBJECTIVE     Pt seen and examined today, patient complained of the left eye burning and squeezing overnight, repeat DVT ultrasound of the left leg showed nonocclusive DVT of the left femoral vein.  H&H is stable    OBJECTIVE      Visit Vitals  BP 96/59 (BP Location: Left arm, Patient Position: Sitting)   Pulse 74   Temp 36.4 °C (97.5 °F) (Temporal)   Resp 17      No intake or output data in the 24 hours ending 10/29/23 1148    Physical Exam   General: Lying in bed in dialysis center.  Cooperative.  Skin: Superficial skin split in his left thigh near his AVG site.  HEENT: Sclera is white.  Mucous membranes moist.  Neck: Supple.  No JVD.  Cardiac: Regular rate and rhythm, S1/S2 normal.  Lungs: Clear to auscultation bilaterally, no wheezing or crackles, no accessory muscle use at rest.  Abdomen: Soft, nontender, mild to moderately obese abdomen, BS +  Extremities: No cyanosis.  Mild edema noted in upper extremity and right lower extremity, significant 3+ edema in left  lower extremity.  Edema in left lower leg does not appear to have changed much.  Currently no active bleeding from left groin area.  Neurologic: Alert and oriented x3.  No focal deficits.  Psychiatric: Currently no agitation    Current Meds   amLODIPine, 10 mg, oral, Daily  apixaban, 5 mg, oral, BID  aspirin, 81 mg, oral, Daily  B complex-vitamin C-folic acid, 1 capsule, oral, Daily  ceFAZolin, 1 g, intravenous, Daily  cyanocobalamin, 1,000 mcg, oral, Daily  epoetin urmila or biosimilar, 10,000 Units, intravenous, Every Mon/Wed/Fri  gabapentin, 100 mg, oral, Nightly  heparin, 2,000 Units, intra-catheter, Once per day on Mon Wed Fri  heparin, 2,000 Units, intra-catheter, Once per day on Mon Wed Fri  hydrocortisone, 15 mg, oral, q AM  hydrocortisone, 5 mg, oral, q PM  lactobacillus acidophilus, 1 tablet, oral, Daily  lidocaine, 1 patch, transdermal, Daily  melatonin, 10 mg, oral, Daily  metoprolol tartrate, 50 mg, oral, BID  multivitamin with minerals, 1 tablet, oral, Daily  pantoprazole, 40 mg, oral, Daily before breakfast  paricalcitoL, 8 mcg, intravenous, Once per day on Mon Wed Fri  psyllium, 1 packet, oral, Daily  sevelamer carbonate, 1,600 mg, oral, TID with meals       PRN medications: acetaminophen **OR** acetaminophen **OR** acetaminophen, cyclobenzaprine, diphenhydrAMINE, diphenhydrAMINE, HYDROmorphone, hydrOXYzine HCL, lubricating eye drops, melatonin, oxyCODONE     LABS and IMAGING     WBC   Date Value Ref Range Status   10/28/2023 9.9 4.4 - 11.3 x10*3/uL Final     Hemoglobin   Date Value Ref Range Status   10/28/2023 8.5 (L) 13.5 - 17.5 g/dL Final     Hematocrit   Date Value Ref Range Status   10/28/2023 27.9 (L) 41.0 - 52.0 % Final     Left lower extremity ultrasound on 10/27/2023  1. Positive study. Occlusive deep venous thrombosis in the left  distal femoral vein.  2. No deep venous thrombosis of the right lower extremity.    ASSESSMENT / PLANS      LLE edema and pain   Ambulatory dysfunction  Left  femoral access hemorrhage with superficial skin split  New left femoral vein DVT  Presented with worsening left leg edema and pain affecting patient ambulation. Patient does not want placement.  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  On review of some previous ultrasounds did not see mention of this but there is a large gap of time in between.  Discussed this briefly with the hematology/oncology fellow, who states that since not acute and this is chronic chronic would not call this Eliquis failure.  Unknown how long the thrombus has been there.  Discussed with hematology/oncology fellow on 10/17 and recommending continuing Eliquis for now and outpatient follow-up with hematology.  -There appears to be a superficial skin split in his thigh where his AVG is located.  This is where he seems to be oozing blood intermittently, including outside dialysis.  Continue with pressure dressing.  Vascular surgery has recommended not to access the left AVG anymore.  Vascular surgery indicates concern for possible blowout.  Patient has been recommended for excision of AVG 10/22/23.   -Eliquis and aspirin resumed  -Tylenol, Oxycodone and flexeril for pain.  -Vascular surgery following.  -IR was consulted, performed fistulogram on 10/13  which showed mild/moderate venous anastomotic stenosis, resolved s/p  7 mm POBA.  IR also placed a tunneled dialysis catheter in the right groin on 10/20.  -Vascular surgery placed a wound VAC to the left groin on 10/24.  Wound care team consulted for VAc management  -On 10/27, patient started complaining of worsening burning sensation of the left eye and squeezing.  Gabapentin was added for nerve pain control.  Left leg ultrasound was obtained and revealed occlusive DVT in the left distal femoral which appeared to be due.  Patient is still on Eliquis 5 mg twice daily.  Vascular surgery aware  -Inpatient and outpatient  wound care consulted.  Appreciate vascular surgery recommendation    Cirrhotic looking liver on imaging  -At this time not confirmed, but suspected on imaging by CT and ultrasound.  -Discussed with hepatology team, at this time low suspicion for true cirrhosis.  They recommend outpatient follow-up.     MSSA bacteremia  LLE cellulitis   Infection of left AVG  -10/13 blood cultures ( 2 out of 4) started growing GPC in clusters.  Repeat culture from 10/14 and 10/16 no growth to date.  Currently afebrile.  -PET/CT scan done further supports suspicion for infection of AVG.  -Transthoracic echo done on 10/9 already.  No signs of endocarditis.  -ID following.  -Initial antibiotics were vancomycin and Zosyn.  Antibiotics have been changed to renally dosed IV cefazolin on 10/17 to be administered during dialysis. ID recommended duration of therapy 4 weeks through 11/20   -Status post excision of left AVG on 10/22.     ESRD on HD  -MWF.  Patient makes no urine.  -Renal dosing meds   -Patient historically has refused renal diet and only wants regular diet, stating that he knows how to regulate himself.  -Electrolytes wnl   -Nephrology following    -Patient now has tunneled dialysis catheter in the right femoral for dialysis access as requested by nephrology placed on 10/20.     Chronic HFpEF  -TTE on 10/12 revealed EF 50%, with reduced RV systolic function   -Cardiology consulted, recs noted   -Daily weights  -Continue home medications.  Patient makes no urine.  For fluid removal patient is completely dialysis dependent.     History of DVT??  -On further discussion with patient he states that he does not recall being told of any thrombus in the past.  He states that he is on Eliquis for A-fib.  -US done this admission no acute DVT but question chronic thrombus of the CFV.  -Discussed with hematology fellow, they are doubting Eliquis failure, recommended to continue Eliquis and see hematology outpatient.   -For now Eliquis on  hold due to concern for bleeding from groin area and plan potential surgery with vascular surgery.  Tomorrow if no further signs of bleeding will discuss with patient about restarting Eliquis.     Paroxysmal afib  -Continue home medications  -No acute issues     Adrenal insufficiency  -Continue hydrocortisone     Lymphadenopathy  -Patient followed by oncology.  Discussed with patient's oncologist Dr. Castellon, she states that at this time his previous biopsy has been negative and she has no good evidence of  lymphoma.  No acute evaluations needed inpatient.  Can follow-up outpatient.     Disposition: Left eye pain and swelling and hemorrhage still not controlled.  Repeat left leg ultrasound showed distal femoral vein DVT.  Eliquis resumed, patient currently on IV antibiotic.  Overall leg condition not improved    Jermaine Garcia MD

## 2023-10-29 NOTE — CARE PLAN
Problem: Pain  Goal: Performs ADL's with improved pain control throughout shift  Outcome: Progressing  Goal: Participates in PT with improved pain control throughout the shift  Outcome: Progressing     Problem: Discharge Planning  Goal: Discharge to home or other facility with appropriate resources  Outcome: Progressing     Problem: Chronic Conditions and Co-morbidities  Goal: Patient's chronic conditions and co-morbidity symptoms are monitored and maintained or improved  Outcome: Progressing     Problem: Dialysis  Goal: I will slow progression of end-stage renal disease through participating in dialysis 3 times a week  Outcome: Progressing     Problem: Fall/Injury  Goal: Verbalize understanding of personal risk factors for fall in the hospital  Outcome: Progressing  Goal: Verbalize understanding of risk factor reduction measures to prevent injury from fall in the home  Outcome: Progressing  Goal: Pace activities to prevent fatigue by end of the shift  Outcome: Progressing     Problem: Diabetes  Goal: Achieve decreasing blood glucose levels by end of shift  Outcome: Progressing  Goal: Increase stability of blood glucose readings by end of shift  Outcome: Progressing  Goal: Decrease in ketones present in urine by end of shift  Outcome: Progressing  Goal: Maintain electrolyte levels within acceptable range throughout shift  Outcome: Progressing  Goal: Maintain glucose levels >70mg/dl to <250mg/dl throughout shift  Outcome: Progressing  Goal: No changes in neurological exam by end of shift  Outcome: Progressing  Goal: Learn about and adhere to nutrition recommendations by end of shift  Outcome: Progressing  Goal: Vital signs within normal range for age by end of shift  Outcome: Progressing  Goal: Increase self care and/or family involovement by end of shift  Outcome: Progressing  Goal: Receive DSME education by end of shift  Outcome: Progressing

## 2023-10-29 NOTE — CARE PLAN
The patient's goals for the shift include      The clinical goals for the shift include pain free

## 2023-10-30 ENCOUNTER — APPOINTMENT (OUTPATIENT)
Dept: RADIOLOGY | Facility: HOSPITAL | Age: 58
DRG: 252 | End: 2023-10-30
Payer: COMMERCIAL

## 2023-10-30 ENCOUNTER — DOCUMENTATION (OUTPATIENT)
Dept: INPATIENT UNIT | Facility: HOSPITAL | Age: 58
End: 2023-10-30

## 2023-10-30 LAB
ALBUMIN SERPL BCP-MCNC: 1.8 G/DL (ref 3.4–5)
ALBUMIN SERPL BCP-MCNC: 2.3 G/DL (ref 3.4–5)
ALP SERPL-CCNC: 218 U/L (ref 33–120)
ALP SERPL-CCNC: 295 U/L (ref 33–120)
ALT SERPL W P-5'-P-CCNC: <3 U/L (ref 10–52)
ALT SERPL W P-5'-P-CCNC: <3 U/L (ref 10–52)
ANION GAP BLDA CALCULATED.4IONS-SCNC: 15 MMO/L (ref 10–25)
ANION GAP BLDA CALCULATED.4IONS-SCNC: 16 MMO/L (ref 10–25)
ANION GAP BLDA CALCULATED.4IONS-SCNC: 18 MMO/L (ref 10–25)
ANION GAP BLDA CALCULATED.4IONS-SCNC: 22 MMO/L (ref 10–25)
ANION GAP SERPL CALC-SCNC: 20 MMOL/L (ref 10–20)
ANION GAP SERPL CALC-SCNC: 20 MMOL/L (ref 10–20)
APTT PPP: 39 SECONDS (ref 27–38)
AST SERPL W P-5'-P-CCNC: 24 U/L (ref 9–39)
AST SERPL W P-5'-P-CCNC: 27 U/L (ref 9–39)
BASE EXCESS BLDA CALC-SCNC: -11.3 MMOL/L (ref -2–3)
BASE EXCESS BLDA CALC-SCNC: -4.6 MMOL/L (ref -2–3)
BASE EXCESS BLDA CALC-SCNC: -6 MMOL/L (ref -2–3)
BASE EXCESS BLDA CALC-SCNC: -9.2 MMOL/L (ref -2–3)
BASOPHILS # BLD AUTO: 0.03 X10*3/UL (ref 0–0.1)
BASOPHILS NFR BLD AUTO: 0.5 %
BILIRUB DIRECT SERPL-MCNC: 0.9 MG/DL (ref 0–0.3)
BILIRUB SERPL-MCNC: 1.3 MG/DL (ref 0–1.2)
BILIRUB SERPL-MCNC: 1.6 MG/DL (ref 0–1.2)
BODY TEMPERATURE: 37 DEGREES CELSIUS
BODY TEMPERATURE: ABNORMAL
BUN SERPL-MCNC: 44 MG/DL (ref 6–23)
BUN SERPL-MCNC: 45 MG/DL (ref 6–23)
BURR CELLS BLD QL SMEAR: NORMAL
CA-I BLDA-SCNC: 0.98 MMOL/L (ref 1.1–1.33)
CA-I BLDA-SCNC: 0.98 MMOL/L (ref 1.1–1.33)
CA-I BLDA-SCNC: 1.04 MMOL/L (ref 1.1–1.33)
CA-I BLDA-SCNC: 1.05 MMOL/L (ref 1.1–1.33)
CALCIUM SERPL-MCNC: 7.3 MG/DL (ref 8.6–10.6)
CALCIUM SERPL-MCNC: 8.1 MG/DL (ref 8.6–10.6)
CHLORIDE BLDA-SCNC: 93 MMOL/L (ref 98–107)
CHLORIDE BLDA-SCNC: 96 MMOL/L (ref 98–107)
CHLORIDE BLDA-SCNC: 97 MMOL/L (ref 98–107)
CHLORIDE BLDA-SCNC: 98 MMOL/L (ref 98–107)
CHLORIDE SERPL-SCNC: 97 MMOL/L (ref 98–107)
CHLORIDE SERPL-SCNC: 98 MMOL/L (ref 98–107)
CK SERPL-CCNC: 235 U/L (ref 0–325)
CO2 SERPL-SCNC: 20 MMOL/L (ref 21–32)
CO2 SERPL-SCNC: 21 MMOL/L (ref 21–32)
CREAT SERPL-MCNC: 6.48 MG/DL (ref 0.5–1.3)
CREAT SERPL-MCNC: 6.74 MG/DL (ref 0.5–1.3)
EOSINOPHIL # BLD AUTO: 0.02 X10*3/UL (ref 0–0.7)
EOSINOPHIL NFR BLD AUTO: 0.4 %
ERYTHROCYTE [DISTWIDTH] IN BLOOD BY AUTOMATED COUNT: 17.7 % (ref 11.5–14.5)
ERYTHROCYTE [DISTWIDTH] IN BLOOD BY AUTOMATED COUNT: 18.3 % (ref 11.5–14.5)
GFR SERPL CREATININE-BSD FRML MDRD: 9 ML/MIN/1.73M*2
GFR SERPL CREATININE-BSD FRML MDRD: 9 ML/MIN/1.73M*2
GLUCOSE BLD MANUAL STRIP-MCNC: 122 MG/DL (ref 74–99)
GLUCOSE BLD MANUAL STRIP-MCNC: 383 MG/DL (ref 74–99)
GLUCOSE BLD MANUAL STRIP-MCNC: 41 MG/DL (ref 74–99)
GLUCOSE BLD MANUAL STRIP-MCNC: 44 MG/DL (ref 74–99)
GLUCOSE BLD MANUAL STRIP-MCNC: 50 MG/DL (ref 74–99)
GLUCOSE BLD MANUAL STRIP-MCNC: 53 MG/DL (ref 74–99)
GLUCOSE BLD MANUAL STRIP-MCNC: 56 MG/DL (ref 74–99)
GLUCOSE BLD MANUAL STRIP-MCNC: 56 MG/DL (ref 74–99)
GLUCOSE BLD MANUAL STRIP-MCNC: 73 MG/DL (ref 74–99)
GLUCOSE BLD MANUAL STRIP-MCNC: 74 MG/DL (ref 74–99)
GLUCOSE BLD MANUAL STRIP-MCNC: 85 MG/DL (ref 74–99)
GLUCOSE BLD MANUAL STRIP-MCNC: 93 MG/DL (ref 74–99)
GLUCOSE BLD MANUAL STRIP-MCNC: 98 MG/DL (ref 74–99)
GLUCOSE BLDA-MCNC: 107 MG/DL (ref 74–99)
GLUCOSE BLDA-MCNC: 44 MG/DL (ref 74–99)
GLUCOSE BLDA-MCNC: 47 MG/DL (ref 74–99)
GLUCOSE BLDA-MCNC: 76 MG/DL (ref 74–99)
GLUCOSE SERPL-MCNC: 105 MG/DL (ref 74–99)
GLUCOSE SERPL-MCNC: 37 MG/DL (ref 74–99)
HCO3 BLDA-SCNC: 14.8 MMOL/L (ref 22–26)
HCO3 BLDA-SCNC: 16.1 MMOL/L (ref 22–26)
HCO3 BLDA-SCNC: 19.6 MMOL/L (ref 22–26)
HCO3 BLDA-SCNC: 21.6 MMOL/L (ref 22–26)
HCT VFR BLD AUTO: 21.7 % (ref 41–52)
HCT VFR BLD AUTO: 29.1 % (ref 41–52)
HCT VFR BLD EST: 25 % (ref 41–52)
HCT VFR BLD EST: 26 % (ref 41–52)
HCT VFR BLD EST: 29 % (ref 41–52)
HCT VFR BLD EST: 29 % (ref 41–52)
HGB BLD-MCNC: 7.6 G/DL (ref 13.5–17.5)
HGB BLD-MCNC: 9.5 G/DL (ref 13.5–17.5)
HGB BLDA-MCNC: 8.4 G/DL (ref 13.5–17.5)
HGB BLDA-MCNC: 8.8 G/DL (ref 13.5–17.5)
HGB BLDA-MCNC: 9.7 G/DL (ref 13.5–17.5)
HGB BLDA-MCNC: 9.8 G/DL (ref 13.5–17.5)
IMM GRANULOCYTES # BLD AUTO: 0.02 X10*3/UL (ref 0–0.7)
IMM GRANULOCYTES NFR BLD AUTO: 0.4 % (ref 0–0.9)
INHALED O2 CONCENTRATION: 21 %
INHALED O2 CONCENTRATION: 40 %
INHALED O2 CONCENTRATION: 60 %
INR PPP: 3.1 (ref 0.9–1.1)
LACTATE BLDA-SCNC: 4.9 MMOL/L (ref 0.4–2)
LACTATE BLDA-SCNC: 5.6 MMOL/L (ref 0.4–2)
LACTATE BLDA-SCNC: 5.7 MMOL/L (ref 0.4–2)
LACTATE BLDA-SCNC: 5.7 MMOL/L (ref 0.4–2)
LACTATE BLDV-SCNC: 5.2 MMOL/L (ref 0.4–2)
LACTATE SERPL-SCNC: 5 MMOL/L (ref 0.4–2)
LACTATE SERPL-SCNC: 5.3 MMOL/L (ref 0.4–2)
LACTATE SERPL-SCNC: 5.4 MMOL/L (ref 0.4–2)
LACTATE SERPL-SCNC: 6.5 MMOL/L (ref 0.4–2)
LYMPHOCYTES # BLD AUTO: 0.41 X10*3/UL (ref 1.2–4.8)
LYMPHOCYTES NFR BLD AUTO: 7.2 %
MAGNESIUM SERPL-MCNC: 1.4 MG/DL (ref 1.6–2.4)
MAGNESIUM SERPL-MCNC: 1.64 MG/DL (ref 1.6–2.4)
MCH RBC QN AUTO: 29.8 PG (ref 26–34)
MCH RBC QN AUTO: 29.8 PG (ref 26–34)
MCHC RBC AUTO-ENTMCNC: 32.6 G/DL (ref 32–36)
MCHC RBC AUTO-ENTMCNC: 35 G/DL (ref 32–36)
MCV RBC AUTO: 85 FL (ref 80–100)
MCV RBC AUTO: 91 FL (ref 80–100)
MONOCYTES # BLD AUTO: 0.09 X10*3/UL (ref 0.1–1)
MONOCYTES NFR BLD AUTO: 1.6 %
NEUTROPHILS # BLD AUTO: 5.13 X10*3/UL (ref 1.2–7.7)
NEUTROPHILS NFR BLD AUTO: 89.9 %
NEUTS VAC BLD QL SMEAR: PRESENT
NRBC BLD-RTO: 0 /100 WBCS (ref 0–0)
NRBC BLD-RTO: 0.4 /100 WBCS (ref 0–0)
OVALOCYTES BLD QL SMEAR: NORMAL
OXYHGB MFR BLDA: 59.1 % (ref 94–98)
OXYHGB MFR BLDA: 78.1 % (ref 94–98)
OXYHGB MFR BLDA: 97 % (ref 94–98)
OXYHGB MFR BLDA: 97.1 % (ref 94–98)
PCO2 BLDA: 32 MM HG (ref 38–42)
PCO2 BLDA: 33 MM HG (ref 38–42)
PCO2 BLDA: 38 MM HG (ref 38–42)
PCO2 BLDA: 44 MM HG (ref 38–42)
PH BLDA: 7.26 PH (ref 7.38–7.42)
PH BLDA: 7.3 PH (ref 7.38–7.42)
PH BLDA: 7.31 PH (ref 7.38–7.42)
PH BLDA: 7.32 PH (ref 7.38–7.42)
PHOSPHATE SERPL-MCNC: 3.9 MG/DL (ref 2.5–4.9)
PHOSPHATE SERPL-MCNC: 4 MG/DL (ref 2.5–4.9)
PLATELET # BLD AUTO: 193 X10*3/UL (ref 150–450)
PLATELET # BLD AUTO: 234 X10*3/UL (ref 150–450)
PMV BLD AUTO: 10 FL (ref 7.5–11.5)
PMV BLD AUTO: 10.5 FL (ref 7.5–11.5)
PO2 BLDA: 115 MM HG (ref 85–95)
PO2 BLDA: 116 MM HG (ref 85–95)
PO2 BLDA: 42 MM HG (ref 85–95)
PO2 BLDA: 53 MM HG (ref 85–95)
POLYCHROMASIA BLD QL SMEAR: NORMAL
POTASSIUM BLDA-SCNC: 4.7 MMOL/L (ref 3.5–5.3)
POTASSIUM BLDA-SCNC: 4.9 MMOL/L (ref 3.5–5.3)
POTASSIUM BLDA-SCNC: 5.6 MMOL/L (ref 3.5–5.3)
POTASSIUM BLDA-SCNC: 5.7 MMOL/L (ref 3.5–5.3)
POTASSIUM SERPL-SCNC: 4.8 MMOL/L (ref 3.5–5.3)
POTASSIUM SERPL-SCNC: 5.1 MMOL/L (ref 3.5–5.3)
PROT SERPL-MCNC: 4.3 G/DL (ref 6.4–8.2)
PROT SERPL-MCNC: 5.4 G/DL (ref 6.4–8.2)
PROTHROMBIN TIME: 34.8 SECONDS (ref 9.8–12.8)
RBC # BLD AUTO: 2.55 X10*6/UL (ref 4.5–5.9)
RBC # BLD AUTO: 3.19 X10*6/UL (ref 4.5–5.9)
RBC MORPH BLD: NORMAL
SAO2 % BLDA: 60 % (ref 94–100)
SAO2 % BLDA: 80 % (ref 94–100)
SAO2 % BLDA: 99 % (ref 94–100)
SAO2 % BLDA: 99 % (ref 94–100)
SODIUM BLDA-SCNC: 124 MMOL/L (ref 136–145)
SODIUM BLDA-SCNC: 124 MMOL/L (ref 136–145)
SODIUM BLDA-SCNC: 128 MMOL/L (ref 136–145)
SODIUM BLDA-SCNC: 130 MMOL/L (ref 136–145)
SODIUM SERPL-SCNC: 132 MMOL/L (ref 136–145)
SODIUM SERPL-SCNC: 134 MMOL/L (ref 136–145)
TARGETS BLD QL SMEAR: NORMAL
TOXIC GRANULES BLD QL SMEAR: PRESENT
UFH PPP CHRO-ACNC: 1.9 IU/ML
WBC # BLD AUTO: 5.7 X10*3/UL (ref 4.4–11.3)
WBC # BLD AUTO: 5.7 X10*3/UL (ref 4.4–11.3)

## 2023-10-30 PROCEDURE — 85610 PROTHROMBIN TIME: CPT

## 2023-10-30 PROCEDURE — 82248 BILIRUBIN DIRECT: CPT

## 2023-10-30 PROCEDURE — 83735 ASSAY OF MAGNESIUM: CPT | Performed by: STUDENT IN AN ORGANIZED HEALTH CARE EDUCATION/TRAINING PROGRAM

## 2023-10-30 PROCEDURE — 82550 ASSAY OF CK (CPK): CPT

## 2023-10-30 PROCEDURE — 85730 THROMBOPLASTIN TIME PARTIAL: CPT

## 2023-10-30 PROCEDURE — 36415 COLL VENOUS BLD VENIPUNCTURE: CPT | Performed by: STUDENT IN AN ORGANIZED HEALTH CARE EDUCATION/TRAINING PROGRAM

## 2023-10-30 PROCEDURE — 85520 HEPARIN ASSAY: CPT

## 2023-10-30 PROCEDURE — 74177 CT ABD & PELVIS W/CONTRAST: CPT | Performed by: STUDENT IN AN ORGANIZED HEALTH CARE EDUCATION/TRAINING PROGRAM

## 2023-10-30 PROCEDURE — 2500000001 HC RX 250 WO HCPCS SELF ADMINISTERED DRUGS (ALT 637 FOR MEDICARE OP)

## 2023-10-30 PROCEDURE — 83605 ASSAY OF LACTIC ACID: CPT | Performed by: STUDENT IN AN ORGANIZED HEALTH CARE EDUCATION/TRAINING PROGRAM

## 2023-10-30 PROCEDURE — 83605 ASSAY OF LACTIC ACID: CPT

## 2023-10-30 PROCEDURE — 71045 X-RAY EXAM CHEST 1 VIEW: CPT | Performed by: RADIOLOGY

## 2023-10-30 PROCEDURE — 83735 ASSAY OF MAGNESIUM: CPT

## 2023-10-30 PROCEDURE — 2500000005 HC RX 250 GENERAL PHARMACY W/O HCPCS: Performed by: STUDENT IN AN ORGANIZED HEALTH CARE EDUCATION/TRAINING PROGRAM

## 2023-10-30 PROCEDURE — 2500000004 HC RX 250 GENERAL PHARMACY W/ HCPCS (ALT 636 FOR OP/ED)

## 2023-10-30 PROCEDURE — 87040 BLOOD CULTURE FOR BACTERIA: CPT | Performed by: STUDENT IN AN ORGANIZED HEALTH CARE EDUCATION/TRAINING PROGRAM

## 2023-10-30 PROCEDURE — 87077 CULTURE AEROBIC IDENTIFY: CPT | Performed by: STUDENT IN AN ORGANIZED HEALTH CARE EDUCATION/TRAINING PROGRAM

## 2023-10-30 PROCEDURE — 85027 COMPLETE CBC AUTOMATED: CPT | Performed by: STUDENT IN AN ORGANIZED HEALTH CARE EDUCATION/TRAINING PROGRAM

## 2023-10-30 PROCEDURE — 87075 CULTR BACTERIA EXCEPT BLOOD: CPT | Performed by: STUDENT IN AN ORGANIZED HEALTH CARE EDUCATION/TRAINING PROGRAM

## 2023-10-30 PROCEDURE — 74177 CT ABD & PELVIS W/CONTRAST: CPT | Mod: MG

## 2023-10-30 PROCEDURE — 87186 SC STD MICRODIL/AGAR DIL: CPT | Performed by: STUDENT IN AN ORGANIZED HEALTH CARE EDUCATION/TRAINING PROGRAM

## 2023-10-30 PROCEDURE — 80053 COMPREHEN METABOLIC PANEL: CPT | Performed by: STUDENT IN AN ORGANIZED HEALTH CARE EDUCATION/TRAINING PROGRAM

## 2023-10-30 PROCEDURE — 82947 ASSAY GLUCOSE BLOOD QUANT: CPT

## 2023-10-30 PROCEDURE — 3E043XZ INTRODUCTION OF VASOPRESSOR INTO CENTRAL VEIN, PERCUTANEOUS APPROACH: ICD-10-PCS | Performed by: STUDENT IN AN ORGANIZED HEALTH CARE EDUCATION/TRAINING PROGRAM

## 2023-10-30 PROCEDURE — 2550000001 HC RX 255 CONTRASTS: Performed by: PEDIATRICS

## 2023-10-30 PROCEDURE — 84100 ASSAY OF PHOSPHORUS: CPT | Performed by: STUDENT IN AN ORGANIZED HEALTH CARE EDUCATION/TRAINING PROGRAM

## 2023-10-30 PROCEDURE — 37799 UNLISTED PX VASCULAR SURGERY: CPT

## 2023-10-30 PROCEDURE — 84100 ASSAY OF PHOSPHORUS: CPT

## 2023-10-30 PROCEDURE — 2500000004 HC RX 250 GENERAL PHARMACY W/ HCPCS (ALT 636 FOR OP/ED): Performed by: STUDENT IN AN ORGANIZED HEALTH CARE EDUCATION/TRAINING PROGRAM

## 2023-10-30 PROCEDURE — 2500000005 HC RX 250 GENERAL PHARMACY W/O HCPCS

## 2023-10-30 PROCEDURE — 84295 ASSAY OF SERUM SODIUM: CPT

## 2023-10-30 PROCEDURE — 99232 SBSQ HOSP IP/OBS MODERATE 35: CPT | Performed by: INTERNAL MEDICINE

## 2023-10-30 PROCEDURE — 82805 BLOOD GASES W/O2 SATURATION: CPT

## 2023-10-30 PROCEDURE — 94660 CPAP INITIATION&MGMT: CPT

## 2023-10-30 PROCEDURE — 6350000001 HC RX 635 EPOETIN >10,000 UNITS: Mod: JZ

## 2023-10-30 PROCEDURE — 85025 COMPLETE CBC W/AUTO DIFF WBC: CPT

## 2023-10-30 PROCEDURE — 71045 X-RAY EXAM CHEST 1 VIEW: CPT | Mod: FY

## 2023-10-30 PROCEDURE — 84295 ASSAY OF SERUM SODIUM: CPT | Performed by: STUDENT IN AN ORGANIZED HEALTH CARE EDUCATION/TRAINING PROGRAM

## 2023-10-30 PROCEDURE — 2020000001 HC ICU ROOM DAILY

## 2023-10-30 RX ORDER — DEXTROSE MONOHYDRATE 100 MG/ML
0.3 INJECTION, SOLUTION INTRAVENOUS ONCE AS NEEDED
Status: DISCONTINUED | OUTPATIENT
Start: 2023-10-30 | End: 2023-11-01

## 2023-10-30 RX ORDER — DEXTROSE MONOHYDRATE 100 MG/ML
0.3 INJECTION, SOLUTION INTRAVENOUS ONCE AS NEEDED
Status: COMPLETED | OUTPATIENT
Start: 2023-10-30 | End: 2023-10-30

## 2023-10-30 RX ORDER — LINEZOLID 2 MG/ML
600 INJECTION, SOLUTION INTRAVENOUS ONCE
Status: COMPLETED | OUTPATIENT
Start: 2023-10-30 | End: 2023-10-30

## 2023-10-30 RX ORDER — HEPARIN SODIUM 10000 [USP'U]/100ML
0-4500 INJECTION, SOLUTION INTRAVENOUS CONTINUOUS
Status: DISCONTINUED | OUTPATIENT
Start: 2023-10-30 | End: 2023-11-01

## 2023-10-30 RX ORDER — DEXTROSE MONOHYDRATE 100 MG/ML
150 INJECTION, SOLUTION INTRAVENOUS CONTINUOUS
Status: DISCONTINUED | OUTPATIENT
Start: 2023-10-30 | End: 2023-11-01

## 2023-10-30 RX ORDER — HYDROMORPHONE HYDROCHLORIDE 1 MG/ML
0.2 INJECTION, SOLUTION INTRAMUSCULAR; INTRAVENOUS; SUBCUTANEOUS ONCE
Status: COMPLETED | OUTPATIENT
Start: 2023-10-30 | End: 2023-10-30

## 2023-10-30 RX ORDER — DEXTROSE 50 % IN WATER (D50W) INTRAVENOUS SYRINGE
25
Status: DISCONTINUED | OUTPATIENT
Start: 2023-10-30 | End: 2023-11-01

## 2023-10-30 RX ORDER — VANCOMYCIN HYDROCHLORIDE 1 G/20ML
INJECTION, POWDER, LYOPHILIZED, FOR SOLUTION INTRAVENOUS DAILY PRN
Status: DISCONTINUED | OUTPATIENT
Start: 2023-10-30 | End: 2023-10-31

## 2023-10-30 RX ORDER — OXYCODONE HYDROCHLORIDE 5 MG/1
2.5 TABLET ORAL ONCE
Status: COMPLETED | OUTPATIENT
Start: 2023-10-30 | End: 2023-10-30

## 2023-10-30 RX ORDER — DEXTROSE 50 % IN WATER (D50W) INTRAVENOUS SYRINGE
25
Status: DISCONTINUED | OUTPATIENT
Start: 2023-10-30 | End: 2023-10-30

## 2023-10-30 RX ORDER — NOREPINEPHRINE BITARTRATE/D5W 8 MG/250ML
.01-1 PLASTIC BAG, INJECTION (ML) INTRAVENOUS CONTINUOUS
Status: DISCONTINUED | OUTPATIENT
Start: 2023-10-30 | End: 2023-10-30

## 2023-10-30 RX ORDER — HEPARIN SODIUM 5000 [USP'U]/ML
5000-10000 INJECTION, SOLUTION INTRAVENOUS; SUBCUTANEOUS EVERY 4 HOURS PRN
Status: DISCONTINUED | OUTPATIENT
Start: 2023-10-30 | End: 2023-11-01

## 2023-10-30 RX ADMIN — HYDROCORTISONE SODIUM SUCCINATE 50 MG: 100 INJECTION, POWDER, FOR SOLUTION INTRAMUSCULAR; INTRAVENOUS at 09:23

## 2023-10-30 RX ADMIN — PIPERACILLIN SODIUM AND TAZOBACTAM SODIUM 2.25 G: 2; .25 INJECTION, SOLUTION INTRAVENOUS at 23:22

## 2023-10-30 RX ADMIN — HYDROCORTISONE SODIUM SUCCINATE 50 MG: 100 INJECTION, POWDER, FOR SOLUTION INTRAMUSCULAR; INTRAVENOUS at 13:24

## 2023-10-30 RX ADMIN — LINEZOLID 600 MG: 600 INJECTION, SOLUTION INTRAVENOUS at 09:23

## 2023-10-30 RX ADMIN — VASOPRESSIN 0.03 UNITS/MIN: 0.2 INJECTION INTRAVENOUS at 15:48

## 2023-10-30 RX ADMIN — ACETAMINOPHEN 650 MG: 325 TABLET ORAL at 19:56

## 2023-10-30 RX ADMIN — HEPARIN SODIUM 2000 UNITS/HR: 10000 INJECTION, SOLUTION INTRAVENOUS at 11:25

## 2023-10-30 RX ADMIN — OXYCODONE HYDROCHLORIDE 2.5 MG: 5 TABLET ORAL at 11:29

## 2023-10-30 RX ADMIN — PIPERACILLIN SODIUM AND TAZOBACTAM SODIUM 2.25 G: 2; .25 INJECTION, SOLUTION INTRAVENOUS at 05:36

## 2023-10-30 RX ADMIN — CYCLOBENZAPRINE 5 MG: 10 TABLET, FILM COATED ORAL at 13:24

## 2023-10-30 RX ADMIN — MELATONIN 10 MG: 3 TAB ORAL at 18:26

## 2023-10-30 RX ADMIN — HYDROCORTISONE SODIUM SUCCINATE 50 MG: 100 INJECTION, POWDER, FOR SOLUTION INTRAMUSCULAR; INTRAVENOUS at 18:26

## 2023-10-30 RX ADMIN — VASOPRESSIN 0.03 UNITS/MIN: 0.2 INJECTION INTRAVENOUS at 07:15

## 2023-10-30 RX ADMIN — DEXTROSE MONOHYDRATE 25 G: 25 INJECTION, SOLUTION INTRAVENOUS at 20:26

## 2023-10-30 RX ADMIN — DEXTROSE MONOHYDRATE 25 G: 25 INJECTION, SOLUTION INTRAVENOUS at 23:30

## 2023-10-30 RX ADMIN — Medication 2000 MG: at 14:27

## 2023-10-30 RX ADMIN — ACETAMINOPHEN 650 MG: 325 TABLET ORAL at 00:22

## 2023-10-30 RX ADMIN — IOHEXOL 90 ML: 350 INJECTION, SOLUTION INTRAVENOUS at 16:38

## 2023-10-30 RX ADMIN — Medication 0.29 MCG/KG/MIN: at 11:56

## 2023-10-30 RX ADMIN — CYCLOBENZAPRINE 5 MG: 10 TABLET, FILM COATED ORAL at 19:59

## 2023-10-30 RX ADMIN — Medication 0.29 MCG/KG/MIN: at 15:45

## 2023-10-30 RX ADMIN — PIPERACILLIN SODIUM AND TAZOBACTAM SODIUM 2.25 G: 2; .25 INJECTION, SOLUTION INTRAVENOUS at 11:38

## 2023-10-30 RX ADMIN — Medication 0.12 MCG/KG/MIN: at 05:15

## 2023-10-30 RX ADMIN — HYDROMORPHONE HYDROCHLORIDE 0.2 MG: 1 INJECTION, SOLUTION INTRAMUSCULAR; INTRAVENOUS; SUBCUTANEOUS at 23:23

## 2023-10-30 RX ADMIN — DEXTROSE MONOHYDRATE 25 G: 25 INJECTION, SOLUTION INTRAVENOUS at 06:22

## 2023-10-30 RX ADMIN — DEXTROSE MONOHYDRATE 150 ML/HR: 100 INJECTION, SOLUTION INTRAVENOUS at 23:45

## 2023-10-30 RX ADMIN — DEXTROSE MONOHYDRATE 0.2 MCG/KG/MIN: 50 INJECTION, SOLUTION INTRAVENOUS at 19:58

## 2023-10-30 RX ADMIN — DEXTROSE MONOHYDRATE 25 G: 25 INJECTION, SOLUTION INTRAVENOUS at 04:30

## 2023-10-30 RX ADMIN — DEXTROSE MONOHYDRATE 0.3 G/KG/HR: 100 INJECTION, SOLUTION INTRAVENOUS at 03:48

## 2023-10-30 RX ADMIN — EPOETIN ALFA 10000 UNITS: 10000 SOLUTION INTRAVENOUS; SUBCUTANEOUS at 20:00

## 2023-10-30 RX ADMIN — PARICALCITOL 8 MCG: 5 INJECTION, SOLUTION INTRAVENOUS at 21:09

## 2023-10-30 ASSESSMENT — PAIN SCALES - GENERAL
PAINLEVEL_OUTOF10: 7
PAINLEVEL_OUTOF10: 3
PAINLEVEL_OUTOF10: 5 - MODERATE PAIN
PAINLEVEL_OUTOF10: 4
PAINLEVEL_OUTOF10: 0 - NO PAIN
PAINLEVEL_OUTOF10: 9
PAINLEVEL_OUTOF10: 9

## 2023-10-30 ASSESSMENT — ENCOUNTER SYMPTOMS
DIARRHEA: 0
FEVER: 1
WHEEZING: 0
ABDOMINAL PAIN: 0
PALPITATIONS: 0
FLANK PAIN: 0
HEADACHES: 0
LIGHT-HEADEDNESS: 0
CHILLS: 0
SHORTNESS OF BREATH: 0
FREQUENCY: 0
NAUSEA: 0
COUGH: 0
DIZZINESS: 0
CONSTIPATION: 0
VOMITING: 0

## 2023-10-30 ASSESSMENT — COGNITIVE AND FUNCTIONAL STATUS - GENERAL
MOBILITY SCORE: 24
DAILY ACTIVITIY SCORE: 24

## 2023-10-30 ASSESSMENT — PAIN - FUNCTIONAL ASSESSMENT
PAIN_FUNCTIONAL_ASSESSMENT: 0-10

## 2023-10-30 ASSESSMENT — PAIN DESCRIPTION - DESCRIPTORS: DESCRIPTORS: ACHING

## 2023-10-30 NOTE — PROGRESS NOTES
"Vancomycin Dosing by Pharmacy- INITIAL    Nghia Hall is a 58 y.o. year old male who Pharmacy has been consulted for vancomycin dosing for cellulitis, skin and soft tissue. Based on the patient's indication and renal status this patient will be dosed based on a goal AUC of 400-600.     Renal function is ESRD on HD (MWF). Nephrology following, last HD session 10/27. Per note on 10/30- No acute dialytic needs today. Will follow notes for plans closely.     Visit Vitals  BP (!) 75/37 (BP Location: Left arm, Patient Position: Lying)   Pulse 83   Temp 36.8 °C (98.2 °F) (Temporal)   Resp 22        Lab Results   Component Value Date    CREATININE 6.74 (H) 10/30/2023    CREATININE 6.48 (H) 10/30/2023    CREATININE 4.14 (H) 10/26/2023    CREATININE 5.04 (H) 10/24/2023        Patient weight is 143 kg    No results found for: \"CULTURE\"     I/O last 3 completed shifts:  In: 550 [IV Piggyback:550]  Out: 820 [Drains:820]  [unfilled]    Lab Results   Component Value Date    PATIENTTEMP 37.0 10/30/2023    PATIENTTEMP  10/30/2023      Comment:      NOTE: Patient Results are Not Corrected for Temperature    PATIENTTEMP 37.0 09/05/2023    PATIENTTEMP 37.0 09/05/2023    PATIENTTEMP 37.0 08/13/2023          Assessment/Plan     Patient will be initiated on dose of vancomycin 2000 mg IV x1. Will hold off order further doses at this time pending RRT plans/schedule. If plans change and patient is to receive HD on 10/30, dose will ordered post-HD.    Follow-up level will be ordered on 10/31 at AM labs unless clinically indicated sooner.  Will continue to monitor renal function daily while on vancomycin and order serum creatinine at least every 48 hours if not already ordered.  Follow for continued vancomycin needs, clinical response, and signs/symptoms of toxicity.       Esvin Pacheco, PharmD       "

## 2023-10-30 NOTE — NURSING NOTE
RN assessed dressing site of R femoral HD catheter found a saturated ABD over the site with yellow liquid. Removed ABD and there was a smell to the site. RN took a picture of the dressing, cleaned the site, and re dressed the site. Team aware

## 2023-10-30 NOTE — H&P
History Of Present Illness  Nghia Hall is a 58 y.o. male with a PMH of ESRD s/p DDKT (2006, subsequent graft failure 07/2021, on HD MWF at [facility] through LT groin AV graft on [address]; not currently on immunosuppression), pAfib (on Apixaban), HFpEF, HCV (s/p treatment), gout, lymphadenopathy (following with oncology, inconclusive biopsy in 01/2023), DVT, T2DM, and Adrenal Insufficiency (on hydrocortisone) who initially presented to the ED with difficulty ambulating due to LLE pain and swelling. His left leg that's used for dialysis access is severely edematous and exquisitely tender to palpation. While admitted, nephrology was consulted for dialysis, and vascular surgery was consulted to evaluate his left AV graft.  On 10/12 patient dialysis access started oozing bright red blood, reached out to vascular surgery for urgent evaluation. Vascular surgery saw the patient and did not feel that there was any surgical interventions to offer. CTA a/p and LLE doppler were performed on 10/12/23, consulted IR for fistulogram and cardiology for right HF recommendations. IR performed fistulogram on 10/13 which showed  mild/moderate venous anastomotic stenosis, resolved s/p  7 mm POBA.  Interventional radiology did not feel stenosis would explain drainage or swelling.  Cardiology also evaluated patient on 10/13 and recommended outpatient follow-up. 10/13 Blood cultures 2/4 started growing GPC (patient was already on Vanc for 2 for 2 days prior to positive bcx), ID was consulted.  Infectious disease is concerned that patient's left AVG is infected.  Vascular surgery reconsulted.  PET CT scan obtained to further clarify if AVG infected.  PET/CT scan did indicate concern for infection of the area.  In addition vascular surgery concern for graft blowout.  They have recommended excision of AVG which was removed on 10/22.  Patient noted to have murky fluid identified around midpoint of graft.  Graft and fluid swab sent for  "culture. New right femoral temporary HD catheter was placed on 10/20/23.  Wound VAC was placed to the left groin wound on 10/24.  Patient leg pain and edema had not improved since admission.  On 10/27, leg pain was worse along with significant output into wound VAC, vascular surgery recommended DVT ultrasound of the left leg.  Ultrasound was obtained and \"of the left femoral vein.  Occlusive DVT of the left distal femoral vein.      Overnight, the patient had a rapid response called for hypoglycemia and unresponsiveness. He was lying flat on 100% NRB. Had desatted down to the 70s and was not responding, with glucose of 30s. D10 infused, causing improvement of sugars. BiPAP was started. ABG was sent and showed pH 7.30, pCO2 44, pO2 42, Bicarb 21.6, and lactate of 5.7. He was then started on 500 mL LR and Zosyn for concern of sepsis. SBP continued to drop to the 60s, and was then started on a norepinephrine drip. He was transferred to the MICU for further management of likely septic shock.     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    Renal transplant rejection    Kidney transplant status 12/08/2022    Kidney replaced by transplant    Personal history of immunosuppression therapy 07/04/2021    H/O immunosuppressive therapy    Personal history of other diseases of the nervous system and sense organs     History of cataract    Personal history of other diseases of urinary system 11/02/2021    Personal history of renal failure    Personal history of other endocrine, nutritional and metabolic disease 07/22/2013    History of hyperlipidemia    Type 2 diabetes mellitus without complications (CMS/[facility]) 12/08/2022    Diabetes mellitus    Urinary tract infection, site not specified 05/02/2018    Acute UTI       Surgical History  Past Surgical History:   Procedure Laterality Date    CT " ABDOMEN PELVIS ANGIOGRAM W AND/OR WO IV CONTRAST  10/12/2023    CT ABDOMEN PELVIS ANGIOGRAM W AND/OR WO IV CONTRAST [place]12[place]20[place] [facility] [place]    CT AORTA AND BILATERAL ILIOFEMORAL RUNOFF ANGIOGRAM W AND/OR WO IV CONTRAST  11/7/2021    CT AORTA AND BILATERAL ILIOFEMORAL RUNOFF ANGIOGRAM W AND/OR WO IV CONTRAST 11/7/2021 [facility] CLINICAL LEGACY    CT AORTA AND BILATERAL ILIOFEMORAL RUNOFF ANGIOGRAM W AND/OR WO IV CONTRAST  7/6/2022    CT AORTA AND BILATERAL ILIOFEMORAL RUNOFF ANGIOGRAM W AND/OR WO IV CONTRAST 7/6/2022 [facility] EMERGENCY LEGACY    CT AORTA AND BILATERAL ILIOFEMORAL RUNOFF ANGIOGRAM W AND/OR WO IV CONTRAST  8/17/2022    CT AORTA AND BILATERAL ILIOFEMORAL RUNOFF ANGIOGRAM W AND/OR WO IV CONTRAST 8/17/2022 [facility] EMERGENCY LEGACY    CT AORTA AND BILATERAL ILIOFEMORAL RUNOFF ANGIOGRAM W AND/OR WO IV CONTRAST  9/30/2022    CT AORTA AND BILATERAL ILIOFEMORAL RUNOFF ANGIOGRAM W AND/OR WO IV CONTRAST 9[place]30/20[place] [facility] [place]    CT AORTA AND BILATERAL ILIOFEMORAL RUNOFF ANGIOGRAM W AND/OR WO IV CONTRAST  12/29/2022    CT AORTA AND BILATERAL ILIOFEMORAL RUNOFF ANGIOGRAM W AND/OR WO IV CONTRAST 12/29/2022 DO[facility] OFFICE LEGACY    IR ANGIOGRAM FISTULA/GRAFT  10/13/2023    IR ANGIOGRAM FISTULA/GRAFT 10/13/2023 Tereso Otto [place] [facility] [place]    IR CVC TUNNELED  10/20/2023    IR CVC TUNNELED 10/20/2023 Eli Arceo [place] [facility] [place]    IR VENOGRAM DIALYSIS  8/30/2022    IR VENOGRAM DIALYSIS 8/30/2022 [facility] CLINICAL LEGACY    MANDIBLE SURGERY  08/25/2015    Jaw Surgery    MR HEAD ANGIO WO IV CONTRAST  1/30/2014    MR HEAD ANGIO WO IV CONTRAST 1/30/2014 [facility] [place]    MR NECK ANGIO WO IV CONTRAST  1/30/2014    MR NECK ANGIO WO IV CONTRAST 1/30/2014 [facility] [place]    OTHER SURGICAL HISTORY  11/03/2021    Arteriovenous fistula creation procedure    OTHER SURGICAL HISTORY  11/02/2021    Renal Transplant    OTHER SURGICAL HISTORY  11/03/2021    Venous Thrombectomy    OTHER SURGICAL HISTORY  06/19/2014    Hand  Dislocation Interphalangeal, Open Treatment    US GUIDED BIOPSY LYMPH NODE SUPERFICIAL  1/11/2023    US GUIDED BIOPSY LYMPH NODE SUPERFICIAL 1/11/2023 DOCTOR OFFICE LEGACY        Social History  He reports that he has quit smoking. His smoking use included cigarettes. He has never used smokeless tobacco. He reports current alcohol use of about 1.0 standard drink of alcohol per week. He reports that he does not use drugs.    Family History  Family History   Problem Relation Name Age of Onset    Kidney failure Other Sibling         Allergies  Ibuprofen and Vancomycin    Review of Systems   Constitutional:  Positive for fever (Patient had one episode of fever overnight prior to arrival to ICU). Negative for chills.   HENT: Negative.     Respiratory:  Negative for cough, shortness of breath and wheezing.    Cardiovascular:  Positive for leg swelling (Significant leg swelling, L worse than R). Negative for chest pain and palpitations.   Gastrointestinal:  Negative for abdominal pain, constipation, diarrhea, nausea and vomiting.   Genitourinary:  Negative for flank pain and frequency.   Musculoskeletal:         Pain in lower extremities, worse on left   Neurological:  Negative for dizziness, light-headedness and headaches.        Physical Exam  Vitals reviewed.   Constitutional:       General: He is not in acute distress.     Appearance: He is morbidly obese.   Cardiovascular:      Rate and Rhythm: Normal rate. Rhythm irregular.      Heart sounds: Normal heart sounds. No murmur heard.  Pulmonary:      Effort: No respiratory distress.      Breath sounds: Normal breath sounds. No wheezing.   Chest:      Chest wall: No tenderness.   Abdominal:      General: There is distension (Mild abdominal distension).      Palpations: Abdomen is soft.      Tenderness: There is abdominal tenderness (Tenderness to palpation elicited in LLQ).   Musculoskeletal:         General: Swelling and tenderness present.      Right lower leg: Edema  "present.      Left lower leg: Edema (Significant LLE edema worse than RLE) present.   Neurological:      General: No focal deficit present.      Mental Status: He is alert and oriented to person, place, and time. Mental status is at baseline.          Last Recorded Vitals  Blood pressure (!) 91/47, pulse 109, temperature 37.6 °C (99.7 °F), temperature source Temporal, resp. rate (!) 31, height 1.854 m (6' 1\"), weight 143 kg (315 lb), SpO2 94 %.    Relevant Results  Scheduled medications  B complex-vitamin C-folic acid, 1 capsule, oral, Daily  cyanocobalamin, 1,000 mcg, oral, Daily  epoetin urmila or biosimilar, 10,000 Units, intravenous, Every Mon/Wed/Fri  hydrocortisone sodium succinate, 50 mg, intravenous, q6h  lactated Ringer's, 500 mL, intravenous, Once  lactobacillus acidophilus, 1 tablet, oral, Daily  lidocaine, 1 patch, transdermal, Daily  melatonin, 10 mg, oral, Daily  pantoprazole, 40 mg, oral, Daily before breakfast  paricalcitoL, 8 mcg, intravenous, Once per day on Mon Wed Fri  piperacillin-tazobactam, 2.25 g, intravenous, q6h  psyllium, 1 packet, oral, Daily  sevelamer carbonate, 1,600 mg, oral, TID with meals  vancomycin, 2,000 mg, intravenous, Once      Continuous medications  heparin, 0-4,500 Units/hr, Last Rate: 2,000 Units/hr (10/30/23 1125)  norepinephrine, 0.01-1 mcg/kg/min, Last Rate: 0.29 mcg/kg/min (10/30/23 1042)  vasopressin, 0.03 Units/min, Last Rate: 0.03 Units/min (10/30/23 0715)      PRN medications  PRN medications: acetaminophen **OR** acetaminophen **OR** acetaminophen, cyclobenzaprine, dextrose 10 % in water (D10W), dextrose, diphenhydrAMINE, diphenhydrAMINE, glucagon, heparin, lubricating eye drops, melatonin, vancomycin    Results for orders placed or performed during the hospital encounter of 10/09/23 (from the past 24 hour(s))   POCT GLUCOSE   Result Value Ref Range    POCT Glucose 117 (H) 74 - 99 mg/dL   POCT GLUCOSE   Result Value Ref Range    POCT Glucose 41 (L) 74 - 99 mg/dL "   POCT GLUCOSE   Result Value Ref Range    POCT Glucose 44 (L) 74 - 99 mg/dL   Comprehensive metabolic panel   Result Value Ref Range    Glucose 37 (LL) 74 - 99 mg/dL    Sodium 134 (L) 136 - 145 mmol/L    Potassium 5.1 3.5 - 5.3 mmol/L    Chloride 98 98 - 107 mmol/L    Bicarbonate 21 21 - 32 mmol/L    Anion Gap 20 10 - 20 mmol/L    Urea Nitrogen 44 (H) 6 - 23 mg/dL    Creatinine 6.48 (H) 0.50 - 1.30 mg/dL    eGFR 9 (L) >60 mL/min/1.73m*2    Calcium 8.1 (L) 8.6 - 10.6 mg/dL    Albumin 2.3 (L) 3.4 - 5.0 g/dL    Alkaline Phosphatase 295 (H) 33 - 120 U/L    Total Protein 5.4 (L) 6.4 - 8.2 g/dL    AST 24 9 - 39 U/L    Bilirubin, Total 1.6 (H) 0.0 - 1.2 mg/dL    ALT <3 (L) 10 - 52 U/L   CBC   Result Value Ref Range    WBC 5.7 4.4 - 11.3 x10*3/uL    nRBC 0.0 0.0 - 0.0 /100 WBCs    RBC 3.19 (L) 4.50 - 5.90 x10*6/uL    Hemoglobin 9.5 (L) 13.5 - 17.5 g/dL    Hematocrit 29.1 (L) 41.0 - 52.0 %    MCV 91 80 - 100 fL    MCH 29.8 26.0 - 34.0 pg    MCHC 32.6 32.0 - 36.0 g/dL    RDW 18.3 (H) 11.5 - 14.5 %    Platelets 234 150 - 450 x10*3/uL    MPV 10.0 7.5 - 11.5 fL   Magnesium   Result Value Ref Range    Magnesium 1.64 1.60 - 2.40 mg/dL   Phosphorus   Result Value Ref Range    Phosphorus 4.0 2.5 - 4.9 mg/dL   POCT GLUCOSE   Result Value Ref Range    POCT Glucose 56 (L) 74 - 99 mg/dL   POCT GLUCOSE   Result Value Ref Range    POCT Glucose 56 (L) 74 - 99 mg/dL   Blood Culture    Specimen: Peripheral Venipuncture; Blood culture   Result Value Ref Range    Blood Culture Loaded on Instrument - Culture in progress    Blood Culture    Specimen: Peripheral Venipuncture; Blood culture   Result Value Ref Range    Blood Culture       Identification and susceptibility testing to follow    Gram Stain Gram negative bacilli (AA)    Lactate   Result Value Ref Range    Lactate 5.0 (HH) 0.4 - 2.0 mmol/L   POCT GLUCOSE   Result Value Ref Range    POCT Glucose 74 74 - 99 mg/dL   POCT GLUCOSE   Result Value Ref Range    POCT Glucose 93 74 - 99 mg/dL    POCT GLUCOSE   Result Value Ref Range    POCT Glucose 73 (L) 74 - 99 mg/dL   Blood Gas Arterial Full Panel   Result Value Ref Range    POCT pH, Arterial 7.30 (L) 7.38 - 7.42 pH    POCT pCO2, Arterial 44 (H) 38 - 42 mm Hg    POCT pO2, Arterial 42 (LL) 85 - 95 mm Hg    POCT SO2, Arterial 60 (L) 94 - 100 %    POCT Oxy Hemoglobin, Arterial 59.1 (L) 94.0 - 98.0 %    POCT Hematocrit Calculated, Arterial 25.0 (L) 41.0 - 52.0 %    POCT Sodium, Arterial 130 (L) 136 - 145 mmol/L    POCT Potassium, Arterial 4.9 3.5 - 5.3 mmol/L    POCT Chloride, Arterial 97 (L) 98 - 107 mmol/L    POCT Ionized Calcium, Arterial 1.04 (L) 1.10 - 1.33 mmol/L    POCT Glucose, Arterial 76 74 - 99 mg/dL    POCT Lactate, Arterial 5.7 (HH) 0.4 - 2.0 mmol/L    POCT Base Excess, Arterial -4.6 (L) -2.0 - 3.0 mmol/L    POCT HCO3 Calculated, Arterial 21.6 (L) 22.0 - 26.0 mmol/L    POCT Hemoglobin, Arterial 8.4 (L) 13.5 - 17.5 g/dL    POCT Anion Gap, Arterial 16 10 - 25 mmo/L    Patient Temperature      FiO2 60 %   POCT GLUCOSE   Result Value Ref Range    POCT Glucose 122 (H) 74 - 99 mg/dL   POCT GLUCOSE   Result Value Ref Range    POCT Glucose 50 (L) 74 - 99 mg/dL   Blood Gas Arterial Full Panel Unsolicited   Result Value Ref Range    POCT pH, Arterial 7.32 (L) 7.38 - 7.42 pH    POCT pCO2, Arterial 38 38 - 42 mm Hg    POCT pO2, Arterial 53 (L) 85 - 95 mm Hg    POCT SO2, Arterial 80 (L) 94 - 100 %    POCT Oxy Hemoglobin, Arterial 78.1 (L) 94.0 - 98.0 %    POCT Hematocrit Calculated, Arterial 26.0 (L) 41.0 - 52.0 %    POCT Sodium, Arterial 128 (L) 136 - 145 mmol/L    POCT Potassium, Arterial 4.7 3.5 - 5.3 mmol/L    POCT Chloride, Arterial 98 98 - 107 mmol/L    POCT Ionized Calcium, Arterial 1.05 (L) 1.10 - 1.33 mmol/L    POCT Glucose, Arterial 107 (H) 74 - 99 mg/dL    POCT Lactate, Arterial 5.6 (HH) 0.4 - 2.0 mmol/L    POCT Base Excess, Arterial -6.0 (L) -2.0 - 3.0 mmol/L    POCT HCO3 Calculated, Arterial 19.6 (L) 22.0 - 26.0 mmol/L    POCT Hemoglobin,  Arterial 8.8 (L) 13.5 - 17.5 g/dL    POCT Anion Gap, Arterial 15 10 - 25 mmo/L    Patient Temperature 37.0 degrees Celsius   POCT GLUCOSE   Result Value Ref Range    POCT Glucose 85 74 - 99 mg/dL   CBC and Auto Differential   Result Value Ref Range    WBC 5.7 4.4 - 11.3 x10*3/uL    nRBC 0.4 (H) 0.0 - 0.0 /100 WBCs    RBC 2.55 (L) 4.50 - 5.90 x10*6/uL    Hemoglobin 7.6 (L) 13.5 - 17.5 g/dL    Hematocrit 21.7 (L) 41.0 - 52.0 %    MCV 85 80 - 100 fL    MCH 29.8 26.0 - 34.0 pg    MCHC 35.0 32.0 - 36.0 g/dL    RDW 17.7 (H) 11.5 - 14.5 %    Platelets 193 150 - 450 x10*3/uL    MPV 10.5 7.5 - 11.5 fL    Neutrophils % 89.9 40.0 - 80.0 %    Immature Granulocytes %, Automated 0.4 0.0 - 0.9 %    Lymphocytes % 7.2 13.0 - 44.0 %    Monocytes % 1.6 2.0 - 10.0 %    Eosinophils % 0.4 0.0 - 6.0 %    Basophils % 0.5 0.0 - 2.0 %    Neutrophils Absolute 5.13 1.20 - 7.70 x10*3/uL    Immature Granulocytes Absolute, Automated 0.02 0.00 - 0.70 x10*3/uL    Lymphocytes Absolute 0.41 (L) 1.20 - 4.80 x10*3/uL    Monocytes Absolute 0.09 (L) 0.10 - 1.00 x10*3/uL    Eosinophils Absolute 0.02 0.00 - 0.70 x10*3/uL    Basophils Absolute 0.03 0.00 - 0.10 x10*3/uL   Coagulation Screen   Result Value Ref Range    Protime 34.8 (H) 9.8 - 12.8 seconds    INR 3.1 (H) 0.9 - 1.1    aPTT 39 (H) 27 - 38 seconds   Hepatic Function Panel   Result Value Ref Range    Albumin 1.8 (L) 3.4 - 5.0 g/dL    Bilirubin, Total 1.3 (H) 0.0 - 1.2 mg/dL    Bilirubin, Direct 0.9 (H) 0.0 - 0.3 mg/dL    Alkaline Phosphatase 218 (H) 33 - 120 U/L    ALT <3 (L) 10 - 52 U/L    AST 27 9 - 39 U/L    Total Protein 4.3 (L) 6.4 - 8.2 g/dL   Magnesium   Result Value Ref Range    Magnesium 1.40 (L) 1.60 - 2.40 mg/dL   Lactate   Result Value Ref Range    Lactate 6.5 (HH) 0.4 - 2.0 mmol/L   Phosphorus   Result Value Ref Range    Phosphorus 3.9 2.5 - 4.9 mg/dL   Basic Metabolic Panel   Result Value Ref Range    Glucose 105 (H) 74 - 99 mg/dL    Sodium 132 (L) 136 - 145 mmol/L    Potassium 4.8  3.5 - 5.3 mmol/L    Chloride 97 (L) 98 - 107 mmol/L    Bicarbonate 20 (L) 21 - 32 mmol/L    Anion Gap 20 10 - 20 mmol/L    Urea Nitrogen 45 (H) 6 - 23 mg/dL    Creatinine 6.74 (H) 0.50 - 1.30 mg/dL    eGFR 9 (L) >60 mL/min/1.73m*2    Calcium 7.3 (L) 8.6 - 10.6 mg/dL   Creatine Kinase   Result Value Ref Range    Creatine Kinase 235 0 - 325 U/L   Morphology   Result Value Ref Range    RBC Morphology See Below     Polychromasia Mild     Target Cells Few     Ovalocytes Few     [place] Cells Few     Toxic Granulation Present     Vacuolated Neutrophils Present      XR chest 1 view 10/30/2023    Narrative  Interpreted By:  Nathaniel Nath,  La Rodriguez  STUDY:  XR CHEST 1 VIEW;  10/30/2023 5:23 am    INDICATION:  Signs/Symptoms:Chest pain and/or dyspnea.    COMPARISON:  Radiograph 09/22/2023    ACCESSION NUMBER(S):  [id number]    ORDERING CLINICIAN:  CARMEN GALVEZ    FINDINGS:  AP radiograph of the chest was provided.    CARDIOMEDIASTINAL SILHOUETTE:  Cardiomediastinal silhouette is enlarged but stable in size and  configuration.    LUNGS:  Interval increase in diffuse perihilar and interstitial lung  markings. Bibasilar opacities likely reflecting atelectasis. No  left-sided pleural effusion. The right costophrenic angle is only  partially seen without evidence of effusion. No evidence of  pneumothorax.    ABDOMEN:  No remarkable upper abdominal findings.    BONES:  No acute osseous changes.    Impression  1.  Interval increase in diffuse perihilar and interstitial lung  markings which can be seen in setting of pulmonary edema in  conjunction with bronchovascular crowding. Correlate with patient's  volume status.  2. Bibasilar opacities likely reflecting atelectasis.    I personally reviewed the images/study and I agree with the findings  as stated by Jennifer Ruano MD. This study was interpreted at  [facility], [place], [place].    MACRO:  None.    Signed by: Nathaniel  Portillo 10/30/2023 8:54 AM  Dictation workstation:   [id number]       Assessment/Plan     Nghia Hall is a 58 year old male with PMH of HTN, HLD, paroxysmal A-fib on Eliquis, HFpEF w/ most recent echo on 10/11 which showed EF 50%, ESRD s/p DDKT (2006, subsequent graft failure 07/2021, on HD MWF at [facility] through LT groin AV graft on [address]; not currently on immunosuppression), T2DM, LAZARO, Hepatitis C s/p treatment, gout, lymphadenopathy (following with oncology, inconclusive biopsy in 01/2023), DVT, and Adrenal Insufficiency (on hydrocortisone) who presented to the MICU after a rapid response was called for hypoglycemia and unresponsiveness, and was found to have elevated lactate at 5.7 and drops in SBP to about the 60s which required a norepinephrine drip. He is admitted to the ICU for concern for possible septic shock vs adrenal insufficiency.    Neuro/Psych  #Pain control  - Giving patient a one time dose of oxycodone 2.5 mg, patient allergic to ibuprofen.  - Patient on PRN Tylenol 650 mg q4 for mild pain and Flexeril 5 mg TID PRN for muscle spasms    Cardiovascular  #Concern for possible septic shock  - Possible site of infection includes AV grafts.  - Overnight, the patient had a rapid response called for worsening hypotension, hypoxia, and hypoglycemia, requiring pressor support  - Patient currently on vasopressin and norepinephrine, wean as tolerated  - Holding home anti-hypertensive medications of amlodipine 10 mg and metoprolol tartrate 50 mg BID in the setting of shock and hypotension.  - Most recent lactate 6.5 and elevated, continuing to monitor lactate q4h  - MAP goal 60-70    #LLE edema and pain   #Ambulatory dysfunction  #Left femoral access hemorrhage with superficial skin split  #New left femoral vein DVT  -Vascular surgery following.  -IR was consulted, performed fistulogram on 10/13  which showed mild/moderate venous anastomotic stenosis, resolved s/p  7 mm POBA.  IR also placed a tunneled  dialysis catheter in the right groin on 10/20.  -Vascular surgery placed a wound VAC to the left groin on 10/24.  Wound care team consulted for VAc management  - On 10/27, Left leg ultrasound was obtained and revealed occlusive DVT in the left distal femoral vein. Vascular surgery aware  - Vascular surgery consulted: Recommending CT abdomen/pelvis w/ IV contrast to make sure that the left thigh is captured. Recommending to continue Eliquis and aspirin. ACE bandage and LLE evaluation. Continue antibiotics  - Plan for potential axillary arterial line.    #Chronic HFpEF  -TTE on 10/12 revealed EF 50%, with reduced RV systolic function   -Cardiology signed off on 10/13, recommending optimization of volume status.  -Daily weights    Pulmonary  #LAZARO  - Start patient on CPAP    Gastrointestinal  #Concern for cirrhosis on imaging  - Hepatology team notified while on floors, recommending OP follow up  - Follow up with results CT abdomen/pelvis     Nephrology/Urology  #ESRD on M/W/F dialysis  - Nephrology consulted, will defer dialysis today    ID  #Gram negative bacilli bacteremia  #LLE cellulitis   #Infection of left AVG  #Concern for septic shock 2/2 infected graft  - 10/13 blood cultures ( 2 out of 4) started growing GPC in clusters.  Repeat culture from 10/14 and 10/16 no growth to date.    - 10/30 blood cultures (3/4) growing gram negative bacilli. Pending identification and susceptibility  - PET/CT scan done further supports suspicion for infection of AVG.  - ID following.  - Given concern for septic shock, the patient was put on vancomycin and cefepime (10/30 - )  - Status post excision of left AVG on 10/22.  - Given concern for now septic shock, the patient was put on Zosyn, transferred to the floor.    Endocrinology  #Concern for worsening adrenal insufficiency  -Continue hydrocortisone IV 50 mg q6h    #Hypoglycemia  #History of T2DM  - When the rapid was called, the patient was found to have BG in the 30s  -  Continuing hypoglycemia protocol  - Will check glucoses q2, upon improvement and stabilization of sugars, will increase checks to 6 to 8 hours.    MSK/Derm  #Concern for possible compartment syndrome  - LLE significant edema and tenderness, there is concern for compartment syndrome  - CK levels 235 and normal, will continue to monitor and consider orthopedic consult    Heme/Onc  #Lymphadenopathy  -Patient followed by oncology.  Discussed with patient's oncologist Dr. Castellon, she states that at this time his previous biopsy has been negative and she has no good evidence of  lymphoma.  No acute evaluations needed inpatient.  Can follow-up outpatient.    #Anemia of ESRD  - Continue home epoeitin urmila 12360 units every M/W/F    Fluids: None  Electrolytes: Replete as needed  Nutrition: Renal  GI Ppx: Protonix  DVT Ppx: Heparin drip    Oxygen: 5L  Access: PIVs  Drips: Norepinephrine and vasopressin  Antibiotics: Vancomycin and cefepime (10/30 - )  Code Status: Full    Dispo: 58 year old admitted to the ICU for concern of septic shock vs worsening adrenal insufficiency. He was found to have gram negative bacilli growing in today's cultures. Starting vancomycin and cefepime. Will follow up results of CT abdomen/pelvis w/ contrast, following vascular surgery recommendations. Nephro to hold off dialysis at this time. Continue to trend glucose and lactate. Patient will remain in ICU.      Chandler Jones MD

## 2023-10-30 NOTE — NURSING NOTE
Pt arrived to the floor on levo with low bp sys in 60s due to terrible vasculature, unable to obtain accurate BP team aware, pt is on .3 of levo. Team trying to obtain consent for MANNY. Pt hypoglycemic on arrival to MICU 1 amp D5 given brought BG to 85. RN at bedside trying to get accurate BP.

## 2023-10-30 NOTE — NURSING NOTE
Vascular Surgery team came to bedside amid trying to get an accurate bp and removed wound vac and did not reapply it. Left at bedside

## 2023-10-30 NOTE — SIGNIFICANT EVENT
RAPID RESPONSE    Called to bedside for concern for hypoglycemia and unresponsiveness.  Upon arrival to floor, pt laying flat on 100 % NRB.  Per RN, pt had a desaturation down to the 70's and was not responding.  Glucose at time was in the 30's.  RN contacted provider and D10 was infusing  Glucose rechecked and was within normal parameters.  HOB was elevated and pt was placed on their bipap machine. Pt alert and oriented and x 4.  Dr Crowley  (NACR/DACR) at bedside.  Dr. Alaniz (hospitalist) notified and came to bedside.  ABG was sent but appeared to be venous :  (Ph 7.30  Lactate 5.7)  Blood cultures already obtained this am.  Dr Alaniz did not want to give any fluids initially but BP started to drop.  500 ml LR started.  Zosyn added and started.  Sbp dropped down to the 60's.  Levophed gtt was started at 0.05 mcg/kg/min.  MICU was contacted an pt was accepted and transferred to MICU on monitored bed.  Updated report given at bedside.

## 2023-10-30 NOTE — CARE PLAN
The patient's goals for the shift include      The clinical goals for the shift include pain managed and safety maintained      Problem: Discharge Planning  Goal: Discharge to home or other facility with appropriate resources  Outcome: Progressing     Problem: Chronic Conditions and Co-morbidities  Goal: Patient's chronic conditions and co-morbidity symptoms are monitored and maintained or improved  Outcome: Progressing     Problem: Dialysis  Goal: I will slow progression of end-stage renal disease through participating in dialysis 3 times a week  Outcome: Progressing     Problem: Diabetes  Goal: Achieve decreasing blood glucose levels by end of shift  Outcome: Progressing  Goal: Increase stability of blood glucose readings by end of shift  Outcome: Progressing  Goal: Decrease in ketones present in urine by end of shift  Outcome: Progressing  Goal: Maintain electrolyte levels within acceptable range throughout shift  Outcome: Progressing  Goal: Maintain glucose levels >70mg/dl to <250mg/dl throughout shift  Outcome: Progressing  Goal: No changes in neurological exam by end of shift  Outcome: Progressing  Goal: Learn about and adhere to nutrition recommendations by end of shift  Outcome: Progressing  Goal: Vital signs within normal range for age by end of shift  Outcome: Progressing  Goal: Increase self care and/or family involovement by end of shift  Outcome: Progressing  Goal: Receive DSME education by end of shift  Outcome: Progressing

## 2023-10-30 NOTE — NURSING NOTE
2320 Pt asked to sit on edge of bed. Attempted to give assistance to pt and he felt weaker compared to original assessment.   2330 Blood sugar resulted at 41. Pt was fully conscious and aware with weakness being only symptom. Provided apple juice and viktoria crackers to pt.   0008 Pt spiking temp of 101.7F. Rechecked pts blood sugar at 44. Team notified of pts conditions.  0023 Tylenol administered. Blood sugar protocols ordered, along with RFP. Attempted to provide more juice, crackers, and milk per protocol.  0058 Temp decreased to 99.7F. Labs collected.  0107 Glucose at 56, Encouraging pt to continue with oral intake.  0145 Repeat Glucose is 56 again. Pt claiming that he can't drink or eat anymore.   0207 Requested D10W from pharmacy due to pt not being able to handle orally.   0230 IV site lost, blood cx x2 and lactate ordered. Floor attempted to regain access and only able to get 1 set of cultures.   0243 IV team paged for assistance in obtaining access and labs.   0340 IV obtained and began D10W protocol.   0420 Pt attempted to sit at edge of bed and began to appear confused. Pt then stares off and stops answering to name. Pt is unresponsive but still breathing and has pulse. Rapid called. Blood glucose at 74. Oxygen saturating in 70s. Systolic BP in 70s as well.

## 2023-10-30 NOTE — PROCEDURES
"Arterial Puncture/Cannulation    Date/Time: 10/30/2023 6:39 PM    Performed by: Tao Barnett MD  Authorized by: Tao Barnett MD  Consent: Written consent obtained.  Risks and benefits: risks, benefits and alternatives were discussed  Consent given by: patient  Patient identity confirmed: verbally with patient and arm band  Time out: Immediately prior to procedure a \"time out\" was called to verify the correct patient, procedure, equipment, support staff and site/side marked as required.  Preparation: Patient was prepped and draped in the usual sterile fashion.  Local anesthesia used: yes    Anesthesia:  Local anesthesia used: yes  Local Anesthetic: lidocaine 1% with epinephrine  Patient tolerance: patient tolerated the procedure well with no immediate complications  Comments: Arterial Line Procedure Summary    Consent was obtained and documented in chart.  Time out was completed verifying correct patient, procedure, site and positioning.  The patient was prepped and draped in the usual sterile manner using chlorhexidine scrub. 1% lidocaine was used to numb the region. The Right axillary artery was palpated and successfully cannulated with ultrasound guidance on the 1st pass. Pulsatile, arterial blood was visualized and the artery was then threaded using the Seldinger technique and a catheter was then sutured into place. Good wave-form was obtained. The patient tolerated the procedure well without any immediate complications. The area was cleaned and Tegaderm was applied.               "

## 2023-10-30 NOTE — PROGRESS NOTES
VASCULAR SURGERY PROGRESS NOTE  Subjective   Overnight/early on rounds, rapid response called for hypoglycemia and unresponsiveness, desaturated. Labs showed lactate of 5.7 on gas. Concern for sepsis, [facility]. Now in MICU.    At bedside, L groin vac was removed. The underlying wound looked ok, no purulence but the medial incision, of which was not under the wound vac, expressed pus with palpation.     Objective   Vitals:    10/30/23 0800   BP:    Pulse:    Resp:    Temp: 36.8 °C (98.2 °F)   SpO2:       Exam:  Constitutional: No acute distress, resting comfortably in bed  Neuro:  AOx3, grossly intact  ENMT: moist mucous membranes  CV: no tachycardia  Pulm: non-labored on room air  GI: soft, non-tender, non-distended  Skin: warm and dry  Musculoskeletal: moving all extremities  Extremities: L groin wound vac in place maintaining adequate seal. 2 additional incisions with sutures intact, c/d/I. LLE swollen, wrapped in ACE, improved from prior    Labs:            7.6  5.7>-----<193              21.7   132  97  45                  ----------------<105     4.8  20  6.74  Mg 1.40 Ca 7.3                   Assessment/Plan   Nghia Hall is 58 y.o. male with history of ESRD s/p DDKT (2006, subsequent graft failure 2021) on HD MWF, pAfib (on Eliquis), HFpEF, HCV, gout, lymphadenopthy, adrenal insufficiency (on steroids) now POD 6 L femoral AVG explant with L femoral DVT.    10/30: Now in MICU for likely septic shock. L groin vac was removed, looked fine. Purulence from the medial L groin incision. On pressors. Concern for infected graft remnant. Will need another scan.     Plan:  - Please obtain CT A/P with IV contrast (please ensure study captures L thigh)  - Wet to dry dressing to L groin wound now that vac is off  - Continue antibiotics per primary team.  - Continue ACE bandage and LLE elevation  - Continue Eliquis and ASA  - Remainder of care as per ICU team    Patient discussed with attending surgeon,   Erika.    Chino Herring MD  Vascular Surgery, PGY3  Team Pager: [phone]

## 2023-10-30 NOTE — PROGRESS NOTES
Patient seen, examined on rounds this AM  Events noted    Hypotensive req pressors  Confused  Sclera AI s inj  Satting well on O2 per NC  + edema  No tremor  No rash  Fem dialysis cath    Electrolytes, acid/base acceptable    ESRD, MWF, last dialysis 10/27  No acute dialytic needs today  Await culture results

## 2023-10-31 ENCOUNTER — HOSPITAL ENCOUNTER (OUTPATIENT)
Dept: CARDIOLOGY | Facility: HOSPITAL | Age: 58
Discharge: HOME | End: 2023-10-31
Payer: COMMERCIAL

## 2023-10-31 LAB
ABO GROUP (TYPE) IN BLOOD: NORMAL
ALBUMIN SERPL BCP-MCNC: 2 G/DL (ref 3.4–5)
ALBUMIN SERPL BCP-MCNC: 2 G/DL (ref 3.4–5)
ALBUMIN SERPL BCP-MCNC: 2.2 G/DL (ref 3.4–5)
ALBUMIN SERPL BCP-MCNC: 2.2 G/DL (ref 3.4–5)
ALP SERPL-CCNC: 162 U/L (ref 33–120)
ALP SERPL-CCNC: 202 U/L (ref 33–120)
ALT SERPL W P-5'-P-CCNC: 3 U/L (ref 10–52)
ALT SERPL W P-5'-P-CCNC: 5 U/L (ref 10–52)
ANION GAP BLDA CALCULATED.4IONS-SCNC: 15 MMO/L (ref 10–25)
ANION GAP BLDA CALCULATED.4IONS-SCNC: 16 MMO/L (ref 10–25)
ANION GAP BLDA CALCULATED.4IONS-SCNC: 18 MMO/L (ref 10–25)
ANION GAP BLDA CALCULATED.4IONS-SCNC: 18 MMO/L (ref 10–25)
ANION GAP SERPL CALC-SCNC: 19 MMOL/L (ref 10–20)
ANION GAP SERPL CALC-SCNC: 21 MMOL/L (ref 10–20)
ANION GAP SERPL CALC-SCNC: 23 MMOL/L (ref 10–20)
ANION GAP SERPL CALC-SCNC: 27 MMOL/L (ref 10–20)
ANTIBODY SCREEN: NORMAL
APTT PPP: 42 SECONDS (ref 27–38)
APTT PPP: 43 SECONDS (ref 27–38)
AST SERPL W P-5'-P-CCNC: 107 U/L (ref 9–39)
AST SERPL W P-5'-P-CCNC: 80 U/L (ref 9–39)
BASE EXCESS BLDA CALC-SCNC: -11.4 MMOL/L (ref -2–3)
BASE EXCESS BLDA CALC-SCNC: -7.2 MMOL/L (ref -2–3)
BASE EXCESS BLDA CALC-SCNC: -7.2 MMOL/L (ref -2–3)
BASE EXCESS BLDA CALC-SCNC: -8.8 MMOL/L (ref -2–3)
BASE EXCESS BLDA CALC-SCNC: -9.1 MMOL/L (ref -2–3)
BILIRUB DIRECT SERPL-MCNC: 1.5 MG/DL (ref 0–0.3)
BILIRUB SERPL-MCNC: 2.1 MG/DL (ref 0–1.2)
BILIRUB SERPL-MCNC: 2.3 MG/DL (ref 0–1.2)
BLOOD EXPIRATION DATE: NORMAL
BODY TEMPERATURE: 37 DEGREES CELSIUS
BUN SERPL-MCNC: 56 MG/DL (ref 6–23)
BUN SERPL-MCNC: 57 MG/DL (ref 6–23)
CA-I BLD-SCNC: 0.89 MMOL/L (ref 1.1–1.33)
CA-I BLDA-SCNC: 0.89 MMOL/L (ref 1.1–1.33)
CA-I BLDA-SCNC: 0.91 MMOL/L (ref 1.1–1.33)
CA-I BLDA-SCNC: 0.92 MMOL/L (ref 1.1–1.33)
CA-I BLDA-SCNC: 0.93 MMOL/L (ref 1.1–1.33)
CALCIUM SERPL-MCNC: 6.5 MG/DL (ref 8.6–10.6)
CALCIUM SERPL-MCNC: 6.7 MG/DL (ref 8.6–10.6)
CALCIUM SERPL-MCNC: 7 MG/DL (ref 8.6–10.6)
CALCIUM SERPL-MCNC: 7.3 MG/DL (ref 8.6–10.6)
CHLORIDE BLDA-SCNC: 90 MMOL/L (ref 98–107)
CHLORIDE BLDA-SCNC: 92 MMOL/L (ref 98–107)
CHLORIDE BLDA-SCNC: 92 MMOL/L (ref 98–107)
CHLORIDE BLDA-SCNC: 93 MMOL/L (ref 98–107)
CHLORIDE SERPL-SCNC: 88 MMOL/L (ref 98–107)
CHLORIDE SERPL-SCNC: 88 MMOL/L (ref 98–107)
CHLORIDE SERPL-SCNC: 90 MMOL/L (ref 98–107)
CHLORIDE SERPL-SCNC: 91 MMOL/L (ref 98–107)
CO2 SERPL-SCNC: 14 MMOL/L (ref 21–32)
CO2 SERPL-SCNC: 19 MMOL/L (ref 21–32)
CO2 SERPL-SCNC: 19 MMOL/L (ref 21–32)
CO2 SERPL-SCNC: 22 MMOL/L (ref 21–32)
CREAT SERPL-MCNC: 6.67 MG/DL (ref 0.5–1.3)
CREAT SERPL-MCNC: 6.7 MG/DL (ref 0.5–1.3)
CREAT SERPL-MCNC: 6.91 MG/DL (ref 0.5–1.3)
CREAT SERPL-MCNC: 7.15 MG/DL (ref 0.5–1.3)
DISPENSE STATUS: NORMAL
ERYTHROCYTE [DISTWIDTH] IN BLOOD BY AUTOMATED COUNT: 17.9 % (ref 11.5–14.5)
ERYTHROCYTE [DISTWIDTH] IN BLOOD BY AUTOMATED COUNT: 18.4 % (ref 11.5–14.5)
GFR SERPL CREATININE-BSD FRML MDRD: 8 ML/MIN/1.73M*2
GFR SERPL CREATININE-BSD FRML MDRD: 9 ML/MIN/1.73M*2
GLUCOSE BLD MANUAL STRIP-MCNC: 155 MG/DL (ref 74–99)
GLUCOSE BLD MANUAL STRIP-MCNC: 29 MG/DL (ref 74–99)
GLUCOSE BLD MANUAL STRIP-MCNC: 45 MG/DL (ref 74–99)
GLUCOSE BLD MANUAL STRIP-MCNC: 66 MG/DL (ref 74–99)
GLUCOSE BLD MANUAL STRIP-MCNC: 69 MG/DL (ref 74–99)
GLUCOSE BLD MANUAL STRIP-MCNC: 75 MG/DL (ref 74–99)
GLUCOSE BLD MANUAL STRIP-MCNC: 78 MG/DL (ref 74–99)
GLUCOSE BLD MANUAL STRIP-MCNC: 83 MG/DL (ref 74–99)
GLUCOSE BLD MANUAL STRIP-MCNC: 87 MG/DL (ref 74–99)
GLUCOSE BLD MANUAL STRIP-MCNC: 93 MG/DL (ref 74–99)
GLUCOSE BLD MANUAL STRIP-MCNC: 93 MG/DL (ref 74–99)
GLUCOSE BLD MANUAL STRIP-MCNC: 95 MG/DL (ref 74–99)
GLUCOSE BLD MANUAL STRIP-MCNC: 97 MG/DL (ref 74–99)
GLUCOSE BLDA-MCNC: 122 MG/DL (ref 74–99)
GLUCOSE BLDA-MCNC: 74 MG/DL (ref 74–99)
GLUCOSE BLDA-MCNC: 78 MG/DL (ref 74–99)
GLUCOSE BLDA-MCNC: 87 MG/DL (ref 74–99)
GLUCOSE SERPL-MCNC: 111 MG/DL (ref 74–99)
GLUCOSE SERPL-MCNC: 241 MG/DL (ref 74–99)
GLUCOSE SERPL-MCNC: 75 MG/DL (ref 74–99)
GLUCOSE SERPL-MCNC: 82 MG/DL (ref 74–99)
HCO3 BLDA-SCNC: 14.9 MMOL/L (ref 22–26)
HCO3 BLDA-SCNC: 17.4 MMOL/L (ref 22–26)
HCO3 BLDA-SCNC: 18 MMOL/L (ref 22–26)
HCO3 BLDA-SCNC: 18.8 MMOL/L (ref 22–26)
HCO3 BLDA-SCNC: 18.8 MMOL/L (ref 22–26)
HCT VFR BLD AUTO: 25.2 % (ref 41–52)
HCT VFR BLD AUTO: 26.1 % (ref 41–52)
HCT VFR BLD EST: 26 % (ref 41–52)
HCT VFR BLD EST: 27 % (ref 41–52)
HCT VFR BLD EST: 28 % (ref 41–52)
HCT VFR BLD EST: 30 % (ref 41–52)
HGB BLD-MCNC: 8.2 G/DL (ref 13.5–17.5)
HGB BLD-MCNC: 8.9 G/DL (ref 13.5–17.5)
HGB BLDA-MCNC: 10.1 G/DL (ref 13.5–17.5)
HGB BLDA-MCNC: 8.6 G/DL (ref 13.5–17.5)
HGB BLDA-MCNC: 9 G/DL (ref 13.5–17.5)
HGB BLDA-MCNC: 9.4 G/DL (ref 13.5–17.5)
INHALED O2 CONCENTRATION: 40 %
INHALED O2 CONCENTRATION: 52 %
INR PPP: 2.8 (ref 0.9–1.1)
INR PPP: 3.3 (ref 0.9–1.1)
LACTATE BLDA-SCNC: 3.4 MMOL/L (ref 0.4–2)
LACTATE BLDA-SCNC: 3.5 MMOL/L (ref 0.4–2)
LACTATE BLDA-SCNC: 3.6 MMOL/L (ref 0.4–2)
LACTATE BLDA-SCNC: 6.3 MMOL/L (ref 0.4–2)
LACTATE SERPL-SCNC: 4.1 MMOL/L (ref 0.4–2)
LACTATE SERPL-SCNC: 5.8 MMOL/L (ref 0.4–2)
LACTATE SERPL-SCNC: 6.1 MMOL/L (ref 0.4–2)
LACTATE SERPL-SCNC: 6.5 MMOL/L (ref 0.4–2)
LACTATE SERPL-SCNC: 6.7 MMOL/L (ref 0.4–2)
MAGNESIUM SERPL-MCNC: 1.46 MG/DL (ref 1.6–2.4)
MAGNESIUM SERPL-MCNC: 1.49 MG/DL (ref 1.6–2.4)
MAGNESIUM SERPL-MCNC: 1.53 MG/DL (ref 1.6–2.4)
MAGNESIUM SERPL-MCNC: 1.58 MG/DL (ref 1.6–2.4)
MCH RBC QN AUTO: 28.9 PG (ref 26–34)
MCH RBC QN AUTO: 29.5 PG (ref 26–34)
MCHC RBC AUTO-ENTMCNC: 32.5 G/DL (ref 32–36)
MCHC RBC AUTO-ENTMCNC: 34.1 G/DL (ref 32–36)
MCV RBC AUTO: 86 FL (ref 80–100)
MCV RBC AUTO: 89 FL (ref 80–100)
NRBC BLD-RTO: 0.2 /100 WBCS (ref 0–0)
NRBC BLD-RTO: 0.3 /100 WBCS (ref 0–0)
OXYHGB MFR BLDA: 93.5 % (ref 94–98)
OXYHGB MFR BLDA: 95.7 % (ref 94–98)
OXYHGB MFR BLDA: 95.9 % (ref 94–98)
OXYHGB MFR BLDA: 96.6 % (ref 94–98)
OXYHGB MFR BLDA: 96.6 % (ref 94–98)
PCO2 BLDA: 34 MM HG (ref 38–42)
PCO2 BLDA: 38 MM HG (ref 38–42)
PCO2 BLDA: 39 MM HG (ref 38–42)
PCO2 BLDA: 39 MM HG (ref 38–42)
PCO2 BLDA: 44 MM HG (ref 38–42)
PH BLDA: 7.22 PH (ref 7.38–7.42)
PH BLDA: 7.25 PH (ref 7.38–7.42)
PH BLDA: 7.27 PH (ref 7.38–7.42)
PH BLDA: 7.29 PH (ref 7.38–7.42)
PH BLDA: 7.29 PH (ref 7.38–7.42)
PHOSPHATE SERPL-MCNC: 6.1 MG/DL (ref 2.5–4.9)
PHOSPHATE SERPL-MCNC: 6.3 MG/DL (ref 2.5–4.9)
PHOSPHATE SERPL-MCNC: 6.4 MG/DL (ref 2.5–4.9)
PLATELET # BLD AUTO: 129 X10*3/UL (ref 150–450)
PLATELET # BLD AUTO: 175 X10*3/UL (ref 150–450)
PMV BLD AUTO: 10.7 FL (ref 7.5–11.5)
PMV BLD AUTO: 10.9 FL (ref 7.5–11.5)
PO2 BLDA: 119 MM HG (ref 85–95)
PO2 BLDA: 119 MM HG (ref 85–95)
PO2 BLDA: 85 MM HG (ref 85–95)
PO2 BLDA: 92 MM HG (ref 85–95)
PO2 BLDA: 97 MM HG (ref 85–95)
POTASSIUM BLDA-SCNC: 6.1 MMOL/L (ref 3.5–5.3)
POTASSIUM BLDA-SCNC: 6.2 MMOL/L (ref 3.5–5.3)
POTASSIUM BLDA-SCNC: 6.3 MMOL/L (ref 3.5–5.3)
POTASSIUM BLDA-SCNC: 6.3 MMOL/L (ref 3.5–5.3)
POTASSIUM SERPL-SCNC: 5.7 MMOL/L (ref 3.5–5.3)
POTASSIUM SERPL-SCNC: 5.7 MMOL/L (ref 3.5–5.3)
POTASSIUM SERPL-SCNC: 6 MMOL/L (ref 3.5–5.3)
POTASSIUM SERPL-SCNC: 6.1 MMOL/L (ref 3.5–5.3)
PRODUCT BLOOD TYPE: 8400
PRODUCT CODE: NORMAL
PROT SERPL-MCNC: 4.8 G/DL (ref 6.4–8.2)
PROT SERPL-MCNC: 5.2 G/DL (ref 6.4–8.2)
PROTHROMBIN TIME: 32.4 SECONDS (ref 9.8–12.8)
PROTHROMBIN TIME: 37.7 SECONDS (ref 9.8–12.8)
RBC # BLD AUTO: 2.84 X10*6/UL (ref 4.5–5.9)
RBC # BLD AUTO: 3.02 X10*6/UL (ref 4.5–5.9)
RH FACTOR (ANTIGEN D): NORMAL
SAO2 % BLDA: 96 % (ref 94–100)
SAO2 % BLDA: 98 % (ref 94–100)
SODIUM BLDA-SCNC: 119 MMOL/L (ref 136–145)
SODIUM BLDA-SCNC: 119 MMOL/L (ref 136–145)
SODIUM BLDA-SCNC: 120 MMOL/L (ref 136–145)
SODIUM BLDA-SCNC: 120 MMOL/L (ref 136–145)
SODIUM SERPL-SCNC: 123 MMOL/L (ref 136–145)
SODIUM SERPL-SCNC: 124 MMOL/L (ref 136–145)
SODIUM SERPL-SCNC: 124 MMOL/L (ref 136–145)
SODIUM SERPL-SCNC: 126 MMOL/L (ref 136–145)
UFH PPP CHRO-ACNC: 1.2 IU/ML
UNIT ABO: NORMAL
UNIT NUMBER: NORMAL
UNIT RH: NORMAL
UNIT VOLUME: 311
VANCOMYCIN SERPL-MCNC: 19 UG/ML (ref 5–20)
WBC # BLD AUTO: 21.6 X10*3/UL (ref 4.4–11.3)
WBC # BLD AUTO: 25.3 X10*3/UL (ref 4.4–11.3)

## 2023-10-31 PROCEDURE — 83735 ASSAY OF MAGNESIUM: CPT

## 2023-10-31 PROCEDURE — 87070 CULTURE OTHR SPECIMN AEROBIC: CPT

## 2023-10-31 PROCEDURE — 85610 PROTHROMBIN TIME: CPT

## 2023-10-31 PROCEDURE — 84132 ASSAY OF SERUM POTASSIUM: CPT

## 2023-10-31 PROCEDURE — 99222 1ST HOSP IP/OBS MODERATE 55: CPT | Performed by: INTERNAL MEDICINE

## 2023-10-31 PROCEDURE — 82330 ASSAY OF CALCIUM: CPT

## 2023-10-31 PROCEDURE — 82330 ASSAY OF CALCIUM: CPT | Performed by: STUDENT IN AN ORGANIZED HEALTH CARE EDUCATION/TRAINING PROGRAM

## 2023-10-31 PROCEDURE — 2500000004 HC RX 250 GENERAL PHARMACY W/ HCPCS (ALT 636 FOR OP/ED)

## 2023-10-31 PROCEDURE — 82947 ASSAY GLUCOSE BLOOD QUANT: CPT

## 2023-10-31 PROCEDURE — 97166 OT EVAL MOD COMPLEX 45 MIN: CPT | Mod: GO

## 2023-10-31 PROCEDURE — 80069 RENAL FUNCTION PANEL: CPT

## 2023-10-31 PROCEDURE — P9017 PLASMA 1 DONOR FRZ W/IN 8 HR: HCPCS

## 2023-10-31 PROCEDURE — 85520 HEPARIN ASSAY: CPT

## 2023-10-31 PROCEDURE — 82435 ASSAY OF BLOOD CHLORIDE: CPT

## 2023-10-31 PROCEDURE — 93010 ELECTROCARDIOGRAM REPORT: CPT | Performed by: INTERNAL MEDICINE

## 2023-10-31 PROCEDURE — 83605 ASSAY OF LACTIC ACID: CPT

## 2023-10-31 PROCEDURE — 84295 ASSAY OF SERUM SODIUM: CPT

## 2023-10-31 PROCEDURE — 2500000001 HC RX 250 WO HCPCS SELF ADMINISTERED DRUGS (ALT 637 FOR MEDICARE OP)

## 2023-10-31 PROCEDURE — 85027 COMPLETE CBC AUTOMATED: CPT

## 2023-10-31 PROCEDURE — 82947 ASSAY GLUCOSE BLOOD QUANT: CPT | Performed by: STUDENT IN AN ORGANIZED HEALTH CARE EDUCATION/TRAINING PROGRAM

## 2023-10-31 PROCEDURE — 36430 TRANSFUSION BLD/BLD COMPNT: CPT

## 2023-10-31 PROCEDURE — 85018 HEMOGLOBIN: CPT

## 2023-10-31 PROCEDURE — 99291 CRITICAL CARE FIRST HOUR: CPT | Performed by: INTERNAL MEDICINE

## 2023-10-31 PROCEDURE — 2500000005 HC RX 250 GENERAL PHARMACY W/O HCPCS

## 2023-10-31 PROCEDURE — 80053 COMPREHEN METABOLIC PANEL: CPT

## 2023-10-31 PROCEDURE — 99291 CRITICAL CARE FIRST HOUR: CPT

## 2023-10-31 PROCEDURE — 82248 BILIRUBIN DIRECT: CPT

## 2023-10-31 PROCEDURE — 82805 BLOOD GASES W/O2 SATURATION: CPT

## 2023-10-31 PROCEDURE — 99232 SBSQ HOSP IP/OBS MODERATE 35: CPT

## 2023-10-31 PROCEDURE — 2020000001 HC ICU ROOM DAILY

## 2023-10-31 PROCEDURE — 97162 PT EVAL MOD COMPLEX 30 MIN: CPT | Mod: GP

## 2023-10-31 PROCEDURE — 97535 SELF CARE MNGMENT TRAINING: CPT | Mod: GO

## 2023-10-31 PROCEDURE — 93005 ELECTROCARDIOGRAM TRACING: CPT

## 2023-10-31 PROCEDURE — 2500000002 HC RX 250 W HCPCS SELF ADMINISTERED DRUGS (ALT 637 FOR MEDICARE OP, ALT 636 FOR OP/ED)

## 2023-10-31 PROCEDURE — 80202 ASSAY OF VANCOMYCIN: CPT

## 2023-10-31 PROCEDURE — 86901 BLOOD TYPING SEROLOGIC RH(D): CPT

## 2023-10-31 PROCEDURE — 37799 UNLISTED PX VASCULAR SURGERY: CPT

## 2023-10-31 PROCEDURE — 85730 THROMBOPLASTIN TIME PARTIAL: CPT

## 2023-10-31 RX ORDER — PHENYLEPHRINE HCL IN 0.9% NACL 0.4MG/10ML
SYRINGE (ML) INTRAVENOUS
Status: DISPENSED
Start: 2023-10-31 | End: 2023-11-01

## 2023-10-31 RX ORDER — CEFEPIME 1 G/50ML
2 INJECTION, SOLUTION INTRAVENOUS EVERY 24 HOURS
Status: DISCONTINUED | OUTPATIENT
Start: 2023-10-31 | End: 2023-10-31

## 2023-10-31 RX ORDER — CALCIUM CHLORIDE INJECTION 100 MG/ML
INJECTION, SOLUTION INTRAVENOUS
Status: DISPENSED
Start: 2023-10-31 | End: 2023-11-01

## 2023-10-31 RX ORDER — EPINEPHRINE 1 MG/ML
INJECTION INTRAMUSCULAR; INTRAVENOUS; SUBCUTANEOUS
Status: DISPENSED
Start: 2023-10-31 | End: 2023-11-01

## 2023-10-31 RX ORDER — OXYCODONE HYDROCHLORIDE 5 MG/1
5 TABLET ORAL EVERY 8 HOURS PRN
Status: DISCONTINUED | OUTPATIENT
Start: 2023-10-31 | End: 2023-11-01

## 2023-10-31 RX ORDER — CALCIUM GLUCONATE 20 MG/ML
2 INJECTION, SOLUTION INTRAVENOUS ONCE
Status: COMPLETED | OUTPATIENT
Start: 2023-10-31 | End: 2023-10-31

## 2023-10-31 RX ORDER — SODIUM POLYSTYRENE SULFONATE 15 G/60ML
30 SUSPENSION ORAL; RECTAL ONCE
Status: DISCONTINUED | OUTPATIENT
Start: 2023-10-31 | End: 2023-11-01

## 2023-10-31 RX ORDER — INDOMETHACIN 25 MG/1
CAPSULE ORAL
Status: DISPENSED
Start: 2023-10-31 | End: 2023-11-01

## 2023-10-31 RX ORDER — DEXMEDETOMIDINE HYDROCHLORIDE 4 UG/ML
INJECTION, SOLUTION INTRAVENOUS
Status: DISPENSED
Start: 2023-10-31 | End: 2023-11-01

## 2023-10-31 RX ORDER — DEXTROSE 50 % IN WATER (D50W) INTRAVENOUS SYRINGE
25 ONCE
Status: COMPLETED | OUTPATIENT
Start: 2023-10-31 | End: 2023-10-31

## 2023-10-31 RX ORDER — HYDROMORPHONE HYDROCHLORIDE 1 MG/ML
0.2 INJECTION, SOLUTION INTRAMUSCULAR; INTRAVENOUS; SUBCUTANEOUS ONCE
Status: COMPLETED | OUTPATIENT
Start: 2023-10-31 | End: 2023-10-31

## 2023-10-31 RX ORDER — DEXTROSE MONOHYDRATE 100 MG/ML
50 INJECTION, SOLUTION INTRAVENOUS CONTINUOUS
Status: DISCONTINUED | OUTPATIENT
Start: 2023-10-31 | End: 2023-11-01

## 2023-10-31 RX ADMIN — PSYLLIUM HUSK 1 PACKET: 3.4 POWDER ORAL at 08:19

## 2023-10-31 RX ADMIN — HYDROCORTISONE SODIUM SUCCINATE 50 MG: 100 INJECTION, POWDER, FOR SOLUTION INTRAMUSCULAR; INTRAVENOUS at 05:52

## 2023-10-31 RX ADMIN — HYDROMORPHONE HYDROCHLORIDE 0.2 MG: 1 INJECTION, SOLUTION INTRAMUSCULAR; INTRAVENOUS; SUBCUTANEOUS at 05:11

## 2023-10-31 RX ADMIN — DEXTROSE MONOHYDRATE 150 ML/HR: 100 INJECTION, SOLUTION INTRAVENOUS at 13:49

## 2023-10-31 RX ADMIN — PIPERACILLIN SODIUM AND TAZOBACTAM SODIUM 2.25 G: 2; .25 INJECTION, SOLUTION INTRAVENOUS at 08:38

## 2023-10-31 RX ADMIN — PANTOPRAZOLE SODIUM 40 MG: 40 TABLET, DELAYED RELEASE ORAL at 08:39

## 2023-10-31 RX ADMIN — DEXTROSE MONOHYDRATE 25 G: 25 INJECTION, SOLUTION INTRAVENOUS at 18:34

## 2023-10-31 RX ADMIN — HYDROCORTISONE SODIUM SUCCINATE 50 MG: 100 INJECTION, POWDER, FOR SOLUTION INTRAMUSCULAR; INTRAVENOUS at 18:51

## 2023-10-31 RX ADMIN — VASOPRESSIN 0.03 UNITS/MIN: 0.2 INJECTION INTRAVENOUS at 20:26

## 2023-10-31 RX ADMIN — DEXTROSE MONOHYDRATE 25 G: 25 INJECTION, SOLUTION INTRAVENOUS at 18:48

## 2023-10-31 RX ADMIN — Medication 1 TABLET: at 08:19

## 2023-10-31 RX ADMIN — INSULIN HUMAN 10 UNITS: 100 INJECTION, SOLUTION PARENTERAL at 18:51

## 2023-10-31 RX ADMIN — SEVELAMER CARBONATE 1600 MG: 800 TABLET, FILM COATED ORAL at 08:19

## 2023-10-31 RX ADMIN — HYDROCORTISONE SODIUM SUCCINATE 50 MG: 100 INJECTION, POWDER, FOR SOLUTION INTRAMUSCULAR; INTRAVENOUS at 00:05

## 2023-10-31 RX ADMIN — SODIUM ZIRCONIUM CYCLOSILICATE 5 G: 5 POWDER, FOR SUSPENSION ORAL at 13:50

## 2023-10-31 RX ADMIN — SEVELAMER CARBONATE 1600 MG: 800 TABLET, FILM COATED ORAL at 11:05

## 2023-10-31 RX ADMIN — ACETAMINOPHEN 650 MG: 325 TABLET ORAL at 08:19

## 2023-10-31 RX ADMIN — DEXTROSE MONOHYDRATE 150 ML/HR: 100 INJECTION, SOLUTION INTRAVENOUS at 10:27

## 2023-10-31 RX ADMIN — SEVELAMER CARBONATE 1600 MG: 800 TABLET, FILM COATED ORAL at 16:28

## 2023-10-31 RX ADMIN — MEROPENEM 2 G: 1 INJECTION, POWDER, FOR SOLUTION INTRAVENOUS at 13:50

## 2023-10-31 RX ADMIN — DEXTROSE MONOHYDRATE 50 ML/HR: 100 INJECTION, SOLUTION INTRAVENOUS at 18:52

## 2023-10-31 RX ADMIN — CYCLOBENZAPRINE 5 MG: 10 TABLET, FILM COATED ORAL at 08:20

## 2023-10-31 RX ADMIN — CYANOCOBALAMIN TAB 1000 MCG 1000 MCG: 1000 TAB at 08:20

## 2023-10-31 RX ADMIN — SODIUM BICARBONATE 150 ML/HR: 84 INJECTION, SOLUTION INTRAVENOUS at 23:30

## 2023-10-31 RX ADMIN — CALCIUM GLUCONATE 2 G: 20 INJECTION, SOLUTION INTRAVENOUS at 18:59

## 2023-10-31 RX ADMIN — DEXTROSE MONOHYDRATE 150 ML/HR: 100 INJECTION, SOLUTION INTRAVENOUS at 17:11

## 2023-10-31 RX ADMIN — DEXTROSE MONOHYDRATE 0.2 MCG/KG/MIN: 50 INJECTION, SOLUTION INTRAVENOUS at 13:49

## 2023-10-31 RX ADMIN — VASOPRESSIN 0.03 UNITS/MIN: 0.2 INJECTION INTRAVENOUS at 02:40

## 2023-10-31 RX ADMIN — DEXTROSE MONOHYDRATE 0.2 MCG/KG/MIN: 50 INJECTION, SOLUTION INTRAVENOUS at 14:39

## 2023-10-31 RX ADMIN — OXYCODONE HYDROCHLORIDE 5 MG: 5 TABLET ORAL at 10:27

## 2023-10-31 RX ADMIN — DEXTROSE MONOHYDRATE 150 ML/HR: 100 INJECTION, SOLUTION INTRAVENOUS at 22:18

## 2023-10-31 RX ADMIN — NEPHROCAP 1 CAPSULE: 1 CAP ORAL at 08:20

## 2023-10-31 RX ADMIN — HYDROCORTISONE SODIUM SUCCINATE 50 MG: 100 INJECTION, POWDER, FOR SOLUTION INTRAMUSCULAR; INTRAVENOUS at 12:07

## 2023-10-31 RX ADMIN — DEXTROSE MONOHYDRATE 150 ML/HR: 100 INJECTION, SOLUTION INTRAVENOUS at 01:00

## 2023-10-31 RX ADMIN — OXYCODONE HYDROCHLORIDE 5 MG: 5 TABLET ORAL at 16:28

## 2023-10-31 ASSESSMENT — PAIN SCALES - GENERAL
PAINLEVEL_OUTOF10: 8
PAINLEVEL_OUTOF10: 9
PAINLEVEL_OUTOF10: 8
PAINLEVEL_OUTOF10: 9
PAINLEVEL_OUTOF10: 8
PAINLEVEL_OUTOF10: 4
PAINLEVEL_OUTOF10: 3

## 2023-10-31 ASSESSMENT — COGNITIVE AND FUNCTIONAL STATUS - GENERAL
STANDING UP FROM CHAIR USING ARMS: A LOT
DRESSING REGULAR LOWER BODY CLOTHING: TOTAL
DAILY ACTIVITIY SCORE: 14
MOVING TO AND FROM BED TO CHAIR: A LOT
MOVING FROM LYING ON BACK TO SITTING ON SIDE OF FLAT BED WITH BEDRAILS: A LOT
CLIMB 3 TO 5 STEPS WITH RAILING: TOTAL
MOBILITY SCORE: 11
WALKING IN HOSPITAL ROOM: A LOT
HELP NEEDED FOR BATHING: A LOT
TOILETING: A LOT
TURNING FROM BACK TO SIDE WHILE IN FLAT BAD: A LOT
PERSONAL GROOMING: A LITTLE
DRESSING REGULAR UPPER BODY CLOTHING: A LOT

## 2023-10-31 ASSESSMENT — PAIN - FUNCTIONAL ASSESSMENT
PAIN_FUNCTIONAL_ASSESSMENT: 0-10

## 2023-10-31 ASSESSMENT — ACTIVITIES OF DAILY LIVING (ADL)
HOME_MANAGEMENT_TIME_ENTRY: 10
BATHING_ASSISTANCE: MODERATE
ADL_ASSISTANCE: INDEPENDENT
ADL_ASSISTANCE: INDEPENDENT

## 2023-10-31 NOTE — PROGRESS NOTES
Occupational Therapy    Evaluation/Treatment    Patient Name: Nghia Hall  MRN: [id number]  : 1965  Today's Date: 10/31/23  Time Calculation  Start Time: 1104  Stop Time: 1134  Time Calculation (min): 30 min       Assessment:  OT Assessment: impaired ADLs/transfers  Prognosis: Good  End of Session Communication: Bedside nurse  End of Session Patient Position: Bed, 3 rail up, Alarm off, not on at start of session  OT Assessment Results: Decreased ADL status, Decreased upper extremity range of motion, Decreased upper extremity strength, Decreased endurance, Decreased functional mobility, Decreased IADLs  Prognosis: Good  Plan:  Treatment Interventions: ADL retraining, Functional transfer training, UE strengthening/ROM, Endurance training, Patient/family training  OT Frequency: 3 times per week  OT Discharge Recommendations: Moderate intensity level of continued care  OT Recommended Transfer Status: Maximum assist, Assist of 2  OT - OK to Discharge: Yes  Treatment Interventions: ADL retraining, Functional transfer training, UE strengthening/ROM, Endurance training, Patient/family training    Subjective     General:   OT Received On: 10/31/23  General  Reason for Referral: worsening left leg swelling and pain; L femoral AVG explant for skin ulceration and possible graft infection. Transferred to MICU d/t c/f septic shock. occlusive DVT of L distal femoral vein-> heparin gtt  Past Medical History Relevant to Rehab: ESRD s/p DDKT (, subsequent graft failure 2021, on HD MWF at [facility] through LT groin AV graft)), pAfib (on Apixaban), HFpEF, HCV (s/p treatment), gout, lymphadenopathy (following with oncology, inconclusive biopsy in 2023), DVT, T2DM, and Adrenal Insufficiency (on hydrocortisone)  Family/Caregiver Present: No  Co-Treatment: PT  Co-Treatment Reason: in order to maximize functional mobility and to ensure both patient and staff safety with patient mobility  Prior to Session Communication:  "Bedside nurse  Patient Position Received: Bed, 3 rail up, Alarm off, not on at start of session  General Comment: patient making continuous \"gruniting\" noises during session; on 0.03 Vaso and 0.26 Levo  Precautions:  Hearing/Visual Limitations: hearing is WFL  Medical Precautions: Fall precautions, Oxygen therapy device and L/min  Vital Signs:  Heart Rate:  (pre 83, post 75)  Resp:  (pre 28, post 25)  SpO2:  (pre 92%, post 95%)  BP:  (pre 121/56, post 103/53)  MAP (mmHg):  (pre 76, post 69)  BP Method: Arterial line  Pain:  Pain Assessment  Pain Assessment: 0-10  Pain Score:  (patient did not rate)  Pain Location:  ((L) groin and (L)LE)    Objective   Cognition:  Overall Cognitive Status: Within Functional Limits  Orientation Level:  (oriented x3)  Following Commands: Follows one step commands with repetition    Home Living:  Type of Home: Apartment  Lives With: Alone  Home Adaptive Equipment: Cane  Home Layout: One level  Home Access: Elevator  Bathroom Shower/Tub: Tub/shower unit  Bathroom Equipment: Grab bars in shower  Prior Function:  Level of [place]: Independent with ADLs and functional transfers, Independent with homemaking with ambulation  ADL Assistance: Independent  Homemaking Assistance: Independent  Ambulatory Assistance:  (mod (I) with cane)  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[employer])  Hand Dominance: Right  Prior Function Comments: (-) drive, takes bus  IADL History:     ADL:  Eating Assistance: Independent  Eating Deficit: Setup (anticipated)  Grooming Assistance: Stand by  Grooming Deficit: Setup, Verbal cueing  Bathing Assistance: Moderate  Bathing Deficit: Setup, Verbal cueing (anticipated)  UE Dressing Assistance: Moderate  UE Dressing Deficit: Setup, Verbal cueing  LE Dressing Assistance: Total  LE Dressing Deficit: Don/doff R sock, Don/doff L sock  Toileting Assistance with Device: Moderate  Toileting Deficit: Setup, Verbal cueing  Activities of Daily Living:       UE " Dressing  UE Dressing Comments: TREATMENT: patient doffed soiled gown and donned clean gown while sitting EOB with set-up and max A     Toileting  Toileting Comments: TREATMENT: patient able to clean front tigist-area with set-up and min A  Activity Tolerance:  Activity Tolerance Comments: Sitting balance: CGA-min A, standing balance max A x2  Functional Standing Tolerance:     Bed Mobility/Transfers: Bed Mobility  Bed Mobility: Yes  Bed Mobility 1  Bed Mobility 1: Rolling right, Rolling left  Level of Assistance 1: Maximum assistance, Moderate verbal cues, Moderate tactile cues  Bed Mobility 2  Bed Mobility  2: Sitting to supine, Supine to sitting  Level of Assistance 2: Maximum assistance, Moderate verbal cues, Moderate tactile cues  Bed Mobility Comments 2: x2 assist, HOB elevated    Transfers  Transfer: Yes  Transfer 1  Transfer From 1: Sit to, Stand to  Transfer to 1: Sit, Stand  Technique 1: Sit to stand, Stand to sit  Transfer Level of Assistance 1: Arm in arm assistance, Maximum assistance, Moderate verbal cues, Moderate tactile cues, +2       Vision:Vision - Basic Assessment  Current Vision: Wears glasses all the time  Sensation:  Light Touch:  (patient did not report numbness/tingling)  Strength:  Strength Comments: (B) shoulders <3/5, (B)  4-/5  Other Activity:      Hand Function:  Hand Function  Gross Grasp: Functional  Coordination: Functional  Extremities: RUE   RUE :  ((R) shoulder ~0-90 degrees, (R) distal joints WFL) and LUE   LUE:  ((L) shoulder ~0-90 degrees, (L) distal joints WFL)      Outcome Measures: [facility] Daily Activity  Putting on and taking off regular lower body clothing: Total  Bathing (including washing, rinsing, drying): A lot  Putting on and taking off regular upper body clothing: A lot  Toileting, which includes using toilet, bedpan or urinal: A lot  Taking care of personal grooming such as brushing teeth: A little  Eating Meals: None  Daily Activity - Total Score: 14    ,    ,  Brief Confusion Assessment Method (bCAM)  CAM Result: CAM -      ,  , and E = Exercise and Early Mobility  Current Activity: Standing    Education Documentation  ADL Training, taught by Dodie Carlson OT at 10/31/2023  2:35 PM.  Learner: Patient  Readiness: Acceptance  Method: Explanation  Response: Needs Reinforcement    Education Comments  No comments found.         Goals:  Encounter Problems       Encounter Problems (Active)       ADLs       Patient with complete upper body dressing with contact guard assist level of assistance donning and doffing all UE clothes with no adaptive equipment. (Progressing)       Start:  10/31/23    Expected End:  11/21/23            Patient with complete lower body dressing with moderate assist level of assistance donning and doffing all LE clothes  with PRN adaptive equipment. (Progressing)       Start:  10/31/23    Expected End:  11/21/23            Patient will complete daily grooming tasks with independent level of assistance and PRN adaptive equipment. (Progressing)       Start:  10/31/23    Expected End:  11/21/23            Patient will complete toileting including hygiene clothing management/hygiene with minimal assist  level of assistance. (Progressing)       Start:  10/31/23    Expected End:  11/21/23               BALANCE       Pt will maintain dynamic standing balance during ADL task with moderate assist level of assistance in order to demonstrate decreased risk of falling and improved postural control. (Progressing)       Start:  10/31/23    Expected End:  11/21/23               EXERCISE/STRENGTHENING       Patient will complete BUE exercises for ROM/strengthening in order to improve strength and activity for ADL performance.  (Progressing)       Start:  10/31/23    Expected End:  11/21/23               MOBILITY       Patient will perform Functional mobility Household distances/Community Distances with moderate assist level of assistance and front wheeled walker in  order to improve safety and functional mobility. (Progressing)       Start:  10/31/23    Expected End:  11/21/23               TRANSFERS       Patient will perform bed mobility moderate assist level of assistance and bed rails and draw sheet in order to improve safety and independence with mobility (Progressing)       Start:  10/31/23    Expected End:  11/21/23            Patient will complete functional transfers with front wheeled walker with moderate assist level of assistance. (Progressing)       Start:  10/31/23    Expected End:  11/21/23

## 2023-10-31 NOTE — CONSULTS
[facility]   [facility]  INITIAL CONSULT NOTE       Reason For Consult  Elevated liver enzymes; concern for cirrhosis    SUBJECTIVE     History Of Present Illness  Nghia Hall is a 58 y.o. male with a past medical history of ESRD s/p DDKT (2006, subsequent graft failure 07/2021, on HD through LT groin AV graft), HCV (s/p treatment, HCV RNA negative on 10/19/2023), pAfib (on Apixaban), HFpEF, DVT, and adrenal Insufficiency (on hydrocortisone) admitted due to LLE pain and swelling.     Since admission, pt was evaluated by ID due to positive blood cultures and concerns of infected L AVG. There was also concern for L AVG blowout; graft was removed by vascular surgery on 10/22. On 10/27, he was also found to have an occlusive DVT of the L femoral vein.     Overnight, the patient had a rapid response called for hypoglycemia and unresponsiveness. He desatted down to the 70s and was not responding, with glucose of 30s.  BiPAP was started. ABG was sent and showed pH 7.30, pCO2 44, pO2 42, Bicarb 21.6, and lactate of 5.7. He was then started on 500 mL LR and Zosyn for concern of sepsis. SBP continued to drop to the 60s, and was then started on a norepinephrine drip. He was transferred to the MICU for further management of likely septic shock.     Today, he was noted to have AST 80, ALT 3, , INR 3.3. CT A/P on 10/30 revealed heterogeneous enhancement of the liver and splenic parenchyma and stable abdominopelvic ascites. Previously, liver US on 10/17 showed heterogenous hepatic echogenicity with nodular contour suggestive of cirrhosis, in addition to small volume ascites.    Hepatology was consulted due to concern for cirrhosis.     Patient confirmed that he had been treated for HCV at  several years ago. He denies any other personal or family history of liver disease. He reports minimal alcohol intake and denies illicit drug use. He denies any ongoing symptoms of  advanced liver disease, including anorexia, fatigue, jaundice, pruritus, GI bleeding, and abdominal distension.      Review of Systems  12-point ROS has been reviewed and it is negative, except if mentioned otherwise above.    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    Renal transplant rejection    Kidney transplant status 12/08/2022    Kidney replaced by transplant    Personal history of immunosuppression therapy 07/04/2021    H/O immunosuppressive therapy    Personal history of other diseases of the nervous system and sense organs     History of cataract    Personal history of other diseases of urinary system 11/02/2021    Personal history of renal failure    Personal history of other endocrine, nutritional and metabolic disease 07/22/2013    History of hyperlipidemia    Type 2 diabetes mellitus without complications (CMS/[facility]) 12/08/2022    Diabetes mellitus    Urinary tract infection, site not specified 05/02/2018    Acute UTI       Home Medications  Medications Prior to Admission   Medication Sig Dispense Refill Last Dose    acetaminophen (Tylenol) 325 mg tablet TAKE 2 TABLETS BY MOUTH EVERY 4 HOURS AS NEEDED FOR PAIN (MILD 1-3) 120 tablet 0 Past Month    acidophilus-sporogenes 35 million- 25 million cell tablet TAKE 2 TABLETS BY MOUTH ONCE DAILY 60 tablet 2 10/9/2023    allopurinol (Zyloprim) 100 mg tablet Take 1 tablet (100 mg) by mouth once a day on Monday, Wednesday, and Friday. AFTER DIALYSIS. 36 tablet 3 Past Week    amLODIPine (Norvasc) 10 mg tablet TAKE 1 TABLET BY MOUTH ONCE DAILY 30 tablet 0 10/9/2023    apixaban (Eliquis) 5 mg tablet Take 1 tablet (5 mg) by mouth 2 times a day. 180 tablet 3     aspirin 81 mg EC tablet TAKE 1 TABLET BY MOUTH ONCE DAILY 30 tablet 0 10/9/2023    B complex-vitamin C-folic acid (Siri-Salo Rx) 1- mg-mg-mcg tablet Take 1  tablet by mouth once daily. 90 tablet 3     blood-glucose sensor device 1 SENSOR EVERY 14 DAYS TO TEST BLOOD SUGAR 2 each 11     cyanocobalamin (Vitamin B-12) 1,000 mcg tablet TAKE 1 TABLET BY MOUTH ONCE DAILY 90 tablet 2 10/9/2023    Dialyvite 100-1 mg tablet        DULoxetine (Cymbalta) 30 mg DR capsule TAKE 1 CAPSULE BY MOUTH ONCE DAILY 90 capsule 1 10/9/2023    epinastine (Elestat) 0.05 % ophthalmic solution 1 drop in both eyes 2 times a day as needed for itching/allergies   Past Month    ergocalciferol (Vitamin D-2) 1.25 MG (51128 UT) capsule Take 1 capsule every month   Past Month    fluticasone (Flonase) 50 mcg/actuation nasal spray Administer 1 spray into each nostril once daily as needed for allergies or rhinitis.       gabapentin (Neurontin) 300 mg capsule Take 1 capsule (300 mg) by mouth once a day on Monday, Wednesday, and Friday. AFTER DIALYSIS. 39 capsule 1     hydrocortisone (Cortef) 5 mg tablet Take 1 tablet (5 mg) by mouth 2 times a day. Take 15 mg in AM , 5 mg in afternoon. 180 tablet 3 10/9/2023    loperamide (Imodium) 2 mg capsule 2 capsules (4 mg) 2 times a day as needed (> 3 loose bowel movements a day). Morning and Afternoon   More than a month    melatonin 3 mg tablet TAKE 1 TABLET BY MOUTH AT BEDTIME AS NEEDED FOR INSOMNIA 30 tablet 0 Past Week    metoprolol tartrate (Lopressor) 50 mg tablet TAKE 1 TABLET BY MOUTH TWO TIMES A  tablet 1 10/9/2023    multivitamin tablet Take 1 tablet by mouth once daily. Men's 50+ formula   10/9/2023    omeprazole (PriLOSEC) 40 mg DR capsule TAKE 1 CAPSULE BY MOUTH ONCE A DAY 30 capsule 0     sevelamer carbonate (Renvela) 800 mg tablet TAKE 2 TABLETS BY MOUTH THREE TIMES A DAY WITH MEALS 180 tablet 0 Past Week    Tab-A-Salo 400 mcg tablet           Surgical History:    Past Surgical History:   Procedure Laterality Date    CT ABDOMEN PELVIS ANGIOGRAM W AND/OR WO IV CONTRAST  10/12/2023    CT ABDOMEN PELVIS ANGIOGRAM W AND/OR WO IV CONTRAST 10/12/2023 [facility]  CT    CT AORTA AND BILATERAL ILIOFEMORAL RUNOFF ANGIOGRAM W AND/OR WO IV CONTRAST  11/7/2021    CT AORTA AND BILATERAL ILIOFEMORAL RUNOFF ANGIOGRAM W AND/OR WO IV CONTRAST 11/7/2021 [facility]    CT AORTA AND BILATERAL ILIOFEMORAL RUNOFF ANGIOGRAM W AND/OR WO IV CONTRAST  7/6/2022    CT AORTA AND BILATERAL ILIOFEMORAL RUNOFF ANGIOGRAM W AND/OR WO IV CONTRAST 7/6/2022 [facility]    CT AORTA AND BILATERAL ILIOFEMORAL RUNOFF ANGIOGRAM W AND/OR WO IV CONTRAST  8/17/2022    CT AORTA AND BILATERAL ILIOFEMORAL RUNOFF ANGIOGRAM W AND/OR WO IV CONTRAST 8/17/2022 [facility]    CT AORTA AND BILATERAL ILIOFEMORAL RUNOFF ANGIOGRAM W AND/OR WO IV CONTRAST  9/30/2022    CT AORTA AND BILATERAL ILIOFEMORAL RUNOFF ANGIOGRAM W AND/OR WO IV CONTRAST 9/30/2022 [facility]    CT AORTA AND BILATERAL ILIOFEMORAL RUNOFF ANGIOGRAM W AND/OR WO IV CONTRAST  12/29/2022    CT AORTA AND BILATERAL ILIOFEMORAL RUNOFF ANGIOGRAM W AND/OR WO IV CONTRAST 12/29/2022 [facility]    IR ANGIOGRAM FISTULA/GRAFT  10/13/2023    IR ANGIOGRAM FISTULA/GRAFT 10/13/2023 Tereso Otto MD [facility] ANGIO    IR CVC TUNNELED  10/20/2023    IR CVC TUNNELED 10/20/2023 Eli Arceo MD [facility] ANGIO    IR VENOGRAM DIALYSIS  8/30/2022    IR VENOGRAM DIALYSIS 8/30/2022 [facility]    MANDIBLE SURGERY  08/25/2015    Jaw Surgery    MR HEAD ANGIO WO IV CONTRAST  1/30/2014    MR HEAD ANGIO WO IV CONTRAST 1/30/2014 [facility]    MR NECK ANGIO WO IV CONTRAST  1/30/2014    MR NECK ANGIO WO IV CONTRAST 1/30/2014 [facility]    OTHER SURGICAL HISTORY  11/03/2021    Arteriovenous fistula creation procedure    OTHER SURGICAL HISTORY  11/02/2021    Renal Transplant    OTHER SURGICAL HISTORY  11/03/2021    Venous Thrombectomy    OTHER SURGICAL HISTORY  06/19/2014    Hand Dislocation Interphalangeal, Open Treatment    US GUIDED BIOPSY LYMPH NODE SUPERFICIAL  1/11/2023    US GUIDED BIOPSY LYMPH NODE  SUPERFICIAL 1/11/2023 [facility]       EXAM     Vitals:    Vitals:    10/31/23 1100 10/31/23 1103 10/31/23 1200 10/31/23 1300   BP:       BP Location:  Right arm     Patient Position:       Pulse: 85  73 85   Resp: 23 23 18   Temp:   36.3 °C (97.3 °F)    TempSrc:   Temporal    SpO2: (!) 87%  (!) 88% (!) 87%   Weight:       Height:             Intake/Output Summary (Last 24 hours) at 10/31/2023 1446  Last data filed at 10/31/2023 1100  Gross per 24 hour   Intake 2590.49 ml   Output 0 ml   Net 2590.49 ml       Physical Exam  GENERAL: In no acute distress.  NEUROLOGIC: A and O x 3. Cranial nerves 2 through 12 grossly intact  HEENT: Extraocular motion intact.  No scleral icterus.  CARDIAC: RRR, no M/R/G.    LUNGS: Symmetric respirations. Slightly increased work of breathing, on NC.  ABDOMEN: Soft, mildly tender to palpation in bilateral lower quadrants. No rebound or guarding.  EXTREMITIES: LLE swollen and tender to palpation.   PSYCH: Appropriate mood and behavior.      OBJECTIVE                                                                              Medications       Current Facility-Administered Medications:     acetaminophen (Tylenol) tablet 650 mg, 650 mg, oral, q4h PRN, 650 mg at 10/31/23 0819 **OR** acetaminophen (Tylenol) oral liquid 650 mg, 650 mg, oral, q4h PRN, 650 mg at 10/13/23 1120 **OR** acetaminophen (Tylenol) suppository 650 mg, 650 mg, rectal, q4h PRN, Suresh Liang MD    B complex-vitamin C-folic acid (Nephrocaps) capsule 1 capsule, 1 capsule, oral, Daily, Suresh Liang MD, 1 capsule at 10/31/23 0820    cyanocobalamin (Vitamin B-12) tablet 1,000 mcg, 1,000 mcg, oral, Daily, Suresh Liang MD, 1,000 mcg at 10/31/23 0820    cyclobenzaprine (Flexeril) tablet 5 mg, 5 mg, oral, TID PRN, Suresh Liang MD, 5 mg at 10/31/23 0820    dextrose 10 % in water (D10W) infusion, 0.3 g/kg/hr, intravenous, Once PRN, Suresh Liang MD    dextrose 10 % in water (D10W) infusion, 150 mL/hr,  intravenous, Continuous, Kushal Jones MD, Last Rate: 150 mL/hr at 10/31/23 1349, 150 mL/hr at 10/31/23 1349    dextrose 50 % injection 25 g, 25 g, intravenous, q15 min PRN, Suresh Liang MD, 25 g at 10/30/23 2330    diphenhydrAMINE (BENADryl) capsule 25 mg, 25 mg, oral, q6h PRN, Suresh Liang MD, 25 mg at 10/26/23 2016    epoetin urmila (Epogen,Procrit) injection 10,000 Units, 10,000 Units, intravenous, Every Mon/Wed/Fri, Suresh Liang MD, 10,000 Units at 10/30/23 2000    glucagon (Glucagen) injection 1 mg, 1 mg, intramuscular, q15 min PRN, Suresh Liang MD    [Held by provider] heparin (porcine) injection 5,000-10,000 Units, 5,000-10,000 Units, intravenous, q4h PRN, Lynnette Austin MD    [Held by provider] heparin 25,000 Units in dextrose 5% 250 mL (100 Units/mL) infusion (premix), 0-4,500 Units/hr, intravenous, Continuous, Lynnette Austin MD, Stopped at 10/30/23 1745    hydrocortisone sod succ (PF) (Solu-CORTEF) injection 50 mg, 50 mg, intravenous, q6h, Suresh Liang MD, 50 mg at 10/31/23 1207    lactated Ringer's bolus 500 mL, 500 mL, intravenous, Once, Suresh Liang MD    lactobacillus acidophilus tablet 1 tablet, 1 tablet, oral, Daily, Suresh Liang MD, 1 tablet at 10/31/23 0819    lidocaine 4 % patch 1 patch, 1 patch, transdermal, Daily, Suresh Liang MD, 1 patch at 10/26/23 2114    lubricating eye drops ophthalmic solution 1 drop, 1 drop, Both Eyes, PRN, Suresh Liang MD    melatonin tablet 10 mg, 10 mg, oral, Daily, Suresh Liang MD, 10 mg at 10/30/23 1826    melatonin tablet 3 mg, 3 mg, oral, Nightly PRN, Surseh Liang MD, 3 mg at 10/29/23 2104    norepinephrine (Levophed) 16 mg in dextrose 5 % in water (D5W) 250 mL (0.064 mg/mL) infusion, 0.01-1 mcg/kg/min, intravenous, Continuous, Chandler Jones MD, Last Rate: 26.8 mL/hr at 10/31/23 1439, 0.2 mcg/kg/min at 10/31/23 1439    oxyCODONE (Roxicodone) immediate release tablet 5 mg, 5 mg, oral, q8h PRN, Sky Hilario MD, 5 mg  at 10/31/23 1027    pantoprazole (ProtoNix) EC tablet 40 mg, 40 mg, oral, Daily before breakfast, Suresh Liang MD, 40 mg at 10/31/23 0839    paricalcitoL (Zemplar) injection 8 mcg, 8 mcg, intravenous, Once per day on Mon Wed Fri, Suresh Liang MD, 8 mcg at 10/30/23 2109    PrismaSol BGK 2/3.5 CRRT solution, 25 mL/kg/hr, CRRT, Continuous, Natalia Echeverria MD    psyllium (Metamucil) 3.4 gram packet 1 packet, 1 packet, oral, Daily, Suresh Liang MD, 1 packet at 10/31/23 0819    sevelamer carbonate (Renvela) tablet 1,600 mg, 1,600 mg, oral, TID with meals, Suresh Liang MD, 1,600 mg at 10/31/23 1105    vasopressin (Vasostrict) 0.2 unit/mL infusion, 0.03 Units/min, intravenous, Continuous, Lynnette Austin MD, Last Rate: 9 mL/hr at 10/31/23 0800, 0.03 Units/min at 10/31/23 0800                                                                            Labs     Results for orders placed or performed during the hospital encounter of 10/09/23 (from the past 24 hour(s))   Blood Gas Lactic Acid, Venous   Result Value Ref Range    POCT Lactate, Venous 5.2 (HH) 0.4 - 2.0 mmol/L   Lactate   Result Value Ref Range    Lactate 5.3 (HH) 0.4 - 2.0 mmol/L   Heparin Assay, UFH   Result Value Ref Range    Heparin Unfractionated 1.9 (HH) See Comment Below for Therapeutic Ranges IU/mL   POCT GLUCOSE   Result Value Ref Range    POCT Glucose 383 (H) 74 - 99 mg/dL   Blood Gas Arterial Full Panel   Result Value Ref Range    POCT pH, Arterial 7.31 (L) 7.38 - 7.42 pH    POCT pCO2, Arterial 32 (L) 38 - 42 mm Hg    POCT pO2, Arterial 116 (H) 85 - 95 mm Hg    POCT SO2, Arterial 99 94 - 100 %    POCT Oxy Hemoglobin, Arterial 97.0 94.0 - 98.0 %    POCT Hematocrit Calculated, Arterial 29.0 (L) 41.0 - 52.0 %    POCT Sodium, Arterial 124 (L) 136 - 145 mmol/L    POCT Potassium, Arterial 5.6 (H) 3.5 - 5.3 mmol/L    POCT Chloride, Arterial 96 (L) 98 - 107 mmol/L    POCT Ionized Calcium, Arterial 0.98 (L) 1.10 - 1.33 mmol/L    POCT Glucose,  Arterial 47 (LL) 74 - 99 mg/dL    POCT Lactate, Arterial 4.9 (HH) 0.4 - 2.0 mmol/L    POCT Base Excess, Arterial -9.2 (L) -2.0 - 3.0 mmol/L    POCT HCO3 Calculated, Arterial 16.1 (L) 22.0 - 26.0 mmol/L    POCT Hemoglobin, Arterial 9.7 (L) 13.5 - 17.5 g/dL    POCT Anion Gap, Arterial 18 10 - 25 mmo/L    Patient Temperature 37.0 degrees Celsius    FiO2 21 %   Lactate   Result Value Ref Range    Lactate 5.4 (HH) 0.4 - 2.0 mmol/L   POCT GLUCOSE   Result Value Ref Range    POCT Glucose 53 (L) 74 - 99 mg/dL   POCT GLUCOSE   Result Value Ref Range    POCT Glucose 98 74 - 99 mg/dL   POCT GLUCOSE   Result Value Ref Range    POCT Glucose 41 (L) 74 - 99 mg/dL   Blood Gas Arterial Full Panel   Result Value Ref Range    POCT pH, Arterial 7.26 (L) 7.38 - 7.42 pH    POCT pCO2, Arterial 33 (L) 38 - 42 mm Hg    POCT pO2, Arterial 115 (H) 85 - 95 mm Hg    POCT SO2, Arterial 99 94 - 100 %    POCT Oxy Hemoglobin, Arterial 97.1 94.0 - 98.0 %    POCT Hematocrit Calculated, Arterial 29.0 (L) 41.0 - 52.0 %    POCT Sodium, Arterial 124 (L) 136 - 145 mmol/L    POCT Potassium, Arterial 5.7 (H) 3.5 - 5.3 mmol/L    POCT Chloride, Arterial 93 (L) 98 - 107 mmol/L    POCT Ionized Calcium, Arterial 0.98 (L) 1.10 - 1.33 mmol/L    POCT Glucose, Arterial 44 (LL) 74 - 99 mg/dL    POCT Lactate, Arterial 5.7 (HH) 0.4 - 2.0 mmol/L    POCT Base Excess, Arterial -11.3 (L) -2.0 - 3.0 mmol/L    POCT HCO3 Calculated, Arterial 14.8 (L) 22.0 - 26.0 mmol/L    POCT Hemoglobin, Arterial 9.8 (L) 13.5 - 17.5 g/dL    POCT Anion Gap, Arterial 22 10 - 25 mmo/L    Patient Temperature 37.0 degrees Celsius    FiO2 40 %   Lactate   Result Value Ref Range    Lactate 6.1 (HH) 0.4 - 2.0 mmol/L   POCT GLUCOSE   Result Value Ref Range    POCT Glucose 93 74 - 99 mg/dL   POCT GLUCOSE   Result Value Ref Range    POCT Glucose 93 74 - 99 mg/dL   Comprehensive metabolic panel   Result Value Ref Range    Glucose 111 (H) 74 - 99 mg/dL    Sodium 126 (L) 136 - 145 mmol/L    Potassium 5.7  (H) 3.5 - 5.3 mmol/L    Chloride 91 (L) 98 - 107 mmol/L    Bicarbonate 14 (L) 21 - 32 mmol/L    Anion Gap 27 (H) 10 - 20 mmol/L    Urea Nitrogen 56 (H) 6 - 23 mg/dL    Creatinine 7.15 (H) 0.50 - 1.30 mg/dL    eGFR 8 (L) >60 mL/min/1.73m*2    Calcium 7.0 (L) 8.6 - 10.6 mg/dL    Albumin 2.2 (L) 3.4 - 5.0 g/dL    Alkaline Phosphatase 202 (H) 33 - 120 U/L    Total Protein 5.2 (L) 6.4 - 8.2 g/dL    AST 80 (H) 9 - 39 U/L    Bilirubin, Total 2.1 (H) 0.0 - 1.2 mg/dL    ALT 3 (L) 10 - 52 U/L   CBC   Result Value Ref Range    WBC 21.6 (H) 4.4 - 11.3 x10*3/uL    nRBC 0.2 (H) 0.0 - 0.0 /100 WBCs    RBC 3.02 (L) 4.50 - 5.90 x10*6/uL    Hemoglobin 8.9 (L) 13.5 - 17.5 g/dL    Hematocrit 26.1 (L) 41.0 - 52.0 %    MCV 86 80 - 100 fL    MCH 29.5 26.0 - 34.0 pg    MCHC 34.1 32.0 - 36.0 g/dL    RDW 17.9 (H) 11.5 - 14.5 %    Platelets 175 150 - 450 x10*3/uL    MPV 10.9 7.5 - 11.5 fL   Vancomycin   Result Value Ref Range    Vancomycin 19.0 5.0 - 20.0 ug/mL   Lactate   Result Value Ref Range    Lactate 6.7 (HH) 0.4 - 2.0 mmol/L   Magnesium   Result Value Ref Range    Magnesium 1.58 (L) 1.60 - 2.40 mg/dL   Heparin Assay   Result Value Ref Range    Heparin Unfractionated 1.2 (HH) See Comment Below for Therapeutic Ranges IU/mL   Phosphorus   Result Value Ref Range    Phosphorus 6.1 (H) 2.5 - 4.9 mg/dL   Bilirubin, Direct   Result Value Ref Range    Bilirubin, Direct 1.5 (H) 0.0 - 0.3 mg/dL   Coagulation Screen   Result Value Ref Range    Protime 37.7 (H) 9.8 - 12.8 seconds    INR 3.3 (H) 0.9 - 1.1    aPTT 42 (H) 27 - 38 seconds   Lactate   Result Value Ref Range    Lactate 6.5 (HH) 0.4 - 2.0 mmol/L   POCT GLUCOSE   Result Value Ref Range    POCT Glucose 95 74 - 99 mg/dL   Blood Gas Arterial Full Panel   Result Value Ref Range    POCT pH, Arterial 7.25 (LL) 7.38 - 7.42 pH    POCT pCO2, Arterial 34 (L) 38 - 42 mm Hg    POCT pO2, Arterial 92 85 - 95 mm Hg    POCT SO2, Arterial 98 94 - 100 %    POCT Oxy Hemoglobin, Arterial 95.7 94.0 - 98.0 %     POCT Hematocrit Calculated, Arterial 28.0 (L) 41.0 - 52.0 %    POCT Sodium, Arterial 120 (LL) 136 - 145 mmol/L    POCT Potassium, Arterial 6.1 (HH) 3.5 - 5.3 mmol/L    POCT Chloride, Arterial 93 (L) 98 - 107 mmol/L    POCT Ionized Calcium, Arterial 0.93 (L) 1.10 - 1.33 mmol/L    POCT Glucose, Arterial 122 (H) 74 - 99 mg/dL    POCT Lactate, Arterial 6.3 (HH) 0.4 - 2.0 mmol/L    POCT Base Excess, Arterial -11.4 (L) -2.0 - 3.0 mmol/L    POCT HCO3 Calculated, Arterial 14.9 (L) 22.0 - 26.0 mmol/L    POCT Hemoglobin, Arterial 9.4 (L) 13.5 - 17.5 g/dL    POCT Anion Gap, Arterial 18 10 - 25 mmo/L    Patient Temperature 37.0 degrees Celsius    FiO2 52 %   POCT GLUCOSE   Result Value Ref Range    POCT Glucose 97 74 - 99 mg/dL   POCT GLUCOSE   Result Value Ref Range    POCT Glucose 78 74 - 99 mg/dL                                                                                Imaging             CT ABDOMEN PELVIS W IV CONTRAST    - Impression -  1. Interval explant of the left superficial femoral artery and vein  AVG graft, with a graft remnant visualized within the subcutaneous  tissues of the left lower extremity. A subcutaneous 3.5 x 3.0 x 4.4  cm fluid collection within the inferomedial aspect of the abandoned  graft in the left upper thigh. No air foci are noted, however the  sterility of this collection can not be assessed on this current  examination.  2. Diffuse skin thickening with subcutaneous edema and fat stranding  in the visualized portion of the left upper thigh, correlate with  concern for cellulitis.  3. Interval development of new 3.5 x 2.4 cm lobular right  retroperitoneal focal hyperdensity abutting the right quadratus  lumborum muscle, which incompletely characterized but likely  represents a small focus of retroperitoneal hemorrhage.  4. Findings suggestive of fluid overload with heterogeneous  enhancement of the liver and splenic parenchyma, abdominal and  mesenteric anasarca and stable abdominopelvic  ascites. Please  correlate with patient's fluid status.  5. Unchanged bilateral subacute and acute rib fractures involving the  9th and left 6-8th and 10th rib.  6. Stable cardiomegaly.  7. Stable enlarged pelvic lymph nodes, more on the left.  8. Partially visualized patchy ground-glass opacities in the right  lung may represent atelectasis, superimposed infection not excluded.    The critical information above was relayed directly by Resident Black Sawant epic secure chat to Lynnette Austin MD on 10/30/2023 at 5:39 p.m..    I personally reviewed the images/study and I agree with the findings  as stated. This study was interpreted at [place], [place].    Signed by: Alistair Morel 10/30/2023 6:53 PM  Dictation workstation:   [id number]       US ABDOMEN LIMITED LIVER    - Impression -  1. Heterogenous hepatic echogenicity with nodular contour suggestive  of cirrhosis. No focal lesion identified.  2. Small volume ascites.  3. Partially visualized right pleural effusion.    I personally reviewed the images/study and I agree with the findings  as stated. This study was interpreted at [place], [place].    MACRO:  None    Signed by: Ortega Guevara 10/18/2023 5:19 PM  Dictation workstation:   [id number]         ASSESSMENT / PLAN                  ASSESSMENT:  Nghia Hall is a 58 y.o. male with a past medical history of ESRD s/p DDKT (2006, subsequent graft failure 07/2021, on HD through LT groin AV graft), HCV (s/p treatment, HCV RNA negative on 10/19/2023), pAfib (on Apixaban), HFpEF, DVT, and adrenal Insufficiency (on hydrocortisone) who was admitted on 10/9 due to LLE pain and swelling. He was transferred to MICU on 10/30 for further management of likely septic shock after rapid response was called for hypoglycemia and unresponsiveness which required a norepinephrine drip. .     In the MICU, he was noted to have an  elevated AST (80) and INR (3.3). Prior liver US (10/17) and CT A/P (10/30) showed findings concerning for cirrhosis and ascites. Hepatology was consulted due to evaluation for cirrhosis.     Recommendations:  - Given patient's risk factors and imaging findings suggestive of cirrhosis, outpatient hepatology evaluation is warranted. Please ensure patient has hepatology follow-up scheduled upon discharge; at that time, fibroscan vs liver biopsy may be considered. However, there is currently no indication for inpatient workup as liver dysfunction is not contributing to his current presentation. Consider reassessing the need for inpatient workup if patient becomes symptomatic with liver-related issues or if there are any clinical changes that suggest liver dysfunction as a significant concern.   - Consider checking CPK levels to evaluate for extrahepatic causes of AST elevation.   - Patient's liver enzymes may continue to rise over the next several days due to the stress of septic shock and the infectious state. Monitoring of LFTs is recommended, but should be interpreted in the context of the patient's overall clinical condition.     Patient was seen and discussed with Dr. Corona.    Thank you for the consultation. Hepatology will continue to follow.    Rasta Moran, MS4

## 2023-10-31 NOTE — PROGRESS NOTES
Nghia Hall   58 y.o.    @WT@  N/Room: [id number]-A  DOA: 10/9/2023    ___________________________________________________________________________________  SUBJECTIVE: I have seen and examined Nghia Hall at the bedside. He was quite irritable.  Nghia Hall charts and 24 hrs events reviewed.    ___________________________________________________________________________________  OBJECTIVE:  Temp:  [35.8 °C (96.4 °F)-36.3 °C (97.3 °F)] 36.3 °C (97.3 °F)  Heart Rate:  [] 76  Resp:  [14-35] 18  BP: (102-135)/(50-81) 102/70  Arterial Line BP 1: ()/(49-67) 109/52  FiO2 (%):  [40 %] 40 %     Intake/Output Summary (Last 24 hours) at 10/31/2023 1511  Last data filed at 10/31/2023 1100  Gross per 24 hour   Intake 2590.49 ml   Output 0 ml   Net 2590.49 ml      I/O last 3 completed shifts:  In: 3013 [I.V.:1863; IV Piggyback:1150]  Out: 0        General appearance: irritable  Eyes: non-icteric  Skin: no apparent rash  Heart: regular  Lungs: NVB B/L with wheezing/crackles  Abdomen: soft, nt/nd  Extremities: grade 2 edema B/L  Access: R femoral cath    Meds:     Current Facility-Administered Medications:     acetaminophen (Tylenol) tablet 650 mg, 650 mg, oral, q4h PRN, 650 mg at 10/31/23 0819 **OR** acetaminophen (Tylenol) oral liquid 650 mg, 650 mg, oral, q4h PRN, 650 mg at 10/13/23 1120 **OR** acetaminophen (Tylenol) suppository 650 mg, 650 mg, rectal, q4h PRN, Suresh Liang MD    B complex-vitamin C-folic acid (Nephrocaps) capsule 1 capsule, 1 capsule, oral, Daily, Suresh Liang MD, 1 capsule at 10/31/23 0820    cyanocobalamin (Vitamin B-12) tablet 1,000 mcg, 1,000 mcg, oral, Daily, Suresh Liang MD, 1,000 mcg at 10/31/23 0820    cyclobenzaprine (Flexeril) tablet 5 mg, 5 mg, oral, TID PRN, Suresh Liang MD, 5 mg at 10/31/23 0820    dextrose 10 % in water (D10W) infusion, 0.3 g/kg/hr, intravenous, Once PRN, Suresh Liang MD    dextrose 10 % in water (D10W) infusion, 150 mL/hr, intravenous,  Continuous, Kushal Jones MD, Last Rate: 150 mL/hr at 10/31/23 1349, 150 mL/hr at 10/31/23 1349    dextrose 50 % injection 25 g, 25 g, intravenous, q15 min PRN, Suresh Liang MD, 25 g at 10/30/23 2330    diphenhydrAMINE (BENADryl) capsule 25 mg, 25 mg, oral, q6h PRN, Suersh Liang MD, 25 mg at 10/26/23 2016    epoetin urmila (Epogen,Procrit) injection 10,000 Units, 10,000 Units, intravenous, Every Mon/Wed/Fri, Suresh Liang MD, 10,000 Units at 10/30/23 2000    glucagon (Glucagen) injection 1 mg, 1 mg, intramuscular, q15 min PRN, Suresh Liang MD    [Held by provider] heparin (porcine) injection 5,000-10,000 Units, 5,000-10,000 Units, intravenous, q4h PRN, Lynnette Austin MD    [Held by provider] heparin 25,000 Units in dextrose 5% 250 mL (100 Units/mL) infusion (premix), 0-4,500 Units/hr, intravenous, Continuous, Lynnette Austin MD, Stopped at 10/30/23 1745    hydrocortisone sod succ (PF) (Solu-CORTEF) injection 50 mg, 50 mg, intravenous, q6h, Suresh Liang MD, 50 mg at 10/31/23 1207    lactated Ringer's bolus 500 mL, 500 mL, intravenous, Once, Suresh Liang MD    lactobacillus acidophilus tablet 1 tablet, 1 tablet, oral, Daily, Suresh Liang MD, 1 tablet at 10/31/23 0819    lidocaine 4 % patch 1 patch, 1 patch, transdermal, Daily, Suresh Liang MD, 1 patch at 10/26/23 2114    lubricating eye drops ophthalmic solution 1 drop, 1 drop, Both Eyes, PRN, Suresh Liang MD    melatonin tablet 10 mg, 10 mg, oral, Daily, Suresh Liang MD, 10 mg at 10/30/23 1826    melatonin tablet 3 mg, 3 mg, oral, Nightly PRN, Suresh Liang MD, 3 mg at 10/29/23 2104    norepinephrine (Levophed) 16 mg in dextrose 5 % in water (D5W) 250 mL (0.064 mg/mL) infusion, 0.01-1 mcg/kg/min, intravenous, Continuous, Chandler Jones MD, Last Rate: 20.1 mL/hr at 10/31/23 1459, 0.15 mcg/kg/min at 10/31/23 1459    oxyCODONE (Roxicodone) immediate release tablet 5 mg, 5 mg, oral, q8h PRN, Sky Hilario MD, 5 mg at 10/31/23  "1027    pantoprazole (ProtoNix) EC tablet 40 mg, 40 mg, oral, Daily before breakfast, Suresh Liang MD, 40 mg at 10/31/23 0839    paricalcitoL (Zemplar) injection 8 mcg, 8 mcg, intravenous, Once per day on Mon Wed Fri, Suresh Liang MD, 8 mcg at 10/30/23 2109    PrismaSol BGK 2/3.5 CRRT solution, 25 mL/kg/hr, CRRT, Continuous, Natalia Echeverria MD    psyllium (Metamucil) 3.4 gram packet 1 packet, 1 packet, oral, Daily, Suresh Liang MD, 1 packet at 10/31/23 0819    sevelamer carbonate (Renvela) tablet 1,600 mg, 1,600 mg, oral, TID with meals, Suresh Liang MD, 1,600 mg at 10/31/23 1105    vasopressin (Vasostrict) 0.2 unit/mL infusion, 0.03 Units/min, intravenous, Continuous, Lynnette Austin MD, Last Rate: 9 mL/hr at 10/31/23 0800, 0.03 Units/min at 10/31/23 0800    Investigations:  Results from last 7 days   Lab Units 10/31/23  0450   WBC AUTO x10*3/uL 21.6*   RBC AUTO x10*6/uL 3.02*   HEMOGLOBIN g/dL 8.9*   HEMATOCRIT % 26.1*     Results from last 7 days   Lab Units 10/31/23  0450   SODIUM mmol/L 126*   POTASSIUM mmol/L 5.7*   CHLORIDE mmol/L 91*   CO2 mmol/L 14*   BUN mg/dL 56*   CREATININE mg/dL 7.15*   CALCIUM mg/dL 7.0*   PHOSPHORUS mg/dL 6.1*   MAGNESIUM mg/dL 1.58*   BILIRUBIN TOTAL mg/dL 2.1*   ALT U/L 3*   AST U/L 80*         No results found for: \"ALBUR\", \"[id number]\"   Susceptibility data from last 90 days.  Collected Specimen Info Organism Amoxicillin/Clavulanate Ampicillin Ampicillin/Sulbactam Aztreonam Cefazolin Cefepime Cefotaxime Ceftazidime Ceftriaxone Ciprofloxacin Clindamycin Ertapenem Erythromycin Gentamicin   10/30/23 Blood culture from Peripheral Venipuncture Escherichia coli S R I R R R R R R S  S  S   10/30/23 Blood culture from Peripheral Venipuncture Escherichia coli                 10/13/23 Blood culture from Dialysis Staphylococcus aureus                 10/13/23 Blood culture from Arterial Line Methicillin Susceptible Staphylococcus aureus (MSSA)           S  S      Collected " Specimen Info Organism Levofloxacin Meropenem Oxacillin Piperacillin/Tazobactam Tetracycline Trimethoprim/Sulfamethoxazole Vancomycin   10/30/23 Blood culture from Peripheral Venipuncture Escherichia coli S S  S  S    10/30/23 Blood culture from Peripheral Venipuncture Escherichia coli          10/13/23 Blood culture from Dialysis Staphylococcus aureus          10/13/23 Blood culture from Arterial Line Methicillin Susceptible Staphylococcus aureus (MSSA)   S  S S S       ___________________________________________________________________________________  ASSESSMENT:  Nghia Hall is a 58 year old male with PMH of HTN, HLD, paroxysmal A-fib on Eliquis, HFpEF w/ most recent echo on 10/11 which showed EF 50%, ESRD s/p DDKT (2006, subsequent graft failure 07/2021, on HD MWF at [facility] through LT groin AV graft on [address]; not currently on immunosuppression), T2DM, LAZARO, Hepatitis C s/p treatment, gout, lymphadenopathy (following with oncology, inconclusive biopsy in 01/2023), DVT, and Adrenal Insufficiency (on hydrocortisone) who presented to the MICU after a rapid response was called for hypoglycemia and unresponsiveness, which required a norepinephrine drip. He is admitted to the ICU for concern for possible septic shock vs adrenal insufficiency. Nephrology is helping with ESRD Mx     #ESRD-HD (MWF) admitted with infected fem AVG s/p explant and leg swelling    -started on CVVH 2k given hypotension and pressor needs  ___________________________________________________________________________________  RECOMMENDATIONS:  -start CVVH 2k   -replete phosphorus as needed  -BMP twice, serum phosphorus and magnesium levels daily   -Renal Diet   -Daily renal MVI  -Treat underlying infection  -Strict I/O monitoring, daily weights, daily BMP  -Will continue to follow    Patient is seen and discussed with the attending.       Natalia Echeverria MD  Nephrology Fellow   Daytime / Weekend Renal Pager [phone]  After 7 pm Emergencies  [phone] Pager [phone]

## 2023-10-31 NOTE — PROGRESS NOTES
SOCIAL WORK NOTE   SW met with team for ID rounds. Patient is currently in need of HD but had an issue with HD catheter access. IR and vascular surgery to develop plan. PT/OT recommending SNF. Social work to follow.  NAVEED Garcia, LISW-S ([id number])

## 2023-10-31 NOTE — SIGNIFICANT EVENT
Asked by MICU team to access for peripheral IV access, patient no longer on pressors and about to start CVVH for ESRD with hyperkalemia with 2 PIVs 22 G functioning poorly, nurses had been unable to obtain further access- per MICU report prior internal jugular attempts by IR, prior vascular surgery input and unable to obtain access.  I cleaned and preped R EJ- accessed with 18G IV, did not obtain flash, subcutaneous hematoma visible, so came out and held pressure, no significant bleeding or hematoma after holding pressure.   At end of monitoring for hematoma- patient became less responsive, heart rate slowed,  and BP dropped.  MICU team came and supported BP with norepinephrine, atropine, bicarb push.  BS checked and 66 hypoglycemia- amp of D50 given.  Patient given  ml bolus, eventually additionally started on vaso gtt, epi gtt.  MICU team to continue attempts at IV access and supportive care, also FFP for coagulopathy in case central access needed.  Family called by MICU GOLD team- they are on their way in for continued discussions.      I spent 45 min in additional CC Time.

## 2023-10-31 NOTE — CONSULTS
Vancomycin Dosing by Pharmacy- Cessation of Therapy    Consult to pharmacy for vancomycin dosing has been discontinued by the prescriber, pharmacy will sign off at this time.    Please call pharmacy if there are further questions or re-enter a consult if vancomycin is resumed.     Nano Marinelli, ArleyD

## 2023-10-31 NOTE — CARE PLAN
The patient's goals for the shift include      The clinical goals for the shift include decrease pain    Problem: Discharge Planning  Goal: Discharge to home or other facility with appropriate resources  Outcome: Progressing     Problem: Chronic Conditions and Co-morbidities  Goal: Patient's chronic conditions and co-morbidity symptoms are monitored and maintained or improved  Outcome: Progressing     Problem: Dialysis  Goal: I will slow progression of end-stage renal disease through participating in dialysis 3 times a week  Outcome: Progressing     Problem: Diabetes  Goal: Achieve decreasing blood glucose levels by end of shift  Outcome: Progressing  Goal: Increase stability of blood glucose readings by end of shift  Outcome: Progressing  Goal: Decrease in ketones present in urine by end of shift  Outcome: Progressing  Goal: Maintain electrolyte levels within acceptable range throughout shift  Outcome: Progressing  Goal: Maintain glucose levels >70mg/dl to <250mg/dl throughout shift  Outcome: Progressing  Goal: No changes in neurological exam by end of shift  Outcome: Progressing  Goal: Learn about and adhere to nutrition recommendations by end of shift  Outcome: Progressing  Goal: Vital signs within normal range for age by end of shift  Outcome: Progressing  Goal: Increase self care and/or family involovement by end of shift  Outcome: Progressing  Goal: Receive DSME education by end of shift  Outcome: Progressing

## 2023-10-31 NOTE — PROGRESS NOTES
VASCULAR SURGERY PROGRESS NOTE  Subjective   Continues to remain on high dose vasopressors. Rising lactate. Worsening metabolic acidosis.     Objective   Vitals:    10/31/23 0830   BP:    Pulse:    Resp:    Temp: 36.2 °C (97.2 °F)   SpO2:       Exam:  Constitutional: Ill-appearing  Neuro:  AOx2, grossly intact  ENMT: moist mucous membranes  CV: no tachycardia on bedside monitor  Pulm: labored on nasal canula  GI: soft, non-tender, non-distended  Skin: Left thigh wound clean with granulation tissue, left lateral incision intact without any drainage, left medial incision opened at bedside with drainage of seropurulent fluid, no exposed graft once the wound was opened  Musculoskeletal: moving all extremities  Extremities: right femoral tunneled HD line, left thigh wounds as above, monophasic L PT and DP, sensory and motor intact    Labs:  Results from last 7 days   Lab Units 10/31/23  0450 10/30/23  0748 10/30/23  0058   WBC AUTO x10*3/uL 21.6* 5.7 5.7   HEMOGLOBIN g/dL 8.9* 7.6* 9.5*   PLATELETS AUTO x10*3/uL 175 193 234      Results from last 7 days   Lab Units 10/31/23  0450 10/30/23  0748 10/30/23  0058   SODIUM mmol/L 126* 132* 134*   POTASSIUM mmol/L 5.7* 4.8 5.1   CHLORIDE mmol/L 91* 97* 98   CO2 mmol/L 14* 20* 21   BUN mg/dL 56* 45* 44*   CREATININE mg/dL 7.15* 6.74* 6.48*   GLUCOSE mg/dL 111* 105* 37*   MAGNESIUM mg/dL 1.58* 1.40* 1.64   PHOSPHORUS mg/dL 6.1* 3.9 4.0      Results from last 7 days   Lab Units 10/31/23  0450 10/30/23  0748   INR  3.3* 3.1*   PROTIME seconds 37.7* 34.8*   APTT seconds 42* 39*     Assessment/Plan   Nghia Hall is 58 y.o. male with history of ESRD s/p DDKT (2006, subsequent graft failure 2021) on HD, pAfib (on Eliquis), HCV, gout, lymphadenopathy, adrenal insufficiency (on steroids) who presented with worsening left leg swelling and pain    He underwent L femoral AVG explant for skin ulceration and possible graft infection.     He was transferred to MICU yesterday with concerns  "for septic shock. Seropurulent drainage from left femoral medial incision (venous anastomosis). CT A/P reviewed, showed superficial collection without involvement of AVG cuff. Medial incision opened at bedside with drainage of seropurulent fluid. Culture obtained and sent. No exposed graft material in the wound.     Blood cultures growing gram negative bacilli    Septic shock likely related to right femoral tunneled HD line infection and less likely due to left incisional fluid collection    Plan:  - recommend removing right femoral tunneled HD line and line holiday  - wound care to left thigh wounds: pack both wound with 1\" NuGauze and cover with ABD pads  - follow-up cultures  - continue IV antibiotics    D/w attending, Dr. Erika Oliver MD  Vascular Surgery PGY-4  Team pager: [phone]   "

## 2023-10-31 NOTE — CONSULTS
Vancomycin Dosing by Pharmacy- Cessation of Therapy    Consult to pharmacy for vancomycin dosing has been discontinued by the prescriber, pharmacy will sign off at this time.    Please call pharmacy if there are further questions or re-enter a consult if vancomycin is resumed.     Sophia Laughlin, ArleyD

## 2023-10-31 NOTE — PROGRESS NOTES
"Physical Therapy    Physical Therapy Evaluation    Patient Name: Nghia Hall  MRN: [id number]  Today's Date: 10/31/2023   Time Calculation  Start Time: 1103  Stop Time: 1132  Time Calculation (min): 29 min    Assessment/Plan   PT Assessment  PT Assessment Results: Decreased strength, Impaired balance, Decreased mobility, Decreased safety awareness, Impaired judgement, Obesity, Pain  Rehab Prognosis: Good  End of Session Communication: Bedside nurse  End of Session Patient Position: Bed, 3 rail up, Alarm off, not on at start of session  IP OR SWING BED PT PLAN  Inpatient or Swing Bed: Inpatient  PT Plan  Treatment/Interventions: Bed mobility, Transfer training, Gait training, Balance training, Strengthening, Therapeutic activity, Therapeutic exercise, Positioning, Postural re-education  PT Plan: Skilled PT  PT Frequency: 3 times per week  PT Discharge Recommendations: Moderate intensity level of continued care  PT - OK to Discharge: Yes      Subjective   General Visit Information:  General  Reason for Referral: worsening left leg swelling and pain; L femoral AVG explant for skin ulceration and possible graft infection. Transferred to MICU d/t c/f septic shock. occlusive DVT of L distal femoral vein-> heparin gtt  Past Medical History Relevant to Rehab: ESRD s/p DDKT (2006, subsequent graft failure 07/2021, on HD MWF at [facility] through LT groin AV graft)), pAfib (on Apixaban), HFpEF, HCV (s/p treatment), gout, lymphadenopathy (following with oncology, inconclusive biopsy in 01/2023), DVT, T2DM, and Adrenal Insufficiency (on hydrocortisone)  Family/Caregiver Present: No  Co-Treatment: OT  Co-Treatment Reason: in order to maximize functional mobility and to ensure both patient and staff safety with patient mobility  Prior to Session Communication: Bedside nurse  Patient Position Received: Bed, 3 rail up, Alarm off, not on at start of session  General Comment: patient making continuous \"gruniting\" noises during " session  Home Living:  Home Living  Type of Home: Apartment  Lives With: Alone  Home Adaptive Equipment: Cane  Home Layout: One level  Home Access: Elevator  Bathroom Shower/Tub: Tub/shower unit  Bathroom Equipment: Grab bars in shower  Prior Level of Function:  Prior Function Per Pt/Caregiver Report  Level of [place]: Independent with ADLs and functional transfers, Independent with homemaking with ambulation  ADL Assistance: Independent  Homemaking Assistance: Independent  Ambulatory Assistance:  (SPC)  Vocational:  (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)  Hand Dominance: Right  Prior Function Comments: (-) drive, takes bus  Precautions:  Precautions  Hearing/Visual Limitations: glasses  Medical Precautions: Fall precautions, Oxygen therapy device and L/min  Vital Signs:  Vital Signs  Heart Rate:  (pre: 81 post: 75)  Resp:  (pre: 20 post: 25)  SpO2:  (pre: 96% post: 95% on 7 L NC)  BP:  (pre: 121/51 (75), sitting EOB, MAP >65, post: 103/53 (69); on .03 vaso, .26 levo)  BP Location: Right arm  BP Method: Arterial line    Objective   Pain:  Pain Assessment  Pain Assessment: 0-10  Pain Score:  (did not rate numerically)  Pain Location:  (L groin and LLE)  Cognition:  Cognition  Orientation Level:  (AxO x3)    General Assessments:     Sensation  Light Touch:  (did not report numbness/tingling)    Postural Control  Postural Control: Impaired  Trunk Control: favors lateral lean L; trunk SB R; patient with observed increase distribution of pannus on patient's L side of abdomen    Static Sitting Balance  Static Sitting-Balance Support: Bilateral upper extremity supported  Static Sitting-Level of Assistance: Contact guard, Minimum assistance  Static Sitting-Comment/Number of Minutes: lateral lean L; requires tactile A to re-orient to midline  Dynamic Sitting Balance  Dynamic Sitting-Balance Support:  (1-2 UE supported)  Dynamic Sitting-Comments: MINx1, lateral lean L    Static Standing  Balance  Static Standing-Balance Support: Bilateral upper extremity supported  Static Standing-Level of Assistance: Maximum assistance  Static Standing-Comment/Number of Minutes: x2 assist + draw sheet for additional assist  Functional Assessments:  Bed Mobility  Bed Mobility: Yes  Bed Mobility 1  Bed Mobility 1: Rolling right, Rolling left  Level of Assistance 1: Maximum assistance, Moderate verbal cues, Moderate tactile cues  Bed Mobility 2  Bed Mobility  2: Sitting to supine, Supine to sitting  Level of Assistance 2: Maximum assistance, Moderate verbal cues, Moderate tactile cues  Bed Mobility Comments 2: x2 with HOB elevated, use of draw sheet for additional assist    Transfers  Transfer: Yes  Transfer 1  Transfer From 1: Sit to, Stand to  Transfer to 1: Sit, Stand  Technique 1: Sit to stand, Stand to sit  Transfer Level of Assistance 1: Arm in arm assistance, Maximum assistance, Moderate verbal cues, Moderate tactile cues, +2  Trials/Comments 1: x1 STS completed; achieved full standing; only able to stand ~15 seconds.    Ambulation/Gait Training  Ambulation/Gait Training Performed: No  Extremity/Trunk Assessments:  RUE   RUE :  (RUE: AAROM shoulder flexion WFL, remainder WFL)  LUE   LUE:  (RUE: AAROM shoulder flexion WFL, remainder WFL)  RLE   RLE : Within Functional Limits  LLE   LLE : Within Functional Limits  Outcome Measures:  [facility] Basic Mobility  Turning from your back to your side while in a flat bed without using bedrails: A lot  Moving from lying on your back to sitting on the side of a flat bed without using bedrails: A lot  Moving to and from bed to chair (including a wheelchair): A lot  Standing up from a chair using your arms (e.g. wheelchair or bedside chair): A lot  To walk in hospital room: A lot  Climbing 3-5 steps with railing: Total  Basic Mobility - Total Score: 11    FSS-ICU  Ambulation: Unable to attempt due to weakness  Rolling: Maximal assistance (performs 25% - 49% of task)  Sitting:  Minimal assistance (performs 75% or more of task)  Transfer Sit-to-Stand: Total assistance (performs 25% or requires another person)  Transfer Supine-to-Sit: Total assistance (performs 25% or requires another person)  Total Score: 8    E = Exercise and Early Mobility  Current Activity: Standing    Encounter Problems       Encounter Problems (Active)       Balance       patient will complete static (Cgx1) and dynamic (MINx1) standing balance activities using LRD as needed without acute LOB        Start:  10/31/23    Expected End:  11/21/23               Mobility       STG - Patient will ambulate >/=20 ft using lRD as needed with MINX1 assist without acute LOB        Start:  10/31/23    Expected End:  11/21/23            Patient will complete BLE there-ex in order to improve strength and to assist with the completion of functional mobility tasks         Start:  10/31/23    Expected End:  11/21/23               Pain - Adult          Transfers       STG - Transfer from bed to chair with MINx1 assist using LRD as needed without acute LOB        Start:  10/31/23    Expected End:  11/21/23            STG - Patient will perform bed mobility with MINx1 assist with HOB elevated and use of bedrailing as needed         Start:  10/31/23    Expected End:  11/21/23            STG - Patient will transfer sit to and from stand with MINx1 assist using LRD as needed without acute LOB        Start:  10/31/23    Expected End:  11/21/23                   Education Documentation  Mobility Training, taught by Zoe Griggs PT at 10/31/2023  1:41 PM.  Learner: Patient  Readiness: Acceptance  Method: Explanation  Response: Needs Reinforcement    Education Comments  No comments found.

## 2023-10-31 NOTE — CARE PLAN
58yoM with history of ESRD s/p DDKT on HD through LT groin, pAFIB, HFpEF, HCV s/p treatment, gout, lymphadenopathy, DVT, DMII, adrenal insufficiency on hydrocortisone found to have infected left AVG s/p graft removal. Course c/b left femoral DVT, septic shock requiring transfer to MICU.     NE: CAM negative. Analgesia***, Sedation none.   CV: MAP 65+, HR 80s sinus. Vasoactive NE 26, Vasopressin  0.03    PU: False Sat on 8L NC  GI: Nutrition NPO   RE: RRT: MWF.    ID: Gram negative bacili. Antimicrobials: vancomycin, zosyn.   Steroids: hydrocortisone IV 50 q6h.   Code: Full     Plan

## 2023-10-31 NOTE — PROGRESS NOTES
Nghia Hall is a 58 y.o. male on day 22 of admission with PMH of HTN, HLD, paroxysmal A-fib on Eliquis, HFpEF w/ most recent echo on 10/11 which showed EF 50%, ESRD s/p DDKT (2006, subsequent graft failure 07/2021, on HD MWF at [facility] through LT groin AV graft on [address]; not currently on immunosuppression), T2DM, LAZARO, Hepatitis C s/p treatment, gout, lymphadenopathy (following with oncology, inconclusive biopsy in 01/2023), DVT, and Adrenal Insufficiency (on hydrocortisone) who presented to the MICU after a rapid response was called for hypoglycemia and unresponsiveness, and was found to have elevated lactate at 5.7 and drops in SBP to about the 60s which required a norepinephrine drip. He is admitted to the ICU with septic shock.    Subjective   Overnight, the patient had persistent hypoglycemia in 53, 41, did not respond to d10 pushes, and was thus started on maintenance D10 fluids. Heparin drip has been held due to a potential small retroperitoneal hemorrhage. This morning, the patient was seen and examined at bedside. The patient continued to moan intermittently during conversation, and reports that the pain in his legs is worse. After giving a dose of oxycodone, the pain improved, but he continued to moan intermittently for an unclear reason, he is able to answer other questions however.    We are awaiting vascular surgery/IR placement of additional central venous access/HD line.        Objective     Physical Exam  Vitals reviewed.   Constitutional:       Appearance: He is morbidly obese. He is not ill-appearing.   Cardiovascular:      Rate and Rhythm: Tachycardia present. Rhythm irregular.      Heart sounds: Normal heart sounds. No murmur heard.  Pulmonary:      Effort: Pulmonary effort is normal. No respiratory distress.      Breath sounds: Normal breath sounds. No wheezing.   Chest:      Chest wall: No tenderness.   Abdominal:      General: There is distension.      Palpations: Abdomen is soft.       "Tenderness: There is abdominal tenderness (TTP in LLQ).   Musculoskeletal:         General: Swelling and tenderness present.      Right lower leg: Edema present.      Left lower leg: Edema present.      Comments: Significant swelling and edema in LLE in comparison to RLE.   Neurological:      General: No focal deficit present.      Mental Status: He is alert and oriented to person, place, and time. Mental status is at baseline.         Last Recorded Vitals  Blood pressure 102/70, pulse 110, temperature 36.3 °C (97.3 °F), temperature source Temporal, resp. rate 20, height 1.854 m (6' 1\"), weight 143 kg (315 lb), SpO2 94 %.  Intake/Output last 3 Shifts:  I/O last 3 completed shifts:  In: 3090.5 [I.V.:2440.5; IV Piggyback:650]  Out: 0     Relevant Results  Scheduled medications  B complex-vitamin C-folic acid, 1 capsule, oral, Daily  cyanocobalamin, 1,000 mcg, oral, Daily  EPINEPHrine, , ,   epoetin urmila or biosimilar, 10,000 Units, intravenous, Every Mon/Wed/Fri  hydrocortisone sodium succinate, 50 mg, intravenous, q6h  lactated Ringer's, 500 mL, intravenous, Once  lactobacillus acidophilus, 1 tablet, oral, Daily  lidocaine, 1 patch, transdermal, Daily  melatonin, 10 mg, oral, Daily  pantoprazole, 40 mg, oral, Daily before breakfast  paricalcitoL, 8 mcg, intravenous, Once per day on Mon Wed Fri  psyllium, 1 packet, oral, Daily  sevelamer carbonate, 1,600 mg, oral, TID with meals  sodium polystyrene, 30 g, oral, Once  sodium zirconium cyclosilicate, 10 g, oral, q8h      Continuous medications  dextrose 10 % in water (D10W), 150 mL/hr, Last Rate: 150 mL/hr (10/31/23 1711)  dextrose 10 % in water (D10W), 50 mL/hr, Last Rate: 50 mL/hr (10/31/23 1852)  [Held by provider] heparin, 0-4,500 Units/hr, Last Rate: Stopped (10/30/23 1745)  norepinephrine, 0.01-1 mcg/kg/min, Last Rate: 0.35 mcg/kg/min (10/31/23 1852)  PrismaSol BGK 2/3.5, 25 mL/kg/hr  vasopressin, 0.03 Units/min, Last Rate: 0.03 Units/min (10/31/23 1852)      PRN " medications  PRN medications: acetaminophen **OR** acetaminophen **OR** acetaminophen, cyclobenzaprine, dextrose 10 % in water (D10W), dextrose, diphenhydrAMINE, EPINEPHrine, glucagon, [Held by provider] heparin, lubricating eye drops, melatonin, oxyCODONE    Results for orders placed or performed during the hospital encounter of 10/09/23 (from the past 24 hour(s))   Blood Gas Arterial Full Panel   Result Value Ref Range    POCT pH, Arterial 7.31 (L) 7.38 - 7.42 pH    POCT pCO2, Arterial 32 (L) 38 - 42 mm Hg    POCT pO2, Arterial 116 (H) 85 - 95 mm Hg    POCT SO2, Arterial 99 94 - 100 %    POCT Oxy Hemoglobin, Arterial 97.0 94.0 - 98.0 %    POCT Hematocrit Calculated, Arterial 29.0 (L) 41.0 - 52.0 %    POCT Sodium, Arterial 124 (L) 136 - 145 mmol/L    POCT Potassium, Arterial 5.6 (H) 3.5 - 5.3 mmol/L    POCT Chloride, Arterial 96 (L) 98 - 107 mmol/L    POCT Ionized Calcium, Arterial 0.98 (L) 1.10 - 1.33 mmol/L    POCT Glucose, Arterial 47 (LL) 74 - 99 mg/dL    POCT Lactate, Arterial 4.9 (HH) 0.4 - 2.0 mmol/L    POCT Base Excess, Arterial -9.2 (L) -2.0 - 3.0 mmol/L    POCT HCO3 Calculated, Arterial 16.1 (L) 22.0 - 26.0 mmol/L    POCT Hemoglobin, Arterial 9.7 (L) 13.5 - 17.5 g/dL    POCT Anion Gap, Arterial 18 10 - 25 mmo/L    Patient Temperature 37.0 degrees Celsius    FiO2 21 %   Lactate   Result Value Ref Range    Lactate 5.4 (HH) 0.4 - 2.0 mmol/L   POCT GLUCOSE   Result Value Ref Range    POCT Glucose 53 (L) 74 - 99 mg/dL   POCT GLUCOSE   Result Value Ref Range    POCT Glucose 98 74 - 99 mg/dL   POCT GLUCOSE   Result Value Ref Range    POCT Glucose 41 (L) 74 - 99 mg/dL   Blood Gas Arterial Full Panel   Result Value Ref Range    POCT pH, Arterial 7.26 (L) 7.38 - 7.42 pH    POCT pCO2, Arterial 33 (L) 38 - 42 mm Hg    POCT pO2, Arterial 115 (H) 85 - 95 mm Hg    POCT SO2, Arterial 99 94 - 100 %    POCT Oxy Hemoglobin, Arterial 97.1 94.0 - 98.0 %    POCT Hematocrit Calculated, Arterial 29.0 (L) 41.0 - 52.0 %    POCT  Sodium, Arterial 124 (L) 136 - 145 mmol/L    POCT Potassium, Arterial 5.7 (H) 3.5 - 5.3 mmol/L    POCT Chloride, Arterial 93 (L) 98 - 107 mmol/L    POCT Ionized Calcium, Arterial 0.98 (L) 1.10 - 1.33 mmol/L    POCT Glucose, Arterial 44 (LL) 74 - 99 mg/dL    POCT Lactate, Arterial 5.7 (HH) 0.4 - 2.0 mmol/L    POCT Base Excess, Arterial -11.3 (L) -2.0 - 3.0 mmol/L    POCT HCO3 Calculated, Arterial 14.8 (L) 22.0 - 26.0 mmol/L    POCT Hemoglobin, Arterial 9.8 (L) 13.5 - 17.5 g/dL    POCT Anion Gap, Arterial 22 10 - 25 mmo/L    Patient Temperature 37.0 degrees Celsius    FiO2 40 %   Lactate   Result Value Ref Range    Lactate 6.1 (HH) 0.4 - 2.0 mmol/L   POCT GLUCOSE   Result Value Ref Range    POCT Glucose 93 74 - 99 mg/dL   POCT GLUCOSE   Result Value Ref Range    POCT Glucose 93 74 - 99 mg/dL   Comprehensive metabolic panel   Result Value Ref Range    Glucose 111 (H) 74 - 99 mg/dL    Sodium 126 (L) 136 - 145 mmol/L    Potassium 5.7 (H) 3.5 - 5.3 mmol/L    Chloride 91 (L) 98 - 107 mmol/L    Bicarbonate 14 (L) 21 - 32 mmol/L    Anion Gap 27 (H) 10 - 20 mmol/L    Urea Nitrogen 56 (H) 6 - 23 mg/dL    Creatinine 7.15 (H) 0.50 - 1.30 mg/dL    eGFR 8 (L) >60 mL/min/1.73m*2    Calcium 7.0 (L) 8.6 - 10.6 mg/dL    Albumin 2.2 (L) 3.4 - 5.0 g/dL    Alkaline Phosphatase 202 (H) 33 - 120 U/L    Total Protein 5.2 (L) 6.4 - 8.2 g/dL    AST 80 (H) 9 - 39 U/L    Bilirubin, Total 2.1 (H) 0.0 - 1.2 mg/dL    ALT 3 (L) 10 - 52 U/L   CBC   Result Value Ref Range    WBC 21.6 (H) 4.4 - 11.3 x10*3/uL    nRBC 0.2 (H) 0.0 - 0.0 /100 WBCs    RBC 3.02 (L) 4.50 - 5.90 x10*6/uL    Hemoglobin 8.9 (L) 13.5 - 17.5 g/dL    Hematocrit 26.1 (L) 41.0 - 52.0 %    MCV 86 80 - 100 fL    MCH 29.5 26.0 - 34.0 pg    MCHC 34.1 32.0 - 36.0 g/dL    RDW 17.9 (H) 11.5 - 14.5 %    Platelets 175 150 - 450 x10*3/uL    MPV 10.9 7.5 - 11.5 fL   Vancomycin   Result Value Ref Range    Vancomycin 19.0 5.0 - 20.0 ug/mL   Lactate   Result Value Ref Range    Lactate 6.7 (HH)  0.4 - 2.0 mmol/L   Magnesium   Result Value Ref Range    Magnesium 1.58 (L) 1.60 - 2.40 mg/dL   Heparin Assay   Result Value Ref Range    Heparin Unfractionated 1.2 (HH) See Comment Below for Therapeutic Ranges IU/mL   Phosphorus   Result Value Ref Range    Phosphorus 6.1 (H) 2.5 - 4.9 mg/dL   Bilirubin, Direct   Result Value Ref Range    Bilirubin, Direct 1.5 (H) 0.0 - 0.3 mg/dL   Coagulation Screen   Result Value Ref Range    Protime 37.7 (H) 9.8 - 12.8 seconds    INR 3.3 (H) 0.9 - 1.1    aPTT 42 (H) 27 - 38 seconds   Lactate   Result Value Ref Range    Lactate 6.5 (HH) 0.4 - 2.0 mmol/L   POCT GLUCOSE   Result Value Ref Range    POCT Glucose 95 74 - 99 mg/dL   Blood Gas Arterial Full Panel   Result Value Ref Range    POCT pH, Arterial 7.25 (LL) 7.38 - 7.42 pH    POCT pCO2, Arterial 34 (L) 38 - 42 mm Hg    POCT pO2, Arterial 92 85 - 95 mm Hg    POCT SO2, Arterial 98 94 - 100 %    POCT Oxy Hemoglobin, Arterial 95.7 94.0 - 98.0 %    POCT Hematocrit Calculated, Arterial 28.0 (L) 41.0 - 52.0 %    POCT Sodium, Arterial 120 (LL) 136 - 145 mmol/L    POCT Potassium, Arterial 6.1 (HH) 3.5 - 5.3 mmol/L    POCT Chloride, Arterial 93 (L) 98 - 107 mmol/L    POCT Ionized Calcium, Arterial 0.93 (L) 1.10 - 1.33 mmol/L    POCT Glucose, Arterial 122 (H) 74 - 99 mg/dL    POCT Lactate, Arterial 6.3 (HH) 0.4 - 2.0 mmol/L    POCT Base Excess, Arterial -11.4 (L) -2.0 - 3.0 mmol/L    POCT HCO3 Calculated, Arterial 14.9 (L) 22.0 - 26.0 mmol/L    POCT Hemoglobin, Arterial 9.4 (L) 13.5 - 17.5 g/dL    POCT Anion Gap, Arterial 18 10 - 25 mmo/L    Patient Temperature 37.0 degrees Celsius    FiO2 52 %   Tissue/Wound Culture/Smear    Specimen: Wound/Tissue; Tissue/Biopsy   Result Value Ref Range    Gram Stain (3+) Moderate Polymorphonuclear leukocytes     Gram Stain No organisms seen    POCT GLUCOSE   Result Value Ref Range    POCT Glucose 97 74 - 99 mg/dL   POCT GLUCOSE   Result Value Ref Range    POCT Glucose 78 74 - 99 mg/dL   Lactate   Result  Value Ref Range    Lactate 5.8 (HH) 0.4 - 2.0 mmol/L   POCT GLUCOSE   Result Value Ref Range    POCT Glucose 83 74 - 99 mg/dL   Renal function panel   Result Value Ref Range    Glucose 75 74 - 99 mg/dL    Sodium 124 (L) 136 - 145 mmol/L    Potassium 6.0 (H) 3.5 - 5.3 mmol/L    Chloride 90 (L) 98 - 107 mmol/L    Bicarbonate 19 (L) 21 - 32 mmol/L    Anion Gap 21 (H) 10 - 20 mmol/L    Urea Nitrogen 56 (H) 6 - 23 mg/dL    Creatinine 6.67 (H) 0.50 - 1.30 mg/dL    eGFR 9 (L) >60 mL/min/1.73m*2    Calcium 6.7 (L) 8.6 - 10.6 mg/dL    Phosphorus 6.3 (H) 2.5 - 4.9 mg/dL    Albumin 2.0 (L) 3.4 - 5.0 g/dL   Magnesium   Result Value Ref Range    Magnesium 1.46 (L) 1.60 - 2.40 mg/dL   Lactate   Result Value Ref Range    Lactate 4.1 (HH) 0.4 - 2.0 mmol/L   Blood Gas Arterial Full Panel   Result Value Ref Range    POCT pH, Arterial 7.27 (L) 7.38 - 7.42 pH    POCT pCO2, Arterial 38 38 - 42 mm Hg    POCT pO2, Arterial 97 (H) 85 - 95 mm Hg    POCT SO2, Arterial 98 94 - 100 %    POCT Oxy Hemoglobin, Arterial 95.9 94.0 - 98.0 %    POCT Hematocrit Calculated, Arterial 27.0 (L) 41.0 - 52.0 %    POCT Sodium, Arterial 119 (LL) 136 - 145 mmol/L    POCT Potassium, Arterial 6.3 (HH) 3.5 - 5.3 mmol/L    POCT Chloride, Arterial 90 (L) 98 - 107 mmol/L    POCT Ionized Calcium, Arterial 0.91 (L) 1.10 - 1.33 mmol/L    POCT Glucose, Arterial 87 74 - 99 mg/dL    POCT Lactate, Arterial 3.6 (H) 0.4 - 2.0 mmol/L    POCT Base Excess, Arterial -8.8 (L) -2.0 - 3.0 mmol/L    POCT HCO3 Calculated, Arterial 17.4 (L) 22.0 - 26.0 mmol/L    POCT Hemoglobin, Arterial 9.0 (L) 13.5 - 17.5 g/dL    POCT Anion Gap, Arterial 18 10 - 25 mmo/L    Patient Temperature 37.0 degrees Celsius    FiO2 40 %   Blood Gas Arterial Full Panel Unsolicited   Result Value Ref Range    POCT pH, Arterial 7.22 (LL) 7.38 - 7.42 pH    POCT pCO2, Arterial 44 (H) 38 - 42 mm Hg    POCT pO2, Arterial 85 85 - 95 mm Hg    POCT SO2, Arterial 96 94 - 100 %    POCT Oxy Hemoglobin, Arterial 93.5 (L)  94.0 - 98.0 %    POCT Hematocrit Calculated, Arterial 26.0 (L) 41.0 - 52.0 %    POCT Sodium, Arterial 119 (LL) 136 - 145 mmol/L    POCT Potassium, Arterial 6.2 (HH) 3.5 - 5.3 mmol/L    POCT Chloride, Arterial 92 (L) 98 - 107 mmol/L    POCT Ionized Calcium, Arterial 0.92 (L) 1.10 - 1.33 mmol/L    POCT Glucose, Arterial 78 74 - 99 mg/dL    POCT Lactate, Arterial 3.5 (H) 0.4 - 2.0 mmol/L    POCT Base Excess, Arterial -9.1 (L) -2.0 - 3.0 mmol/L    POCT HCO3 Calculated, Arterial 18.0 (L) 22.0 - 26.0 mmol/L    POCT Hemoglobin, Arterial 8.6 (L) 13.5 - 17.5 g/dL    POCT Anion Gap, Arterial 15 10 - 25 mmo/L    Patient Temperature 37.0 degrees Celsius   POCT GLUCOSE   Result Value Ref Range    POCT Glucose 66 (L) 74 - 99 mg/dL   Calcium, ionized   Result Value Ref Range    POCT Calcium, Ionized 0.89 (L) 1.1 - 1.33 mmol/L   CBC   Result Value Ref Range    WBC 25.3 (H) 4.4 - 11.3 x10*3/uL    nRBC 0.3 (H) 0.0 - 0.0 /100 WBCs    RBC 2.84 (L) 4.50 - 5.90 x10*6/uL    Hemoglobin 8.2 (L) 13.5 - 17.5 g/dL    Hematocrit 25.2 (L) 41.0 - 52.0 %    MCV 89 80 - 100 fL    MCH 28.9 26.0 - 34.0 pg    MCHC 32.5 32.0 - 36.0 g/dL    RDW 18.4 (H) 11.5 - 14.5 %    Platelets 129 (L) 150 - 450 x10*3/uL    MPV 10.7 7.5 - 11.5 fL   Coagulation Screen   Result Value Ref Range    Protime 32.4 (H) 9.8 - 12.8 seconds    INR 2.8 (H) 0.9 - 1.1    aPTT 43 (H) 27 - 38 seconds   POCT GLUCOSE   Result Value Ref Range    POCT Glucose 45 (L) 74 - 99 mg/dL   POCT GLUCOSE   Result Value Ref Range    POCT Glucose 155 (H) 74 - 99 mg/dL   Prepare Plasma: 1 Units   Result Value Ref Range    PRODUCT CODE S3286P12     Unit Number [id number]     Unit ABO AB     Unit RH POS     Dispense Status IS     Blood Expiration Date November 01, 2023 03:04 EDT     PRODUCT BLOOD TYPE 8400     UNIT VOLUME 311      CT abdomen pelvis w IV contrast 10/30/2023    Narrative  Interpreted By:  Alistair Morel,  and Jese Cleaning  STUDY:  CT ABDOMEN PELVIS W IV CONTRAST;  10/30/2023 4:37  pm    INDICATION:  Signs/Symptoms:concern for retained L AV graft resulting in septic  shock. PLEASE ENSURE THIS CONTAINS LEFT THIGH.    COMPARISON:  CT abdomen pelvis 10/12/2020. PET-CT 10/20/2023.    ACCESSION NUMBER(S):  [id number]    ORDERING CLINICIAN:  ROSS LEY    TECHNIQUE:  CT of the abdomen and pelvis was performed.  Standard contiguous  axial images were obtained at 3 mm slice thickness through the  abdomen and pelvis. Coronal and sagittal reconstructions at 3 mm  slice thickness were performed.    90 ml of contrast Omnipaque 350 were administered intravenously  without immediate complication.    FINDINGS:        Limited study due to inadequate contrast opacification    LOWER CHEST: Patchy ground-glass opacities within the periphery of  the right middle lobe extending beyond the field of view and  partially visualized ground-glass opacities within the lateral aspect  of the right upper lobe. Bilateral lung dependent and lingular  opacity, likely represents atelectasis. No pleural effusion.  Cardiomegaly without pericardial effusion. Reflux of contrast into  the visualized distal esophagus.      ABDOMEN/PELVIS:    LIVER: Hepatomegaly measuring up to 20.3 cm in craniocaudal  dimension. Heterogeneous appearance of the liver parenchyma without  focal lesion. Mild periportal edema.    BILE DUCTS: No significant intrahepatic or extrahepatic dilatation.    GALLBLADDER: Status post cholecystectomy.    PANCREAS: Pancreas is normal in size without peripancreatic fluid,  mass or ductal dilatation. Fat stranding surrounding the pancreas is  likely secondary to fluid overload.    SPLEEN: Normal-sized spleen. Heterogeneous appearance of the splenic  parenchyma is likely secondary to fluid overload.    ADRENALS: No significant abnormality.    KIDNEYS AND URETERS:  Atrophic bilateral native kidneys. A 2.2 cm simple cyst in the upper  pole of the right kidney. A 1.4 cm cyst in the lower pole of the  right kidney with  a small punctate calcification along the posterior  aspect of the there are few too small to completely characterize  hypodensities in the left kidney, likely represents cyst.    Left lower pelvic transplant kidney without hydronephrosis. No  nephrolithiasis.    PELVIS:    BLADDER:  The bladder is incompletely distended and limited for evaluation.    REPRODUCTIVE ORGANS:  The uterus is surgically absent. No pelvic mass.    BOWEL:  The stomach is unremarkable. Layering hyperdense material within the  gastric lumen likely represents oral ingested material. The small and  large bowel are normal in caliber and demonstrate no wall thickening.  The appendix appears normal.    VESSELS:  Mild atherosclerosis calcification of the aorta and its branching  vessels.  There is no aneurysmal dilatation of the abdominal aorta.  The IVC appears normal. Right common femoral vein access central  venous catheter tip terminates within the infrarenal IVC.  Redemonstration of an abandoned left AVG graft with unchanged  appearance of the arterial and venous graft components within the  subcutaneous tissues in the left thigh. Interval explant of the left  femoral AVG graft with abandoned arterial and venous components  grafts visualized within the subcutaneous tissues. Partially  visualized right AVG graft, patency can not be assessed due to the  phase of contrast timing. Unchanged asymmetric enlargement of the  left external and common iliac vein.    PERITONEUM/RETROPERITONEUM/LYMPH NODES:  Small abdominopelvic ascites layering along the right subhepatic,  subphrenic, bilateral paracolic gutters and into the pelvis. There is  diffuse mesenteric stranding which is likely secondary to fluid  overload. There are multiple prominent retroperitoneal and mesenteric  lymph nodes which are likely reactive in nature. There are multiple  prominent/enlarged pelvic lymph nodes including a 2.0 cm left common  iliac chain lymph node 1.6 cm left external  iliac chain lymph node, a  1.0 cm right obturator chain lymph node. Of note some of these lymph  nodes with mildly FDG avid on PET-CT dated 10/20/2023.    A new 3.5 x 2.4 cm lobular high density focus in the right  retroperitoneal abutting the lateral quadratus lumbar muscle (series  301, image 79).    BONES AND ABDOMINAL WALL:  No suspicious osseous lesions are identified. Subacute nondisplaced  right anterior 9th rib and left posterior 10th and left anterior 6th  through 8th rib fractures with surrounding callus formation.  Nondisplaced left lateral 9th and anterolateral 7th rib fracture  without osseous bridging or callus formation. Well corticated ossific  fragment abutting the medial aspect of the left pubic symphysis  (series 301, image 153), likely represents remote avulsion fracture.  Ossification of the origins of the bilateral hamstring muscles.    ABDOMINAL WALL: There is marked diffuse abdominal wall anasarca.  Vascular collaterals are visualized within the posterior and  posterolateral chest and upper abdominal wall, similar to prior.  Partially visualized right gynecomastia. A 3.5 x 3.0 x 4.4 cm fluid  collection within the medial aspect of the left groin (series 301,  image 194). Soft tissue defect within the anterolateral aspect of the  proximal left thigh, may be postsurgical (series 301, image 208).  There is thickening and reticular stranding within the visualized  aspect of the proximal left thigh.    Impression  1. Interval explant of the left superficial femoral artery and vein  AVG graft, with a graft remnant visualized within the subcutaneous  tissues of the left lower extremity. A subcutaneous 3.5 x 3.0 x 4.4  cm fluid collection within the inferomedial aspect of the abandoned  graft in the left upper thigh. No air foci are noted, however the  sterility of this collection can not be assessed on this current  examination.  2. Diffuse skin thickening with subcutaneous edema and fat  stranding  in the visualized portion of the left upper thigh, correlate with  concern for cellulitis.  3. Interval development of new 3.5 x 2.4 cm lobular right  retroperitoneal focal hyperdensity abutting the right quadratus  lumborum muscle, which incompletely characterized but likely  represents a small focus of retroperitoneal hemorrhage.  4. Findings suggestive of fluid overload with heterogeneous  enhancement of the liver and splenic parenchyma, abdominal and  mesenteric anasarca and stable abdominopelvic ascites. Please  correlate with patient's fluid status.  5. Unchanged bilateral subacute and acute rib fractures involving the  9th and left 6-8th and 10th rib.  6. Stable cardiomegaly.  7. Stable enlarged pelvic lymph nodes, more on the left.  8. Partially visualized patchy ground-glass opacities in the right  lung may represent atelectasis, superimposed infection not excluded.    The critical information above was relayed directly by Resident Black Sawant epic secure chat to Lynnette Austin MD on 10/30/2023 at 5:39 p.m..    I personally reviewed the images/study and I agree with the findings  as stated. This study was interpreted at [facility], [place], [place].    Signed by: Alistair Morel 10/30/2023 6:53 PM  Dictation workstation:   [id number]      Assessment/Plan     Nghia Hall is a 58 year old male with PMH of HTN, HLD, paroxysmal A-fib on Eliquis, HFpEF w/ most recent echo on 10/11 which showed EF 50%, ESRD s/p DDKT (2006, subsequent graft failure 07/2021, on HD MWF at [facility] through LT groin AV graft on [address]; not currently on immunosuppression), T2DM, LAZARO, Hepatitis C s/p treatment, gout, lymphadenopathy (following with oncology, inconclusive biopsy in 01/2023), DVT, and Adrenal Insufficiency (on hydrocortisone) who presented to the MICU after a rapid response was called for hypoglycemia and unresponsiveness, and was found to have elevated lactate at 5.7 and  drops in SBP to about the 60s which required a norepinephrine drip. He is admitted to the ICU for concern for possible septic shock vs adrenal insufficiency.     Neuro/Psych  #Pain control  - Starting patient on PRN oxycodone 5 mg every 8 hours for pain.  - Patient on PRN Tylenol 650 mg q4 for mild pain and Flexeril 5 mg TID PRN for muscle spasms     Cardiovascular  #Septic shock  - Discussion held with vascular surgery, who believes that the primary site of infection is the tunneled catheter in his right femoral area.  - Patient currently on vasopressin and norepinephrine, wean as tolerated  - Holding home anti-hypertensive medications of amlodipine 10 mg and metoprolol tartrate 50 mg BID in the setting of shock and hypotension.  - Most recent lactate 3.5 and elevated, continuing to trend lactate, improving  - MAP goal 60-70     #LLE edema and pain   #Ambulatory dysfunction  #Left femoral access hemorrhage with superficial skin split  #New left femoral vein DVT  #Vascular access issues  -Vascular surgery following.  -IR was consulted, performed fistulogram on 10/13  which showed mild/moderate venous anastomotic stenosis, resolved s/p  7 mm POBA.  IR also placed a tunneled dialysis catheter in the right groin on 10/20.  -Vascular surgery placed a wound VAC to the left groin on 10/24.  Wound care team consulted for VAc management  - On 10/27, Left leg ultrasound was obtained and revealed occlusive DVT in the left distal femoral vein. Vascular surgery aware  - Vascular surgery consulted: recommending to remove tunneled cath due to infection via IR, however discussed with IR, and they would not like the tunneled cath removed as the patient has very little to no vascular access. See nephro section for various discussions held.  - Patient successfully underwent axillary arterial line in the right side.      #Chronic HFpEF  -TTE on 10/12 revealed EF 50%, with reduced RV systolic function   -Cardiology signed off on 10/13,  recommending optimization of volume status.  -Daily weights     Pulmonary  #LAZARO  - Continuing nocturnal CPAP     Gastrointestinal  #Concern for cirrhosis on imaging  - Hepatology team reconsulted for concern of acutely worsening liver enzymes, previous concerning findings of cirrhosis on imaging, acutely worsening INR. Hepatology recommending outpatient hepatic workup with fibroscan or liver biopsy, and are not concerned that liver dysfunction is contributing to his current status.    #Retroperitoneal hemorrhage  - CT abdomen and pelvis was concerning for signs of retroperitoneal hemorrhage. Currently holding off on anticoagulation     Nephrology/Urology  #ESRD on M/W/F dialysis  - Patient initially planned for CRRT today per nephrology recommendations  - The main deterrent for CRRT is that the patient is receiving his pressors through his tunneled catheter, and requires pressors to achieve optimal MAP goals of 60-70.   - Throughout the course of the day, numerous discussions were held between the primary team, vascular surgery team, IR, anesthesia, and phlebotomy to attempt to put central venous access for him. Vascular surgery recommended to remove the tunneled cath as they were concerned for infection from that site rather than a graft infection. Discussed with IR, who recommended to not remove the catheter because the patient would not have good vascular access otherwise. Per IR discussion and previous imaging, the patient has occluded bilateral internal jugular vessels, so he cannot receive central venous access through there. Discussed with both teams to have a conversation about a plan going forward, as the patient urgently needs CRRT, but given poor IV access and the need for pressor support, it is unclear on what the plan is going forward.   -In the evening, an EJ tube was attempted to be placed, however the patient became less responsive, and immediately responded with norepinephrine, atropine, bicarbonate  push, and an amp of d50. Restarted patient on increased norepinephrine dose and vasopressin dose. FFP ordered as well in case central access can be obtained overnight. Anesthesia contacted and deferred the issue, recommending to talk to the night ICU attending for help in establishing access. Phlebotomy as well were very busy and also unable to assist in placing a catheter.   - Will attempt overnight to get central venous access for pressors, plan for CRRT overnight.       ID  #ESBL E.coli bacteremia  #LLE cellulitis   #Infection of left AVG  #Concern for septic shock 2/2 infected graft and tunneled catheter  - 10/13 blood cultures ( 2 out of 4) started growing GPC in clusters.  Repeat culture from 10/14 and 10/16 no growth to date.    - 10/30 blood cultures (3/4) growing gram negative bacilli. Pending identification and susceptibility  - PET/CT scan done further supports suspicion for infection of AVG.  - ID following.  - Blood culture identification and sensitivities showed ESBL E.coli, discontinued Zosyn (10/30 - 10/31) and started patient on meropenem (10/31 - ). Given one dose of meropenem today, will discuss tomorrow about meropenem in anticipation for potential dialysis later tonight or overnight.      Endocrinology  #Concern for worsening adrenal insufficiency  -Continue hydrocortisone IV 50 mg q6h     #Hypoglycemia  #History of T2DM  - When the rapid was called, the patient was found to have BG in the 30s  - Continuing hypoglycemia protocol  - Will continue d10 infusion for hypoglycemia  - Will check glucoses q2, upon improvement and stabilization of sugars, will increase checks to 6 to 8 hours.     MSK/Derm  #Concern for possible compartment syndrome  - LLE significant edema and tenderness, there is concern for compartment syndrome  - CK levels 235 and normal, will continue to monitor and consider orthopedic consult     Heme/Onc  #Lymphadenopathy  -Patient followed by oncology.  Discussed with patient's  oncologist Dr. Castellon, she states that at this time his previous biopsy has been negative and she has no good evidence of  lymphoma.  No acute evaluations needed inpatient.  Can follow-up outpatient.     #Anemia of ESRD  - Continue home epoeitin urmila 98035 units every M/W/F     Fluids: None  Electrolytes: Replete as needed  Nutrition: Renal  GI Ppx: Protonix  DVT Ppx: Heparin drip     Oxygen: 5L  Access: PIVs  Drips: Norepinephrine and vasopressin  Antibiotics: Vancomycin and Zosyn (10/30 - 10/31). Meropenem (10/31 - )  Code Status: Full     Dispo: 58 year old admitted to the ICU for concern of septic shock vs worsening adrenal insufficiency. Patient has ESBL E.coli, starting patient on meropenem. Plan initially for CRRT today per nephrology recs, however numerous attempts to reach out to the various teams for venous access were unsuccessful, see above in nephrology section. This evening, the patient had an episode of decreased responsiveness, bradycardia and hypotension which required norepinephrine, atropine, bicarb push, amp of d50 given. FFP given for coagulopathy in case central venous access needed. Plan to potentially get venous access overnight with CRRT. Will order repeat RFP, magnesium and follow up.     Chandler Jones MD

## 2023-11-01 ENCOUNTER — HOSPITAL ENCOUNTER (OUTPATIENT)
Dept: CARDIOLOGY | Facility: HOSPITAL | Age: 58
Discharge: HOME | End: 2023-11-01
Payer: COMMERCIAL

## 2023-11-01 VITALS
HEART RATE: 114 BPM | SYSTOLIC BLOOD PRESSURE: 106 MMHG | OXYGEN SATURATION: 98 % | DIASTOLIC BLOOD PRESSURE: 52 MMHG | BODY MASS INDEX: 41.75 KG/M2 | RESPIRATION RATE: 17 BRPM | TEMPERATURE: 97.2 F | WEIGHT: 315 LBS | HEIGHT: 73 IN

## 2023-11-01 LAB
ALBUMIN SERPL BCP-MCNC: 2.2 G/DL (ref 3.4–5)
ALP SERPL-CCNC: 156 U/L (ref 33–120)
ALT SERPL W P-5'-P-CCNC: 5 U/L (ref 10–52)
ANION GAP BLDA CALCULATED.4IONS-SCNC: 14 MMO/L (ref 10–25)
ANION GAP SERPL CALC-SCNC: 20 MMOL/L (ref 10–20)
APTT PPP: 39 SECONDS (ref 27–38)
AST SERPL W P-5'-P-CCNC: 111 U/L (ref 9–39)
BACTERIA BLD AEROBE CULT: ABNORMAL
BACTERIA BLD AEROBE CULT: ABNORMAL
BACTERIA BLD CULT: ABNORMAL
BACTERIA BLD CULT: ABNORMAL
BASE EXCESS BLDA CALC-SCNC: -4.9 MMOL/L (ref -2–3)
BASO STIPL BLD QL SMEAR: PRESENT
BASOPHILS # BLD AUTO: 0.16 X10*3/UL (ref 0–0.1)
BASOPHILS NFR BLD AUTO: 0.6 %
BILIRUB DIRECT SERPL-MCNC: 1.9 MG/DL (ref 0–0.3)
BILIRUB SERPL-MCNC: 2.6 MG/DL (ref 0–1.2)
BLOOD EXPIRATION DATE: NORMAL
BODY TEMPERATURE: 37 DEGREES CELSIUS
BUN SERPL-MCNC: 55 MG/DL (ref 6–23)
BURR CELLS BLD QL SMEAR: NORMAL
CA-I BLD-SCNC: 0.96 MMOL/L (ref 1.1–1.33)
CA-I BLDA-SCNC: 0.98 MMOL/L (ref 1.1–1.33)
CALCIUM SERPL-MCNC: 7.2 MG/DL (ref 8.6–10.6)
CHLORIDE BLDA-SCNC: 89 MMOL/L (ref 98–107)
CHLORIDE SERPL-SCNC: 86 MMOL/L (ref 98–107)
CO2 SERPL-SCNC: 23 MMOL/L (ref 21–32)
CREAT SERPL-MCNC: 6.73 MG/DL (ref 0.5–1.3)
DISPENSE STATUS: NORMAL
EOSINOPHIL # BLD AUTO: 0.01 X10*3/UL (ref 0–0.7)
EOSINOPHIL NFR BLD AUTO: 0 %
ERYTHROCYTE [DISTWIDTH] IN BLOOD BY AUTOMATED COUNT: 18 % (ref 11.5–14.5)
GFR SERPL CREATININE-BSD FRML MDRD: 9 ML/MIN/1.73M*2
GLUCOSE BLD MANUAL STRIP-MCNC: 139 MG/DL (ref 74–99)
GLUCOSE BLD MANUAL STRIP-MCNC: 165 MG/DL (ref 74–99)
GLUCOSE BLD MANUAL STRIP-MCNC: 58 MG/DL (ref 74–99)
GLUCOSE BLD MANUAL STRIP-MCNC: 64 MG/DL (ref 74–99)
GLUCOSE BLD MANUAL STRIP-MCNC: 83 MG/DL (ref 74–99)
GLUCOSE BLD MANUAL STRIP-MCNC: 86 MG/DL (ref 74–99)
GLUCOSE BLDA-MCNC: 103 MG/DL (ref 74–99)
GLUCOSE SERPL-MCNC: 130 MG/DL (ref 74–99)
GRAM STN SPEC: ABNORMAL
HCO3 BLDA-SCNC: 22.6 MMOL/L (ref 22–26)
HCT VFR BLD AUTO: 24.5 % (ref 41–52)
HCT VFR BLD EST: 25 % (ref 41–52)
HGB BLD-MCNC: 8 G/DL (ref 13.5–17.5)
HGB BLDA-MCNC: 8.3 G/DL (ref 13.5–17.5)
IMM GRANULOCYTES # BLD AUTO: 0.11 X10*3/UL (ref 0–0.7)
IMM GRANULOCYTES NFR BLD AUTO: 0.4 % (ref 0–0.9)
INHALED O2 CONCENTRATION: 95 %
INR PPP: 2.4 (ref 0.9–1.1)
LACTATE BLDA-SCNC: 2.8 MMOL/L (ref 0.4–2)
LYMPHOCYTES # BLD AUTO: 0.18 X10*3/UL (ref 1.2–4.8)
LYMPHOCYTES NFR BLD AUTO: 0.7 %
MAGNESIUM SERPL-MCNC: 1.54 MG/DL (ref 1.6–2.4)
MCH RBC QN AUTO: 29.1 PG (ref 26–34)
MCHC RBC AUTO-ENTMCNC: 32.7 G/DL (ref 32–36)
MCV RBC AUTO: 89 FL (ref 80–100)
MONOCYTES # BLD AUTO: 0.89 X10*3/UL (ref 0.1–1)
MONOCYTES NFR BLD AUTO: 3.3 %
NEUTROPHILS # BLD AUTO: 25.6 X10*3/UL (ref 1.2–7.7)
NEUTROPHILS NFR BLD AUTO: 95 %
NRBC BLD-RTO: 0.1 /100 WBCS (ref 0–0)
OXYHGB MFR BLDA: 96.1 % (ref 94–98)
PCO2 BLDA: 54 MM HG (ref 38–42)
PH BLDA: 7.23 PH (ref 7.38–7.42)
PHOSPHATE SERPL-MCNC: 6.5 MG/DL (ref 2.5–4.9)
PLATELET # BLD AUTO: 127 X10*3/UL (ref 150–450)
PMV BLD AUTO: 10.8 FL (ref 7.5–11.5)
PO2 BLDA: 158 MM HG (ref 85–95)
POLYCHROMASIA BLD QL SMEAR: NORMAL
POTASSIUM BLDA-SCNC: 6 MMOL/L (ref 3.5–5.3)
POTASSIUM SERPL-SCNC: 5.8 MMOL/L (ref 3.5–5.3)
PRODUCT BLOOD TYPE: 8400
PRODUCT CODE: NORMAL
PROT SERPL-MCNC: 5.4 G/DL (ref 6.4–8.2)
PROTHROMBIN TIME: 26.8 SECONDS (ref 9.8–12.8)
RBC # BLD AUTO: 2.75 X10*6/UL (ref 4.5–5.9)
RBC MORPH BLD: NORMAL
SAO2 % BLDA: 96 % (ref 94–100)
SCHISTOCYTES BLD QL SMEAR: NORMAL
SODIUM BLDA-SCNC: 120 MMOL/L (ref 136–145)
SODIUM SERPL-SCNC: 123 MMOL/L (ref 136–145)
UNIT ABO: NORMAL
UNIT NUMBER: NORMAL
UNIT RH: NORMAL
UNIT VOLUME: 250
WBC # BLD AUTO: 27 X10*3/UL (ref 4.4–11.3)

## 2023-11-01 PROCEDURE — 37799 UNLISTED PX VASCULAR SURGERY: CPT

## 2023-11-01 PROCEDURE — 82947 ASSAY GLUCOSE BLOOD QUANT: CPT

## 2023-11-01 PROCEDURE — 2500000004 HC RX 250 GENERAL PHARMACY W/ HCPCS (ALT 636 FOR OP/ED)

## 2023-11-01 PROCEDURE — 99233 SBSQ HOSP IP/OBS HIGH 50: CPT | Performed by: INTERNAL MEDICINE

## 2023-11-01 PROCEDURE — 82330 ASSAY OF CALCIUM: CPT

## 2023-11-01 PROCEDURE — 99291 CRITICAL CARE FIRST HOUR: CPT

## 2023-11-01 PROCEDURE — 84100 ASSAY OF PHOSPHORUS: CPT

## 2023-11-01 PROCEDURE — 93005 ELECTROCARDIOGRAM TRACING: CPT

## 2023-11-01 PROCEDURE — 99231 SBSQ HOSP IP/OBS SF/LOW 25: CPT

## 2023-11-01 PROCEDURE — 85025 COMPLETE CBC W/AUTO DIFF WBC: CPT

## 2023-11-01 PROCEDURE — 84132 ASSAY OF SERUM POTASSIUM: CPT

## 2023-11-01 PROCEDURE — 93010 ELECTROCARDIOGRAM REPORT: CPT | Performed by: INTERNAL MEDICINE

## 2023-11-01 PROCEDURE — 36430 TRANSFUSION BLD/BLD COMPNT: CPT

## 2023-11-01 PROCEDURE — 85730 THROMBOPLASTIN TIME PARTIAL: CPT

## 2023-11-01 PROCEDURE — 2060000001 HC INTERMEDIATE ICU ROOM DAILY

## 2023-11-01 PROCEDURE — 82435 ASSAY OF BLOOD CHLORIDE: CPT

## 2023-11-01 PROCEDURE — 99223 1ST HOSP IP/OBS HIGH 75: CPT

## 2023-11-01 PROCEDURE — 94660 CPAP INITIATION&MGMT: CPT

## 2023-11-01 PROCEDURE — 99418 PROLNG IP/OBS E/M EA 15 MIN: CPT

## 2023-11-01 PROCEDURE — 82248 BILIRUBIN DIRECT: CPT

## 2023-11-01 PROCEDURE — 2500000005 HC RX 250 GENERAL PHARMACY W/O HCPCS: Performed by: STUDENT IN AN ORGANIZED HEALTH CARE EDUCATION/TRAINING PROGRAM

## 2023-11-01 PROCEDURE — 2500000005 HC RX 250 GENERAL PHARMACY W/O HCPCS

## 2023-11-01 PROCEDURE — 83735 ASSAY OF MAGNESIUM: CPT

## 2023-11-01 RX ORDER — LORAZEPAM 2 MG/ML
0.5 INJECTION INTRAMUSCULAR EVERY 4 HOURS PRN
Status: DISCONTINUED | OUTPATIENT
Start: 2023-11-01 | End: 2023-11-02 | Stop reason: HOSPADM

## 2023-11-01 RX ORDER — FENTANYL CITRATE 50 UG/ML
50 INJECTION, SOLUTION INTRAMUSCULAR; INTRAVENOUS
Status: DISCONTINUED | OUTPATIENT
Start: 2023-11-01 | End: 2023-11-02 | Stop reason: HOSPADM

## 2023-11-01 RX ORDER — GLYCOPYRROLATE 0.2 MG/ML
0.2 INJECTION INTRAMUSCULAR; INTRAVENOUS EVERY 4 HOURS PRN
Status: DISCONTINUED | OUTPATIENT
Start: 2023-11-01 | End: 2023-11-02 | Stop reason: HOSPADM

## 2023-11-01 RX ORDER — DEXTROSE MONOHYDRATE AND SODIUM CHLORIDE 5; .45 G/100ML; G/100ML
100 INJECTION, SOLUTION INTRAVENOUS CONTINUOUS
Status: DISCONTINUED | OUTPATIENT
Start: 2023-11-01 | End: 2023-11-01

## 2023-11-01 RX ORDER — FENTANYL CITRATE 50 UG/ML
25 INJECTION, SOLUTION INTRAMUSCULAR; INTRAVENOUS
Status: DISCONTINUED | OUTPATIENT
Start: 2023-11-01 | End: 2023-11-02 | Stop reason: HOSPADM

## 2023-11-01 RX ORDER — MEROPENEM 1 G/1
1000 INJECTION, POWDER, FOR SOLUTION INTRAVENOUS EVERY 24 HOURS
Status: DISCONTINUED | OUTPATIENT
Start: 2023-11-01 | End: 2023-11-01

## 2023-11-01 RX ADMIN — DEXTROSE MONOHYDRATE 0.26 MCG/KG/MIN: 50 INJECTION, SOLUTION INTRAVENOUS at 12:51

## 2023-11-01 RX ADMIN — DEXTROSE MONOHYDRATE 0.26 MCG/KG/MIN: 50 INJECTION, SOLUTION INTRAVENOUS at 02:38

## 2023-11-01 RX ADMIN — Medication 60 L/MIN: at 02:00

## 2023-11-01 RX ADMIN — DEXTROSE MONOHYDRATE 150 ML/HR: 100 INJECTION, SOLUTION INTRAVENOUS at 06:21

## 2023-11-01 RX ADMIN — DEXTROSE MONOHYDRATE 150 ML/HR: 100 INJECTION, SOLUTION INTRAVENOUS at 12:01

## 2023-11-01 RX ADMIN — HYDROCORTISONE SODIUM SUCCINATE 50 MG: 100 INJECTION, POWDER, FOR SOLUTION INTRAMUSCULAR; INTRAVENOUS at 14:11

## 2023-11-01 RX ADMIN — DEXTROSE AND SODIUM CHLORIDE 100 ML/HR: 5; 450 INJECTION, SOLUTION INTRAVENOUS at 14:17

## 2023-11-01 RX ADMIN — DEXTROSE MONOHYDRATE 150 ML/HR: 100 INJECTION, SOLUTION INTRAVENOUS at 14:12

## 2023-11-01 RX ADMIN — SODIUM BICARBONATE 150 ML/HR: 84 INJECTION, SOLUTION INTRAVENOUS at 08:29

## 2023-11-01 RX ADMIN — DEXTROSE MONOHYDRATE 25 G: 25 INJECTION, SOLUTION INTRAVENOUS at 08:41

## 2023-11-01 RX ADMIN — DEXTROSE MONOHYDRATE 150 ML/HR: 100 INJECTION, SOLUTION INTRAVENOUS at 02:38

## 2023-11-01 RX ADMIN — HYDROCORTISONE SODIUM SUCCINATE 50 MG: 100 INJECTION, POWDER, FOR SOLUTION INTRAMUSCULAR; INTRAVENOUS at 00:00

## 2023-11-01 RX ADMIN — FENTANYL CITRATE 25 MCG: 50 INJECTION INTRAMUSCULAR; INTRAVENOUS at 17:45

## 2023-11-01 RX ADMIN — FENTANYL CITRATE 25 MCG: 50 INJECTION INTRAMUSCULAR; INTRAVENOUS at 19:33

## 2023-11-01 RX ADMIN — Medication 1000 MG: at 14:00

## 2023-11-01 RX ADMIN — VASOPRESSIN 0.03 UNITS/MIN: 0.2 INJECTION INTRAVENOUS at 08:41

## 2023-11-01 RX ADMIN — HYDROCORTISONE SODIUM SUCCINATE 50 MG: 100 INJECTION, POWDER, FOR SOLUTION INTRAMUSCULAR; INTRAVENOUS at 06:21

## 2023-11-01 ASSESSMENT — RESPIRATORY DISTRESS OBSERVATION SCALE (RDOS)
RESTLESS NONPURPOSEFUL MOVEMENTS: 1 - OCCASIONAL, SLIGHT MOVEMENTS
HEART RATE PER MINUTE: 1 - 90-109 BEATS
RDOS TOTAL SCORE: 4
GRUNTING AT END OF EXPIRATION: 0 - NONE
LOOK OF FEAR: 0 - NONE
INVOLUNTARY NASAL FLARING: 2 - PRESENT
RESTLESS NONPURPOSEFUL MOVEMENTS: 0 - NONE
PARADOXICAL BREATHING PATTERN: 0 - NONE
GRUNTING AT END OF EXPIRATION: 0 - NONE
INVOLUNTARY NASAL FLARING: 0 - NONE
LOOK OF FEAR: 0 - NONE
ACCESSORY MUSCLE RISE IN CLAVICLE DURING INSPIRATION: 2 - PRONOUNCED RISE
HEART RATE PER MINUTE: 0 - <90 BEATS
ACCESSORY MUSCLE RISE IN CLAVICLE DURING INSPIRATION: 1 - SLIGHT RISE
PARADOXICAL BREATHING PATTERN: 0 - NONE
RESPIRATORY RATE PER MINUTE: 1 - 19-30 BREATHS
RESPIRATORY RATE PER MINUTE: 0 - <19 BREATHS
RDOS TOTAL SCORE: 4

## 2023-11-01 ASSESSMENT — PAIN SCALES - GENERAL
PAINLEVEL_OUTOF10: 0 - NO PAIN
PAINLEVEL_OUTOF10: 0 - NO PAIN

## 2023-11-01 ASSESSMENT — PAIN - FUNCTIONAL ASSESSMENT
PAIN_FUNCTIONAL_ASSESSMENT: CPOT (CRITICAL CARE PAIN OBSERVATION TOOL)
PAIN_FUNCTIONAL_ASSESSMENT: 0-10

## 2023-11-01 NOTE — SIGNIFICANT EVENT
Called by MICU team regarding patient's need for central access. Patient has existing right femoral HD line. History of left femoral graft which required explant due to possible graft infection. Bilateral IJs attempted by MICU team, but unable to pass wire. Surgery consulted to assist with possible subclavian line placement in order to run pressors and CRRT simultaneously.    Patient's condition was discussed with his family, including his need for additional central access for vasopressor support to free up his HD line. Details of subclavian line placement, including risks were discussed with family who agreed to proceed.    Patient placed supine with arms at his side. Left chest prepped and draped. Needle access into left subclavian vein was attempted but unsuccessful after several attempts.    Right chest then prepped and draped. Needle access gained into right subclavian vein with return of dark red venous blood. Wire unable to be advanced more than several cm despite small attempts at repositioning. Long angiocath was attempted to be threaded over wire. Initially returned venous blood but shortly thereafter was nonfunctional. Further attempts were aborted. Pressure dressing applied.    AGUEDA Alcala  Surgery

## 2023-11-01 NOTE — NURSING NOTE
Vascular at bedside to attempt left subclavian central line. Timeout completed. Pt started on precedex for procedure per Dr. Andrade.  0030 left attempt failed, right side attempted.   0100 Right side failed. MICU team aware.

## 2023-11-01 NOTE — PROGRESS NOTES
"[facility]  [facility]  CONSULT FOLLOW-UP     Reason For Consult  Elevated liver enzymes, concern for cirrhosis.     SUBJECTIVE     Mr. Hall was evaluated at bedside this morning. He was resting in bed comfortably with family at bedside. He was drowsy and did not respond to most questions.     EXAM     Last Recorded Vitals  Blood pressure 106/52, pulse 96, temperature 36.7 °C (98.1 °F), resp. rate 18, height 1.854 m (6' 1\"), weight 143 kg (315 lb), SpO2 98 %.      Intake/Output Summary (Last 24 hours) at 11/1/2023 1446  Last data filed at 11/1/2023 1412  Gross per 24 hour   Intake 7892.96 ml   Output 0 ml   Net 7892.96 ml       Physical Exam  GENERAL: In no acute distress.  NEUROLOGIC: A and O x 3. Cranial nerves 2 through 12 grossly intact  HEENT:Extraocular motion intact.  No scleral icterus.  CARDIAC: RRR, no M/R/G.    LUNGS: CTA BL. On BiPAP.  ABDOMEN: Soft, tender to palpation in BL lower quadrants. Normal BS in 4Q. No rebound or guarding.  EXTREMITIES: L leg swollen and tender to palpation.       OBJECTIVE                                                                              Medications             Current Facility-Administered Medications:     acetaminophen (Tylenol) tablet 650 mg, 650 mg, oral, q4h PRN, 650 mg at 10/31/23 0819 **OR** acetaminophen (Tylenol) oral liquid 650 mg, 650 mg, oral, q4h PRN, 650 mg at 10/13/23 1120 **OR** acetaminophen (Tylenol) suppository 650 mg, 650 mg, rectal, q4h PRN, Suresh Liang MD    B complex-vitamin C-folic acid (Nephrocaps) capsule 1 capsule, 1 capsule, oral, Daily, Suresh Liang MD, 1 capsule at 10/31/23 0820    cyanocobalamin (Vitamin B-12) tablet 1,000 mcg, 1,000 mcg, oral, Daily, Suresh Liang MD, 1,000 mcg at 10/31/23 0820    cyclobenzaprine (Flexeril) tablet 5 mg, 5 mg, oral, TID PRN, Suresh Liang MD, 5 mg at 10/31/23 0820    dextrose 10 % in water (D10W) infusion, 150 mL/hr, intravenous, " Continuous, Kushal Jones MD, Last Rate: 150 mL/hr at 11/01/23 1412, 150 mL/hr at 11/01/23 1412    dextrose 5%-0.45 % sodium chloride infusion, 100 mL/hr, intravenous, Continuous, Chandler Jones MD, Last Rate: 100 mL/hr at 11/01/23 1417, 100 mL/hr at 11/01/23 1417    dextrose 50 % injection 25 g, 25 g, intravenous, q15 min PRN, Suresh Liang MD, 25 g at 11/01/23 0841    diphenhydrAMINE (BENADryl) capsule 25 mg, 25 mg, oral, q6h PRN, Suresh Liang MD, 25 mg at 10/26/23 2016    epoetin urmila (Epogen,Procrit) injection 10,000 Units, 10,000 Units, intravenous, Every Mon/Wed/Fri, Suresh Liang MD, 10,000 Units at 10/30/23 2000    glucagon (Glucagen) injection 1 mg, 1 mg, intramuscular, q15 min PRN, Suresh Liang MD    [Held by provider] heparin (porcine) injection 5,000-10,000 Units, 5,000-10,000 Units, intravenous, q4h PRN, Lynnette Austin MD    [Held by provider] heparin 25,000 Units in dextrose 5% 250 mL (100 Units/mL) infusion (premix), 0-4,500 Units/hr, intravenous, Continuous, Lynnette Austin MD, Stopped at 10/30/23 1745    hydrocortisone sod succ (PF) (Solu-CORTEF) injection 50 mg, 50 mg, intravenous, q6h, Suresh Liang MD, 50 mg at 11/01/23 1411    lactated Ringer's bolus 500 mL, 500 mL, intravenous, Once, Suresh Liang MD    lactobacillus acidophilus tablet 1 tablet, 1 tablet, oral, Daily, Suresh Liang MD, 1 tablet at 10/31/23 0819    lidocaine 4 % patch 1 patch, 1 patch, transdermal, Daily, Suresh Liang MD, 1 patch at 10/26/23 2114    lubricating eye drops ophthalmic solution 1 drop, 1 drop, Both Eyes, PRN, Suresh Liang MD    melatonin tablet 10 mg, 10 mg, oral, Daily, Suresh Liang MD, 10 mg at 10/30/23 1826    melatonin tablet 3 mg, 3 mg, oral, Nightly PRN, Suresh Liang MD, 3 mg at 10/29/23 2104    meropenem (Merrem) in sodium chloride 0.9 % 100 mL IV 1,000 mg, 1,000 mg, intravenous, q24h, Rommel Caballero MD    norepinephrine (Levophed) 16 mg in dextrose 5 % in water  (D5W) 250 mL (0.064 mg/mL) infusion, 0.01-1 mcg/kg/min, intravenous, Continuous, Chandler Jones MD, Last Rate: 32.2 mL/hr at 11/01/23 1300, 0.24 mcg/kg/min at 11/01/23 1300    oxyCODONE (Roxicodone) immediate release tablet 5 mg, 5 mg, oral, q8h PRN, Sky Hilario MD, 5 mg at 10/31/23 1628    oxygen (O2) therapy, , inhalation, Continuous PRN - O2/gases, Cait Andrade MD, 60 L/min at 11/01/23 0718    pantoprazole (ProtoNix) EC tablet 40 mg, 40 mg, oral, Daily before breakfast, Suresh Liang MD, 40 mg at 10/31/23 0839    paricalcitoL (Zemplar) injection 8 mcg, 8 mcg, intravenous, Once per day on Mon Wed Fri, Suresh Liang MD, 8 mcg at 10/30/23 2109    PrismaSol BGK 2/3.5 CRRT solution, 25 mL/kg/hr, CRRT, Continuous, Natalia Echeverria MD    psyllium (Metamucil) 3.4 gram packet 1 packet, 1 packet, oral, Daily, Suresh Liang MD, 1 packet at 10/31/23 0819    sevelamer carbonate (Renvela) tablet 1,600 mg, 1,600 mg, oral, TID with meals, Suresh Liang MD, 1,600 mg at 10/31/23 1628    sodium bicarbonate 150 mEq in dextrose 5 % in water (D5W) 1,000 mL infusion, 150 mL/hr, intravenous, Continuous, Kushal Jones MD, Last Rate: 150 mL/hr at 11/01/23 0829, 150 mL/hr at 11/01/23 0829    sodium polystyrene (Kayexalate) suspension 30 g, 30 g, oral, Once, Chandler Jones MD    sodium zirconium cyclosilicate (Lokelma) packet 10 g, 10 g, oral, q8h, Chandler Jones MD    vasopressin (Vasostrict) 0.2 unit/mL infusion, 0.03 Units/min, intravenous, Continuous, Lynnette Austin MD, Last Rate: 9 mL/hr at 11/01/23 0841, 0.03 Units/min at 11/01/23 0841                                                                            Labs     Results for orders placed or performed during the hospital encounter of 10/09/23 (from the past 24 hour(s))   POCT GLUCOSE   Result Value Ref Range    POCT Glucose 83 74 - 99 mg/dL   Renal function panel   Result Value Ref Range    Glucose 75 74 - 99 mg/dL    Sodium 124 (L) 136 - 145 mmol/L     Potassium 6.0 (H) 3.5 - 5.3 mmol/L    Chloride 90 (L) 98 - 107 mmol/L    Bicarbonate 19 (L) 21 - 32 mmol/L    Anion Gap 21 (H) 10 - 20 mmol/L    Urea Nitrogen 56 (H) 6 - 23 mg/dL    Creatinine 6.67 (H) 0.50 - 1.30 mg/dL    eGFR 9 (L) >60 mL/min/1.73m*2    Calcium 6.7 (L) 8.6 - 10.6 mg/dL    Phosphorus 6.3 (H) 2.5 - 4.9 mg/dL    Albumin 2.0 (L) 3.4 - 5.0 g/dL   Magnesium   Result Value Ref Range    Magnesium 1.46 (L) 1.60 - 2.40 mg/dL   Lactate   Result Value Ref Range    Lactate 4.1 (HH) 0.4 - 2.0 mmol/L   Blood Gas Arterial Full Panel   Result Value Ref Range    POCT pH, Arterial 7.27 (L) 7.38 - 7.42 pH    POCT pCO2, Arterial 38 38 - 42 mm Hg    POCT pO2, Arterial 97 (H) 85 - 95 mm Hg    POCT SO2, Arterial 98 94 - 100 %    POCT Oxy Hemoglobin, Arterial 95.9 94.0 - 98.0 %    POCT Hematocrit Calculated, Arterial 27.0 (L) 41.0 - 52.0 %    POCT Sodium, Arterial 119 (LL) 136 - 145 mmol/L    POCT Potassium, Arterial 6.3 (HH) 3.5 - 5.3 mmol/L    POCT Chloride, Arterial 90 (L) 98 - 107 mmol/L    POCT Ionized Calcium, Arterial 0.91 (L) 1.10 - 1.33 mmol/L    POCT Glucose, Arterial 87 74 - 99 mg/dL    POCT Lactate, Arterial 3.6 (H) 0.4 - 2.0 mmol/L    POCT Base Excess, Arterial -8.8 (L) -2.0 - 3.0 mmol/L    POCT HCO3 Calculated, Arterial 17.4 (L) 22.0 - 26.0 mmol/L    POCT Hemoglobin, Arterial 9.0 (L) 13.5 - 17.5 g/dL    POCT Anion Gap, Arterial 18 10 - 25 mmo/L    Patient Temperature 37.0 degrees Celsius    FiO2 40 %   Blood Gas Arterial Full Panel Unsolicited   Result Value Ref Range    POCT pH, Arterial 7.22 (LL) 7.38 - 7.42 pH    POCT pCO2, Arterial 44 (H) 38 - 42 mm Hg    POCT pO2, Arterial 85 85 - 95 mm Hg    POCT SO2, Arterial 96 94 - 100 %    POCT Oxy Hemoglobin, Arterial 93.5 (L) 94.0 - 98.0 %    POCT Hematocrit Calculated, Arterial 26.0 (L) 41.0 - 52.0 %    POCT Sodium, Arterial 119 (LL) 136 - 145 mmol/L    POCT Potassium, Arterial 6.2 (HH) 3.5 - 5.3 mmol/L    POCT Chloride, Arterial 92 (L) 98 - 107 mmol/L     POCT Ionized Calcium, Arterial 0.92 (L) 1.10 - 1.33 mmol/L    POCT Glucose, Arterial 78 74 - 99 mg/dL    POCT Lactate, Arterial 3.5 (H) 0.4 - 2.0 mmol/L    POCT Base Excess, Arterial -9.1 (L) -2.0 - 3.0 mmol/L    POCT HCO3 Calculated, Arterial 18.0 (L) 22.0 - 26.0 mmol/L    POCT Hemoglobin, Arterial 8.6 (L) 13.5 - 17.5 g/dL    POCT Anion Gap, Arterial 15 10 - 25 mmo/L    Patient Temperature 37.0 degrees Celsius   POCT GLUCOSE   Result Value Ref Range    POCT Glucose 66 (L) 74 - 99 mg/dL   Comprehensive metabolic panel   Result Value Ref Range    Glucose 241 (H) 74 - 99 mg/dL    Sodium 123 (L) 136 - 145 mmol/L    Potassium 6.1 (HH) 3.5 - 5.3 mmol/L    Chloride 88 (L) 98 - 107 mmol/L    Bicarbonate 22 21 - 32 mmol/L    Anion Gap 19 10 - 20 mmol/L    Urea Nitrogen 57 (H) 6 - 23 mg/dL    Creatinine 6.70 (H) 0.50 - 1.30 mg/dL    eGFR 9 (L) >60 mL/min/1.73m*2    Calcium 6.5 (L) 8.6 - 10.6 mg/dL    Albumin 2.0 (L) 3.4 - 5.0 g/dL    Alkaline Phosphatase 162 (H) 33 - 120 U/L    Total Protein 4.8 (L) 6.4 - 8.2 g/dL     (H) 9 - 39 U/L    Bilirubin, Total 2.3 (H) 0.0 - 1.2 mg/dL    ALT 5 (L) 10 - 52 U/L   Calcium, ionized   Result Value Ref Range    POCT Calcium, Ionized 0.89 (L) 1.1 - 1.33 mmol/L   CBC   Result Value Ref Range    WBC 25.3 (H) 4.4 - 11.3 x10*3/uL    nRBC 0.3 (H) 0.0 - 0.0 /100 WBCs    RBC 2.84 (L) 4.50 - 5.90 x10*6/uL    Hemoglobin 8.2 (L) 13.5 - 17.5 g/dL    Hematocrit 25.2 (L) 41.0 - 52.0 %    MCV 89 80 - 100 fL    MCH 28.9 26.0 - 34.0 pg    MCHC 32.5 32.0 - 36.0 g/dL    RDW 18.4 (H) 11.5 - 14.5 %    Platelets 129 (L) 150 - 450 x10*3/uL    MPV 10.7 7.5 - 11.5 fL   Magnesium   Result Value Ref Range    Magnesium 1.49 (L) 1.60 - 2.40 mg/dL   Coagulation Screen   Result Value Ref Range    Protime 32.4 (H) 9.8 - 12.8 seconds    INR 2.8 (H) 0.9 - 1.1    aPTT 43 (H) 27 - 38 seconds   POCT GLUCOSE   Result Value Ref Range    POCT Glucose 45 (L) 74 - 99 mg/dL   POCT GLUCOSE   Result Value Ref Range    POCT  Glucose 155 (H) 74 - 99 mg/dL   Prepare Plasma: 1 Units   Result Value Ref Range    PRODUCT CODE P6079W07     Unit Number [id number]     Unit ABO AB     Unit RH POS     Dispense Status TR     Blood Expiration Date November 01, 2023 03:04 EDT     PRODUCT BLOOD TYPE 8400     UNIT VOLUME 311    POCT GLUCOSE   Result Value Ref Range    POCT Glucose 87 74 - 99 mg/dL   Type and screen   Result Value Ref Range    ABO TYPE B     Rh TYPE POS     ANTIBODY SCREEN NEG    Renal function panel   Result Value Ref Range    Glucose 82 74 - 99 mg/dL    Sodium 124 (L) 136 - 145 mmol/L    Potassium 5.7 (H) 3.5 - 5.3 mmol/L    Chloride 88 (L) 98 - 107 mmol/L    Bicarbonate 19 (L) 21 - 32 mmol/L    Anion Gap 23 (H) 10 - 20 mmol/L    Urea Nitrogen 56 (H) 6 - 23 mg/dL    Creatinine 6.91 (H) 0.50 - 1.30 mg/dL    eGFR 9 (L) >60 mL/min/1.73m*2    Calcium 7.3 (L) 8.6 - 10.6 mg/dL    Phosphorus 6.4 (H) 2.5 - 4.9 mg/dL    Albumin 2.2 (L) 3.4 - 5.0 g/dL   Magnesium   Result Value Ref Range    Magnesium 1.53 (L) 1.60 - 2.40 mg/dL   POCT GLUCOSE   Result Value Ref Range    POCT Glucose 69 (L) 74 - 99 mg/dL   Blood Gas Arterial   Result Value Ref Range    POCT pH, Arterial 7.29 (L) 7.38 - 7.42 pH    POCT pCO2, Arterial 39 38 - 42 mm Hg    POCT pO2, Arterial 119 (H) 85 - 95 mm Hg    POCT SO2, Arterial 98 94 - 100 %    POCT Oxy Hemoglobin, Arterial 96.6 94.0 - 98.0 %    POCT Base Excess, Arterial -7.2 (L) -2.0 - 3.0 mmol/L    POCT HCO3 Calculated, Arterial 18.8 (L) 22.0 - 26.0 mmol/L    Patient Temperature 37.0 degrees Celsius    FiO2 40 %   Blood Gas Arterial Full Panel   Result Value Ref Range    POCT pH, Arterial 7.29 (L) 7.38 - 7.42 pH    POCT pCO2, Arterial 39 38 - 42 mm Hg    POCT pO2, Arterial 119 (H) 85 - 95 mm Hg    POCT SO2, Arterial 98 94 - 100 %    POCT Oxy Hemoglobin, Arterial 96.6 94.0 - 98.0 %    POCT Hematocrit Calculated, Arterial 30.0 (L) 41.0 - 52.0 %    POCT Sodium, Arterial 120 (LL) 136 - 145 mmol/L    POCT Potassium,  Arterial 6.3 (HH) 3.5 - 5.3 mmol/L    POCT Chloride, Arterial 92 (L) 98 - 107 mmol/L    POCT Ionized Calcium, Arterial 0.89 (L) 1.10 - 1.33 mmol/L    POCT Glucose, Arterial 74 74 - 99 mg/dL    POCT Lactate, Arterial 3.4 (H) 0.4 - 2.0 mmol/L    POCT Base Excess, Arterial -7.2 (L) -2.0 - 3.0 mmol/L    POCT HCO3 Calculated, Arterial 18.8 (L) 22.0 - 26.0 mmol/L    POCT Hemoglobin, Arterial 10.1 (L) 13.5 - 17.5 g/dL    POCT Anion Gap, Arterial 16 10 - 25 mmo/L    Patient Temperature 37.0 degrees Celsius    FiO2 40 %   POCT GLUCOSE   Result Value Ref Range    POCT Glucose 29 (L) 74 - 99 mg/dL   POCT GLUCOSE   Result Value Ref Range    POCT Glucose 75 74 - 99 mg/dL   Prepare Plasma: 1 Units   Result Value Ref Range    PRODUCT CODE [id number]     Unit Number [id number]     Unit ABO AB     Unit RH POS     Dispense Status TR     Blood Expiration Date November 01, 2023 07:19 EDT     PRODUCT BLOOD TYPE 8400     UNIT VOLUME 250    POCT GLUCOSE   Result Value Ref Range    POCT Glucose 86 74 - 99 mg/dL   Blood Gas Arterial Full Panel   Result Value Ref Range    POCT pH, Arterial 7.23 (LL) 7.38 - 7.42 pH    POCT pCO2, Arterial 54 (H) 38 - 42 mm Hg    POCT pO2, Arterial 158 (H) 85 - 95 mm Hg    POCT SO2, Arterial 96 94 - 100 %    POCT Oxy Hemoglobin, Arterial 96.1 94.0 - 98.0 %    POCT Hematocrit Calculated, Arterial 25.0 (L) 41.0 - 52.0 %    POCT Sodium, Arterial 120 (LL) 136 - 145 mmol/L    POCT Potassium, Arterial 6.0 (H) 3.5 - 5.3 mmol/L    POCT Chloride, Arterial 89 (L) 98 - 107 mmol/L    POCT Ionized Calcium, Arterial 0.98 (L) 1.10 - 1.33 mmol/L    POCT Glucose, Arterial 103 (H) 74 - 99 mg/dL    POCT Lactate, Arterial 2.8 (H) 0.4 - 2.0 mmol/L    POCT Base Excess, Arterial -4.9 (L) -2.0 - 3.0 mmol/L    POCT HCO3 Calculated, Arterial 22.6 22.0 - 26.0 mmol/L    POCT Hemoglobin, Arterial 8.3 (L) 13.5 - 17.5 g/dL    POCT Anion Gap, Arterial 14 10 - 25 mmo/L    Patient Temperature 37.0 degrees Celsius    FiO2 95 %   POCT GLUCOSE    Result Value Ref Range    POCT Glucose 139 (H) 74 - 99 mg/dL   Comprehensive metabolic panel   Result Value Ref Range    Glucose 130 (H) 74 - 99 mg/dL    Sodium 123 (L) 136 - 145 mmol/L    Potassium 5.8 (H) 3.5 - 5.3 mmol/L    Chloride 86 (L) 98 - 107 mmol/L    Bicarbonate 23 21 - 32 mmol/L    Anion Gap 20 10 - 20 mmol/L    Urea Nitrogen 55 (H) 6 - 23 mg/dL    Creatinine 6.73 (H) 0.50 - 1.30 mg/dL    eGFR 9 (L) >60 mL/min/1.73m*2    Calcium 7.2 (L) 8.6 - 10.6 mg/dL    Albumin 2.2 (L) 3.4 - 5.0 g/dL    Alkaline Phosphatase 156 (H) 33 - 120 U/L    Total Protein 5.4 (L) 6.4 - 8.2 g/dL     (H) 9 - 39 U/L    Bilirubin, Total 2.6 (H) 0.0 - 1.2 mg/dL    ALT 5 (L) 10 - 52 U/L   CBC and Auto Differential   Result Value Ref Range    WBC 27.0 (H) 4.4 - 11.3 x10*3/uL    nRBC 0.1 (H) 0.0 - 0.0 /100 WBCs    RBC 2.75 (L) 4.50 - 5.90 x10*6/uL    Hemoglobin 8.0 (L) 13.5 - 17.5 g/dL    Hematocrit 24.5 (L) 41.0 - 52.0 %    MCV 89 80 - 100 fL    MCH 29.1 26.0 - 34.0 pg    MCHC 32.7 32.0 - 36.0 g/dL    RDW 18.0 (H) 11.5 - 14.5 %    Platelets 127 (L) 150 - 450 x10*3/uL    MPV 10.8 7.5 - 11.5 fL    Neutrophils % 95.0 40.0 - 80.0 %    Immature Granulocytes %, Automated 0.4 0.0 - 0.9 %    Lymphocytes % 0.7 13.0 - 44.0 %    Monocytes % 3.3 2.0 - 10.0 %    Eosinophils % 0.0 0.0 - 6.0 %    Basophils % 0.6 0.0 - 2.0 %    Neutrophils Absolute 25.60 (H) 1.20 - 7.70 x10*3/uL    Immature Granulocytes Absolute, Automated 0.11 0.00 - 0.70 x10*3/uL    Lymphocytes Absolute 0.18 (L) 1.20 - 4.80 x10*3/uL    Monocytes Absolute 0.89 0.10 - 1.00 x10*3/uL    Eosinophils Absolute 0.01 0.00 - 0.70 x10*3/uL    Basophils Absolute 0.16 (H) 0.00 - 0.10 x10*3/uL   Magnesium   Result Value Ref Range    Magnesium 1.54 (L) 1.60 - 2.40 mg/dL   Calcium, ionized   Result Value Ref Range    POCT Calcium, Ionized 0.96 (L) 1.1 - 1.33 mmol/L   Coagulation Screen   Result Value Ref Range    Protime 26.8 (H) 9.8 - 12.8 seconds    INR 2.4 (H) 0.9 - 1.1    aPTT 39 (H)  27 - 38 seconds   Phosphorus   Result Value Ref Range    Phosphorus 6.5 (H) 2.5 - 4.9 mg/dL   Bilirubin, Direct   Result Value Ref Range    Bilirubin, Direct 1.9 (H) 0.0 - 0.3 mg/dL   Morphology   Result Value Ref Range    RBC Morphology See Below     Polychromasia Mild     RBC Fragments Few     Philip Cells Few     Basophilic Stippling Present    POCT GLUCOSE   Result Value Ref Range    POCT Glucose 58 (L) 74 - 99 mg/dL   POCT GLUCOSE   Result Value Ref Range    POCT Glucose 83 74 - 99 mg/dL   POCT GLUCOSE   Result Value Ref Range    POCT Glucose 165 (H) 74 - 99 mg/dL       ASSESSMENT / PLAN     ASSESSMENT/PLAN:    Nghia Hall is a 58 y.o. male with a past medical history of ESRD s/p DDKT (2006, subsequent graft failure 07/2021, on HD through LT groin AV graft), HCV (s/p treatment, HCV RNA negative on 10/19/2023), pAfib (on Apixaban), HFpEF, DVT, and adrenal Insufficiency (on hydrocortisone) who was admitted on 10/9 due to LLE pain and swelling. He was transferred to MICU on 10/30 for further management of likely septic shock after rapid response was called for hypoglycemia and unresponsiveness which required a norepinephrine drip. .      In the MICU, he was noted to have an elevated AST (80) and INR (3.3). Prior liver US (10/17) and CT A/P (10/30) showed findings concerning for cirrhosis and ascites.     Recommendations:  - Continue daily LFTs. Patient's liver enzymes may continue to rise over the next several days due to the stress of septic shock and the infectious state.   - Currently no indication for inpatient cirrhosis workup.     Patient was seen and discussed with Dr. Corona .    Thank you for the consultation. Hepatology will continue to follow.    Rasta Moran, MS4

## 2023-11-01 NOTE — PROGRESS NOTES
HD is on MWF [facility]/Dr Mccormick.    Spoke with Belinda at [facility] who states [facility] facilities near [facility] where they also provide the on-site HD are [facility], [facility] and [facility].    Poppy Adorno, SHANICEW

## 2023-11-01 NOTE — NURSING NOTE
Per Cait Andrade MD and nursing communication order,  0.22 Levophed and 0.03 Vasopressin okay to run through the 18G R EJ 10/31/23 0000.

## 2023-11-01 NOTE — PROGRESS NOTES
Nghia ANDERSON Hall   58 y.o.    @WT@  MRN/Room: [id number]  DOA: 10/9/2023    ___________________________________________________________________________________  SUBJECTIVE: I have seen and examined Nghia Hall at the bedside. Patient resting in bed comfortably.  Nghia Hall charts and 24 hrs events reviewed.    ___________________________________________________________________________________  OBJECTIVE:  Temp:  [36.2 °C (97.2 °F)-36.7 °C (98.1 °F)] 36.7 °C (98.1 °F)  Heart Rate:  [] 103  Resp:  [12-33] 18  BP: (106-131)/(52-64) 106/52  Arterial Line BP 1: ()/(48-74) 110/60  FiO2 (%):  [60 %-95 %] 95 %     Intake/Output Summary (Last 24 hours) at 11/1/2023 0831  Last data filed at 11/1/2023 0400  Gross per 24 hour   Intake 4861.76 ml   Output 0 ml   Net 4861.76 ml        I/O last 3 completed shifts:  In: 6566.8 [I.V.:5706.8; Blood:610; IV Piggyback:250]  Out: 0        General appearance: resting comfortably in bed  Eyes: non-icteric  Skin: no apparent rash  Heart: regular  Lungs: NVB B/L with wheezing/crackles  Abdomen: soft, nt/nd  Extremities: grade 2 edema B/L, cool extremities bilaterally  Access: R femoral cath    Meds:     Current Facility-Administered Medications:     acetaminophen (Tylenol) tablet 650 mg, 650 mg, oral, q4h PRN, 650 mg at 10/31/23 0819 **OR** acetaminophen (Tylenol) oral liquid 650 mg, 650 mg, oral, q4h PRN, 650 mg at 10/13/23 1120 **OR** acetaminophen (Tylenol) suppository 650 mg, 650 mg, rectal, q4h PRN, Suresh Liang MD    B complex-vitamin C-folic acid (Nephrocaps) capsule 1 capsule, 1 capsule, oral, Daily, Suresh Liang MD, 1 capsule at 10/31/23 0820    calcium chloride injection  - Omnicell Override Pull, , , ,     cyanocobalamin (Vitamin B-12) tablet 1,000 mcg, 1,000 mcg, oral, Daily, Suresh Liang MD, 1,000 mcg at 10/31/23 0820    cyclobenzaprine (Flexeril) tablet 5 mg, 5 mg, oral, TID PRN, Suresh Liang MD, 5 mg at 10/31/23 0820    dexmedeTOMIDine in  NS (Precedex) infusion  - Omnicell Override Pull, , , ,     dextrose 10 % in water (D10W) infusion, 0.3 g/kg/hr, intravenous, Once PRN, Suresh Liang MD    dextrose 10 % in water (D10W) infusion, 150 mL/hr, intravenous, Continuous, Kushal Jones MD, Last Rate: 150 mL/hr at 11/01/23 0621, 150 mL/hr at 11/01/23 0621    dextrose 50 % injection 25 g, 25 g, intravenous, q15 min PRN, Suresh Liang MD, 25 g at 10/31/23 1848    diphenhydrAMINE (BENADryl) capsule 25 mg, 25 mg, oral, q6h PRN, Suresh Liang MD, 25 mg at 10/26/23 2016    epoetin urmila (Epogen,Procrit) injection 10,000 Units, 10,000 Units, intravenous, Every Mon/Wed/Fri, Suresh Liang MD, 10,000 Units at 10/30/23 2000    glucagon (Glucagen) injection 1 mg, 1 mg, intramuscular, q15 min PRN, Suresh Liang MD    [Held by provider] heparin (porcine) injection 5,000-10,000 Units, 5,000-10,000 Units, intravenous, q4h PRN, Lynnette Austin MD    [Held by provider] heparin 25,000 Units in dextrose 5% 250 mL (100 Units/mL) infusion (premix), 0-4,500 Units/hr, intravenous, Continuous, Lynnette Austin MD, Stopped at 10/30/23 1745    hydrocortisone sod succ (PF) (Solu-CORTEF) injection 50 mg, 50 mg, intravenous, q6h, Suresh Liang MD, 50 mg at 11/01/23 0621    lactated Ringer's bolus 500 mL, 500 mL, intravenous, Once, Suresh Liang MD    lactobacillus acidophilus tablet 1 tablet, 1 tablet, oral, Daily, Suresh Liang MD, 1 tablet at 10/31/23 0819    lidocaine 4 % patch 1 patch, 1 patch, transdermal, Daily, Suresh Liang MD, 1 patch at 10/26/23 2114    lubricating eye drops ophthalmic solution 1 drop, 1 drop, Both Eyes, PRN, Suresh Liang MD    melatonin tablet 10 mg, 10 mg, oral, Daily, Suresh Liang MD, 10 mg at 10/30/23 1826    melatonin tablet 3 mg, 3 mg, oral, Nightly PRN, Suresh Liang MD, 3 mg at 10/29/23 2104    norepinephrine (Levophed) 16 mg in dextrose 5 % in water (D5W) 250 mL (0.064 mg/mL) infusion, 0.01-1 mcg/kg/min, intravenous,  Continuous, Chandler Jones MD, Last Rate: 34.9 mL/hr at 11/01/23 0238, 0.26 mcg/kg/min at 11/01/23 0238    oxyCODONE (Roxicodone) immediate release tablet 5 mg, 5 mg, oral, q8h PRN, Sky Hilario MD, 5 mg at 10/31/23 1628    oxygen (O2) therapy, , inhalation, Continuous PRN - O2/gases, Cait Andrade MD, 60 L/min at 11/01/23 0200    pantoprazole (ProtoNix) EC tablet 40 mg, 40 mg, oral, Daily before breakfast, Suresh Liang MD, 40 mg at 10/31/23 0839    paricalcitoL (Zemplar) injection 8 mcg, 8 mcg, intravenous, Once per day on Mon Wed Fri, Suresh Liang MD, 8 mcg at 10/30/23 2109    phenylephrine HCl in 0.9% NaCl syringe  - Omnicell Override Pull, , , ,     PrismaSol BGK 2/3.5 CRRT solution, 25 mL/kg/hr, CRRT, Continuous, Natalia Echeverria MD    psyllium (Metamucil) 3.4 gram packet 1 packet, 1 packet, oral, Daily, Suresh Liang MD, 1 packet at 10/31/23 0819    sevelamer carbonate (Renvela) tablet 1,600 mg, 1,600 mg, oral, TID with meals, Suresh Liang MD, 1,600 mg at 10/31/23 1628    sodium bicarbonate 150 mEq in dextrose 5 % in water (D5W) 1,000 mL infusion, 150 mL/hr, intravenous, Continuous, Kushal Jones MD, Last Rate: 150 mL/hr at 10/31/23 2330, 150 mL/hr at 10/31/23 2330    sodium bicarbonate injection  - Omnicell Override Pull, , , ,     sodium bicarbonate injection  - Omnicell Override Pull, , , ,     sodium bicarbonate injection  - Omnicell Override Pull, , , ,     sodium bicarbonate injection  - Omnicell Override Pull, , , ,     sodium polystyrene (Kayexalate) suspension 30 g, 30 g, oral, Once, Chandler Jones MD    sodium zirconium cyclosilicate (Lokelma) packet 10 g, 10 g, oral, q8h, Chandler Jones MD    vasopressin (Vasostrict) 0.2 unit/mL infusion, 0.03 Units/min, intravenous, Continuous, Lynnette Austin MD, Last Rate: 9 mL/hr at 10/31/23 2026, 0.03 Units/min at 10/31/23 2026    Investigations:  Results from last 7 days   Lab Units 11/01/23  0428   WBC AUTO x10*3/uL 27.0*   RBC  "AUTO x10*6/uL 2.75*   HEMOGLOBIN g/dL 8.0*   HEMATOCRIT % 24.5*       Results from last 7 days   Lab Units 11/01/23  0428   SODIUM mmol/L 123*   POTASSIUM mmol/L 5.8*   CHLORIDE mmol/L 86*   CO2 mmol/L 23   BUN mg/dL 55*   CREATININE mg/dL 6.73*   CALCIUM mg/dL 7.2*   PHOSPHORUS mg/dL 6.5*   MAGNESIUM mg/dL 1.54*   BILIRUBIN TOTAL mg/dL 2.6*   ALT U/L 5*   AST U/L 111*           No results found for: \"ALBUR\", \"[id number]\"   Susceptibility data from last 90 days.  Collected Specimen Info Organism Amoxicillin/Clavulanate Ampicillin Ampicillin/Sulbactam Aztreonam Cefazolin Cefepime Cefotaxime Ceftazidime Ceftriaxone Ciprofloxacin Clindamycin Ertapenem Erythromycin Gentamicin   10/30/23 Blood culture from Peripheral Venipuncture Escherichia coli S R I R R R R R R S  S  S   10/30/23 Blood culture from Peripheral Venipuncture Escherichia coli                 10/13/23 Blood culture from Dialysis Staphylococcus aureus                 10/13/23 Blood culture from Arterial Line Methicillin Susceptible Staphylococcus aureus (MSSA)           S  S      Collected Specimen Info Organism Levofloxacin Meropenem Oxacillin Piperacillin/Tazobactam Tetracycline Trimethoprim/Sulfamethoxazole Vancomycin   10/30/23 Blood culture from Peripheral Venipuncture Escherichia coli S S  S  S    10/30/23 Blood culture from Peripheral Venipuncture Escherichia coli          10/13/23 Blood culture from Dialysis Staphylococcus aureus          10/13/23 Blood culture from Arterial Line Methicillin Susceptible Staphylococcus aureus (MSSA)   S  S S S         ___________________________________________________________________________________  ASSESSMENT:  Nghia Hall is a 58 year old male with PMH of HTN, HLD, paroxysmal A-fib on Eliquis, HFpEF w/ most recent echo on 10/11 which showed EF 50%, ESRD s/p DDKT (2006, subsequent graft failure 07/2021, on HD MWF at [facility] through LT groin AV graft on [address]; not currently on immunosuppression), T2DM, LAZARO, " Hepatitis C s/p treatment, gout, lymphadenopathy (following with oncology, inconclusive biopsy in 01/2023), DVT, and Adrenal Insufficiency (on hydrocortisone) who presented to the MICU after a rapid response was called for hypoglycemia and unresponsiveness, which required a norepinephrine drip. He is admitted to the ICU for concern for possible septic shock vs adrenal insufficiency. Nephrology is helping with ESRD Mx     #ESRD-HD (MWF) admitted with infected fem AVG s/p explant and leg swelling    MICU team unable to obtain access overnight to do CRRT and run pressors at the same time, patient has not received CRRT yet  ___________________________________________________________________________________  RECOMMENDATIONS:  -start CVVH 2k when able to establish access and after GOC discussion  -replete phosphorus as needed  -BMP twice, serum phosphorus and magnesium levels daily   -Renal Diet   -Daily renal MVI  -Treat underlying infection  -Strict I/O monitoring, daily weights, daily BMP  -Will continue to follow    Patient is seen and discussed with the attending.    Jesi English MD  Nephrology resident   Daytime / Weekend Renal Pager [phone]  After 7 pm Emergencies [phone] Pager [phone]

## 2023-11-01 NOTE — SIGNIFICANT EVENT
Upon discussion with the patient's family with palliative care at bedside, the family has elected to go comfort care. Family currently at bedside. Code status changed to DNR comfort care

## 2023-11-01 NOTE — ACP (ADVANCE CARE PLANNING)
"Confirming Previous Code Status:   Advance Care Planning Note     Discussion Date: 11/01/23   Discussion Participants: Patient's mother    The patient wishes to discuss Advance Care Planning today and the following is a brief summary of our discussion.     Patient has capacity to make their own medical decisions: No  Health Care Agent/Surrogate Decision Maker documented in chart: Mother    Documents on file and valid:  Health Care Power of : Yes - mother    Communication of Medical Status/Prognosis:   Pt with worsening mental status, very difficult peripheral access necessitating high-dose peripheral pressors in order to run CVVH (needed for hyperkalemia, ectopy on ECG). Discussed high-risk intubation with CPR being a measure that is potentially inappropriate given his clinical status.      Communication of Treatment Goals/Options:   Mother of patient wishes for him to be DNR-DNI with no escallation of care. In the mother's words, \"if he looks uncomfortable or worsens despite the medications I want him to die peacefully\". Family aware this could be imminent.     Treatment Decisions  Goals of Care: quality of life is prioritized; willing to accept low-burden treatments to achieve meaningful goals - do not escalate current care. If worsening proceed with comfort measures.    Follow Up Plan  DNR-DNI- DO NOT ESCALATE    Team Members  MD Bobbi Flanagan MD    Time Statement: Total face to face time spent on advance care planning was 30 minutes with 25 minutes spent in counseling, including the explanation.    Cait Andrade MD  11/1/2023 1:14 AM  "

## 2023-11-01 NOTE — CONSULTS
"Inpatient consult to Palliative Care  Consult performed by: Georgie Sibley, APRN-CNP  Consult ordered by: Tirso Santillan MD          Reason For Consult  Reason for Consult: communication / medical decision making     History Of Present Illness  Nghia Hall is a 58 y.o. male with past medical history of Kidney Disease - ESRD s/p DDKT c/b graft failure 7/2021 currently on HD, A fib, HFpEF, HCV s/p treatment, gout, lymphadenopathy, DVT, adrenal insufficiency, DM2 is presenting with B/L LE swelling, difficulty walking. Pt underwent explantation of right femoral HD catheter placement; course c/b MSSA bacteremia, left femoral DVT, septic shock requiring ICU transfer on 10/30/23 with difficulty with IV access per primary, vascular and surgical team. Palliative care consulted for medical decision making.    Symptoms-unable to review, pt not responsive to verbal stimuli    Serious Illness Conversation- completed with pt's daughterJuana, and pt's motherKaruna  Information: pt's family prefers both daily details and big picture approach to medical information  Understanding: pt's family has a moderate understanding of pt's current medical condition    Prognosis: pt's family prefers disclosure of prognosis   Joys: bingo, playing cards, playing pool, going to the casino, family   Goals: for pt to be comfortable   Fears and worries: \"prolonging the inevitable\"   Minimal acceptable outcome: cognition, independence with toileting, feeding, ambulation  Family: pt's family aware of current hospitalization    Advance Care Planning:  Health Care Agent-pt's Karuna espinal: [phone]  Advance directives-not on file     Spiritual Hx: Scientologist      Information obtained from chart review, discussion with patient/family, and discussion with primary team.        Personal/Social History   He reports that he has quit smoking. His smoking use included cigarettes. He has never used smokeless tobacco. He reports current alcohol use of " about 1.0 standard drink of alcohol per week. He reports that he does not use drugs.    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    Surgical History  He has a past surgical history that includes Other surgical history (11/03/2021); Other surgical history (11/02/2021); Other surgical history (11/03/2021); Mandible surgery (08/25/2015); MR angio neck wo IV contrast (1/30/2014); MR angio head wo IV contrast (1/30/2014); CT angio aorta and bilateral iliofemoral runoff w and or wo IV contrast (11/7/2021); Other surgical history (06/19/2014); US guided biopsy lymph node superficial (1/11/2023); IR venogram dialysis (8/30/2022); CT angio aorta and bilateral iliofemoral runoff w and or wo IV contrast (7/6/2022); CT angio aorta and bilateral iliofemoral runoff w and or wo IV contrast (8/17/2022); CT angio aorta and bilateral iliofemoral runoff w and or wo IV contrast (9/30/2022); CT angio aorta and bilateral iliofemoral runoff w and or wo IV contrast (12/29/2022); CT angio abdomen pelvis w and or wo IV IV contrast (10/12/2023); IR angiogram fistula graft (10/13/2023); and IR CVC tunneled (10/20/2023).     Family History  Family History   Problem Relation Name Age of Onset    Kidney failure Other Sibling      Allergies  Ibuprofen and Vancomycin    Review of Systems   Reason unable to perform ROS: Pt not responsive to verbal stimuli.        Physical Exam  Vitals reviewed.  "  Constitutional:       General: He is not in acute distress.     Appearance: He is obese. He is ill-appearing.   HENT:      Head: Normocephalic and atraumatic.      Nose: Nose normal.      Mouth/Throat:      Mouth: Mucous membranes are dry.   Cardiovascular:      Rate and Rhythm: Normal rate and regular rhythm.   Pulmonary:      Effort: Pulmonary effort is normal.      Breath sounds: Examination of the right-upper field reveals decreased breath sounds. Examination of the left-upper field reveals decreased breath sounds. Examination of the right-middle field reveals decreased breath sounds. Examination of the left-middle field reveals decreased breath sounds. Examination of the right-lower field reveals decreased breath sounds. Examination of the left-lower field reveals decreased breath sounds. Decreased breath sounds present.   Abdominal:      General: Abdomen is flat. Bowel sounds are normal.      Palpations: Abdomen is soft.   Skin:     General: Skin is warm and dry.   Neurological:      Mental Status: He is unresponsive.   Psychiatric:         Speech: He is noncommunicative.         Cognition and Memory: Cognition is impaired. Memory is impaired.         Last Recorded Vitals  Blood pressure 106/52, pulse 104, temperature 36.2 °C (97.2 °F), resp. rate 17, height 1.854 m (6' 1\"), weight 143 kg (315 lb), SpO2 98 %.    Relevant Results  Scheduled medications  B complex-vitamin C-folic acid, 1 capsule, oral, Daily  cyanocobalamin, 1,000 mcg, oral, Daily  epoetin urmila or biosimilar, 10,000 Units, intravenous, Every Mon/Wed/Fri  hydrocortisone sodium succinate, 50 mg, intravenous, q6h  lactobacillus acidophilus, 1 tablet, oral, Daily  lidocaine, 1 patch, transdermal, Daily  melatonin, 10 mg, oral, Daily  meropenem, 1,000 mg, intravenous, q24h  pantoprazole, 40 mg, oral, Daily before breakfast  paricalcitoL, 8 mcg, intravenous, Once per day on Mon Wed Fri  psyllium, 1 packet, oral, Daily  sevelamer carbonate, 1,600 mg, " oral, TID with meals  sodium polystyrene, 30 g, oral, Once  sodium zirconium cyclosilicate, 10 g, oral, q8h      Continuous medications  dextrose 5%-0.45 % sodium chloride, 100 mL/hr, Last Rate: 100 mL/hr (11/01/23 1417)  [Held by provider] heparin, 0-4,500 Units/hr, Last Rate: Stopped (10/30/23 1745)  norepinephrine, 0.01-1 mcg/kg/min, Last Rate: 0.24 mcg/kg/min (11/01/23 1300)  PrismaSol BGK 2/3.5, 25 mL/kg/hr  sodium bicarbonate, 150 mL/hr, Last Rate: 150 mL/hr (11/01/23 0829)  vasopressin, 0.03 Units/min, Last Rate: 0.03 Units/min (11/01/23 0841)      PRN medications  PRN medications: acetaminophen **OR** acetaminophen **OR** acetaminophen, cyclobenzaprine, dextrose, diphenhydrAMINE, glucagon, [Held by provider] heparin, lubricating eye drops, melatonin, oxyCODONE, oxygen    Results for orders placed or performed during the hospital encounter of 10/09/23 (from the past 24 hour(s))   Blood Gas Arterial Full Panel Unsolicited   Result Value Ref Range    POCT pH, Arterial 7.22 (LL) 7.38 - 7.42 pH    POCT pCO2, Arterial 44 (H) 38 - 42 mm Hg    POCT pO2, Arterial 85 85 - 95 mm Hg    POCT SO2, Arterial 96 94 - 100 %    POCT Oxy Hemoglobin, Arterial 93.5 (L) 94.0 - 98.0 %    POCT Hematocrit Calculated, Arterial 26.0 (L) 41.0 - 52.0 %    POCT Sodium, Arterial 119 (LL) 136 - 145 mmol/L    POCT Potassium, Arterial 6.2 (HH) 3.5 - 5.3 mmol/L    POCT Chloride, Arterial 92 (L) 98 - 107 mmol/L    POCT Ionized Calcium, Arterial 0.92 (L) 1.10 - 1.33 mmol/L    POCT Glucose, Arterial 78 74 - 99 mg/dL    POCT Lactate, Arterial 3.5 (H) 0.4 - 2.0 mmol/L    POCT Base Excess, Arterial -9.1 (L) -2.0 - 3.0 mmol/L    POCT HCO3 Calculated, Arterial 18.0 (L) 22.0 - 26.0 mmol/L    POCT Hemoglobin, Arterial 8.6 (L) 13.5 - 17.5 g/dL    POCT Anion Gap, Arterial 15 10 - 25 mmo/L    Patient Temperature 37.0 degrees Celsius   POCT GLUCOSE   Result Value Ref Range    POCT Glucose 66 (L) 74 - 99 mg/dL   Comprehensive metabolic panel   Result Value  Ref Range    Glucose 241 (H) 74 - 99 mg/dL    Sodium 123 (L) 136 - 145 mmol/L    Potassium 6.1 (HH) 3.5 - 5.3 mmol/L    Chloride 88 (L) 98 - 107 mmol/L    Bicarbonate 22 21 - 32 mmol/L    Anion Gap 19 10 - 20 mmol/L    Urea Nitrogen 57 (H) 6 - 23 mg/dL    Creatinine 6.70 (H) 0.50 - 1.30 mg/dL    eGFR 9 (L) >60 mL/min/1.73m*2    Calcium 6.5 (L) 8.6 - 10.6 mg/dL    Albumin 2.0 (L) 3.4 - 5.0 g/dL    Alkaline Phosphatase 162 (H) 33 - 120 U/L    Total Protein 4.8 (L) 6.4 - 8.2 g/dL     (H) 9 - 39 U/L    Bilirubin, Total 2.3 (H) 0.0 - 1.2 mg/dL    ALT 5 (L) 10 - 52 U/L   Calcium, ionized   Result Value Ref Range    POCT Calcium, Ionized 0.89 (L) 1.1 - 1.33 mmol/L   CBC   Result Value Ref Range    WBC 25.3 (H) 4.4 - 11.3 x10*3/uL    nRBC 0.3 (H) 0.0 - 0.0 /100 WBCs    RBC 2.84 (L) 4.50 - 5.90 x10*6/uL    Hemoglobin 8.2 (L) 13.5 - 17.5 g/dL    Hematocrit 25.2 (L) 41.0 - 52.0 %    MCV 89 80 - 100 fL    MCH 28.9 26.0 - 34.0 pg    MCHC 32.5 32.0 - 36.0 g/dL    RDW 18.4 (H) 11.5 - 14.5 %    Platelets 129 (L) 150 - 450 x10*3/uL    MPV 10.7 7.5 - 11.5 fL   Magnesium   Result Value Ref Range    Magnesium 1.49 (L) 1.60 - 2.40 mg/dL   Coagulation Screen   Result Value Ref Range    Protime 32.4 (H) 9.8 - 12.8 seconds    INR 2.8 (H) 0.9 - 1.1    aPTT 43 (H) 27 - 38 seconds   POCT GLUCOSE   Result Value Ref Range    POCT Glucose 45 (L) 74 - 99 mg/dL   POCT GLUCOSE   Result Value Ref Range    POCT Glucose 155 (H) 74 - 99 mg/dL   Prepare Plasma: 1 Units   Result Value Ref Range    PRODUCT CODE P5407G20     Unit Number [id number]     Unit ABO AB     Unit RH POS     Dispense Status TR     Blood Expiration Date November 01, 2023 03:04 EDT     PRODUCT BLOOD TYPE 8400     UNIT VOLUME 311    POCT GLUCOSE   Result Value Ref Range    POCT Glucose 87 74 - 99 mg/dL   Type and screen   Result Value Ref Range    ABO TYPE B     Rh TYPE POS     ANTIBODY SCREEN NEG    Renal function panel   Result Value Ref Range    Glucose 82 74 - 99 mg/dL     Sodium 124 (L) 136 - 145 mmol/L    Potassium 5.7 (H) 3.5 - 5.3 mmol/L    Chloride 88 (L) 98 - 107 mmol/L    Bicarbonate 19 (L) 21 - 32 mmol/L    Anion Gap 23 (H) 10 - 20 mmol/L    Urea Nitrogen 56 (H) 6 - 23 mg/dL    Creatinine 6.91 (H) 0.50 - 1.30 mg/dL    eGFR 9 (L) >60 mL/min/1.73m*2    Calcium 7.3 (L) 8.6 - 10.6 mg/dL    Phosphorus 6.4 (H) 2.5 - 4.9 mg/dL    Albumin 2.2 (L) 3.4 - 5.0 g/dL   Magnesium   Result Value Ref Range    Magnesium 1.53 (L) 1.60 - 2.40 mg/dL   POCT GLUCOSE   Result Value Ref Range    POCT Glucose 69 (L) 74 - 99 mg/dL   Blood Gas Arterial   Result Value Ref Range    POCT pH, Arterial 7.29 (L) 7.38 - 7.42 pH    POCT pCO2, Arterial 39 38 - 42 mm Hg    POCT pO2, Arterial 119 (H) 85 - 95 mm Hg    POCT SO2, Arterial 98 94 - 100 %    POCT Oxy Hemoglobin, Arterial 96.6 94.0 - 98.0 %    POCT Base Excess, Arterial -7.2 (L) -2.0 - 3.0 mmol/L    POCT HCO3 Calculated, Arterial 18.8 (L) 22.0 - 26.0 mmol/L    Patient Temperature 37.0 degrees Celsius    FiO2 40 %   Blood Gas Arterial Full Panel   Result Value Ref Range    POCT pH, Arterial 7.29 (L) 7.38 - 7.42 pH    POCT pCO2, Arterial 39 38 - 42 mm Hg    POCT pO2, Arterial 119 (H) 85 - 95 mm Hg    POCT SO2, Arterial 98 94 - 100 %    POCT Oxy Hemoglobin, Arterial 96.6 94.0 - 98.0 %    POCT Hematocrit Calculated, Arterial 30.0 (L) 41.0 - 52.0 %    POCT Sodium, Arterial 120 (LL) 136 - 145 mmol/L    POCT Potassium, Arterial 6.3 (HH) 3.5 - 5.3 mmol/L    POCT Chloride, Arterial 92 (L) 98 - 107 mmol/L    POCT Ionized Calcium, Arterial 0.89 (L) 1.10 - 1.33 mmol/L    POCT Glucose, Arterial 74 74 - 99 mg/dL    POCT Lactate, Arterial 3.4 (H) 0.4 - 2.0 mmol/L    POCT Base Excess, Arterial -7.2 (L) -2.0 - 3.0 mmol/L    POCT HCO3 Calculated, Arterial 18.8 (L) 22.0 - 26.0 mmol/L    POCT Hemoglobin, Arterial 10.1 (L) 13.5 - 17.5 g/dL    POCT Anion Gap, Arterial 16 10 - 25 mmo/L    Patient Temperature 37.0 degrees Celsius    FiO2 40 %   POCT GLUCOSE   Result Value Ref  Range    POCT Glucose 29 (L) 74 - 99 mg/dL   POCT GLUCOSE   Result Value Ref Range    POCT Glucose 75 74 - 99 mg/dL   Prepare Plasma: 1 Units   Result Value Ref Range    PRODUCT CODE [id number]     Unit Number [id number]     Unit ABO AB     Unit RH POS     Dispense Status TR     Blood Expiration Date November 01, 2023 07:19 EDT     PRODUCT BLOOD TYPE 8400     UNIT VOLUME 250    POCT GLUCOSE   Result Value Ref Range    POCT Glucose 86 74 - 99 mg/dL   Blood Gas Arterial Full Panel   Result Value Ref Range    POCT pH, Arterial 7.23 (LL) 7.38 - 7.42 pH    POCT pCO2, Arterial 54 (H) 38 - 42 mm Hg    POCT pO2, Arterial 158 (H) 85 - 95 mm Hg    POCT SO2, Arterial 96 94 - 100 %    POCT Oxy Hemoglobin, Arterial 96.1 94.0 - 98.0 %    POCT Hematocrit Calculated, Arterial 25.0 (L) 41.0 - 52.0 %    POCT Sodium, Arterial 120 (LL) 136 - 145 mmol/L    POCT Potassium, Arterial 6.0 (H) 3.5 - 5.3 mmol/L    POCT Chloride, Arterial 89 (L) 98 - 107 mmol/L    POCT Ionized Calcium, Arterial 0.98 (L) 1.10 - 1.33 mmol/L    POCT Glucose, Arterial 103 (H) 74 - 99 mg/dL    POCT Lactate, Arterial 2.8 (H) 0.4 - 2.0 mmol/L    POCT Base Excess, Arterial -4.9 (L) -2.0 - 3.0 mmol/L    POCT HCO3 Calculated, Arterial 22.6 22.0 - 26.0 mmol/L    POCT Hemoglobin, Arterial 8.3 (L) 13.5 - 17.5 g/dL    POCT Anion Gap, Arterial 14 10 - 25 mmo/L    Patient Temperature 37.0 degrees Celsius    FiO2 95 %   POCT GLUCOSE   Result Value Ref Range    POCT Glucose 139 (H) 74 - 99 mg/dL   Comprehensive metabolic panel   Result Value Ref Range    Glucose 130 (H) 74 - 99 mg/dL    Sodium 123 (L) 136 - 145 mmol/L    Potassium 5.8 (H) 3.5 - 5.3 mmol/L    Chloride 86 (L) 98 - 107 mmol/L    Bicarbonate 23 21 - 32 mmol/L    Anion Gap 20 10 - 20 mmol/L    Urea Nitrogen 55 (H) 6 - 23 mg/dL    Creatinine 6.73 (H) 0.50 - 1.30 mg/dL    eGFR 9 (L) >60 mL/min/1.73m*2    Calcium 7.2 (L) 8.6 - 10.6 mg/dL    Albumin 2.2 (L) 3.4 - 5.0 g/dL    Alkaline Phosphatase 156 (H) 33 - 120 U/L     Total Protein 5.4 (L) 6.4 - 8.2 g/dL     (H) 9 - 39 U/L    Bilirubin, Total 2.6 (H) 0.0 - 1.2 mg/dL    ALT 5 (L) 10 - 52 U/L   CBC and Auto Differential   Result Value Ref Range    WBC 27.0 (H) 4.4 - 11.3 x10*3/uL    nRBC 0.1 (H) 0.0 - 0.0 /100 WBCs    RBC 2.75 (L) 4.50 - 5.90 x10*6/uL    Hemoglobin 8.0 (L) 13.5 - 17.5 g/dL    Hematocrit 24.5 (L) 41.0 - 52.0 %    MCV 89 80 - 100 fL    MCH 29.1 26.0 - 34.0 pg    MCHC 32.7 32.0 - 36.0 g/dL    RDW 18.0 (H) 11.5 - 14.5 %    Platelets 127 (L) 150 - 450 x10*3/uL    MPV 10.8 7.5 - 11.5 fL    Neutrophils % 95.0 40.0 - 80.0 %    Immature Granulocytes %, Automated 0.4 0.0 - 0.9 %    Lymphocytes % 0.7 13.0 - 44.0 %    Monocytes % 3.3 2.0 - 10.0 %    Eosinophils % 0.0 0.0 - 6.0 %    Basophils % 0.6 0.0 - 2.0 %    Neutrophils Absolute 25.60 (H) 1.20 - 7.70 x10*3/uL    Immature Granulocytes Absolute, Automated 0.11 0.00 - 0.70 x10*3/uL    Lymphocytes Absolute 0.18 (L) 1.20 - 4.80 x10*3/uL    Monocytes Absolute 0.89 0.10 - 1.00 x10*3/uL    Eosinophils Absolute 0.01 0.00 - 0.70 x10*3/uL    Basophils Absolute 0.16 (H) 0.00 - 0.10 x10*3/uL   Magnesium   Result Value Ref Range    Magnesium 1.54 (L) 1.60 - 2.40 mg/dL   Calcium, ionized   Result Value Ref Range    POCT Calcium, Ionized 0.96 (L) 1.1 - 1.33 mmol/L   Coagulation Screen   Result Value Ref Range    Protime 26.8 (H) 9.8 - 12.8 seconds    INR 2.4 (H) 0.9 - 1.1    aPTT 39 (H) 27 - 38 seconds   Phosphorus   Result Value Ref Range    Phosphorus 6.5 (H) 2.5 - 4.9 mg/dL   Bilirubin, Direct   Result Value Ref Range    Bilirubin, Direct 1.9 (H) 0.0 - 0.3 mg/dL   Morphology   Result Value Ref Range    RBC Morphology See Below     Polychromasia Mild     RBC Fragments Few     Philip Cells Few     Basophilic Stippling Present    POCT GLUCOSE   Result Value Ref Range    POCT Glucose 58 (L) 74 - 99 mg/dL   POCT GLUCOSE   Result Value Ref Range    POCT Glucose 83 74 - 99 mg/dL   POCT GLUCOSE   Result Value Ref Range    POCT Glucose  165 (H) 74 - 99 mg/dL   POCT GLUCOSE   Result Value Ref Range    POCT Glucose 64 (L) 74 - 99 mg/dL     CT abdomen pelvis w IV contrast    Result Date: 10/30/2023  Interpreted By:  Alistair Morel and Ebai Jerky STUDY: CT ABDOMEN PELVIS W IV CONTRAST;  10/30/2023 4:37 pm   INDICATION: Signs/Symptoms:concern for retained L AV graft resulting in septic shock. PLEASE ENSURE THIS CONTAINS LEFT THIGH.   COMPARISON: CT abdomen pelvis 10/12/2020. PET-CT 10/20/2023.   ACCESSION NUMBER(S): [id number]   ORDERING CLINICIAN: ROSS LEY   TECHNIQUE: CT of the abdomen and pelvis was performed.  Standard contiguous axial images were obtained at 3 mm slice thickness through the abdomen and pelvis. Coronal and sagittal reconstructions at 3 mm slice thickness were performed.   90 ml of contrast Omnipaque 350 were administered intravenously without immediate complication.   FINDINGS:       Limited study due to inadequate contrast opacification   LOWER CHEST: Patchy ground-glass opacities within the periphery of the right middle lobe extending beyond the field of view and partially visualized ground-glass opacities within the lateral aspect of the right upper lobe. Bilateral lung dependent and lingular opacity, likely represents atelectasis. No pleural effusion. Cardiomegaly without pericardial effusion. Reflux of contrast into the visualized distal esophagus.     ABDOMEN/PELVIS:   LIVER: Hepatomegaly measuring up to 20.3 cm in craniocaudal dimension. Heterogeneous appearance of the liver parenchyma without focal lesion. Mild periportal edema.   BILE DUCTS: No significant intrahepatic or extrahepatic dilatation.   GALLBLADDER: Status post cholecystectomy.   PANCREAS: Pancreas is normal in size without peripancreatic fluid, mass or ductal dilatation. Fat stranding surrounding the pancreas is likely secondary to fluid overload.   SPLEEN: Normal-sized spleen. Heterogeneous appearance of the splenic parenchyma is likely secondary to  fluid overload.   ADRENALS: No significant abnormality.   KIDNEYS AND URETERS: Atrophic bilateral native kidneys. A 2.2 cm simple cyst in the upper pole of the right kidney. A 1.4 cm cyst in the lower pole of the right kidney with a small punctate calcification along the posterior aspect of the there are few too small to completely characterize hypodensities in the left kidney, likely represents cyst.   Left lower pelvic transplant kidney without hydronephrosis. No nephrolithiasis.   PELVIS:   BLADDER: The bladder is incompletely distended and limited for evaluation.   REPRODUCTIVE ORGANS: The uterus is surgically absent. No pelvic mass.   BOWEL: The stomach is unremarkable. Layering hyperdense material within the gastric lumen likely represents oral ingested material. The small and large bowel are normal in caliber and demonstrate no wall thickening. The appendix appears normal.   VESSELS: Mild atherosclerosis calcification of the aorta and its branching vessels.  There is no aneurysmal dilatation of the abdominal aorta. The IVC appears normal. Right common femoral vein access central venous catheter tip terminates within the infrarenal IVC. Redemonstration of an abandoned left AVG graft with unchanged appearance of the arterial and venous graft components within the subcutaneous tissues in the left thigh. Interval explant of the left femoral AVG graft with abandoned arterial and venous components grafts visualized within the subcutaneous tissues. Partially visualized right AVG graft, patency can not be assessed due to the phase of contrast timing. Unchanged asymmetric enlargement of the left external and common iliac vein.   PERITONEUM/RETROPERITONEUM/LYMPH NODES: Small abdominopelvic ascites layering along the right subhepatic, subphrenic, bilateral paracolic gutters and into the pelvis. There is diffuse mesenteric stranding which is likely secondary to fluid overload. There are multiple prominent retroperitoneal  and mesenteric lymph nodes which are likely reactive in nature. There are multiple prominent/enlarged pelvic lymph nodes including a 2.0 cm left common iliac chain lymph node 1.6 cm left external iliac chain lymph node, a 1.0 cm right obturator chain lymph node. Of note some of these lymph nodes with mildly FDG avid on PET-CT dated 10/20/2023.   A new 3.5 x 2.4 cm lobular high density focus in the right retroperitoneal abutting the lateral quadratus lumbar muscle (series 301, image 79).   BONES AND ABDOMINAL WALL: No suspicious osseous lesions are identified. Subacute nondisplaced right anterior 9th rib and left posterior 10th and left anterior 6th through 8th rib fractures with surrounding callus formation. Nondisplaced left lateral 9th and anterolateral 7th rib fracture without osseous bridging or callus formation. Well corticated ossific fragment abutting the medial aspect of the left pubic symphysis (series 301, image 153), likely represents remote avulsion fracture. Ossification of the origins of the bilateral hamstring muscles.   ABDOMINAL WALL: There is marked diffuse abdominal wall anasarca. Vascular collaterals are visualized within the posterior and posterolateral chest and upper abdominal wall, similar to prior. Partially visualized right gynecomastia. A 3.5 x 3.0 x 4.4 cm fluid collection within the medial aspect of the left groin (series 301, image 194). Soft tissue defect within the anterolateral aspect of the proximal left thigh, may be postsurgical (series 301, image 208). There is thickening and reticular stranding within the visualized aspect of the proximal left thigh.       1. Interval explant of the left superficial femoral artery and vein AVG graft, with a graft remnant visualized within the subcutaneous tissues of the left lower extremity. A subcutaneous 3.5 x 3.0 x 4.4 cm fluid collection within the inferomedial aspect of the abandoned graft in the left upper thigh. No air foci are noted,  however the sterility of this collection can not be assessed on this current examination. 2. Diffuse skin thickening with subcutaneous edema and fat stranding in the visualized portion of the left upper thigh, correlate with concern for cellulitis. 3. Interval development of new 3.5 x 2.4 cm lobular right retroperitoneal focal hyperdensity abutting the right quadratus lumborum muscle, which incompletely characterized but likely represents a small focus of retroperitoneal hemorrhage. 4. Findings suggestive of fluid overload with heterogeneous enhancement of the liver and splenic parenchyma, abdominal and mesenteric anasarca and stable abdominopelvic ascites. Please correlate with patient's fluid status. 5. Unchanged bilateral subacute and acute rib fractures involving the 9th and left 6-8th and 10th rib. 6. Stable cardiomegaly. 7. Stable enlarged pelvic lymph nodes, more on the left. 8. Partially visualized patchy ground-glass opacities in the right lung may represent atelectasis, superimposed infection not excluded.   The critical information above was relayed directly by Resident Black Sawant epic secure chat to Lynnette Austin MD on 10/30/2023 at 5:39 p.m..   I personally reviewed the images/study and I agree with the findings as stated. This study was interpreted at [facility], [place], [place].   Signed by: Alistair Morel 10/30/2023 6:53 PM Dictation workstation:   [id number]    XR chest 1 view    Result Date: 10/30/2023  Interpreted By:  Nathaniel Nath and Tippareddy Charit STUDY: XR CHEST 1 VIEW;  10/30/2023 5:23 am   INDICATION: Signs/Symptoms:Chest pain and/or dyspnea.   COMPARISON: Radiograph 09/22/2023   ACCESSION NUMBER(S): [id number]   ORDERING CLINICIAN: CARMEN GALVEZ   FINDINGS: AP radiograph of the chest was provided.   CARDIOMEDIASTINAL SILHOUETTE: Cardiomediastinal silhouette is enlarged but stable in size and configuration.   LUNGS: Interval increase in diffuse  perihilar and interstitial lung markings. Bibasilar opacities likely reflecting atelectasis. No left-sided pleural effusion. The right costophrenic angle is only partially seen without evidence of effusion. No evidence of pneumothorax.   ABDOMEN: No remarkable upper abdominal findings.   BONES: No acute osseous changes.       1.  Interval increase in diffuse perihilar and interstitial lung markings which can be seen in setting of pulmonary edema in conjunction with bronchovascular crowding. Correlate with patient's volume status. 2. Bibasilar opacities likely reflecting atelectasis.   I personally reviewed the images/study and I agree with the findings as stated by Jennifer Ruano MD. This study was interpreted at [place], [place].   MACRO: None.   Signed by: Nathaniel Nath 10/30/2023 8:54 AM Dictation workstation:   [id number]    Lower extremity venous duplex bilateral    Result Date: 10/28/2023  Interpreted By:  Vic Orta and Bamfo Rose STUDY: [facility] US LOWER EXTREMITY VENOUS DUPLEX BILATERAL  10/27/2023 8:02 pm   INDICATION: 59 y/o   M with  Signs/Symptoms:LLE swelling. LMP:  Unknown.   COMPARISON: Lower extremity venous duplex on 10/09/2023   ACCESSION NUMBER(S): [id number]   ORDERING CLINICIAN: JONNIE GALVAN   TECHNIQUE: Routine ultrasound of the  bilateral lower extremities was performed with duplex Doppler (color and spectral) evaluation.   Static images were obtained for remote interpretation.   FINDINGS: Edematous changes are noted in the bilateral lower extremities.   RIGHT LOWER EXTREMITY:   THIGH VEINS:  The common femoral vein, proximal, mid, and distal femoral vein, and popliteal veins are patent and free of thrombus. The veins are normally compressible.  They demonstrate normal phasic flow and augmentation response.   CALF VEINS:  The paired peroneal and posterior tibial calf veins are patent.     LEFT LOWER EXTREMITY:   Limitations:  Nonvisualization of the common femoral vein as well as the proximal and mid femoral vein due to overlying bandaging in the left groin/thigh that was unable to be removed.   THIGH VEINS:  The distal femoral vein is noncompressible with echogenic thrombus and no blood flow on Doppler evaluation. The popliteal vein is patent and free of thrombus.   CALF VEINS:  The paired peroneal and posterior tibial calf veins are patent.       1. Positive study. Occlusive deep venous thrombosis in the left distal femoral vein. 2. No deep venous thrombosis of the right lower extremity.     I personally reviewed the images/study and I agree with radiology resident Dr. Qian Monte's findings as stated. This study was interpreted at [place], [place].   MACRO: None   Signed by: Vic Orta 10/28/2023 6:14 AM Dictation workstation:   [id number]    IR CVC tunneled    Result Date: 10/20/2023  Interpreted By:  Eli Arceo and [place] Lesley STUDY: IR CVC TUNNELED;  10/20/2023 12:20 pm   INDICATION: Signs/Symptoms:Need for tunneled dialysis cath placed into right groin..   COMPARISON: None.   ACCESSION NUMBER(S): [id number]   ORDERING CLINICIAN: EMBER OLIVAREZ   TECHNIQUE: INTERVENTIONALIST(S): Dr. Eli Arceo   CONSENT: The patient/patient's POA/next of kin was informed of the nature of the proposed procedure. The purposes, alternatives, risks, and benefits were explained and discussed. All questions were answered and consent was obtained.   RADIATION EXPOSURE: Fluoroscopy time: 1.4 min. Dose: 33.07 mGy. Dose Area Product (DAP): 11,109 mGy*cm^2   SEDATION: Moderate conscious IV sedation services (supervision of administration, induction, and maintenance) were provided by the physician performing the procedure with intravenous fentanyl 50mcg and versed 1mg for 15 minutes. The physician was assisted by an independent trained observer, an interventional radiology nurse, in  the continuous monitoring of patient level of consciousness and physiologic status.   MEDICATION/CONTRAST: No additional   TIME OUT: A time out was performed immediately prior to procedure start with the interventional team, correctly identifying the patient name, date of birth, MRN, procedure, anatomy (including marking of site and side), patient position, procedure consent form, relevant laboratory and imaging test results, antibiotic administration, safety precautions, and procedure-specific equipment needs.   COMPLICATIONS: No immediate adverse events identified.   FINDINGS: In the recumbent position, the patient was positioned on the angiography table. The right femoral cutaneous tissues were prepared and draped in usual sterile manner.   The supraclavicular access site was screened with gray-scale ultrasound with subsequent subcutaneous instillation of Lidocaine 1% local anesthesia. Ultrasound images demonstrate a patent right femoral vein. Under direct ultrasound guidance and Seldinger/micropuncture technique, the right femoral vein was accessed. An ultrasound digital spot image was acquired and stored on the  PACS.   After confirmation of location, a 018 [place]-Mandril guidewire was inserted to secure location. The guidewire was advanced into the inferior vena cava utilizing intermittent fluoroscopy. The micro-access needle was removed over the guidewire. Utilizing a 5-on-4 coaxial dilator sheath system, upsize to a 035 guidewire was performed. Subsequent access tract dilation was performed to an eventual 14-Spanish peel-away sheath dilator system.   After Lidocaine 1% local anesthesia, a subcutaneous tunnel tract was created from the  right upper thigh to the venous access site. After continuity of the tunnel tract and venous access site was obtained, a 14.5 Spanish x 31 cm  hemodialysis catheter was then placed with tract continuity and its central catheter tip(s) to reside at the cavoatrial junction. A  fluoroscopic spot image of the chest was acquired in the AP projection to confirm optimal location.   The catheter ports were aspirated and flushed without resistance with normal saline. The catheter ports were then charged with high-concentration heparin. The venous access site was closed and sterilely dressed. The external portions of the catheter were secured with a purse-string 2-0 polypropylene suture and sterile dressings.   The patient tolerated the procedure without complication.       1. Uncomplicated placement of an indwelling right femoral infraclavicular 14.5 Fr x 31 cm hemodialysis catheter.   I was present for and/or performed the critical portions of the procedure and immediately available throughout the entire procedure.   I personally reviewed the image(s) / study and resident interpretation. I agree with the findings as stated.   Performed and dictated at [facility] [place] [facility].   Signed by: Eli Arceo 10/20/2023 2:01 PM Dictation workstation:   [id number]    PET inpatient w CMO approval call [id number]    Result Date: 10/20/2023  Interpreted By:  Rodri Michelle and Abuhamdeh Imran STUDY: PET SCAN INPATIENT WITH CMO APPROVAL CALL [id number];  10/20/2023 8:59 am   INDICATION: Signs/Symptoms:Evaluation of left femoral AVG to see if infected.. 58-year-old male with history of end-stage renal disease on dialysis with failed renal transplant. Found to have lymphadenopathy on prior CTs and PET-CT with concerns of posttreatment lymphoproliferative disorder. Per EMR: Biopsy in 2/23 of the left iliac lymph node showed no concerns for lymphoproliferative disorder.   COMPARISON: PET-CT 10/06/2023   ACCESSION NUMBER(S): [id number]   ORDERING CLINICIAN: EMBER OLIVAREZ   TECHNIQUE: DIVISION OF NUCLEAR MEDICINE POSITRON EMISSION TOMOGRAPHY (PET-CT)   The patient received an intravenous dose of 14 mCi of Fluorine-18 fluorodeoxyglucose (FDG). Positron emission tomographic  (PET) images from mid-thigh to skull base were then acquired after a one hour delay. Also acquired was a contemporaneous low dose non-contrast CT scan performed for attenuation correction of PET images and anatomic localization.  The PET and CT images were digitally fused for display.  All images were acquired on a combined PET-CT scanner unit. Some areas of FDG accumulation may be described in standardized uptake value (SUV) units.   CODING: Subsequent Treatment Strategy (PS)   CALIBRATION: Dose Injection-to-Scan Interval (mins): 65 min Mediastinal bloodpool SUV (normal 1.5-2.5): 2.3 Blood glucose: 91 mg/dL   FINDINGS: Limited examination due to increased activity throughout the soft tissues. Within the limitation:   NECK: No focal hypermetabolic soft tissue lesion is seen in the neck. There is similar appearance of numerous bilateral predominantly subcentimeter cervical lymph nodes which demonstrate minimal to mild FDG uptake.   CHEST: No focal hypermetabolic lesion is seen in the lung parenchyma. Similar nonspecific bilateral axillary medius and mediastinal lymph nodes which demonstrate only mild FDG activity similar to blood pool.   ABDOMEN AND PELVIS: No hypermetabolic soft tissue lesion is present in the abdomen and pelvis. There is similar mildly FDG avid retroperitoneal lymph nodes including para-aortic, aortocaval, pericaval, bilateral common iliac, bilateral external iliac, and inguinal lymph node stations. Again, the left external iliac lymph nodes are the largest measuring up to 1.5 cm in the short axis with SUV max of 3.2, not significantly changed. Physiologic radiotracer uptake is present in the liver and spleen with excretion into the bowel loops and the genitourinary tract.   MUSCULOSKELETAL: There is no focal hypermetabolic lesion to suggest osseous metastasis. There has been interval development of hypermetabolic activity associated with the left femoral arteriovenous graft within the left  anterior mid thigh (SUV max of 3.8). An additional focus of hypermetabolic activity more superiorly and near the native left SFA demonstrates SUV max of 4.9. Diffuse anasarca is visualized throughout the body wall and extremities.       Limited examination due to increased activity throughout the soft tissues. Within the limitation:   1. Similar extent of predominantly subcentimeter lymphadenopathy both above and below the diaphragm which may represent a low-grade lymphomatous/leukemic process versus inflammatory etiology. 2. Interval development of hypermetabolic activity within the left femoral arteriovenous graft as well as an FDG avid focus adjacent to the native left SFA, both of which are concerning for an infectious/inflammatory process. 3. Diffuse anasarca throughout the body wall and extremities.     I personally reviewed the image(s) / study and agree with the findings and interpretation as stated. This study was interpreted at [facility].   MACRO: None     Signed by: Rodri Michelle 10/20/2023 11:58 AM Dictation workstation:   [id number]    US abdomen limited liver    Result Date: 10/18/2023  Interpreted By:  Ortega Guevara,  Bella Castano STUDY: US ABDOMEN LIMITED LIVER;  10/18/2023 12:58 pm   INDICATION: Signs/Symptoms:evaluate liver, ?  Cirrhosis?.   COMPARISON: CTA abdomen pelvis 10/12/2023, gallbladder ultrasound 10/22/2015, renal ultrasound 04/11/2022, MRI abdomen 8/1/22   ACCESSION NUMBER(S): [id number]   ORDERING CLINICIAN: EMBER OLIVAREZ   TECHNIQUE: Multiple images of the right upper quadrant were obtained.  Gray scale, color Doppler and spectral Doppler waveform analysis was performed.   This examination was interpreted at [facility].   FINDINGS: LIVER: The liver measures 17.2 cm in length and demonstrates heterogenous echogenicity and nodular contour. No focal lesion identified.    GALLBLADDER: The gallbladder is surgically absent.   BILIARY SYSTEM: No evidence of intra or extrahepatic biliary dilatation is identified; the common bile duct measures 7 mm.   PANCREAS: The visualized pancreas is unremarkable in appearance.   RIGHT KIDNEY: The right kidney is atrophic and demonstrates diffusely increased echogenicity with cortical thinning suggestive of medical renal disease. Slight increase in size of a 1.6 x 1.5 x 1.6 cm simple cyst at the superior pole, previously 1 x 0.9 x 0.9 cm on renal ultrasound 04/11/2022.   PERITONEUM AND RETROPERITONEUM: Small volume perihepatic fluid is noted.   There is a partially visualized right pleural effusion.       1. Heterogenous hepatic echogenicity with nodular contour suggestive of cirrhosis. No focal lesion identified. 2. Small volume ascites. 3. Partially visualized right pleural effusion.   I personally reviewed the images/study and I agree with the findings as stated. This study was interpreted at [place], [place].   MACRO: None   Signed by: Ortega Guevara 10/18/2023 5:19 PM Dictation workstation:   [id number]    IR angiogram fistula graft    Result Date: 10/14/2023  Interpreted By:  Tereso Otto, STUDY: IR ANGIOGRAM FISTULA GRAFT; ;  10/13/2023 6:29 pm 1. ULTRASOUND EVALUATION LEFT FEMORAL FEMORAL ARTERIOVENOUS LOOP GRAFT BY MD 2. ULTRASOUND-GUIDED VASCULAR ACCESS 3. ARTERIOVENOUS GRAFT ANGIOGRAPHY 4. VENOUS ANASTOMOTIC OUTFLOW STENOSIS ANGIOPLASTY 5. COMPLETION ARTERIOVENOUS GRAFT ANGIOGRAPHY     INDICATION: . 58-year-old man with end-stage renal disease, left groin arteriovenous graft, reported bleeding after dialysis, concern for infection with open skin wound overlying the graft.   COMPARISON: CT angiogram 10/12/2023, angiography 08/30/2022   ACCESSION NUMBER(S): [id number]   ORDERING CLINICIAN: JOSE A VERONICA   TECHNIQUE:   ATTENDING : Tereso Otto M.D.   TECHNICAL  DESCRIPTION/FINDINGS: The procedure, including all risks, benefits and alternatives were explained to the patient in detail. All questions were answered and written informed consent was obtained.   The patient was positioned supine on the angiography table. A time-out was performed.   The left groin and proximal left thigh were prepped and draped in usual sterile fashion. In the mid to upstream/lateral portion of the graft, there was skin ulceration with spontaneous bloody/cloudy/purulent drainage. Gentle compression adjacent to the ulcer resulted in additional spontaneous drainage of the cloudy/bloody fluid. The finding is concerning for overlying infection in this location.   Upstream of the skin ulcer in the more proximal and lateral aspect of the graft, focused ultrasound was performed demonstrating patency and compressibility. Ultrasound images were saved. 1% lidocaine was administered subcutaneously for local anesthesia. Under real-time ultrasound guidance, a 21 gauge access needle was used to access the graft in antegrade direction using micropuncture technique. Ultrasound images were saved. An 018 guidewire was advanced into the graft under fluoroscopic visualization, and a micropuncture transitional introducer was placed. Initial graft angiography performed through the micropuncture sheath demonstrated brisk flow through the graft and previously placed stents within the graft. There is mild venous anastomotic narrowing at the common femoral venous anastomosis.   The access was upsized over an 035 wire for a 6 German antegrade sheath. A 5 German [employer] the catheter was utilized to position an Amplatz guidewire into the IV C. over the wire, a 7 mm x 40 mm noncompliant angioplasty balloon was then advanced and inflated to maximum burst pressure at the venous anastomosis for 60 seconds. The balloon was then deflated and removed from the sheath. Post angioplasty angiography demonstrated resolution of the  previously shown venous anastomotic stenosis, with persistent brisk flow through the graft. Central venography was performed demonstrating gross patency of the enlarged left iliac deep veins as well as gross patency of the IV C.   The wires and sheath were then removed and hemostasis was obtained with a purse string suture. Sterile dressing was applied.   SEDATION/MEDICATIONS: Continuous cardiopulmonary monitoring was performed by a radiology nurse for the duration of the procedure. 50 mcg Fentanyl was administered for analgesia. Procedural duration 20 minutes. 5 cc 1% Lidocaine was administered subcutaneously for local anesthesia. SPECIMENS: None. ESTIMATED BLOOD LOSS:  5 cc FLOUROSCOPY:  1.5 minutes; DAP  38262 mGy-cm*2; Air Kerma  131.16 mGy CONTRAST: 30 cc Omnipaque 350   FINDINGS: Test       1. Overall mild venous anastomotic stenosis of the left femoral femoral arteriovenous loop graft. 2. Resolution of venous anastomotic stenosis status post 7 mm balloon angioplasty.   Plan: The graft is ready for ongoing use. The mild venous anastomotic stenosis does not explain spontaneous drainage from the skin (concern for inflection) or patient's left lower extremity swelling.   MACRO: None   Signed by: Tereso Otto 10/14/2023 7:31 PM Dictation workstation:   [id number]    Vascular US lower extremity hemodialysis access duplex left    Result Date: 10/12/2023            [place] [address]   Tel [phone] and Fax [phone]  Vascular Lab Report Dialysis Access Evaluation  Patient Name:      PRABHJOT ANDERSON ANDIE Bush Physician:  [id number] Tim Knight DO Study Date:        10/12/2023           Ordering Provider:  [id number] VERONICA MRN/PID:           [id number]             Technologist:       Dorian Rutherford T Accession#:        [id number]         Technologist 2: Date of Birth/Age: 1965 / 58  years Encounter#:         [id number] Gender:            M Admission Status:  Inpatient            Location Performed: [facility]  Diagnosis/ICD: Pain from vascular prosthetic devices, implants and grafts,                initial encounter-T82.848A  CONCLUSIONS: Dialysis Access Evaluation: Common femoral artery to common femoral vein AVG appears widely patent. At the mid segment the AVG appears to be slightly kinked, no high velocities noted in that area. Edema and lymph nodes noted in left groin.  Comparison: Compared with study from 8/18/2023, no significant change.  Imaging & Doppler Findings:  Dialysis Access Graft                 Left Velocity Arterial Anast  292 cm/s Flow Prox       363 cm/s Flow Prox/Mid   168 cm/s Mid             160 cm/s Mid/Flow Distal 280 cm/s Flow Distal     193 cm/s Outflow Anast   268 cm/s Outflow Vein    270 cm/s Pre Anast       130 cm/s Volume Flow 1253.00 ml/min  [id number] Tim Knight DO Electronically signed by [id number] Tim Knight DO on 10/12/2023 at 3:52:49 PM  ** Final **     CT angio abdomen pelvis w and or wo IV IV contrast    Result Date: 10/12/2023  Interpreted By:  Vic Orta,  and Doc Pike STUDY: CT ANGIO ABDOMEN PELVIS W AND/OR WO IV IV CONTRAST;  10/12/2023 1:24 pm   INDICATION: Signs/Symptoms:venous obstruction, severe left leg swelling, left femoral AVG.   COMPARISON: CT abdomen pelvis 09/23/2023 PET-CT 10/06/2023 CTA abdomen pelvis with bilateral lower extremity runoff 12/20/2022   ACCESSION NUMBER(S): [id number]   ORDERING CLINICIAN: NINA EWING   PROCEDURE: CT ANGIOGRAM OF THE ABDOMEN AND PELVIS   TECHNIQUE: High-resolution contrast-enhanced helical CT of the  abdomen, pelvis and both lower extremities  was performed,  without contrast, timed to arterial phase, and timed to venous phase (three phases).  3-D processing was performed by the physician on an independent work station, with MIP and volume-rendering techniques.  Total of 100 ml of  Omnipaque 350 was injected during the examination.  The study was performed without oral contrast.  The patient tolerated the injection without complications.   Per technologist notes, the IV leaked and the arm had to be positioned at the side due to positional IV. Best images possible were obtained.   FINDINGS: ABDOMEN: Images of the aorta demonstrate  diffuse atherosclerotic change without significant focal stenosis or aneurysm.   Celiac artery demonstrates  no significant focal stenosis.   Superior mesenteric artery demonstrates  no significant focal stenosis.   Inferior mesenteric artery demonstrates  no significant focal stenosis.   There  is a single right renal artery.  Right renal artery demonstrates  no significant focal stenosis.  There  is a single renal vein which is patent.   There  is a single left renal artery.  Left renal artery demonstrates no significant focal stenosis.  There  is a single renal vein which is patent.   RIGHT LEG: The right common femoral artery patent. An AV graft remnant is again seen and again appears occluded. An additional probable abandoned AV graft is also seen unchanged occlusion of the right profunda femoris artery. The right superficial femoral artery demonstrates diffuse atherosclerotic calcification without significant focal stenosis.   LEFT LEG: A left femoral AV graft fistula between the superficial femoral artery and common femoral vein is again seen. The graft is patent. A remnant of an old occluded AV graft is again seen. Diffuse atherosclerotic calcification of the left superficial and deep femoral arteries without focal significant stenosis.   NONVASCULAR FINDINGS:   LOWER CHEST: Right-sided gynecomastia. Trace right pleural effusion versus nonspecific pleural thickening. Right lung bases clear. The heart is enlarged. Partially visualized coronary calcifications. The visualized distal esophagus is unremarkable.   ABDOMEN:   LIVER: Liver is enlarged and measures  21.6 cm in craniocaudal dimension. The liver contour is slightly nodular which can be seen with fibrotic changes. No parenchymal lesions are identified.   BILE DUCTS: The intrahepatic and extrahepatic ducts are not dilated.   GALLBLADDER: Surgically absent.   PANCREAS: The pancreas appears unremarkable without evidence of ductal dilatation or masses.   SPLEEN: The spleen is normal in size without focal lesions.   ADRENAL GLANDS: Bilateral adrenal glands appear normal.   KIDNEYS AND URETERS: Symmetric severe atrophy of the bilateral native kidneys. Multiple cysts are again seen throughout the bilateral kidneys, overall similar in size and appearance compared to prior studies. No hydroureteronephrosis or nephroureterolithiasis is identified.   A transplant kidney is seen in the left iliac fossa. It demonstrates no significant abnormality.   PELVIS:   BLADDER: The urinary bladder is decompressed, limiting assessment. Moderate concentric wall thickening is again seen.   REPRODUCTIVE ORGANS: The prostate is unremarkable.   BOWEL: The stomach is unremarkable. The small bowel is not abnormally dilated. The large bowel demonstrates multiple diverticula without inflammation. The appendix appears normal.   VESSELS: As above. No discrete filling defects are seen within the image venous system. Multiple collaterals are visualized along the patient's anterior abdominal and thoracic garcía.   PERITONEUM/RETROPERITONEUM/LYMPH NODES: Moderate amount of abdominopelvic ascites. There is diffuse mesenteric edema. Several enlarged left inguinal and iliac chain lymph nodes are again seen which were FDG avid on the PET-CT from 10/06/2023.   BONES AND ABDOMINAL WALL: No suspicious osseous lesions are identified. Diffuse anasarca involving the thoracic, abdominal, and pelvic subcutaneous tissues. There is asymmetric subcutaneous edema and swelling of the left lower extremity extending from the level of the hip joint to the edge of the  field-of-view. This is fairly severe at the distal aspect of the thigh. No focal fluid collections are identified.   Diffuse somewhat circumferential skin thickening of the left thigh.       1. Patent left femoral AV graft fistula between the superficial femoral artery and femoral vein. 2. Unchanged AV graft remnants in the right lower extremity. 3. Diffuse atherosclerotic calcification of the visualized vasculature without significant focal stenosis. 4. Patent venous structures, no evidence of venous obstruction. 5. Diffuse circumferential skin thickening of the left thigh with marked underlying edema, correlate with concern for cellulitis. No focal fluid collections to suggest abscess formation. 6. Diffuse anasarca, mesenteric edema, and abdominopelvic ascites can be seen the setting of volume overload, correlate with the patient's volume status. 7. Hepatomegaly with suggestion of a cirrhotic morphology, correlate with LFTs and ultrasound. 8. Stable enlarged left inguinal and iliac chain lymph nodes.     I personally reviewed the images/study and I agree with the findings as stated. This study was interpreted at [facility], [place], [place].   MACRO: None.   Signed by: Vic Orta 10/12/2023 3:31 PM Dictation workstation:   [id number]    Transthoracic Echo (TTE) Complete    Result Date: 10/12/2023   [facility], [address] [place] [address]                Tel [phone] and Fax [phone] TRANSTHORACIC ECHOCARDIOGRAM REPORT  Patient Name:      PRABHJOT ANDERSON ANDIE        Reading Physician:    [id number] Finesse Patterson MD Study Date:        10/12/2023           Ordering Provider:    [id number] JOSE A VERONICA MRN/PID:           [id number]             Fellow: Accession#:        [id number]         Nurse: Date of Birth/Age: 1965 / 58  years Sonographer:          Joan Kim RDCS Gender:            M                    Additional Staff:     Ammy Campos                                                               Cardiac                                                               Sonographer Intern Height:            185.42 cm            Admit Date:           10/9/2023 Weight:            142.88 kg            Admission Status:     Inpatient -                                                               Routine BSA:               2.61 m2              Encounter#:           [id number]                                         Department Location:  [facility] Non                                                               Invasive Blood Pressure: 102 /62 mmHg Study Type:    TRANSTHORACIC ECHO (TTE) COMPLETE Diagnosis/ICD: Pulmonary hypertension, unspecified-I27.20 Indication:    pHTN, CHF Patient History: Pertinent History: ESRD s/p DDKT (2006), pA-fib, HFpEF, Hep C, DVT, Leg edema. Study Detail: The following Echo studies were performed: 2D, M-Mode, Doppler and               color flow.  PHYSICIAN INTERPRETATION: Left Ventricle: The left ventricular systolic function is low normal, with an estimated ejection fraction of 50-55%. There are no regional wall motion abnormalities. The left ventricular cavity size is normal. Left ventricular diastolic filling was indeterminate. Left Atrium: The left atrium is moderate to severely dilated. Right Ventricle: The right ventricle is mildly enlarged. There is reduced right ventricular systolic function. Right Atrium: The right atrium is moderately dilated. Aortic Valve: The aortic valve is trileaflet. There is trivial aortic valve regurgitation. The peak instantaneous gradient of the aortic valve is 10.8 mmHg. The mean gradient of the aortic valve is 6.0 mmHg. Mitral Valve: The mitral valve is normal in structure. There is mild mitral valve regurgitation. Tricuspid Valve: The tricuspid valve is  structurally normal. There is moderate tricuspid regurgitation. The Doppler estimated RVSP is moderately elevated at 67.9 mmHg. Pulmonic Valve: The pulmonic valve is structurally normal. There is trace to mild pulmonic valve regurgitation. Pericardium: There is no pericardial effusion noted. Aorta: The aortic root is normal.  CONCLUSIONS:  1. Left ventricular systolic function is low normal with a 50-55% estimated ejection fraction.  2. There is reduced right ventricular systolic function.  3. The left atrium is moderate to severely dilated.  4. The right atrium is moderately dilated.  5. Moderate tricuspid regurgitation visualized.  6. Moderately elevated right ventricular systolic pressure. QUANTITATIVE DATA SUMMARY: 2D MEASUREMENTS:                          Normal Ranges: Ao Root d:     3.70 cm   (2.0-3.7cm) LAs:           4.60 cm   (2.7-4.0cm) IVSd:          1.10 cm   (0.6-1.1cm) LVPWd:         1.10 cm   (0.6-1.1cm) LVIDd:         5.40 cm   (3.9-5.9cm) LVIDs:         4.20 cm LV Mass Index: 90.0 g/m2 LV % FS        22.2 % LA VOLUME:                               Normal Ranges: LA Vol A4C:        130.5 ml   (22+/-6mL/m2) LA Vol A2C:        73.0 ml LA Vol BP:         99.4 ml LA Vol Index A4C:  50.0ml/m2 LA Vol Index A2C:  28.0 ml/m2 LA Vol Index BP:   38.1 ml/m2 LA Area A4C:       30.9 cm2 LA Area A2C:       22.7 cm2 LA Major Axis A4C: 6.2 cm LA Major Axis A2C: 6.0 cm RA VOLUME BY A/L METHOD:                       Normal Ranges: RA Area A4C: 29.2 cm2 AORTA MEASUREMENTS:                    Normal Ranges: Asc Ao, d: 3.60 cm (2.1-3.4cm) LV SYSTOLIC FUNCTION BY 2D PLANIMETRY (MOD):                     Normal Ranges: EF-A4C View: 55.6 % (>=55%) EF-A2C View: 53.2 % EF-Biplane:  54.3 % LV DIASTOLIC FUNCTION:                        Normal Ranges: MV Peak E:    1.00 m/s (0.7-1.2 m/s) MV e'         0.10 m/s (>8.0) MV lateral e' 0.11 m/s MV medial e'  0.08 m/s E/e' Ratio:   10.53    (<8.0) MV DT:        159 msec (150-240  msec) MITRAL VALVE:                 Normal Ranges: MV DT: 159 msec (150-240msec) AORTIC VALVE:                                    Normal Ranges: AoV Vmax:                1.64 m/s  (<=1.7m/s) AoV Peak PG:             10.8 mmHg (<20mmHg) AoV Mean P.0 mmHg  (1.7-11.5mmHg) LVOT Max Brett:            1.12 m/s  (<=1.1m/s) AoV VTI:                 31.20 cm  (18-25cm) LVOT VTI:                21.90 cm LVOT Diameter:           2.30 cm   (1.8-2.4cm) AoV Area, VTI:           2.92 cm2  (2.5-5.5cm2) AoV Area,Vmax:           2.84 cm2  (2.5-4.5cm2) AoV Dimensionless Index: 0.70  RIGHT VENTRICLE: RV Basal 5.72 cm RV Mid   3.47 cm RV Major 8.1 cm TAPSE:   17.7 mm RV s'    0.12 m/s TRICUSPID VALVE/RVSP:                             Normal Ranges: Peak TR Velocity: 3.87 m/s RV Syst Pressure: 67.9 mmHg (< 30mmHg) IVC Diam:         3.10 cm PULMONIC VALVE:                         Normal Ranges: PV Accel Time: 132 msec (>120ms) PV Max Brett:    1.0 m/s  (0.6-0.9m/s) PV Max P.2 mmHg  [id number] Finesse Patterson MD Electronically signed on 10/12/2023 at 1:02:37 PM  ** Final **     Lower extremity venous duplex left    Result Date: 10/9/2023            [place] [id number]   Tel [phone] and Fax [phone]  Vascular Lab Report Lower Venous Duplex Ultrasound  Patient Name:     PRABHJOT CHAVES       Reading           [id number] Shahid Guevara                                       Physician:        MD Study Date:       10/9/2023           Ordering          [id number] VINAYAK ZAMUDIO                                       Provider: MRN/PID:          [id number]            Technologist:     Kylah Kim RVT,                                                         [facility] Accession#:       [id number]        Technologist 2: Date of           1965 / 58      Encounter#:       [id number] Birth/Age:        years Gender:           M Admission Status: Emergency           Location          [facility]                                       Performed:  Diagnosis/ICD: Localized (leg) edema-R60.0 Indication:    Limb edema  CONCLUSIONS: Left Lower Venous: Negative for acute DVT of the visualized vessels. Possible chronic thrombus in the CFV. Patient cannot tolerate compressions of the mid distal thigh. The vessels appear patent by color flow and Doppler. Calf veins only visuslized in segments and appear patent. There are lymph nodes noted in the left groin. May wish for further evaluation. Also AVG noted and is patent. An old non functioning AVG is also noted.  Imaging & Doppler Findings:  Right Compressible Thrombus        Flow CFV       Yes        None   Spontaneous/Phasic  Left                  Compress Thrombus        Flow Distal External Iliac            None CFV                     Yes      None   Spontaneous/Phasic PFV                     Yes      None FV Proximal             Yes      None   Spontaneous/Phasic FV Mid                           None   Spontaneous/Phasic FV Distal                        None   Spontaneous/Phasic Popliteal               Yes      None   Spontaneous/Phasic  [id number] Shahid Guevara MD Electronically signed by [id number] Shahid Guevara MD on 10/9/2023 at 9:36:08 PM  ** Final **     NM PET CT lymphoma staging    Result Date: 10/6/2023  Interpreted By:  Jay Dee  and Edgar Lopez STUDY: NM PET CT LYMPHOMA STAGING;  10/6/2023 9:24 am   INDICATION: Signs/Symptoms:Lymphadenopathy. 58-year-old male with history of end-stage renal disease on dialysis with failed renal transplant. Found to have lymphadenopathy on prior CTs and PET-CT with concerns of posttreatment lymphoproliferative disorder. Per EMR: Biopsy in 2/23 of the left iliac lymph node showed no concerns for lymphoproliferative disorder. Follow-up PET-CT   COMPARISON: CT AP 09/23/2023 PET-CT 01/04/2023   ACCESSION NUMBER(S): [id number]   ORDERING CLINICIAN: JOVANNY VELASQUEZ   TECHNIQUE: DIVISION OF NUCLEAR MEDICINE  POSITRON EMISSION TOMOGRAPHY (PET-CT)   The patient received an intravenous dose of 11.8 mCi of Fluorine-18 fluorodeoxyglucose (FDG). Positron emission tomographic (PET) images from mid-thigh to skull base were then acquired after a one hour delay. Also acquired was a contemporaneous low dose non-contrast CT scan performed for attenuation correction of PET images and anatomic localization.  The PET and CT images were digitally fused for display.  All images were acquired on a combined PET-CT scanner unit. Some areas of FDG accumulation may be described in standardized uptake value (SUV) units.   CODING: Subsequent Treatment Strategy (PS)   CALIBRATION: Dose Injection-to-Scan Interval (mins): 61 min Mediastinal bloodpool SUV (normal 1.5-2.5): 1.9 Blood glucose: 60 mg/dL   FINDINGS: Evaluation is limited secondary to increased FDG activity throughout the soft tissues.   NECK: No focal hypermetabolic soft tissue lesion is seen in the neck. There are numerous bilateral predominantly subcentimeter cervical lymph nodes which demonstrate only mild FDG avidity. The largest among these is a left supraclavicular lymph node measuring 1.1 cm in short axis with SUV max of 1.5. This is not significantly changed from the prior.   CHEST: No focal hypermetabolic lesion is seen in the lung parenchyma. A small right pleural effusion is seen without significant FDG avidity. There are similar numerous bilateral axillary and mediastinal lymph nodes with only mild FDG avidity similar to blood pool.   ABDOMEN AND PELVIS: No hypermetabolic soft tissue lesion is present in the abdomen and pelvis. There is similar extent of mildly FDG avid retroperitoneal lymph nodes including para-aortic, aortocaval, pericaval, bilateral common iliac, bilateral external iliac, and inguinal lymph node stations. The largest among these is a left external iliac lymph node measuring 1.5 cm in the short axis with SUV max of 3.7. Physiologic radiotracer uptake is  present in the liver and spleen with excretion into the bowel loops and the genitourinary tract.   MUSCULOSKELETAL: There is no focal hypermetabolic lesion to suggest osseous metastasis.       Limited examination due to increased activity throughout the soft tissues. Within the limitation:   Similar extensive predominantly subcentimeter lymphadenopathy both above and below the diaphragm which may represent a low-grade lymphomatous/leukemic process versus an inflammatory etiology. No new focal hypermetabolic activity to suggest malignancy elsewhere.   I personally reviewed the image(s) / study and agree with the findings and interpretation as stated. This study was interpreted at [facility].   MACRO: None           Signed by: Jay Dee 10/6/2023 10:21 AM Dictation workstation:   [id number]        Assessment/Plan   Introduction to Palliative Care:  Spoke with pt's daughter, granddaughter and mother at bedside. Patient seemed to appreciate the extra layer of support.   Palliative care was introduced as a service for patients with serious illness to help with symptoms, assist with goals of care conversations, navigate complex decision making, improve quality of life for patients, and provide support both patients and families.    Supportive Interventions:  Music therapy as needed  Spiritual care    Medical decision making/ Goals of care:   Patient's current clinical condition, including diagnosis, prognosis, management plan, and goals of care were discussed.   Life limiting disease: septic shock, acute hypoxic respiratory failure, ESRD   Family: Supportive   Performance status: Major limitations due to disease process  Joys/meaning/strength: Family, bingo, playing cards and pool, going to the [employer]  Understanding of health: pt's family demonstrates good prognostic understanding of disease process  Information: Wants full disclosure of daily updates and prognosis   Goals: Symptom  "control, for pt to be comfortable  Worries and fears now and future: \"prolonging the inevitable\"   Minimum acceptable outcome/QOL: cognition, independence with toileting, feeding, ambulation  Code status discussion: completed with pt's daughter and mother at the bedside. Pt's poor prognosis reviewed, given acute decompensation with septic shock with poor IV access. Primary team and I reviewed the option of surgery team attempting IV access at right femoral site. Pt's family reiterated that they do not want pt to undergo any additional invasive procedures, and want to prioritize comfort. I explained pt's ongoing medical conditions, and specifically pt's worsening acute hypoxic respiratory failure, sepsis, as well as concern for cirrhosis and retroperitoneal bleed. Pt's mother and daughter confirmed pt's code status of DNR/DNI, and ultimately a decision for DNR-C.C was made this evening. I assisted primary team with placement of end of life orderset.     Advance Care Planning:   No Advance Directives on file.   Surrogate decision maker is: pt's Karuna espinal: [phone]  Code status: DNR-CC      Provider estimate of survival: hours to days  Patient Prognostic Awareness: Patient unable to respond    Patient/proxy preference for information  Prefers full information    Is the patient hospice-eligible?   Yes  Was a discussion held re hospice services?   yes  Was a decision made re hospice services?  Yes      Case discussed with primary team.    Thank you for allowing us to participate in the care of this patient. Palliative Team will continue to follow as needed.  Please contact team with any questions or concerns.    I spent 150 minutes in the care of this patient which included chart review, interviewing patient/family, discussion with primary team, coordination of care, and documentation.    Medical Decision Making was high level due to high complexity of problems, extensive data review, and high risk of " management/treatment.         Georgie Gomez, APRN-CNP

## 2023-11-01 NOTE — HOSPITAL COURSE
Nghia Hall is a 58 y.o. male with a PMH of ESRD s/p DDKT (2006, subsequent graft failure 07/2021, on HD MWF at [facility] through LT groin AV graft on [address]; not currently on immunosuppression), pAfib (on Apixaban), HFpEF, HCV (s/p treatment), gout, lymphadenopathy (following with oncology, inconclusive biopsy in 01/2023), DVT, T2DM, and Adrenal Insufficiency (on hydrocortisone) who initially presented to the ED with difficulty ambulating due to LLE pain and swelling. His left leg that's used for dialysis access is severely edematous and exquisitely tender to palpation. While admitted, nephrology was consulted for dialysis, and vascular surgery was consulted to evaluate his left AV graft.  On 10/12 patient dialysis access started oozing bright red blood, reached out to vascular surgery for urgent evaluation. Vascular surgery saw the patient and stated that there was no surgical interventions to offer. CTA a/p and LLE doppler were performed on 10/12/23, and IR was consulted for fistulogram and cardiology for right HF recommendations. IR performed fistulogram on 10/13 which showed  mild/moderate venous anastomotic stenosis, resolved s/p 7 mm POBA. Interventional radiology stated that the stenosis was not the reason for the drainage or swelling.  Cardiology also evaluated patient on 10/13 and recommended outpatient follow-up. 10/13 Blood cultures 2/4 started growing GPC (patient was already on Vanc for 2 for 2 days prior to positive bcx), ID was consulted.  Infectious disease team stated that the patient's left AVG is infected.  Vascular surgery reconsulted.  PET CT scan obtained to further clarify if AVG infected.  PET/CT scan did indicate concern for infection of the area.  In addition vascular surgery concern for graft blowout.  They have recommended excision of AVG which was removed on 10/22.  Patient noted to have murky fluid identified around midpoint of graft.  Graft and fluid swab sent for culture. New  right femoral temporary HD catheter was placed on 10/20/23.  Wound VAC was placed to the left groin wound on 10/24.  Patient leg pain and edema had not improved since admission.  On 10/27, leg pain was worse along with significant output into wound VAC, vascular surgery recommended DVT ultrasound of the left leg.  Ultrasound was obtained and found an occlusive DVT of the left distal femoral vein.       On 10/30 AM, the patient had a rapid response called for hypoglycemia and unresponsiveness. He was lying flat on 100% NRB. He was found to be hypoxic in the 70s with glucose of 30s. D10 infused, causing improvement of sugars. BiPAP was started. ABG was sent and showed pH 7.30, pCO2 44, pO2 42, Bicarb 21.6, and lactate of 5.7. He was then started on 500 mL LR and Zosyn for concern of sepsis. SBP continued to drop to the 60s, and was then started on a norepinephrine drip. He was transferred to the MICU for further management of septic shock.    ICU course: The patient was found to have ESBL E.coli bacteremia and was started on meropenem. Continued vasopressors. Vascular surgery recommended that the tunneled catheter was the site of infection, and wanted it removed. Discussed with IR, stating that the patient cannot have the catheter removed or else the patient would have poor central venous access. The patient has bilateral internal jugular occlusion per IR and previous review of imaging. During the course of ICU admission, his pressor requirements continued to decrease. Re-attempted to place a central line in the patient, however the patient had an episode of dropped BP, HR, and unresponsiveness. STAT blood gas showed hyperkalemia, and was given calcium gluconate, bicarbonate, and Lokelma. Restarted vasopressin and epinephrine drip. Night team ICU and vascular surgery night staff attempted to place central line after the event, however couldn't. Goals of care discussion was held overnight, family elected to go DNR/DNI.  The next morning, the patient was somnolent and unresponsive to sternal rub. Palliative care team was consulted for end of life care conversations, and discussion was held between the primary team, palliative care, and the family. After discussion was held, the family has decided to pursue comfort care measures for the patient.     The patient was pronounced dead at 1822 @ 11/1.

## 2023-11-01 NOTE — PROGRESS NOTES
"Nghia Hall is a 58 y.o. male on day 23 of admission with PMH of HTN, HLD, paroxysmal A-fib on Eliquis, HFpEF w/ most recent echo on 10/11 which showed EF 50%, ESRD s/p DDKT (2006, subsequent graft failure 07/2021, on HD MWF at [facility] through LT groin AV graft on [address]; not currently on immunosuppression), T2DM, LAZARO, Hepatitis C s/p treatment, gout, lymphadenopathy (following with oncology, inconclusive biopsy in 01/2023), DVT, and Adrenal Insufficiency (on hydrocortisone) who presented to the MICU after a rapid response was called for hypoglycemia and unresponsiveness, and was found to have elevated lactate at 5.7 and drops in SBP to about the 60s which required a norepinephrine drip. He is admitted to the ICU with septic shock.     Subjective   Overnight, the patient had numerous attempts for a central line to be placed via ICU team and vascular surgery, could not do it. Night ICU team held discussion with family, changed code status to DNR/DNI overnight. Per mother as documented in note, \"If he looks uncomfortable or worsens despite the medications, I want him to die peacefully.\"    This AM, the patient was seen at bedside with family members. Patient somnolent and unarousable.    Discussion held with primary team, palliative care, and family about GOC going forward. The family has elected for the patient to transition to comfort care at this time. At the time of documentation, currently transitioned the patient to comfort care, orders in EMR have reflected this.    Objective     Physical Exam  Vitals reviewed.   Constitutional:       Appearance: He is ill-appearing.      Comments: Nonarousable to sternal rub   Cardiovascular:      Rate and Rhythm: Tachycardia present. Rhythm irregular.   Pulmonary:      Effort: No respiratory distress.      Breath sounds: Normal breath sounds. No wheezing.   Chest:      Chest wall: No tenderness.   Abdominal:      General: There is distension.      Tenderness: There is " "abdominal tenderness.   Musculoskeletal:         General: Swelling and tenderness present.      Right lower leg: Edema (Significantly greater than LLE edema) present.      Left lower leg: Edema present.   Neurological:      Comments: Not alert, non-arousable. Unable to wake up         Last Recorded Vitals  Blood pressure 106/52, pulse 104, temperature 36.2 °C (97.2 °F), resp. rate 17, height 1.854 m (6' 1\"), weight 143 kg (315 lb), SpO2 98 %.  Intake/Output last 3 Shifts:  I/O last 3 completed shifts:  In: 6566.8 [I.V.:5706.8; Blood:610; IV Piggyback:250]  Out: 0     Relevant Results  Scheduled medications     Continuous medications     PRN medications  PRN medications: fentaNYL PF, fentaNYL PF, fentaNYL PF, glycopyrrolate, LORazepam, oxygen      Assessment/Plan     Nghia Hall is a 58 y.o. male on day 23 of admission with PMH of HTN, HLD, paroxysmal A-fib on Eliquis, HFpEF w/ most recent echo on 10/11 which showed EF 50%, ESRD s/p DDKT (2006, subsequent graft failure 07/2021, on HD MWF at [facility] through LT groin AV graft on [address]; not currently on immunosuppression), T2DM, LAZARO, Hepatitis C s/p treatment, gout, lymphadenopathy (following with oncology, inconclusive biopsy in 01/2023), DVT, and Adrenal Insufficiency (on hydrocortisone) who presented to the MICU after a rapid response was called for hypoglycemia and unresponsiveness, and was found to have elevated lactate at 5.7 and drops in SBP to about the 60s which required a norepinephrine drip. He is admitted to the ICU with septic shock.    Patient and family elected to go comfort care, prior plan below.    Nghia Hall is a 58 year old male with PMH of HTN, HLD, paroxysmal A-fib on Eliquis, HFpEF w/ most recent echo on 10/11 which showed EF 50%, ESRD s/p DDKT (2006, subsequent graft failure 07/2021, on HD MWF at [facility] through LT groin AV graft on [address]; not currently on immunosuppression), T2DM, LAZARO, Hepatitis C s/p treatment, gout, " lymphadenopathy (following with oncology, inconclusive biopsy in 01/2023), DVT, and Adrenal Insufficiency (on hydrocortisone) who presented to the MICU after a rapid response was called for hypoglycemia and unresponsiveness, and was found to have elevated lactate at 5.7 and drops in SBP to about the 60s which required a norepinephrine drip. He is admitted to the ICU for concern for possible septic shock vs adrenal insufficiency.     Neuro/Psych  #Pain control  - Starting patient on PRN oxycodone 5 mg every 8 hours for pain.  - Patient on PRN Tylenol 650 mg q4 for mild pain and Flexeril 5 mg TID PRN for muscle spasms     Cardiovascular  #Septic shock  - Discussion held with vascular surgery, who believes that the primary site of infection is the tunneled catheter in his right femoral area.  - Patient currently on vasopressin and norepinephrine, wean as tolerated  - Holding home anti-hypertensive medications of amlodipine 10 mg and metoprolol tartrate 50 mg BID in the setting of shock and hypotension.  - Most recent lactate 3.5 and elevated, continuing to trend lactate, improving  - MAP goal 60-70     #LLE edema and pain   #Ambulatory dysfunction  #Left femoral access hemorrhage with superficial skin split  #New left femoral vein DVT  #Vascular access issues  -Vascular surgery following.  -IR was consulted, performed fistulogram on 10/13  which showed mild/moderate venous anastomotic stenosis, resolved s/p  7 mm POBA.  IR also placed a tunneled dialysis catheter in the right groin on 10/20.  -Vascular surgery placed a wound VAC to the left groin on 10/24.  Wound care team consulted for VAc management  - On 10/27, Left leg ultrasound was obtained and revealed occlusive DVT in the left distal femoral vein. Vascular surgery aware  - Vascular surgery consulted: recommending to remove tunneled cath due to infection via IR, however discussed with IR, and they would not like the tunneled cath removed as the patient has very  little to no vascular access. See nephro section for various discussions held.  - Patient successfully underwent axillary arterial line in the right side.      #Chronic HFpEF  -TTE on 10/12 revealed EF 50%, with reduced RV systolic function   -Cardiology signed off on 10/13, recommending optimization of volume status.  -Daily weights     Pulmonary  #LAZARO  - Continuing nocturnal CPAP     Gastrointestinal  #Concern for cirrhosis on imaging  - Hepatology team reconsulted for concern of acutely worsening liver enzymes, previous concerning findings of cirrhosis on imaging, acutely worsening INR. Hepatology recommending outpatient hepatic workup with fibroscan or liver biopsy, and are not concerned that liver dysfunction is contributing to his current status.     #Retroperitoneal hemorrhage  - CT abdomen and pelvis was concerning for signs of retroperitoneal hemorrhage. Currently holding off on anticoagulation     Nephrology/Urology  #ESRD on M/W/F dialysis  - Patient initially planned for CRRT today per nephrology recommendations  - The main deterrent for CRRT is that the patient is receiving his pressors through his tunneled catheter, and requires pressors to achieve optimal MAP goals of 60-70.   - Throughout the course of the day, numerous discussions were held between the primary team, vascular surgery team, IR, anesthesia, and phlebotomy to attempt to put central venous access for him. Vascular surgery recommended to remove the tunneled cath as they were concerned for infection from that site rather than a graft infection. Discussed with IR, who recommended to not remove the catheter because the patient would not have good vascular access otherwise. Per IR discussion and previous imaging, the patient has occluded bilateral internal jugular vessels, so he cannot receive central venous access through there. Discussed with both teams to have a conversation about a plan going forward, as the patient urgently needs CRRT,  but given poor IV access and the need for pressor support, it is unclear on what the plan is going forward.   -In the evening, an EJ tube was attempted to be placed, however the patient became less responsive, and immediately responded with norepinephrine, atropine, bicarbonate push, and an amp of d50. Restarted patient on increased norepinephrine dose and vasopressin dose. FFP ordered as well in case central access can be obtained overnight. Anesthesia contacted and deferred the issue, recommending to talk to the night ICU attending for help in establishing access. Phlebotomy as well were very busy and also unable to assist in placing a catheter.   - Will attempt overnight to get central venous access for pressors, plan for CRRT overnight.        ID  #ESBL E.coli bacteremia  #LLE cellulitis   #Infection of left AVG  #Concern for septic shock 2/2 infected graft and tunneled catheter  - 10/13 blood cultures ( 2 out of 4) started growing GPC in clusters.  Repeat culture from 10/14 and 10/16 no growth to date.    - 10/30 blood cultures (3/4) growing gram negative bacilli. Pending identification and susceptibility  - PET/CT scan done further supports suspicion for infection of AVG.  - ID following.  - Blood culture identification and sensitivities showed ESBL E.coli, discontinued Zosyn (10/30 - 10/31) and started patient on meropenem (10/31 - ). Given one dose of meropenem today, will discuss tomorrow about meropenem in anticipation for potential dialysis later tonight or overnight.      Endocrinology  #Concern for worsening adrenal insufficiency  -Continue hydrocortisone IV 50 mg q6h     #Hypoglycemia  #History of T2DM  - When the rapid was called, the patient was found to have BG in the 30s  - Continuing hypoglycemia protocol  - Will continue d10 infusion for hypoglycemia  - Will check glucoses q2, upon improvement and stabilization of sugars, will increase checks to 6 to 8 hours.     MSK/Derm  #Concern for possible  compartment syndrome  - LLE significant edema and tenderness, there is concern for compartment syndrome  - CK levels 235 and normal, will continue to monitor and consider orthopedic consult     Heme/Onc  #Lymphadenopathy  -Patient followed by oncology.  Discussed with patient's oncologist Dr. Castellon, she states that at this time his previous biopsy has been negative and she has no good evidence of  lymphoma.  No acute evaluations needed inpatient.  Can follow-up outpatient.     #Anemia of ESRD  - Continue home epoeitin urmila 44587 units every M/W/F     Fluids: None  Electrolytes: Replete as needed  Nutrition: Renal  GI Ppx: Protonix  DVT Ppx: Heparin drip     Oxygen: 5L  Access: PIVs  Drips: Norepinephrine and vasopressin  Antibiotics: Vancomycin and Zosyn (10/30 - 10/31). Meropenem (10/31 - 11/1)  Code Status: DNR-CC     Dispo: 58 year old admitted to the ICU for concern of septic shock vs worsening adrenal insufficiency. Family electing to go comfort care at this time.    Chandler Jones MD

## 2023-11-02 LAB
BACTERIA SPEC CULT: ABNORMAL
BACTERIA SPEC CULT: ABNORMAL
GRAM STN SPEC: ABNORMAL
GRAM STN SPEC: ABNORMAL

## 2023-11-02 ASSESSMENT — ENCOUNTER SYMPTOMS: FEVER: 1

## 2023-11-02 NOTE — DISCHARGE SUMMARY
Discharge Diagnosis  Septic shock, ESRD, multi-organ failure  Death    Hospital Course  Nghia Hall is a 58 y.o. male with a PMH of ESRD s/p DDKT (2006, subsequent graft failure 07/2021, on HD MWF at [facility] through LT groin AV graft on [address]; not currently on immunosuppression), pAfib (on Apixaban), HFpEF, HCV (s/p treatment), gout, lymphadenopathy (following with oncology, inconclusive biopsy in 01/2023), DVT, T2DM, and Adrenal Insufficiency (on hydrocortisone) who initially presented to the ED with difficulty ambulating due to LLE pain and swelling. His left leg that's used for dialysis access is severely edematous and exquisitely tender to palpation. While admitted, nephrology was consulted for dialysis, and vascular surgery was consulted to evaluate his left AV graft.  On 10/12 patient dialysis access started oozing bright red blood, reached out to vascular surgery for urgent evaluation. Vascular surgery saw the patient and did not feel that there was any surgical interventions to offer. CTA a/p and LLE doppler were performed on 10/12/23, consulted IR for fistulogram and cardiology for right HF recommendations. IR performed fistulogram on 10/13 which showed  mild/moderate venous anastomotic stenosis, resolved s/p  7 mm POBA.  Interventional radiology did not feel stenosis would explain drainage or swelling.  Cardiology also evaluated patient on 10/13 and recommended outpatient follow-up. 10/13 Blood cultures 2/4 started growing GPC (patient was already on Vanc for 2 for 2 days prior to positive bcx), ID was consulted.  Infectious disease is concerned that patient's left AVG is infected.  Vascular surgery reconsulted.  PET CT scan obtained to further clarify if AVG infected.  PET/CT scan did indicate concern for infection of the area.  In addition vascular surgery concern for graft blowout.  They have recommended excision of AVG which was removed on 10/22.  Patient noted to have murky fluid identified  "around midpoint of graft.  Graft and fluid swab sent for culture. New right femoral temporary HD catheter was placed on 10/20/23.  Wound VAC was placed to the left groin wound on 10/24.  Patient leg pain and edema had not improved since admission.  On 10/27, leg pain was worse along with significant output into wound VAC, vascular surgery recommended DVT ultrasound of the left leg.  Ultrasound was obtained and \"of the left femoral vein.  Occlusive DVT of the left distal femoral vein.       On 10/30 AM, the patient had a rapid response called for hypoglycemia and unresponsiveness. He was lying flat on 100% NRB. Had desatted down to the 70s and was not responding, with glucose of 30s. D10 infused, causing improvement of sugars. BiPAP was started. ABG was sent and showed pH 7.30, pCO2 44, pO2 42, Bicarb 21.6, and lactate of 5.7. He was then started on 500 mL LR and Zosyn for concern of sepsis. SBP continued to drop to the 60s, and was then started on a norepinephrine drip. He was transferred to the MICU for further management of likely septic shock.    ICU course: The patient was found to be growing ESBL E.coli, and was started on meropenem. Continued pressors. Vascular surgery recommended that the tunneled catheter was the site of infection, and wanted it removed. Discussed with IR, stating that the patient cannot have the catheter removed or else the patient would have poor PO access. The patient has bilateral internal jugular occlusion per IR and previous review of imaging. Pressors were slowly but surely weaned. Re-attempted to place a central line in the patient, however the patient had an episode of dropped BP, HR, and unresponsiveness. Patient received a hyperkalemia cocktail. Restarted vasopressin and epinephrine drip. Night team ICU and vascular surgery night staff attempted to place central line after the event, however couldn't. Goals of care discussion was held overnight, family elected to go DNR/DNI. The " next morning, the patient was somnolent and unresponsive. Palliative consulted and a GOC discussion was held with the family. Family elected for the patient to go comfort care. At 11/1 @ 2218, the patient was pronounced dead with family at bedside.      Chandler Jones MD

## 2023-11-02 NOTE — PROGRESS NOTES
VASCULAR SURGERY PROGRESS NOTE  Subjective   Continues to remain on high dose vasopressors. Worsening hyperkalemia and metabolic acidosis    Objective   Vitals:    11/01/23 1800   BP:    Pulse: (!) 114   Resp: 17   Temp:    SpO2:       Exam:  Constitutional: Ill-appearing  Neuro:  obtunded, not following commands  ENMT: moist mucous membranes  CV: no tachycardia on bedside monitor  Pulm: labored on nasal canula  GI: soft, non-tender, non-distended  Skin: Left thigh wound clean with granulation tissue, left lateral incision intact without any drainage, left medial incision opened at bedside with drainage of seropurulent fluid, no exposed graft once the wound was opened  Musculoskeletal: moving all extremities  Extremities: right femoral tunneled HD line, left thigh wounds as above, monophasic L PT and DP, sensory and motor intact    Labs:  Results from last 7 days   Lab Units 11/01/23  0428 10/31/23  1831 10/31/23  0450   WBC AUTO x10*3/uL 27.0* 25.3* 21.6*   HEMOGLOBIN g/dL 8.0* 8.2* 8.9*   PLATELETS AUTO x10*3/uL 127* 129* 175        Results from last 7 days   Lab Units 11/01/23  0428 10/31/23  2049 10/31/23  1831   SODIUM mmol/L 123* 124* 123*   POTASSIUM mmol/L 5.8* 5.7* 6.1*   CHLORIDE mmol/L 86* 88* 88*   CO2 mmol/L 23 19* 22   BUN mg/dL 55* 56* 57*   CREATININE mg/dL 6.73* 6.91* 6.70*   GLUCOSE mg/dL 130* 82 241*   MAGNESIUM mg/dL 1.54* 1.53* 1.49*   PHOSPHORUS mg/dL 6.5* 6.4*  --         Results from last 7 days   Lab Units 11/01/23  0428 10/31/23  1831 10/31/23  0450   INR  2.4* 2.8* 3.3*   PROTIME seconds 26.8* 32.4* 37.7*   APTT seconds 39* 43* 42*       Assessment/Plan   Nghia Hall is 58 y.o. male with history of ESRD s/p DDKT (2006, subsequent graft failure 2021) on HD, pAfib (on Eliquis), HCV, gout, lymphadenopathy, adrenal insufficiency (on steroids) who presented with worsening left leg swelling and pain    He underwent L femoral AVG explant for skin ulceration and possible graft infection.  "    Concerns for septic shock likely related to central line infection. Blood cultures growing gram negative bacilli. CT A/P reviewed, showed superficial collection without involvement of AVG cuff. Seropurulent fluid drained from left femoral medial incision, no exposed graft material within the wound. Wound culture negative to date.    Issues with obtaining central venous access at this time    Plan:  - wound care to left thigh wounds: pack both wound with 1\" NuGauze and cover with ABD pads  - follow-up cultures  - continue IV antibiotics  - if additional central venous access is needed, can consider left femoral vein access given failed multiple attempts at bilateral subclavian and IJ   - patient's family is electing to move forward with comfort care    D/w attending, Dr. Erika Oliver MD  Vascular Surgery PGY-4  Team pager: [phone]   "

## 2023-11-02 NOTE — SIGNIFICANT EVENT
I was called to patient’s bedside to pronounce that  Mr melendrez has . No spontaneous movements were present. There was not response to verbal or tactile stimuli. Pupils were mid-dilated and fixed. No breath sounds were appreciated over either lung field. No carotid pulses were palpable. No heart sounds were auscultator over entire precordium. Patient pronounced dead at date & time. Family was inside the room when I pronounced the patient. The family declines autopsy. [place] and postmortem services offered. The family declines organ donation. Document if pt was DNR/DNI. Patient’s major medical illness was Septic shock. Time of death was . Nurse assigned to him was at bedside.

## 2023-11-02 NOTE — DISCHARGE SUMMARY
Discharge Diagnosis  Septic shock, multi-organ failure  Death    Hospital Course  Nghia Hall is a 58 y.o. male with a PMH of ESRD s/p DDKT (2006, subsequent graft failure 07/2021, on HD MWF at [facility] through LT groin AV graft on [address]; not currently on immunosuppression), pAfib (on Apixaban), HFpEF, HCV (s/p treatment), gout, lymphadenopathy (following with oncology, inconclusive biopsy in 01/2023), DVT, T2DM, and Adrenal Insufficiency (on hydrocortisone) who initially presented to the ED with difficulty ambulating due to LLE pain and swelling. His left leg that's used for dialysis access is severely edematous and exquisitely tender to palpation. While admitted, nephrology was consulted for dialysis, and vascular surgery was consulted to evaluate his left AV graft.  On 10/12 patient dialysis access started oozing bright red blood, reached out to vascular surgery for urgent evaluation. Vascular surgery saw the patient and stated that there was no surgical interventions to offer. CTA a/p and LLE doppler were performed on 10/12/23, and IR was consulted for fistulogram and cardiology for right HF recommendations. IR performed fistulogram on 10/13 which showed  mild/moderate venous anastomotic stenosis, resolved s/p 7 mm POBA. Interventional radiology stated that the stenosis was not the reason for the drainage or swelling.  Cardiology also evaluated patient on 10/13 and recommended outpatient follow-up. 10/13 Blood cultures 2/4 started growing GPC (patient was already on Vanc for 2 for 2 days prior to positive bcx), ID was consulted.  Infectious disease team stated that the patient's left AVG is infected.  Vascular surgery reconsulted.  PET CT scan obtained to further clarify if AVG infected.  PET/CT scan did indicate concern for infection of the area.  In addition vascular surgery concern for graft blowout.  They have recommended excision of AVG which was removed on 10/22.  Patient noted to have murky fluid  identified around midpoint of graft.  Graft and fluid swab sent for culture. New right femoral temporary HD catheter was placed on 10/20/23.  Wound VAC was placed to the left groin wound on 10/24.  Patient leg pain and edema had not improved since admission.  On 10/27, leg pain was worse along with significant output into wound VAC, vascular surgery recommended DVT ultrasound of the left leg.  Ultrasound was obtained and found an occlusive DVT of the left distal femoral vein.       On 10/30 AM, the patient had a rapid response called for hypoglycemia and unresponsiveness. He was lying flat on 100% NRB. He was found to be hypoxic in the 70s with glucose of 30s. D10 infused, causing improvement of sugars. BiPAP was started. ABG was sent and showed pH 7.30, pCO2 44, pO2 42, Bicarb 21.6, and lactate of 5.7. He was then started on 500 mL LR and Zosyn for concern of sepsis. SBP continued to drop to the 60s, and was then started on a norepinephrine drip. He was transferred to the MICU for further management of septic shock.    ICU course: The patient was found to have ESBL E.coli bacteremia and was started on meropenem. Continued vasopressors. Vascular surgery recommended that the tunneled catheter was the site of infection, and wanted it removed. Discussed with IR, stating that the patient cannot have the catheter removed or else the patient would have poor central venous access. The patient has bilateral internal jugular occlusion per IR and previous review of imaging. During the course of ICU admission, his pressor requirements continued to decrease. Re-attempted to place a central line in the patient, however the patient had an episode of dropped BP, HR, and unresponsiveness. STAT blood gas showed hyperkalemia, and was given calcium gluconate, bicarbonate, and Lokelma. Restarted vasopressin and epinephrine drip. Night team ICU and vascular surgery night staff attempted to place central line after the event, however  couldn't. Goals of care discussion was held overnight, family elected to go DNR/DNI. The next morning, the patient was somnolent and unresponsive to sternal rub. Palliative care team was consulted for end of life care conversations, and discussion was held between the primary team, palliative care, and the family. After discussion was held, the family has decided to pursue comfort care measures for the patient.     The patient was pronounced dead at 1822 @ 11/1.      Chandler Jones MD

## 2023-11-06 ENCOUNTER — APPOINTMENT (OUTPATIENT)
Dept: TRANSPLANT | Facility: HOSPITAL | Age: 58
End: 2023-11-06
Payer: COMMERCIAL

## 2023-11-06 ENCOUNTER — APPOINTMENT (OUTPATIENT)
Dept: TRANSPLANT | Facility: HOSPITAL | Age: 58
End: 2023-11-06
Payer: MEDICARE

## 2023-11-06 ENCOUNTER — APPOINTMENT (OUTPATIENT)
Dept: VASCULAR SURGERY | Facility: HOSPITAL | Age: 58
End: 2023-11-06
Payer: COMMERCIAL

## 2023-11-08 ENCOUNTER — APPOINTMENT (OUTPATIENT)
Dept: INFECTIOUS DISEASES | Facility: CLINIC | Age: 58
End: 2023-11-08
Payer: COMMERCIAL

## 2023-11-13 LAB
LABORATORY COMMENT REPORT: NORMAL
PATH REPORT.FINAL DX SPEC: NORMAL
PATH REPORT.GROSS SPEC: NORMAL
PATH REPORT.RELEVANT HX SPEC: NORMAL
PATH REPORT.TOTAL CANCER: NORMAL

## 2023-11-14 LAB
ATRIAL RATE: 74 BPM
ATRIAL RATE: 75 BPM
Q ONSET: 202 MS
Q ONSET: 207 MS
QRS COUNT: 12 BEATS
QRS COUNT: 15 BEATS
QRS DURATION: 110 MS
QRS DURATION: 112 MS
QT INTERVAL: 324 MS
QT INTERVAL: 384 MS
QTC CALCULATION(BAZETT): 409 MS
QTC CALCULATION(BAZETT): 423 MS
QTC FREDERICIA: 379 MS
QTC FREDERICIA: 410 MS
R AXIS: -41 DEGREES
R AXIS: -58 DEGREES
T AXIS: 62 DEGREES
T AXIS: 64 DEGREES
T OFFSET: 369 MS
T OFFSET: 394 MS
VENTRICULAR RATE: 73 BPM
VENTRICULAR RATE: 96 BPM

## 2023-11-20 ENCOUNTER — APPOINTMENT (OUTPATIENT)
Dept: VASCULAR SURGERY | Facility: HOSPITAL | Age: 58
End: 2023-11-20
Payer: COMMERCIAL

## 2023-12-06 ENCOUNTER — APPOINTMENT (OUTPATIENT)
Dept: PRIMARY CARE | Facility: CLINIC | Age: 58
End: 2023-12-06
Payer: COMMERCIAL

## 2024-04-09 ENCOUNTER — APPOINTMENT (OUTPATIENT)
Dept: ENDOCRINOLOGY | Facility: CLINIC | Age: 59
End: 2024-04-09
Payer: COMMERCIAL

## (undated) DEVICE — SUTURE, PROLENE, 6-0, 24 IN, BV1, DA, BLUE

## (undated) DEVICE — CUFF, TOURNIQUET, 24 X 4, SNGL PORT/SNGL BLADDER, DISP, LF

## (undated) DEVICE — SUTURE, SILK, 3-0, 18 IN SH/CR, BLACK

## (undated) DEVICE — TAPE, UMBILICAL, 1/8 X 30 IN, MULTIPACK, COTTON, WHITE

## (undated) DEVICE — SYRINGE, 20 CC, LUER LOCK, MONOJECT, W/O CAP, LF

## (undated) DEVICE — INSERT, CLAMP, SURGICAL, SOFT/TRACTION, STEALTH, 1 MM

## (undated) DEVICE — PADDING, WEBRIL, UNDERCAST, STERILE, 6 IN

## (undated) DEVICE — SUTURE, PROLENE, 5-0, 24 IN, C1, DA, BLUE

## (undated) DEVICE — SUTURE, VICRYL, 2-0, 27 IN, BR/SH 27, VIOLET

## (undated) DEVICE — MANIFOLD, 4 PORT NEPTUNE STANDARD

## (undated) DEVICE — SUTURE, MONOCRYL, 4-0, 18 IN, PS2, UNDYED

## (undated) DEVICE — BOLSTER, HIP

## (undated) DEVICE — CATHETER TRAY, SURESTEP, 16FR, URINE METER W/STATLOCK

## (undated) DEVICE — SUTURE, VICRYL, 3-0, 27 IN, SH

## (undated) DEVICE — SUTURE, SILK, 2-0, 30 IN, SH, BLACK

## (undated) DEVICE — STAPLER, SKIN PROXIMATE, 35 WIDE

## (undated) DEVICE — EXTENSION SET W/MALE LUER LOCK ADAPTER, VOLUME 2.4ML

## (undated) DEVICE — BANDAGE, ESMARK, 6 IN X 9 FT, STERILE

## (undated) DEVICE — COVER, EQUIPMENT, SOLUTION, SLUSH, LF, 122CM X 122 CM, STERILE

## (undated) DEVICE — DRAPE, ISOLATION, BAG, DRAW STRING CLOSURE, STERI DRAPE, LARGE, 20 X 20 IN, DISPOSABLE, STERILE

## (undated) DEVICE — BANDAGE, ELASTIC, SELF-CLOSE, 4 IN, HONEYCOMB, STERILE

## (undated) DEVICE — COVER, CART, 45 X 27 X 48 IN, CLEAR

## (undated) DEVICE — SUTURE, PROLENE, 7-0, 24 IN, BV1, DA, BLUE

## (undated) DEVICE — PROTECTOR, NERVE, ULNAR, PINK

## (undated) DEVICE — DRAPE, INCISE, ANTIMICROBIAL, IOBAN 2, STERI DRAPE, 23 X 33 IN, DISPOSABLE, STERILE

## (undated) DEVICE — DRAPE, PAD, PREP, W/ 9 IN CUFF, 24 X 41, LF, NS

## (undated) DEVICE — Device

## (undated) DEVICE — CONTAINER, SPECIMEN, 120 ML, STERILE

## (undated) DEVICE — GOWN, SURGICAL, SMARTGOWN, XLARGE, STERILE

## (undated) DEVICE — GLOVE, SURGICAL, PROTEXIS PI MICRO, 7.5, PF, LF

## (undated) DEVICE — IRRIGATION SYSTEM, WOUND, SURGIPHOR, 450ML, STERILE